# Patient Record
Sex: MALE | Race: BLACK OR AFRICAN AMERICAN | NOT HISPANIC OR LATINO | Employment: OTHER | URBAN - METROPOLITAN AREA
[De-identification: names, ages, dates, MRNs, and addresses within clinical notes are randomized per-mention and may not be internally consistent; named-entity substitution may affect disease eponyms.]

---

## 2015-03-23 LAB — HIV: NEGATIVE

## 2018-01-15 ENCOUNTER — OFFICE VISIT (OUTPATIENT)
Dept: FAMILY MEDICINE | Facility: CLINIC | Age: 58
End: 2018-01-15
Payer: MEDICARE

## 2018-01-15 VITALS
HEIGHT: 69 IN | TEMPERATURE: 99 F | RESPIRATION RATE: 17 BRPM | WEIGHT: 227.31 LBS | BODY MASS INDEX: 33.67 KG/M2 | OXYGEN SATURATION: 97 % | HEART RATE: 84 BPM | SYSTOLIC BLOOD PRESSURE: 110 MMHG | DIASTOLIC BLOOD PRESSURE: 84 MMHG

## 2018-01-15 DIAGNOSIS — Z23 NEED FOR PNEUMOCOCCAL VACCINATION: ICD-10-CM

## 2018-01-15 DIAGNOSIS — Z23 NEED FOR INFLUENZA VACCINATION: ICD-10-CM

## 2018-01-15 DIAGNOSIS — K21.9 GASTROESOPHAGEAL REFLUX DISEASE WITHOUT ESOPHAGITIS: ICD-10-CM

## 2018-01-15 DIAGNOSIS — I10 ESSENTIAL HYPERTENSION: ICD-10-CM

## 2018-01-15 DIAGNOSIS — Z98.890 HISTORY OF CORONARY ANGIOGRAM: ICD-10-CM

## 2018-01-15 DIAGNOSIS — M25.572 CHRONIC PAIN OF LEFT ANKLE: ICD-10-CM

## 2018-01-15 DIAGNOSIS — E11.9 TYPE 2 DIABETES MELLITUS WITHOUT COMPLICATION, WITH LONG-TERM CURRENT USE OF INSULIN: Primary | ICD-10-CM

## 2018-01-15 DIAGNOSIS — E78.49 OTHER HYPERLIPIDEMIA: ICD-10-CM

## 2018-01-15 DIAGNOSIS — Z79.4 TYPE 2 DIABETES MELLITUS WITHOUT COMPLICATION, WITH LONG-TERM CURRENT USE OF INSULIN: Primary | ICD-10-CM

## 2018-01-15 DIAGNOSIS — G89.29 CHRONIC PAIN OF LEFT ANKLE: ICD-10-CM

## 2018-01-15 PROCEDURE — 99204 OFFICE O/P NEW MOD 45 MIN: CPT | Mod: PBBFAC,PO,25 | Performed by: FAMILY MEDICINE

## 2018-01-15 PROCEDURE — 99204 OFFICE O/P NEW MOD 45 MIN: CPT | Mod: S$PBB,,, | Performed by: FAMILY MEDICINE

## 2018-01-15 PROCEDURE — G0009 ADMIN PNEUMOCOCCAL VACCINE: HCPCS | Mod: PBBFAC,PO

## 2018-01-15 PROCEDURE — 99999 PR PBB SHADOW E&M-NEW PATIENT-LVL IV: CPT | Mod: PBBFAC,,, | Performed by: FAMILY MEDICINE

## 2018-01-15 PROCEDURE — 90686 IIV4 VACC NO PRSV 0.5 ML IM: CPT | Mod: PBBFAC,PO

## 2018-01-15 RX ORDER — PEN NEEDLE, DIABETIC 29 G X1/2"
1 NEEDLE, DISPOSABLE MISCELLANEOUS DAILY
Qty: 90 EACH | Refills: 6 | Status: SHIPPED | OUTPATIENT
Start: 2018-01-15 | End: 2018-06-04 | Stop reason: ALTCHOICE

## 2018-01-15 RX ORDER — ACETAMINOPHEN 500 MG
500 TABLET ORAL EVERY 6 HOURS PRN
COMMUNITY

## 2018-01-15 RX ORDER — OMEPRAZOLE 20 MG/1
20 CAPSULE, DELAYED RELEASE ORAL DAILY
Qty: 90 CAPSULE | Refills: 2 | Status: SHIPPED | OUTPATIENT
Start: 2018-01-15 | End: 2019-01-30 | Stop reason: SDUPTHER

## 2018-01-15 RX ORDER — LISINOPRIL 20 MG/1
20 TABLET ORAL DAILY
Qty: 90 TABLET | Refills: 2 | Status: SHIPPED | OUTPATIENT
Start: 2018-01-15 | End: 2018-06-04 | Stop reason: SDUPTHER

## 2018-01-15 RX ORDER — TRAMADOL HYDROCHLORIDE 50 MG/1
50 TABLET ORAL 2 TIMES DAILY PRN
Qty: 60 TABLET | Refills: 1 | Status: SHIPPED | OUTPATIENT
Start: 2018-01-15 | End: 2018-01-25

## 2018-01-15 RX ORDER — INSULIN DETEMIR 100 [IU]/ML
40 INJECTION, SOLUTION SUBCUTANEOUS NIGHTLY
Qty: 15 ML | Refills: 8 | Status: SHIPPED | OUTPATIENT
Start: 2018-01-15 | End: 2019-02-28 | Stop reason: SDUPTHER

## 2018-01-15 RX ORDER — SIMVASTATIN 40 MG/1
40 TABLET, FILM COATED ORAL NIGHTLY
COMMUNITY
Start: 2017-11-15 | End: 2018-01-15 | Stop reason: SDUPTHER

## 2018-01-15 RX ORDER — CARVEDILOL 3.12 MG/1
3.12 TABLET ORAL 2 TIMES DAILY WITH MEALS
COMMUNITY
Start: 2017-11-15 | End: 2018-01-15 | Stop reason: SDUPTHER

## 2018-01-15 RX ORDER — CARVEDILOL 3.12 MG/1
3.12 TABLET ORAL 2 TIMES DAILY WITH MEALS
Qty: 180 TABLET | Refills: 2 | Status: SHIPPED | OUTPATIENT
Start: 2018-01-15 | End: 2018-06-04 | Stop reason: SDUPTHER

## 2018-01-15 RX ORDER — METFORMIN HYDROCHLORIDE 500 MG/1
500 TABLET ORAL 2 TIMES DAILY WITH MEALS
Qty: 180 TABLET | Refills: 3 | Status: SHIPPED | OUTPATIENT
Start: 2018-01-15 | End: 2018-10-10

## 2018-01-15 RX ORDER — INSULIN DETEMIR 100 [IU]/ML
40 INJECTION, SOLUTION SUBCUTANEOUS NIGHTLY
COMMUNITY
Start: 2017-11-15 | End: 2018-01-15 | Stop reason: SDUPTHER

## 2018-01-15 RX ORDER — SIMVASTATIN 40 MG/1
40 TABLET, FILM COATED ORAL NIGHTLY
Qty: 90 TABLET | Refills: 2 | Status: SHIPPED | OUTPATIENT
Start: 2018-01-15 | End: 2019-02-12 | Stop reason: SDUPTHER

## 2018-01-15 RX ORDER — LISINOPRIL 20 MG/1
20 TABLET ORAL DAILY
COMMUNITY
Start: 2017-11-15 | End: 2018-01-15 | Stop reason: SDUPTHER

## 2018-01-15 RX ORDER — OMEPRAZOLE 20 MG/1
20 CAPSULE, DELAYED RELEASE ORAL DAILY
COMMUNITY
Start: 2017-11-15 | End: 2018-01-15 | Stop reason: SDUPTHER

## 2018-01-15 NOTE — PROGRESS NOTES
Chief Complaint   Patient presents with    [facility]    Hypertension    Diabetes       HPI  Thierry Ho is a 57 y.o. male with medical diagnoses as listed in the medical history and problem list that presents to [facility].      S/P fall 2.5 years ago, repaired L ankle fx, +hardware. States using tylenol, evaluated by ortho, previously on tramadol.    DM2 - overall doing well, compliant with medications.     HTN - overall doing well, compliant with meds, no CP, HA or blurry vision. +Cardiac stress test, underwent angiogram (6 months ago). Needs Cardiology appt at [facility].      PAST MEDICAL HISTORY:  No past medical history on file.    PAST SURGICAL HISTORY:  Past Surgical History:   Procedure Laterality Date    BACK SURGERY  11/2017       SOCIAL HISTORY:  Social History     Social History    Marital status:      Spouse name: N/A    Number of children: N/A    Years of education: N/A     Occupational History    Not on file.     Social History Main Topics    Smoking status: Current Every Day Smoker     Types: Cigarettes     Start date: 1/15/1981    Smokeless tobacco: Never Used    Alcohol use Yes      Comment: social     Drug use: No    Sexual activity: Not on file     Other Topics Concern    Not on file     Social History Narrative    No narrative on file       FAMILY HISTORY:  Family History   Problem Relation Age of Onset    Heart disease Mother     Diabetes Father        ALLERGIES AND MEDICATIONS: updated and reviewed.  Review of patient's allergies indicates:  No Known Allergies  Current Outpatient Prescriptions   Medication Sig Dispense Refill    acetaminophen (TYLENOL) 500 MG tablet Take 500 mg by mouth every 6 (six) hours as needed for Pain.      carvedilol (COREG) 3.125 MG tablet Take 1 tablet (3.125 mg total) by mouth 2 (two) times daily with meals. 180 tablet 2    LEVEMIR FLEXTOUCH 100 unit/mL (3 mL) InPn pen Inject 40 Units into the skin every evening. 15 mL 8     "lisinopril (PRINIVIL,ZESTRIL) 20 MG tablet Take 1 tablet (20 mg total) by mouth once daily. 90 tablet 2    omeprazole (PRILOSEC) 20 MG capsule Take 1 capsule (20 mg total) by mouth once daily. 90 capsule 2    simvastatin (ZOCOR) 40 MG tablet Take 1 tablet (40 mg total) by mouth every evening. 90 tablet 2    metFORMIN (GLUCOPHAGE) 500 MG tablet Take 1 tablet (500 mg total) by mouth 2 (two) times daily with meals. 180 tablet 3    pen needle, diabetic (PEN NEEDLE) 29 gauge x 1/2" Ndle 1 Units by Misc.(Non-Drug; Combo Route) route once daily. 90 each 6    traMADol (ULTRAM) 50 mg tablet Take 1 tablet (50 mg total) by mouth 2 (two) times daily as needed for Pain. 60 tablet 1     No current facility-administered medications for this visit.        ROS  Review of Systems   Constitutional: Negative for activity change, fatigue and fever.   HENT: Negative for congestion, rhinorrhea and sore throat.    Eyes: Negative for visual disturbance.   Respiratory: Negative for cough, chest tightness and shortness of breath.    Cardiovascular: Negative for chest pain.   Gastrointestinal: Negative for abdominal pain, diarrhea, nausea and vomiting.   Genitourinary: Negative for dysuria, frequency and urgency.   Musculoskeletal: Positive for arthralgias, joint swelling and myalgias. Negative for back pain.   Skin: Negative for rash.   Neurological: Negative for dizziness, syncope and numbness.   Psychiatric/Behavioral: Negative for agitation.       Physical Exam  Vitals:    01/15/18 1448   BP: 110/84   Pulse: 84   Resp: 17   Temp: 98.7 °F (37.1 °C)    Body mass index is 33.47 kg/m².  Weight: 103.1 kg (227 lb 4.7 oz)   Height: 5' 9.09" (175.5 cm)     Physical Exam   Constitutional: He is oriented to person, place, and time. He appears well-developed and well-nourished.   HENT:   Head: Normocephalic and atraumatic.   Eyes: Conjunctivae and EOM are normal. Pupils are equal, round, and reactive to light.   Neck: Normal range of motion. " "Neck supple.   Cardiovascular: Normal rate, regular rhythm and normal heart sounds.    Pulmonary/Chest: Effort normal and breath sounds normal.   Abdominal: Soft. Bowel sounds are normal.   Musculoskeletal: Normal range of motion.   L ankle - mild dec ROM, mild TTP   Neurological: He is alert and oriented to person, place, and time. He has normal reflexes.   Skin: Skin is warm and dry.   Psychiatric: He has a normal mood and affect. His behavior is normal. Judgment and thought content normal.       Health Maintenance    Patient has no pending health maintenance at this time         Assessment & Plan    Type 2 diabetes mellitus without complication, with long-term current use of insulin  -     pen needle, diabetic (PEN NEEDLE) 29 gauge x 1/2" Ndle; 1 Units by Misc.(Non-Drug; Combo Route) route once daily.  Dispense: 90 each; Refill: 6  -     LEVEMIR FLEXTOUCH 100 unit/mL (3 mL) InPn pen; Inject 40 Units into the skin every evening.  Dispense: 15 mL; Refill: 8  -     metFORMIN (GLUCOPHAGE) 500 MG tablet; Take 1 tablet (500 mg total) by mouth 2 (two) times daily with meals.  Dispense: 180 tablet; Refill: 3  - Continue current medication regimen as prescribed  - Refilled medications.    Other hyperlipidemia  -     simvastatin (ZOCOR) 40 MG tablet; Take 1 tablet (40 mg total) by mouth every evening.  Dispense: 90 tablet; Refill: 2    Gastroesophageal reflux disease without esophagitis  -     omeprazole (PRILOSEC) 20 MG capsule; Take 1 capsule (20 mg total) by mouth once daily.  Dispense: 90 capsule; Refill: 2  - Continue current medication regimen as prescribed    Essential hypertension, History of coronary angiogram  -     lisinopril (PRINIVIL,ZESTRIL) 20 MG tablet; Take 1 tablet (20 mg total) by mouth once daily.  Dispense: 90 tablet; Refill: 2  -     carvedilol (COREG) 3.125 MG tablet; Take 1 tablet (3.125 mg total) by mouth 2 (two) times daily with meals.  Dispense: 180 tablet; Refill: 2  - Continue current " medication regimen as prescribed    Chronic pain of left ankle  -     traMADol (ULTRAM) 50 mg tablet; Take 1 tablet (50 mg total) by mouth 2 (two) times daily as needed for Pain.  Dispense: 60 tablet; Refill: 1    Need for pneumococcal vaccination  -     (In Office Administered) Pneumococcal Conjugate Vaccine (13 Valent) (IM)    Need for influenza vaccination  -     Influenza - Quadrivalent (3 years & older) (PF)        Follow-up in about 4 weeks (around 2/12/2018).

## 2018-01-15 NOTE — PROGRESS NOTES
Patient given influenza right deltoid and prevnar 13 vaccine left deltoid, tolerated well, no complaints, no reaction noted

## 2018-01-18 ENCOUNTER — TELEPHONE (OUTPATIENT)
Dept: FAMILY MEDICINE | Facility: CLINIC | Age: 58
End: 2018-01-18

## 2018-01-18 NOTE — TELEPHONE ENCOUNTER
Spoke with pharmacy and was notified script went through patient insurance for $1.25. Patient called and notified of the same

## 2018-01-18 NOTE — TELEPHONE ENCOUNTER
----- Message from Jessica Zacarias sent at 1/18/2018  9:09 AM CST -----  Contact: Self  Patient says pharmacy would not fill script because they say something is wrong on the directions. Please call Pharmacy at [phone].    traMADol (ULTRAM) 50 mg tablet    [facility] [address] [address] [address]

## 2018-01-19 DIAGNOSIS — E11.9 TYPE 2 DIABETES MELLITUS WITHOUT COMPLICATION: ICD-10-CM

## 2018-06-04 ENCOUNTER — LAB VISIT (OUTPATIENT)
Dept: LAB | Facility: HOSPITAL | Age: 58
End: 2018-06-04
Attending: FAMILY MEDICINE
Payer: MEDICARE

## 2018-06-04 ENCOUNTER — TELEPHONE (OUTPATIENT)
Dept: FAMILY MEDICINE | Facility: CLINIC | Age: 58
End: 2018-06-04

## 2018-06-04 ENCOUNTER — OFFICE VISIT (OUTPATIENT)
Dept: FAMILY MEDICINE | Facility: CLINIC | Age: 58
End: 2018-06-04
Payer: MEDICARE

## 2018-06-04 VITALS
OXYGEN SATURATION: 98 % | HEIGHT: 69 IN | RESPIRATION RATE: 20 BRPM | TEMPERATURE: 99 F | SYSTOLIC BLOOD PRESSURE: 158 MMHG | DIASTOLIC BLOOD PRESSURE: 96 MMHG | HEART RATE: 79 BPM | BODY MASS INDEX: 32.65 KG/M2 | WEIGHT: 220.44 LBS

## 2018-06-04 DIAGNOSIS — E11.9 TYPE 2 DIABETES MELLITUS WITHOUT COMPLICATION, WITH LONG-TERM CURRENT USE OF INSULIN: ICD-10-CM

## 2018-06-04 DIAGNOSIS — Z12.11 COLON CANCER SCREENING: ICD-10-CM

## 2018-06-04 DIAGNOSIS — G89.29 CHRONIC PAIN OF LEFT ANKLE: ICD-10-CM

## 2018-06-04 DIAGNOSIS — I10 ESSENTIAL HYPERTENSION: ICD-10-CM

## 2018-06-04 DIAGNOSIS — Z11.59 NEED FOR HEPATITIS C SCREENING TEST: ICD-10-CM

## 2018-06-04 DIAGNOSIS — M25.572 CHRONIC PAIN OF LEFT ANKLE: ICD-10-CM

## 2018-06-04 DIAGNOSIS — Z11.59 NEED FOR HEPATITIS C SCREENING TEST: Primary | ICD-10-CM

## 2018-06-04 DIAGNOSIS — L30.9 DERMATITIS: ICD-10-CM

## 2018-06-04 DIAGNOSIS — E11.9 TYPE 2 DIABETES MELLITUS WITHOUT COMPLICATION: ICD-10-CM

## 2018-06-04 DIAGNOSIS — Z79.4 TYPE 2 DIABETES MELLITUS WITHOUT COMPLICATION, WITH LONG-TERM CURRENT USE OF INSULIN: ICD-10-CM

## 2018-06-04 LAB
ALBUMIN SERPL BCP-MCNC: 3.5 G/DL
ALP SERPL-CCNC: 83 U/L
ALT SERPL W/O P-5'-P-CCNC: 27 U/L
ANION GAP SERPL CALC-SCNC: 7 MMOL/L
AST SERPL-CCNC: 21 U/L
BASOPHILS # BLD AUTO: 0.03 K/UL
BASOPHILS NFR BLD: 0.4 %
BILIRUB SERPL-MCNC: 0.2 MG/DL
BUN SERPL-MCNC: 10 MG/DL
CALCIUM SERPL-MCNC: 10.9 MG/DL
CHLORIDE SERPL-SCNC: 110 MMOL/L
CHOLEST SERPL-MCNC: 108 MG/DL
CHOLEST/HDLC SERPL: 2.7 {RATIO}
CO2 SERPL-SCNC: 25 MMOL/L
CREAT SERPL-MCNC: 0.8 MG/DL
DIFFERENTIAL METHOD: ABNORMAL
EOSINOPHIL # BLD AUTO: 0.3 K/UL
EOSINOPHIL NFR BLD: 4.1 %
ERYTHROCYTE [DISTWIDTH] IN BLOOD BY AUTOMATED COUNT: 14.7 %
EST. GFR  (AFRICAN AMERICAN): >60 ML/MIN/1.73 M^2
EST. GFR  (NON AFRICAN AMERICAN): >60 ML/MIN/1.73 M^2
ESTIMATED AVG GLUCOSE: 186 MG/DL
GLUCOSE SERPL-MCNC: 180 MG/DL
HBA1C MFR BLD HPLC: 8.1 %
HCT VFR BLD AUTO: 41.5 %
HDLC SERPL-MCNC: 40 MG/DL
HDLC SERPL: 37 %
HGB BLD-MCNC: 14.1 G/DL
IMM GRANULOCYTES # BLD AUTO: 0.02 K/UL
IMM GRANULOCYTES NFR BLD AUTO: 0.3 %
LDLC SERPL CALC-MCNC: 49.4 MG/DL
LYMPHOCYTES # BLD AUTO: 2.4 K/UL
LYMPHOCYTES NFR BLD: 34.3 %
MCH RBC QN AUTO: 31.7 PG
MCHC RBC AUTO-ENTMCNC: 34 G/DL
MCV RBC AUTO: 93 FL
MONOCYTES # BLD AUTO: 0.6 K/UL
MONOCYTES NFR BLD: 8.8 %
NEUTROPHILS # BLD AUTO: 3.7 K/UL
NEUTROPHILS NFR BLD: 52.1 %
NONHDLC SERPL-MCNC: 68 MG/DL
NRBC BLD-RTO: 0 /100 WBC
PLATELET # BLD AUTO: 260 K/UL
PMV BLD AUTO: 11.2 FL
POTASSIUM SERPL-SCNC: 4.6 MMOL/L
PROT SERPL-MCNC: 6.9 G/DL
RBC # BLD AUTO: 4.45 M/UL
SODIUM SERPL-SCNC: 142 MMOL/L
T4 FREE SERPL-MCNC: 1.02 NG/DL
TRIGL SERPL-MCNC: 93 MG/DL
TSH SERPL DL<=0.005 MIU/L-ACNC: 2.42 UIU/ML
WBC # BLD AUTO: 7.05 K/UL

## 2018-06-04 PROCEDURE — 84439 ASSAY OF FREE THYROXINE: CPT

## 2018-06-04 PROCEDURE — 99215 OFFICE O/P EST HI 40 MIN: CPT | Mod: PBBFAC,PO | Performed by: FAMILY MEDICINE

## 2018-06-04 PROCEDURE — 83036 HEMOGLOBIN GLYCOSYLATED A1C: CPT

## 2018-06-04 PROCEDURE — 80061 LIPID PANEL: CPT

## 2018-06-04 PROCEDURE — 80053 COMPREHEN METABOLIC PANEL: CPT

## 2018-06-04 PROCEDURE — 85025 COMPLETE CBC W/AUTO DIFF WBC: CPT

## 2018-06-04 PROCEDURE — 84443 ASSAY THYROID STIM HORMONE: CPT

## 2018-06-04 PROCEDURE — 99999 PR PBB SHADOW E&M-EST. PATIENT-LVL V: CPT | Mod: PBBFAC,,, | Performed by: FAMILY MEDICINE

## 2018-06-04 PROCEDURE — 86803 HEPATITIS C AB TEST: CPT

## 2018-06-04 PROCEDURE — 99214 OFFICE O/P EST MOD 30 MIN: CPT | Mod: S$PBB,,, | Performed by: FAMILY MEDICINE

## 2018-06-04 PROCEDURE — 36415 COLL VENOUS BLD VENIPUNCTURE: CPT | Mod: PO

## 2018-06-04 RX ORDER — TRAMADOL HYDROCHLORIDE 50 MG/1
50 TABLET ORAL 2 TIMES DAILY PRN
Qty: 60 TABLET | Refills: 0 | Status: SHIPPED | OUTPATIENT
Start: 2018-06-04 | End: 2018-06-14

## 2018-06-04 RX ORDER — LISINOPRIL 20 MG/1
20 TABLET ORAL DAILY
Qty: 90 TABLET | Refills: 2 | Status: SHIPPED | OUTPATIENT
Start: 2018-06-04 | End: 2019-03-13 | Stop reason: SDUPTHER

## 2018-06-04 RX ORDER — PEN NEEDLE, DIABETIC 31 GX5/16"
NEEDLE, DISPOSABLE MISCELLANEOUS
Refills: 6 | COMMUNITY
Start: 2018-05-03 | End: 2019-06-04 | Stop reason: SDUPTHER

## 2018-06-04 RX ORDER — CARVEDILOL 3.12 MG/1
3.12 TABLET ORAL 2 TIMES DAILY WITH MEALS
Qty: 180 TABLET | Refills: 2 | Status: SHIPPED | OUTPATIENT
Start: 2018-06-04 | End: 2019-01-29

## 2018-06-04 RX ORDER — CLOBETASOL PROPIONATE 0.05 G/100ML
SHAMPOO TOPICAL DAILY PRN
Qty: 118 ML | Refills: 1 | Status: SHIPPED | OUTPATIENT
Start: 2018-06-04 | End: 2018-07-27

## 2018-06-04 NOTE — LETTER
June 4, 2018    Thierry Ho  [address] [place] [address]             [facility] [address] [place] [address]  Phone: [phone]  Fax: [phone] To whom it may concern,    Mr. Ho has a history of ankle fracture repair and has difficulty walking over 200 feet at a time and also requires the use of a cane while ambulating.       If you have any questions or concerns, please don't hesitate to call.    Sincerely,          Tejas Barker MD

## 2018-06-04 NOTE — PROGRESS NOTES
"Chief Complaint   Patient presents with    Hypertension    Diabetes       HPI  Thierry Ho is a 58 y.o. male with multiple medical diagnoses as listed in the medical history and problem list that presents for follow up.      Scalp lesions - +pruritic, hyperpigmented lesions, 3-4 month hx.     S/P fall 3 years ago, repaired L ankle fx, +hardware. States using tylenol, evaluated by ortho, previously on tramadol. Difficulty walking long distances only.     DM2 - compliant with medications, needs updated labs.     HTN - out of meds x 1 month. Denies CP, HA or blurry vision.      Pt is known to me and was last seen by me on 1/15/2018.    PAST MEDICAL HISTORY:  No past medical history on file.    PAST SURGICAL HISTORY:  Past Surgical History:   Procedure Laterality Date    BACK SURGERY  11/2017       SOCIAL HISTORY:  Social History     Social History    Marital status:      Spouse name: N/A    Number of children: N/A    Years of education: N/A     Occupational History    Not on file.     Social History Main Topics    Smoking status: Current Every Day Smoker     Types: Cigarettes     Start date: 1/15/1981    Smokeless tobacco: Never Used    Alcohol use Yes      Comment: social     Drug use: No    Sexual activity: Not on file     Other Topics Concern    Not on file     Social History Narrative    No narrative on file       FAMILY HISTORY:  Family History   Problem Relation Age of Onset    Heart disease Mother     Diabetes Father        ALLERGIES AND MEDICATIONS: updated and reviewed.  Review of patient's allergies indicates:  No Known Allergies  Current Outpatient Prescriptions   Medication Sig Dispense Refill    acetaminophen (TYLENOL) 500 MG tablet Take 500 mg by mouth every 6 (six) hours as needed for Pain.      BD ULTRA-FINE FREDA PEN NEEDLE 32 gauge x 5/32" Ndle U ONCE D UTD  6    carvedilol (COREG) 3.125 MG tablet Take 1 tablet (3.125 mg total) by mouth 2 (two) times daily with meals. 180 " "tablet 2    LEVEMIR FLEXTOUCH 100 unit/mL (3 mL) InPn pen Inject 40 Units into the skin every evening. 15 mL 8    lisinopril (PRINIVIL,ZESTRIL) 20 MG tablet Take 1 tablet (20 mg total) by mouth once daily. 90 tablet 2    metFORMIN (GLUCOPHAGE) 500 MG tablet Take 1 tablet (500 mg total) by mouth 2 (two) times daily with meals. 180 tablet 3    omeprazole (PRILOSEC) 20 MG capsule Take 1 capsule (20 mg total) by mouth once daily. 90 capsule 2    simvastatin (ZOCOR) 40 MG tablet Take 1 tablet (40 mg total) by mouth every evening. 90 tablet 2    clobetasol (CLOBEX) 0.05 % shampoo Apply topically daily as needed. 118 mL 1    traMADol (ULTRAM) 50 mg tablet Take 1 tablet (50 mg total) by mouth 2 (two) times daily as needed. 60 tablet 0     No current facility-administered medications for this visit.        ROS  Review of Systems   Constitutional: Negative for activity change, fatigue and fever.   HENT: Negative for congestion, rhinorrhea and sore throat.    Eyes: Negative for visual disturbance.   Respiratory: Negative for cough, chest tightness and shortness of breath.    Cardiovascular: Negative for chest pain.   Gastrointestinal: Negative for abdominal pain, diarrhea, nausea and vomiting.   Genitourinary: Negative for dysuria, frequency and urgency.   Musculoskeletal: Positive for arthralgias, joint swelling and myalgias. Negative for back pain.   Skin: Negative for rash.   Neurological: Negative for dizziness, syncope and numbness.   Psychiatric/Behavioral: Negative for agitation.       Physical Exam  Vitals:    06/04/18 1246   BP: (!) 158/96   Pulse: 79   Resp: 20   Temp: 98.8 °F (37.1 °C)    Body mass index is 32.56 kg/m².  Weight: 100 kg (220 lb 7.4 oz)   Height: 5' 9" (175.3 cm)     Physical Exam   Constitutional: He is oriented to person, place, and time. He appears well-developed and well-nourished.   HENT:   Head: Normocephalic.   Eyes: EOM are normal.   Neck: Normal range of motion.   Musculoskeletal:   L " ankle - dec ROM, global TTP   Neurological: He is alert and oriented to person, place, and time.   Skin: Skin is warm and dry.   Psychiatric: He has a normal mood and affect. His behavior is normal. Judgment and thought content normal.   Nursing note and vitals reviewed.      Health Maintenance       Date Due Completion Date    Hepatitis C Screening 1960 ---    Lipid Panel 1961 ---    Hemoglobin A1c 1961 ---    Foot Exam 04/28/1971 ---    Eye Exam 04/28/1971 ---    Colonoscopy 04/28/2011 ---    Influenza Vaccine 08/01/2018 1/15/2018    Low Dose Statin 01/22/2019 1/22/2018    TETANUS VACCINE 10/01/2025 10/1/2015    Pneumococcal PPSV23 (Medium Risk) (2) 04/28/2026 10/1/2015          Assessment & Plan    Need for hepatitis C screening test  -     Hepatitis C antibody; Future; Expected date: 06/04/2018    Essential hypertension  -     lisinopril (PRINIVIL,ZESTRIL) 20 MG tablet; Take 1 tablet (20 mg total) by mouth once daily.  Dispense: 90 tablet; Refill: 2  -     carvedilol (COREG) 3.125 MG tablet; Take 1 tablet (3.125 mg total) by mouth 2 (two) times daily with meals.  Dispense: 180 tablet; Refill: 2  -     Comprehensive metabolic panel; Future; Expected date: 06/04/2018  -     T4, free; Future; Expected date: 06/04/2018  -     TSH; Future; Expected date: 06/04/2018  -     Lipid panel; Future; Expected date: 06/04/2018  -     CBC auto differential; Future; Expected date: 06/04/2018  - Assess labs today    Colon cancer screening  -     Case request GI: COLONOSCOPY    Type 2 diabetes mellitus without complication, with long-term current use of insulin  -     Diabetic Eye Screening Photo; Future  -     Ambulatory referral to Ophthalmology    Dermatitis  -     Ambulatory referral to Dermatology  -     clobetasol (CLOBEX) 0.05 % shampoo; Apply topically daily as needed.  Dispense: 118 mL; Refill: 1    Other orders  -     Cancel: Case request GI        Follow-up in about 3 months (around 9/4/2018), or if  symptoms worsen or fail to improve.

## 2018-06-04 NOTE — TELEPHONE ENCOUNTER
----- Message from Eloina Solano sent at 6/4/2018  2:33 PM CDT -----  Contact: self  Patient called,  clobetasol (CLOBEX) 0.05 % shampoo is over 300.00. Please re-send another medication.    Thanks!

## 2018-06-05 ENCOUNTER — TELEPHONE (OUTPATIENT)
Dept: OPTOMETRY | Facility: CLINIC | Age: 58
End: 2018-06-05

## 2018-06-05 DIAGNOSIS — Z12.11 SCREEN FOR COLON CANCER: Primary | ICD-10-CM

## 2018-06-05 LAB — HCV AB SERPL QL IA: NEGATIVE

## 2018-06-06 RX ORDER — FLUOCINOLONE ACETONIDE 0.1 MG/ML
SOLUTION TOPICAL DAILY
Qty: 60 ML | Refills: 2 | Status: SHIPPED | OUTPATIENT
Start: 2018-06-06 | End: 2019-07-09

## 2018-06-06 NOTE — TELEPHONE ENCOUNTER
----- Message from Abhishek Hoyt sent at 6/6/2018 10:32 AM CDT -----  Contact: [phone]/PT  Calling TO speak with nurse to see why medication haven't been called in yet. [facility] [address]

## 2018-06-08 ENCOUNTER — TELEPHONE (OUTPATIENT)
Dept: ADMINISTRATIVE | Facility: HOSPITAL | Age: 58
End: 2018-06-08

## 2018-07-25 ENCOUNTER — TELEPHONE (OUTPATIENT)
Dept: ENDOSCOPY | Facility: HOSPITAL | Age: 58
End: 2018-07-25

## 2018-07-27 ENCOUNTER — OFFICE VISIT (OUTPATIENT)
Dept: FAMILY MEDICINE | Facility: CLINIC | Age: 58
End: 2018-07-27
Payer: MEDICARE

## 2018-07-27 VITALS
DIASTOLIC BLOOD PRESSURE: 98 MMHG | HEART RATE: 90 BPM | OXYGEN SATURATION: 98 % | WEIGHT: 211.88 LBS | BODY MASS INDEX: 31.38 KG/M2 | HEIGHT: 69 IN | SYSTOLIC BLOOD PRESSURE: 150 MMHG | RESPIRATION RATE: 20 BRPM | TEMPERATURE: 99 F

## 2018-07-27 DIAGNOSIS — K21.9 GASTROESOPHAGEAL REFLUX DISEASE WITHOUT ESOPHAGITIS: ICD-10-CM

## 2018-07-27 DIAGNOSIS — I10 ESSENTIAL HYPERTENSION: ICD-10-CM

## 2018-07-27 DIAGNOSIS — E78.49 OTHER HYPERLIPIDEMIA: Primary | ICD-10-CM

## 2018-07-27 DIAGNOSIS — M79.672 FOOT PAIN, LEFT: ICD-10-CM

## 2018-07-27 DIAGNOSIS — E11.9 TYPE 2 DIABETES MELLITUS WITHOUT COMPLICATION, WITHOUT LONG-TERM CURRENT USE OF INSULIN: ICD-10-CM

## 2018-07-27 PROCEDURE — 99999 PR PBB SHADOW E&M-EST. PATIENT-LVL III: CPT | Mod: PBBFAC,,, | Performed by: FAMILY MEDICINE

## 2018-07-27 PROCEDURE — 99214 OFFICE O/P EST MOD 30 MIN: CPT | Mod: S$PBB,,, | Performed by: FAMILY MEDICINE

## 2018-07-27 PROCEDURE — 99213 OFFICE O/P EST LOW 20 MIN: CPT | Mod: PBBFAC,PO | Performed by: FAMILY MEDICINE

## 2018-07-27 RX ORDER — NEBULIZER AND COMPRESSOR
1 EACH MISCELLANEOUS DAILY
Qty: 1 EACH | Refills: 0 | Status: SHIPPED | OUTPATIENT
Start: 2018-07-27

## 2018-07-27 RX ORDER — TRAMADOL HYDROCHLORIDE 50 MG/1
50 TABLET ORAL EVERY 12 HOURS PRN
Qty: 60 TABLET | Refills: 2 | Status: SHIPPED | OUTPATIENT
Start: 2018-07-27 | End: 2018-08-06

## 2018-07-27 NOTE — PROGRESS NOTES
"Chief Complaint   Patient presents with    Hair/Scalp Problem       HPI  Thierry Ho is a 58 y.o. male with multiple medical diagnoses as listed in the medical history and problem list that presents for follow up.      HTN - Pt states out of meds x 2 months, states no BP meds today. Denies CP, HA or blurry vision.     S/P fall 3 years ago, repaired L ankle fx, +hardware. States using tylenol, evaluated by ortho, previously on tramadol. Difficulty walking long distances only. States no further therapy at this time.     DM2 - currently on levemir 40 units nightly (will decrease to 20 if BG at night <170),     Pt is known to me and was last seen by me on 6/4/2018.    PAST MEDICAL HISTORY:  History reviewed. No pertinent past medical history.    PAST SURGICAL HISTORY:  Past Surgical History:   Procedure Laterality Date    BACK SURGERY  11/2017       SOCIAL HISTORY:  Social History     Social History    Marital status:      Spouse name: N/A    Number of children: N/A    Years of education: N/A     Occupational History    Not on file.     Social History Main Topics    Smoking status: Current Every Day Smoker     Types: Cigarettes     Start date: 1/15/1981    Smokeless tobacco: Never Used    Alcohol use Yes      Comment: social     Drug use: No    Sexual activity: Not on file     Other Topics Concern    Not on file     Social History Narrative    No narrative on file       FAMILY HISTORY:  Family History   Problem Relation Age of Onset    Heart disease Mother     Diabetes Father        ALLERGIES AND MEDICATIONS: updated and reviewed.  Review of patient's allergies indicates:  No Known Allergies  Current Outpatient Prescriptions   Medication Sig Dispense Refill    acetaminophen (TYLENOL) 500 MG tablet Take 500 mg by mouth every 6 (six) hours as needed for Pain.      BD ULTRA-FINE FREDA PEN NEEDLE 32 gauge x 5/32" Ndle U ONCE D UTD  6    carvedilol (COREG) 3.125 MG tablet Take 1 tablet (3.125 mg " "total) by mouth 2 (two) times daily with meals. 180 tablet 2    fluocinolone (SYNALAR) 0.01 % external solution Apply topically once daily. 60 mL 2    LEVEMIR FLEXTOUCH 100 unit/mL (3 mL) InPn pen Inject 40 Units into the skin every evening. 15 mL 8    lisinopril (PRINIVIL,ZESTRIL) 20 MG tablet Take 1 tablet (20 mg total) by mouth once daily. 90 tablet 2    metFORMIN (GLUCOPHAGE) 500 MG tablet Take 1 tablet (500 mg total) by mouth 2 (two) times daily with meals. 180 tablet 3    omeprazole (PRILOSEC) 20 MG capsule Take 1 capsule (20 mg total) by mouth once daily. 90 capsule 2    BLOOD PRESSURE CUFF Misc 1 kit by Misc.(Non-Drug; Combo Route) route once daily. 1 each 0    simvastatin (ZOCOR) 40 MG tablet Take 1 tablet (40 mg total) by mouth every evening. 90 tablet 2     No current facility-administered medications for this visit.        ROS  Review of Systems   Constitutional: Negative for activity change, fatigue and fever.   HENT: Negative for congestion, rhinorrhea and sore throat.    Eyes: Negative for visual disturbance.   Respiratory: Negative for cough, chest tightness and shortness of breath.    Cardiovascular: Negative for chest pain.   Gastrointestinal: Negative for abdominal pain, diarrhea, nausea and vomiting.   Genitourinary: Negative for dysuria, frequency and urgency.   Musculoskeletal: Positive for arthralgias, joint swelling and myalgias. Negative for back pain.   Skin: Negative for rash.   Neurological: Negative for dizziness, syncope and numbness.   Psychiatric/Behavioral: Negative for agitation.       Physical Exam  Vitals:    07/27/18 1451   BP: (!) 150/98   Pulse: 90   Resp: 20   Temp: 98.8 °F (37.1 °C)    Body mass index is 31.29 kg/m².  Weight: 96.1 kg (211 lb 13.8 oz)   Height: 5' 9" (175.3 cm)     Physical Exam   Constitutional: He is oriented to person, place, and time. He appears well-developed and well-nourished.   HENT:   Head: Normocephalic.   Eyes: EOM are normal.   Neck: Normal " range of motion.   Neurological: He is alert and oriented to person, place, and time.   Skin: Skin is warm and dry.   Psychiatric: He has a normal mood and affect. His behavior is normal. Judgment and thought content normal.   Nursing note and vitals reviewed.      Health Maintenance       Date Due Completion Date    Foot Exam 04/28/1971 ---    Eye Exam 04/28/1971 ---    Colonoscopy 04/28/2011 ---    Influenza Vaccine 08/01/2018 1/15/2018    Hemoglobin A1c 09/04/2018 6/4/2018    Low Dose Statin 06/04/2019 6/4/2018    Lipid Panel 06/04/2019 6/4/2018    TETANUS VACCINE 10/01/2025 10/1/2015    Pneumococcal PPSV23 (Medium Risk) (2) 04/28/2026 10/1/2015          Assessment & Plan    Other hyperlipidemia  - Continue current medication regimen as prescribed    Gastroesophageal reflux disease without esophagitis  - Continue current medication regimen as prescribed    Essential hypertension  - Cont to monitor closely  - Pt restarted medications yesterday, no meds today    Foot pain, left  - Continue current medication regimen as prescribed    DM2   - Continue current medication regimen as prescribed  - Monitor BG in am for fasting levels.     Follow-up in about 4 weeks (around 8/24/2018), or if symptoms worsen or fail to improve.

## 2018-08-03 ENCOUNTER — CLINICAL SUPPORT (OUTPATIENT)
Dept: FAMILY MEDICINE | Facility: CLINIC | Age: 58
End: 2018-08-03
Payer: MEDICARE

## 2018-08-03 VITALS — SYSTOLIC BLOOD PRESSURE: 138 MMHG | DIASTOLIC BLOOD PRESSURE: 88 MMHG

## 2018-08-03 DIAGNOSIS — I10 ESSENTIAL HYPERTENSION: Primary | ICD-10-CM

## 2018-08-03 PROCEDURE — 99213 OFFICE O/P EST LOW 20 MIN: CPT | Mod: PBBFAC,PO

## 2018-08-03 PROCEDURE — 99999 PR PBB SHADOW E&M-EST. PATIENT-LVL III: CPT | Mod: PBBFAC,,,

## 2018-08-03 PROCEDURE — 99499 UNLISTED E&M SERVICE: CPT | Mod: S$PBB,,, | Performed by: FAMILY MEDICINE

## 2018-08-03 NOTE — PROGRESS NOTES
"Thierry Ho 58 y.o. male is here today for Blood Pressure check.   History of HTN yes.    Review of patient's allergies indicates:  No Known Allergies  Creatinine   Date Value Ref Range Status   06/04/2018 0.8 0.5 - 1.4 mg/dL Final     Sodium   Date Value Ref Range Status   06/04/2018 142 136 - 145 mmol/L Final     Potassium   Date Value Ref Range Status   06/04/2018 4.6 3.5 - 5.1 mmol/L Final   ]  Patient verifies taking blood pressure medications on a regular basis at the same time of the day.     Current Outpatient Prescriptions:     acetaminophen (TYLENOL) 500 MG tablet, Take 500 mg by mouth every 6 (six) hours as needed for Pain., Disp: , Rfl:     BD ULTRA-FINE FREDA PEN NEEDLE 32 gauge x 5/32" Ndle, U ONCE D UTD, Disp: , Rfl: 6    BLOOD PRESSURE CUFF Misc, 1 kit by Misc.(Non-Drug; Combo Route) route once daily., Disp: 1 each, Rfl: 0    carvedilol (COREG) 3.125 MG tablet, Take 1 tablet (3.125 mg total) by mouth 2 (two) times daily with meals., Disp: 180 tablet, Rfl: 2    LEVEMIR FLEXTOUCH 100 unit/mL (3 mL) InPn pen, Inject 40 Units into the skin every evening., Disp: 15 mL, Rfl: 8    lisinopril (PRINIVIL,ZESTRIL) 20 MG tablet, Take 1 tablet (20 mg total) by mouth once daily., Disp: 90 tablet, Rfl: 2    metFORMIN (GLUCOPHAGE) 500 MG tablet, Take 1 tablet (500 mg total) by mouth 2 (two) times daily with meals., Disp: 180 tablet, Rfl: 3    omeprazole (PRILOSEC) 20 MG capsule, Take 1 capsule (20 mg total) by mouth once daily., Disp: 90 capsule, Rfl: 2    simvastatin (ZOCOR) 40 MG tablet, Take 1 tablet (40 mg total) by mouth every evening., Disp: 90 tablet, Rfl: 2    traMADol (ULTRAM) 50 mg tablet, Take 1 tablet (50 mg total) by mouth every 12 (twelve) hours as needed for Pain., Disp: 60 tablet, Rfl: 2    fluocinolone (SYNALAR) 0.01 % external solution, Apply topically once daily., Disp: 60 mL, Rfl: 2  Does patient have record of home blood pressure readings no. Readings have been averaging no readings " to report.   Last dose of blood pressure medication was taken at 6:30 am8/3/18.  Patient is symptomatic.   Complains of dizziness this morning when he awoke.    Vitals:    08/03/18 0907 08/03/18 0921   BP: (!) 152/96 138/88   BP Location: Left arm Left arm   Patient Position: Sitting Sitting   BP Method: Medium (Manual) Medium (Manual)         Dr. Barker informed of nurse visit.

## 2018-08-13 ENCOUNTER — CLINICAL SUPPORT (OUTPATIENT)
Dept: FAMILY MEDICINE | Facility: CLINIC | Age: 58
End: 2018-08-13
Payer: MEDICARE

## 2018-08-13 VITALS — SYSTOLIC BLOOD PRESSURE: 136 MMHG | DIASTOLIC BLOOD PRESSURE: 88 MMHG | HEART RATE: 82 BPM

## 2018-08-13 DIAGNOSIS — I10 ESSENTIAL HYPERTENSION: Primary | ICD-10-CM

## 2018-08-13 PROCEDURE — 99499 UNLISTED E&M SERVICE: CPT | Mod: S$PBB,,, | Performed by: FAMILY MEDICINE

## 2018-08-13 PROCEDURE — 99212 OFFICE O/P EST SF 10 MIN: CPT | Mod: PBBFAC,PO

## 2018-08-13 PROCEDURE — 99999 PR PBB SHADOW E&M-EST. PATIENT-LVL II: CPT | Mod: PBBFAC,,,

## 2018-08-13 NOTE — PROGRESS NOTES
".  Thierry Ho 58 y.o. male is here today for Blood Pressure check.   History of HTN yes.    Review of patient's allergies indicates:  No Known Allergies  Creatinine   Date Value Ref Range Status   06/04/2018 0.8 0.5 - 1.4 mg/dL Final     Sodium   Date Value Ref Range Status   06/04/2018 142 136 - 145 mmol/L Final     Potassium   Date Value Ref Range Status   06/04/2018 4.6 3.5 - 5.1 mmol/L Final   ]  Patient verifies taking blood pressure medications on a regular basis at the same time of the day.     Current Outpatient Medications:     acetaminophen (TYLENOL) 500 MG tablet, Take 500 mg by mouth every 6 (six) hours as needed for Pain., Disp: , Rfl:     BD ULTRA-FINE FREDA PEN NEEDLE 32 gauge x 5/32" Ndle, U ONCE D UTD, Disp: , Rfl: 6    BLOOD PRESSURE CUFF Misc, 1 kit by Misc.(Non-Drug; Combo Route) route once daily., Disp: 1 each, Rfl: 0    carvedilol (COREG) 3.125 MG tablet, Take 1 tablet (3.125 mg total) by mouth 2 (two) times daily with meals., Disp: 180 tablet, Rfl: 2    fluocinolone (SYNALAR) 0.01 % external solution, Apply topically once daily., Disp: 60 mL, Rfl: 2    LEVEMIR FLEXTOUCH 100 unit/mL (3 mL) InPn pen, Inject 40 Units into the skin every evening., Disp: 15 mL, Rfl: 8    lisinopril (PRINIVIL,ZESTRIL) 20 MG tablet, Take 1 tablet (20 mg total) by mouth once daily., Disp: 90 tablet, Rfl: 2    metFORMIN (GLUCOPHAGE) 500 MG tablet, Take 1 tablet (500 mg total) by mouth 2 (two) times daily with meals., Disp: 180 tablet, Rfl: 3    omeprazole (PRILOSEC) 20 MG capsule, Take 1 capsule (20 mg total) by mouth once daily., Disp: 90 capsule, Rfl: 2    simvastatin (ZOCOR) 40 MG tablet, Take 1 tablet (40 mg total) by mouth every evening., Disp: 90 tablet, Rfl: 2  Does patient have record of home blood pressure readings no. Readings have been averaging N/A.   Last dose of blood pressure medication was taken at 8/13/2018@6am.  Patient is asymptomatic.   Complains of no symptoms at this time.    BP: 136/88 " , Pulse: 82 .    Blood pressure reading after 15 minutes was 136/88, Pulse 82.  Dr. Barker notified.

## 2018-09-14 DIAGNOSIS — E11.9 TYPE 2 DIABETES MELLITUS WITHOUT COMPLICATION: ICD-10-CM

## 2018-10-10 ENCOUNTER — LAB VISIT (OUTPATIENT)
Dept: LAB | Facility: HOSPITAL | Age: 58
End: 2018-10-10
Attending: FAMILY MEDICINE
Payer: MEDICARE

## 2018-10-10 ENCOUNTER — OFFICE VISIT (OUTPATIENT)
Dept: FAMILY MEDICINE | Facility: CLINIC | Age: 58
End: 2018-10-10
Payer: MEDICARE

## 2018-10-10 VITALS
BODY MASS INDEX: 30.4 KG/M2 | DIASTOLIC BLOOD PRESSURE: 90 MMHG | TEMPERATURE: 98 F | HEART RATE: 82 BPM | HEIGHT: 69 IN | OXYGEN SATURATION: 98 % | WEIGHT: 205.25 LBS | SYSTOLIC BLOOD PRESSURE: 138 MMHG | RESPIRATION RATE: 20 BRPM

## 2018-10-10 DIAGNOSIS — Z12.11 COLON CANCER SCREENING: ICD-10-CM

## 2018-10-10 DIAGNOSIS — E11.9 TYPE 2 DIABETES MELLITUS WITHOUT COMPLICATION: ICD-10-CM

## 2018-10-10 DIAGNOSIS — G89.29 CHRONIC PAIN OF LEFT ANKLE: ICD-10-CM

## 2018-10-10 DIAGNOSIS — E11.9 TYPE 2 DIABETES MELLITUS WITHOUT COMPLICATION, WITHOUT LONG-TERM CURRENT USE OF INSULIN: ICD-10-CM

## 2018-10-10 DIAGNOSIS — M25.572 CHRONIC PAIN OF LEFT ANKLE: ICD-10-CM

## 2018-10-10 DIAGNOSIS — E78.49 OTHER HYPERLIPIDEMIA: ICD-10-CM

## 2018-10-10 DIAGNOSIS — I10 ESSENTIAL HYPERTENSION: Primary | ICD-10-CM

## 2018-10-10 DIAGNOSIS — Z23 NEED FOR INFLUENZA VACCINATION: ICD-10-CM

## 2018-10-10 LAB
CHOLEST SERPL-MCNC: 110 MG/DL
CHOLEST/HDLC SERPL: 2.3 {RATIO}
HDLC SERPL-MCNC: 47 MG/DL
HDLC SERPL: 42.7 %
LDLC SERPL CALC-MCNC: 43.4 MG/DL
NONHDLC SERPL-MCNC: 63 MG/DL
TRIGL SERPL-MCNC: 98 MG/DL

## 2018-10-10 PROCEDURE — 90686 IIV4 VACC NO PRSV 0.5 ML IM: CPT | Mod: PBBFAC,PO

## 2018-10-10 PROCEDURE — 99214 OFFICE O/P EST MOD 30 MIN: CPT | Mod: S$PBB,,, | Performed by: FAMILY MEDICINE

## 2018-10-10 PROCEDURE — 36415 COLL VENOUS BLD VENIPUNCTURE: CPT | Mod: PO

## 2018-10-10 PROCEDURE — 80061 LIPID PANEL: CPT

## 2018-10-10 PROCEDURE — 99999 PR PBB SHADOW E&M-EST. PATIENT-LVL III: CPT | Mod: PBBFAC,,, | Performed by: FAMILY MEDICINE

## 2018-10-10 PROCEDURE — 99213 OFFICE O/P EST LOW 20 MIN: CPT | Mod: PBBFAC,PO,25 | Performed by: FAMILY MEDICINE

## 2018-10-10 RX ORDER — TRIAMCINOLONE ACETONIDE 1 MG/G
OINTMENT TOPICAL
Refills: 2 | COMMUNITY
Start: 2018-09-02 | End: 2019-07-09

## 2018-10-10 RX ORDER — METFORMIN HYDROCHLORIDE 1000 MG/1
1000 TABLET ORAL
Qty: 90 TABLET | Refills: 3 | Status: SHIPPED | OUTPATIENT
Start: 2018-10-10 | End: 2019-01-29 | Stop reason: DRUGHIGH

## 2018-10-10 RX ORDER — TRAMADOL HYDROCHLORIDE 50 MG/1
TABLET ORAL
Qty: 45 TABLET | Refills: 1 | Status: SHIPPED | OUTPATIENT
Start: 2018-10-10 | End: 2019-02-05 | Stop reason: SDUPTHER

## 2018-10-10 RX ORDER — TRAMADOL HYDROCHLORIDE 50 MG/1
TABLET ORAL
Refills: 2 | COMMUNITY
Start: 2018-08-26 | End: 2018-10-10 | Stop reason: SDUPTHER

## 2018-10-10 NOTE — PROGRESS NOTES
Chief Complaint   Patient presents with    Diabetes    Hyperlipidemia    Hypertension       HPI  Thierry Ho is a 58 y.o. male with multiple medical diagnoses as listed in the medical history and problem list that presents for follow up.      DM2 - levemir 25 or 30 units at night, +metformin, compliant with meds. Home FBG - over 200.     HTN - BP unknown at home. +HA at night, no CP blurry vision.     S/P fall 3 years ago, repaired L ankle fx, +hardware. States using tylenol, evaluated by ortho, previously on tramadol. Difficulty walking long distances only. States no further therapy at this time, declines further evaluation.     Pt is known to me and was last seen by me on 7/27/2018.    PAST MEDICAL HISTORY:  History reviewed. No pertinent past medical history.    PAST SURGICAL HISTORY:  Past Surgical History:   Procedure Laterality Date    BACK SURGERY  11/2017    COLONOSCOPY N/A 10/19/2018    Procedure: COLONOSCOPY;  Surgeon: Frantz Rasmussen MD;  Location: [facility];  Service: Endoscopy;  Laterality: N/A;    COLONOSCOPY N/A 10/19/2018    Performed by Frantz Rasmussen MD at [facility]       SOCIAL HISTORY:  Social History     Socioeconomic History    Marital status:      Spouse name: Not on file    Number of children: Not on file    Years of education: Not on file    Highest education level: Not on file   Social Needs    Financial resource strain: Not on file    Food insecurity - worry: Not on file    Food insecurity - inability: Not on file    Transportation needs - medical: Not on file    Transportation needs - non-medical: Not on file   Occupational History    Not on file   Tobacco Use    Smoking status: Current Every Day Smoker     Types: Cigarettes     Start date: 1/15/1981    Smokeless tobacco: Never Used   Substance and Sexual Activity    Alcohol use: Yes     Comment: social     Drug use: No    Sexual activity: Not on file   Other Topics Concern    Not on file   Social  "History Narrative    Not on file       FAMILY HISTORY:  Family History   Problem Relation Age of Onset    Heart disease Mother     Diabetes Father        ALLERGIES AND MEDICATIONS: updated and reviewed.  Review of patient's allergies indicates:  No Known Allergies  Current Outpatient Medications   Medication Sig Dispense Refill    acetaminophen (TYLENOL) 500 MG tablet Take 500 mg by mouth every 6 (six) hours as needed for Pain.      BD ULTRA-FINE FREDA PEN NEEDLE 32 gauge x 5/32" Ndle U ONCE D UTD  6    BLOOD PRESSURE CUFF Misc 1 kit by Misc.(Non-Drug; Combo Route) route once daily. 1 each 0    carvedilol (COREG) 3.125 MG tablet Take 1 tablet (3.125 mg total) by mouth 2 (two) times daily with meals. 180 tablet 2    LEVEMIR FLEXTOUCH 100 unit/mL (3 mL) InPn pen Inject 40 Units into the skin every evening. 15 mL 8    lisinopril (PRINIVIL,ZESTRIL) 20 MG tablet Take 1 tablet (20 mg total) by mouth once daily. 90 tablet 2    omeprazole (PRILOSEC) 20 MG capsule Take 1 capsule (20 mg total) by mouth once daily. 90 capsule 2    simvastatin (ZOCOR) 40 MG tablet Take 1 tablet (40 mg total) by mouth every evening. 90 tablet 2    traMADol (ULTRAM) 50 mg tablet 1 tablet by mouth prn pain 45 tablet 1    triamcinolone acetonide 0.1% (KENALOG) 0.1 % ointment APPLY TO SORES BID  2    fluocinolone (SYNALAR) 0.01 % external solution Apply topically once daily. 60 mL 2    metFORMIN (GLUCOPHAGE) 1000 MG tablet Take 1 tablet (1,000 mg total) by mouth daily with breakfast. 90 tablet 3     No current facility-administered medications for this visit.        ROS  Review of Systems   Constitutional: Negative for activity change, fatigue and fever.   HENT: Negative for congestion, rhinorrhea and sore throat.    Eyes: Negative for visual disturbance.   Respiratory: Negative for cough, chest tightness and shortness of breath.    Cardiovascular: Negative for chest pain.   Gastrointestinal: Negative for abdominal pain, diarrhea, " "nausea and vomiting.   Genitourinary: Negative for dysuria, frequency and urgency.   Musculoskeletal: Positive for arthralgias, joint swelling and myalgias. Negative for back pain.   Skin: Negative for rash.   Neurological: Negative for dizziness, syncope and numbness.   Psychiatric/Behavioral: Negative for agitation.       Physical Exam  Vitals:    10/10/18 0857   BP: (!) 138/90   Pulse: 82   Resp: 20   Temp: 98.2 °F (36.8 °C)    Body mass index is 30.31 kg/m².  Weight: 93.1 kg (205 lb 4 oz)   Height: 5' 9" (175.3 cm)     Physical Exam   Constitutional: He is oriented to person, place, and time. He appears well-developed and well-nourished.   HENT:   Head: Normocephalic.   Eyes: EOM are normal.   Neck: Normal range of motion.   Neurological: He is alert and oriented to person, place, and time.   Skin: Skin is warm and dry.   Psychiatric: He has a normal mood and affect. His behavior is normal. Judgment and thought content normal.   Nursing note and vitals reviewed.      Health Maintenance       Date Due Completion Date    Foot Exam 04/28/1971 ---    Eye Exam 04/28/1971 ---    Colonoscopy 04/28/2011 ---    Influenza Vaccine 08/01/2018 1/15/2018    Hemoglobin A1c 09/04/2018 6/4/2018    Lipid Panel 06/04/2019 6/4/2018    Low Dose Statin 10/10/2019 10/10/2018    TETANUS VACCINE 10/01/2025 10/1/2015    Pneumococcal PPSV23 (Medium Risk) (2) 04/28/2026 10/1/2015          Assessment & Plan    Essential hypertension  - Continue current medication regimen as prescribed    Type 2 diabetes mellitus without complication, without long-term current use of insulin  -     metFORMIN (GLUCOPHAGE) 1000 MG tablet; Take 1 tablet (1,000 mg total) by mouth daily with breakfast.  Dispense: 90 tablet; Refill: 3  - Will assess labs today    Other hyperlipidemia  - Continue current medication regimen as prescribed    Chronic pain of left ankle  -     traMADol (ULTRAM) 50 mg tablet; 1 tablet by mouth prn pain  Dispense: 45 tablet; Refill: " 1  - Weaning process begun  - Will begin gabapentin on next fill        Follow-up in about 4 weeks (around 11/7/2018).

## 2018-10-19 ENCOUNTER — ANESTHESIA EVENT (OUTPATIENT)
Dept: ENDOSCOPY | Facility: HOSPITAL | Age: 58
End: 2018-10-19
Payer: MEDICARE

## 2018-10-19 ENCOUNTER — HOSPITAL ENCOUNTER (OUTPATIENT)
Facility: HOSPITAL | Age: 58
Discharge: HOME | End: 2018-10-19
Attending: INTERNAL MEDICINE | Admitting: INTERNAL MEDICINE
Payer: MEDICARE

## 2018-10-19 ENCOUNTER — ANESTHESIA (OUTPATIENT)
Dept: ENDOSCOPY | Facility: HOSPITAL | Age: 58
End: 2018-10-19
Payer: MEDICARE

## 2018-10-19 VITALS
WEIGHT: 207 LBS | OXYGEN SATURATION: 98 % | SYSTOLIC BLOOD PRESSURE: 144 MMHG | HEIGHT: 71 IN | TEMPERATURE: 98 F | DIASTOLIC BLOOD PRESSURE: 70 MMHG | HEART RATE: 74 BPM | BODY MASS INDEX: 28.98 KG/M2 | RESPIRATION RATE: 18 BRPM

## 2018-10-19 DIAGNOSIS — Z12.11 COLON CANCER SCREENING: ICD-10-CM

## 2018-10-19 PROCEDURE — 88305 TISSUE EXAM BY PATHOLOGIST: CPT | Mod: 26,,, | Performed by: PATHOLOGY

## 2018-10-19 PROCEDURE — 25000003 PHARM REV CODE 250: Performed by: ANESTHESIOLOGY

## 2018-10-19 PROCEDURE — 37000009 HC ANESTHESIA EA ADD 15 MINS: Performed by: INTERNAL MEDICINE

## 2018-10-19 PROCEDURE — 88305 TISSUE EXAM BY PATHOLOGIST: CPT | Performed by: PATHOLOGY

## 2018-10-19 PROCEDURE — D9220A PRA ANESTHESIA: Mod: PT,CRNA,, | Performed by: NURSE ANESTHETIST, CERTIFIED REGISTERED

## 2018-10-19 PROCEDURE — 27201089 HC SNARE, DISP (ANY): Performed by: INTERNAL MEDICINE

## 2018-10-19 PROCEDURE — 63600175 PHARM REV CODE 636 W HCPCS: Performed by: NURSE ANESTHETIST, CERTIFIED REGISTERED

## 2018-10-19 PROCEDURE — 37000008 HC ANESTHESIA 1ST 15 MINUTES: Performed by: INTERNAL MEDICINE

## 2018-10-19 PROCEDURE — D9220A PRA ANESTHESIA: Mod: PT,ANES,, | Performed by: ANESTHESIOLOGY

## 2018-10-19 PROCEDURE — 45385 COLONOSCOPY W/LESION REMOVAL: CPT | Performed by: INTERNAL MEDICINE

## 2018-10-19 RX ORDER — PROPOFOL 10 MG/ML
VIAL (ML) INTRAVENOUS
Status: DISCONTINUED | OUTPATIENT
Start: 2018-10-19 | End: 2018-10-19

## 2018-10-19 RX ORDER — PROPOFOL 10 MG/ML
VIAL (ML) INTRAVENOUS
Status: DISCONTINUED
Start: 2018-10-19 | End: 2018-10-19 | Stop reason: HOSPADM

## 2018-10-19 RX ORDER — SODIUM CHLORIDE 9 MG/ML
INJECTION, SOLUTION INTRAVENOUS CONTINUOUS
Status: DISCONTINUED | OUTPATIENT
Start: 2018-10-19 | End: 2018-10-19 | Stop reason: HOSPADM

## 2018-10-19 RX ORDER — LIDOCAINE HYDROCHLORIDE 20 MG/ML
INJECTION, SOLUTION EPIDURAL; INFILTRATION; INTRACAUDAL; PERINEURAL
Status: DISCONTINUED
Start: 2018-10-19 | End: 2018-10-19 | Stop reason: HOSPADM

## 2018-10-19 RX ORDER — LIDOCAINE HCL/PF 100 MG/5ML
SYRINGE (ML) INTRAVENOUS
Status: DISCONTINUED | OUTPATIENT
Start: 2018-10-19 | End: 2018-10-19

## 2018-10-19 RX ORDER — PROPOFOL 10 MG/ML
VIAL (ML) INTRAVENOUS
Status: COMPLETED
Start: 2018-10-19 | End: 2018-10-19

## 2018-10-19 RX ADMIN — PROPOFOL 50 MG: 10 INJECTION, EMULSION INTRAVENOUS at 10:10

## 2018-10-19 RX ADMIN — SODIUM CHLORIDE: 0.9 INJECTION, SOLUTION INTRAVENOUS at 09:10

## 2018-10-19 RX ADMIN — PROPOFOL 50 MG: 10 INJECTION, EMULSION INTRAVENOUS at 09:10

## 2018-10-19 RX ADMIN — PROPOFOL 150 MG: 10 INJECTION, EMULSION INTRAVENOUS at 09:10

## 2018-10-19 RX ADMIN — LIDOCAINE HYDROCHLORIDE 100 MG: 20 INJECTION, SOLUTION INTRAVENOUS at 09:10

## 2018-10-19 RX ADMIN — PROPOFOL 20 MG: 10 INJECTION, EMULSION INTRAVENOUS at 10:10

## 2018-10-19 RX ADMIN — PROPOFOL 30 MG: 10 INJECTION, EMULSION INTRAVENOUS at 10:10

## 2018-10-19 NOTE — [employer] PATIENT INSTRUCTIONS
Discharge Summary/Instructions after an Endoscopic Procedure  Patient Name: Thierry Ho  Patient MRN: [id number]  Patient YOB: 1960 Friday, October 19, 2018  Frantz Rasmussen MD  RESTRICTIONS:  During your procedure today, you received medications for sedation.  These   medications may affect your judgment, balance and coordination.  Therefore,   for 24 hours, you have the following restrictions:   - DO NOT drive a car, operate machinery, make legal/financial decisions,   sign important papers or drink alcohol.    ACTIVITY:  Today: no heavy lifting, straining or running due to procedural   sedation/anesthesia.  The following day: return to full activity including work.  DIET:  Eat and drink normally unless instructed otherwise.     TREATMENT FOR COMMON SIDE EFFECTS:  - Mild abdominal pain, nausea, belching, bloating or excessive gas:  rest,   eat lightly and use a heating pad.  - Sore Throat: treat with throat lozenges and/or gargle with warm salt   water.  - Because air was used during the procedure, expelling large amounts of air   from your rectum or belching is normal.  - If a bowel prep was taken, you may not have a bowel movement for 1-3 days.    This is normal.  SYMPTOMS TO WATCH FOR AND REPORT TO YOUR PHYSICIAN:  1. Abdominal pain or bloating, other than gas cramps.  2. Chest pain.  3. Back pain.  4. Signs of infection such as: chills or fever occurring within 24 hours   after the procedure.  5. Rectal bleeding, which would show as bright red, maroon, or black stools.   (A tablespoon of blood from the rectum is not serious, especially if   hemorrhoids are present.)  6. Vomiting.  7. Weakness or dizziness.  GO DIRECTLY TO THE NEAREST EMERGENCY ROOM IF YOU HAVE ANY OF THE FOLLOWING:      Difficulty breathing              Chills and/or fever over 101 F   Persistent vomiting and/or vomiting blood   Severe abdominal pain   Severe chest pain   Black, tarry stools   Bleeding- more than one  tablespoon   Any other symptom or condition that you feel may need urgent attention  Your doctor recommends these additional instructions:  If any biopsies were taken, your doctors clinic will contact you in 1 to 2   weeks with any results.  - Discharge patient to home.   - Resume previous diet.   - No ibuprofen, naproxen, or other non-steroidal anti-inflammatory drugs for   5 days after polyp removal.   - Await pathology results.   - Repeat colonoscopy in 2 years because the bowel preparation was suboptimal   and for surveillance based on pathology results.   - Return to primary care physician as previously scheduled.   - Return to GI clinic PRN.   - The findings and recommendations were discussed with the patient's family.     - Patient has a contact number available for emergencies.  The signs and   symptoms of potential delayed complications were discussed with the   patient.  Return to normal activities tomorrow.  Written discharge   instructions were provided to the patient.  For questions, problems or results please call your physician - Frantz Rasmussen MD at Work:  [phone].  [facility] can be reached at [phone]     IF A COMPLICATION OR EMERGENCY SITUATION ARISES AND YOU ARE UNABLE TO REACH   YOUR PHYSICIAN - GO DIRECTLY TO THE EMERGENCY ROOM.  Frantz Rasmussen MD  10/19/2018 10:26:33 AM  This report has been verified and signed electronically.  [employer]

## 2018-10-19 NOTE — ANESTHESIA PREPROCEDURE EVALUATION
10/19/2018  Thierry Ho is a 58 y.o., male.    Anesthesia Evaluation    I have reviewed the Patient Summary Reports.    I have reviewed the Nursing Notes.   I have reviewed the Medications.     Review of Systems  Anesthesia Hx:  No problems with previous Anesthesia  History of prior surgery of interest to airway management or planning: Denies Family Hx of Anesthesia complications.   Denies Personal Hx of Anesthesia complications.   Social:  Smoker    Cardiovascular:   Exercise tolerance: good Hypertension    Hepatic/GI:   GERD    Endocrine:   Diabetes        Physical Exam  General:  Well nourished    Airway/Jaw/Neck:  Airway Findings: Mouth Opening: Normal Tongue: Normal  General Airway Assessment: Adult  Mallampati: II     Eyes/Ears/Nose:  Eyes/Ears/Nose Findings:          Mental Status:  Mental Status Findings:  Cooperative, Alert and Oriented         Anesthesia Plan  Type of Anesthesia, risks & benefits discussed:  Anesthesia Type:  MAC, general  Patient's Preference:   Intra-op Monitoring Plan: standard ASA monitors  Intra-op Monitoring Plan Comments:   Post Op Pain Control Plan: multimodal analgesia, IV/PO Opioids PRN and per primary service following discharge from PACU  Post Op Pain Control Plan Comments:   Induction:   IV  Beta Blocker:  Patient is on a Beta-Blocker and has received one dose within the past 24 hours (No further documentation required).       Informed Consent: Patient understands risks and agrees with Anesthesia plan.  Questions answered. Anesthesia consent signed with patient.  ASA Score: 2     Day of Surgery Review of History & Physical:     H&P completed by Anesthesiologist.       Ready For Surgery From Anesthesia Perspective.

## 2018-10-19 NOTE — TRANSFER OF CARE
"Anesthesia Transfer of Care Note    Patient: Thierry Ho    Procedure(s) Performed: Procedure(s) (LRB):  COLONOSCOPY (N/A)    Patient location: PACU    Anesthesia Type: general    Transport from OR: Transported from OR on room air with adequate spontaneous ventilation    Post pain: adequate analgesia    Post assessment: no apparent anesthetic complications and tolerated procedure well    Post vital signs: stable    Level of consciousness: awake, alert and oriented    Nausea/Vomiting: no nausea/vomiting    Complications: none    Transfer of care protocol was followed      Last vitals:   Visit Vitals  /78   Pulse 84   Temp 36.5 °C (97.7 °F) (Oral)   Resp 16   Ht 5' 11" (1.803 m)   Wt 93.9 kg (207 lb)   SpO2 97%   BMI 28.87 kg/m²     "

## 2018-10-19 NOTE — DISCHARGE INSTRUCTIONS

## 2018-10-19 NOTE — H&P
Pre-Procedure H&P:  Reason for Procedure: Colon Ca screening    HPI:  Pt is a 58 y.o. male here for colonoscopy.  This is the patient's first colonoscopy.  No known family Hx of Colon Ca.  No current GI complaints.      History reviewed. No pertinent past medical history.    Past Surgical History:   Procedure Laterality Date    BACK SURGERY  11/2017       Family History   Problem Relation Age of Onset    Heart disease Mother     Diabetes Father        Social History     Socioeconomic History    Marital status:      Spouse name: Not on file    Number of children: Not on file    Years of education: Not on file    Highest education level: Not on file   Social Needs    Financial resource strain: Not on file    Food insecurity - worry: Not on file    Food insecurity - inability: Not on file    Transportation needs - medical: Not on file    Transportation needs - non-medical: Not on file   Occupational History    Not on file   Tobacco Use    Smoking status: Current Every Day Smoker     Types: Cigarettes     Start date: 1/15/1981    Smokeless tobacco: Never Used   Substance and Sexual Activity    Alcohol use: Yes     Comment: social     Drug use: No    Sexual activity: Not on file   Other Topics Concern    Not on file   Social History Narrative    Not on file       Endoscopic History:  None    Review of patient's allergies indicates:  No Known Allergies    No current facility-administered medications on file prior to encounter.      Current Outpatient Medications on File Prior to Encounter   Medication Sig Dispense Refill    carvedilol (COREG) 3.125 MG tablet Take 1 tablet (3.125 mg total) by mouth 2 (two) times daily with meals. 180 tablet 2    lisinopril (PRINIVIL,ZESTRIL) 20 MG tablet Take 1 tablet (20 mg total) by mouth once daily. 90 tablet 2    simvastatin (ZOCOR) 40 MG tablet Take 1 tablet (40 mg total) by mouth every evening. 90 tablet 2    acetaminophen (TYLENOL) 500 MG tablet Take  "500 mg by mouth every 6 (six) hours as needed for Pain.      BD ULTRA-FINE FREDA PEN NEEDLE 32 gauge x 5/32" Ndle U ONCE D UTD  6    fluocinolone (SYNALAR) 0.01 % external solution Apply topically once daily. 60 mL 2    LEVEMIR FLEXTOUCH 100 unit/mL (3 mL) InPn pen Inject 40 Units into the skin every evening. 15 mL 8    omeprazole (PRILOSEC) 20 MG capsule Take 1 capsule (20 mg total) by mouth once daily. 90 capsule 2       Current Facility-Administered Medications:     0.9%  NaCl infusion, , Intravenous, Continuous, Marcin Campos MD    ROS: Negative x 10    Patient Vitals for the past 24 hrs:   BP Temp Temp src Pulse Resp SpO2 Height Weight   10/19/18 0943 -- -- -- 74 18 99 % -- --   10/19/18 0918 (!) 146/87 98.2 °F (36.8 °C) Oral -- -- -- 5' 11" (1.803 m) 93.9 kg (207 lb)       Gen: Well developed, well nourished, no apparent distress  HEENT: Anicteric, PERRLA  CV: S1, S2, no murmers/rubs, non-displaced PMI  Lungs: CTA-B, normal excursion  Abd: Soft, NT, ND, normal BS's, no HSM  Ext: No c/c/e, 1+ DP pulses to BLE's  Neuro: CN II-XII grossly intact, no asterixis.  Skin: No rashes/lesions.  Psych: AA&O x 4    Assessment:  Pt. Is a 58 y.o. male with:  1. Colon CA screening    Recommendations:  1. Colonoscopy  2. F/U with PCP      I would like to take this opportunity to thank you for this consult.  If you have any questions or concerns, please do not hesitate to contact me.         "

## 2018-10-22 NOTE — ANESTHESIA POSTPROCEDURE EVALUATION
"Anesthesia Post Evaluation    Patient: Thierry Ho    Procedure(s) Performed: Procedure(s) (LRB):  COLONOSCOPY (N/A)    Final Anesthesia Type: MAC  Patient location during evaluation: PACU  Patient participation: Yes- Able to Participate  Level of consciousness: awake and alert and oriented  Post-procedure vital signs: reviewed and stable  Pain management: adequate  Airway patency: patent  PONV status at discharge: No PONV  Anesthetic complications: no      Cardiovascular status: blood pressure returned to baseline and hemodynamically stable  Respiratory status: unassisted, spontaneous ventilation and room air  Hydration status: euvolemic  Follow-up not needed.        Visit Vitals  BP (!) 144/70 (BP Location: Left arm, Patient Position: Lying)   Pulse 74   Temp 36.5 °C (97.7 °F) (Oral)   Resp 18   Ht 5' 11" (1.803 m)   Wt 93.9 kg (207 lb)   SpO2 98%   BMI 28.87 kg/m²       Pain/Estiven Score: No Data Recorded      "

## 2018-12-17 ENCOUNTER — PATIENT OUTREACH (OUTPATIENT)
Dept: FAMILY MEDICINE | Facility: CLINIC | Age: 58
End: 2018-12-17
Payer: MEDICAID

## 2019-01-29 ENCOUNTER — LAB VISIT (OUTPATIENT)
Dept: LAB | Facility: HOSPITAL | Age: 59
End: 2019-01-29
Attending: FAMILY MEDICINE
Payer: MEDICARE

## 2019-01-29 ENCOUNTER — OFFICE VISIT (OUTPATIENT)
Dept: FAMILY MEDICINE | Facility: CLINIC | Age: 59
End: 2019-01-29
Payer: MEDICARE

## 2019-01-29 VITALS
DIASTOLIC BLOOD PRESSURE: 108 MMHG | RESPIRATION RATE: 20 BRPM | HEART RATE: 86 BPM | TEMPERATURE: 98 F | BODY MASS INDEX: 28.98 KG/M2 | WEIGHT: 207 LBS | HEIGHT: 71 IN | OXYGEN SATURATION: 98 % | SYSTOLIC BLOOD PRESSURE: 180 MMHG

## 2019-01-29 DIAGNOSIS — E11.9 TYPE 2 DIABETES MELLITUS WITHOUT COMPLICATION, WITHOUT LONG-TERM CURRENT USE OF INSULIN: ICD-10-CM

## 2019-01-29 DIAGNOSIS — E11.9 TYPE 2 DIABETES MELLITUS WITHOUT COMPLICATION: ICD-10-CM

## 2019-01-29 DIAGNOSIS — I10 ESSENTIAL HYPERTENSION: Primary | ICD-10-CM

## 2019-01-29 DIAGNOSIS — I25.10 CORONARY ARTERY DISEASE INVOLVING NATIVE CORONARY ARTERY OF NATIVE HEART WITHOUT ANGINA PECTORIS: ICD-10-CM

## 2019-01-29 DIAGNOSIS — I16.0 HYPERTENSIVE URGENCY: ICD-10-CM

## 2019-01-29 DIAGNOSIS — K21.9 GASTROESOPHAGEAL REFLUX DISEASE WITHOUT ESOPHAGITIS: ICD-10-CM

## 2019-01-29 LAB
ESTIMATED AVG GLUCOSE: 137 MG/DL
HBA1C MFR BLD HPLC: 6.4 %

## 2019-01-29 PROCEDURE — 99999 PR PBB SHADOW E&M-EST. PATIENT-LVL III: CPT | Mod: PBBFAC,,, | Performed by: FAMILY MEDICINE

## 2019-01-29 PROCEDURE — 93010 ELECTROCARDIOGRAM REPORT: CPT | Mod: S$PBB,,, | Performed by: INTERNAL MEDICINE

## 2019-01-29 PROCEDURE — 99213 OFFICE O/P EST LOW 20 MIN: CPT | Mod: PBBFAC,PO,25 | Performed by: FAMILY MEDICINE

## 2019-01-29 PROCEDURE — 99215 PR OFFICE/OUTPT VISIT, EST, LEVL V, 40-54 MIN: ICD-10-PCS | Mod: S$PBB,,, | Performed by: FAMILY MEDICINE

## 2019-01-29 PROCEDURE — 99215 OFFICE O/P EST HI 40 MIN: CPT | Mod: S$PBB,,, | Performed by: FAMILY MEDICINE

## 2019-01-29 PROCEDURE — 83036 HEMOGLOBIN GLYCOSYLATED A1C: CPT

## 2019-01-29 PROCEDURE — 93010 EKG 12-LEAD: ICD-10-PCS | Mod: S$PBB,,, | Performed by: INTERNAL MEDICINE

## 2019-01-29 PROCEDURE — 99999 PR PBB SHADOW E&M-EST. PATIENT-LVL III: ICD-10-PCS | Mod: PBBFAC,,, | Performed by: FAMILY MEDICINE

## 2019-01-29 PROCEDURE — 36415 COLL VENOUS BLD VENIPUNCTURE: CPT | Mod: PO

## 2019-01-29 PROCEDURE — 93005 ELECTROCARDIOGRAM TRACING: CPT | Mod: PBBFAC,PO | Performed by: INTERNAL MEDICINE

## 2019-01-29 RX ORDER — CARVEDILOL 12.5 MG/1
12.5 TABLET ORAL 2 TIMES DAILY WITH MEALS
Qty: 60 TABLET | Refills: 11 | Status: SHIPPED | OUTPATIENT
Start: 2019-01-29 | End: 2019-08-26 | Stop reason: SDUPTHER

## 2019-01-29 RX ORDER — METFORMIN HYDROCHLORIDE 500 MG/1
TABLET ORAL
Refills: 1 | COMMUNITY
Start: 2018-11-07 | End: 2019-03-13 | Stop reason: SDUPTHER

## 2019-01-29 RX ORDER — FLUOCINONIDE TOPICAL SOLUTION USP, 0.05% 0.5 MG/ML
SOLUTION TOPICAL
Refills: 3 | COMMUNITY
Start: 2019-01-07 | End: 2021-11-05 | Stop reason: SDUPTHER

## 2019-01-29 NOTE — PROGRESS NOTES
Chief Complaint   Patient presents with    Diabetes    Hyperlipidemia    Hypertension       HPI  Thierry Ho is a 58 y.o. male with multiple medical diagnoses as listed in the medical history and problem list that presents for headaches.     HA - 3 weeks, intermittent, states improves with OTC tylenol, frontal, no blurry vision, +intermittent L sided chest pain, no neuro deficits, no speech difficulty, no SOB or diarrhea.    Overall states not feeling right, yesterday overall has worsened.     Hx of chemical stress, per patient normal.     States compliant with medications.     Pt is known to me and was last seen by me on 10/10/2018.    PAST MEDICAL HISTORY:  No past medical history on file.    PAST SURGICAL HISTORY:  Past Surgical History:   Procedure Laterality Date    BACK SURGERY  11/2017    COLONOSCOPY N/A 10/19/2018    Performed by Frantz Rasmussen MD at [facility] ENDO       SOCIAL HISTORY:  Social History     Socioeconomic History    Marital status:      Spouse name: Not on file    Number of children: Not on file    Years of education: Not on file    Highest education level: Not on file   Social Needs    Financial resource strain: Not on file    Food insecurity - worry: Not on file    Food insecurity - inability: Not on file    Transportation needs - medical: Not on file    Transportation needs - non-medical: Not on file   Occupational History    Not on file   Tobacco Use    Smoking status: Current Every Day Smoker     Types: Cigarettes     Start date: 1/15/1981    Smokeless tobacco: Never Used   Substance and Sexual Activity    Alcohol use: Yes     Comment: social     Drug use: No    Sexual activity: Not on file   Other Topics Concern    Not on file   Social History Narrative    Not on file       FAMILY HISTORY:  Family History   Problem Relation Age of Onset    Heart disease Mother     Diabetes Father        ALLERGIES AND MEDICATIONS: updated and reviewed.  Review of patient's  "allergies indicates:  No Known Allergies  Current Outpatient Medications   Medication Sig Dispense Refill    acetaminophen (TYLENOL) 500 MG tablet Take 500 mg by mouth every 6 (six) hours as needed for Pain.      BD ULTRA-FINE FREDA PEN NEEDLE 32 gauge x 5/32" Ndle U ONCE D UTD  6    BLOOD PRESSURE CUFF Misc 1 kit by Misc.(Non-Drug; Combo Route) route once daily. 1 each 0    fluocinonide (LIDEX) 0.05 % external solution APPLY 1 ML TO THE SCALP AT BEDTIME  3    LEVEMIR FLEXTOUCH 100 unit/mL (3 mL) InPn pen Inject 40 Units into the skin every evening. 15 mL 8    lisinopril (PRINIVIL,ZESTRIL) 20 MG tablet Take 1 tablet (20 mg total) by mouth once daily. 90 tablet 2    metFORMIN (GLUCOPHAGE) 500 MG tablet   1    omeprazole (PRILOSEC) 20 MG capsule Take 1 capsule (20 mg total) by mouth once daily. 90 capsule 2    simvastatin (ZOCOR) 40 MG tablet Take 1 tablet (40 mg total) by mouth every evening. 90 tablet 2    traMADol (ULTRAM) 50 mg tablet 1 tablet by mouth prn pain 45 tablet 1    triamcinolone acetonide 0.1% (KENALOG) 0.1 % ointment APPLY TO SORES BID  2    carvedilol (COREG) 12.5 MG tablet Take 1 tablet (12.5 mg total) by mouth 2 (two) times daily with meals. 60 tablet 11    fluocinolone (SYNALAR) 0.01 % external solution Apply topically once daily. 60 mL 2     No current facility-administered medications for this visit.        ROS  Review of Systems   Constitutional: Negative for activity change, fatigue and fever.   HENT: Negative for congestion, rhinorrhea and sore throat.    Eyes: Negative for visual disturbance.   Respiratory: Negative for cough, chest tightness and shortness of breath.    Cardiovascular: Negative for chest pain.   Gastrointestinal: Negative for abdominal pain, diarrhea, nausea and vomiting.   Genitourinary: Negative for dysuria, frequency and urgency.   Musculoskeletal: Positive for arthralgias, joint swelling and myalgias. Negative for back pain.   Skin: Negative for rash. " "  Neurological: Positive for headaches. Negative for dizziness, syncope and numbness.   Psychiatric/Behavioral: Negative for agitation.       Physical Exam  Vitals:    01/29/19 0837   BP: (!) 180/108   Pulse: 86   Resp: 20   Temp: 98.1 °F (36.7 °C)    Body mass index is 28.87 kg/m².  Weight: 93.9 kg (207 lb 0.2 oz)   Height: 5' 11" (180.3 cm)     Physical Exam   Constitutional: He is oriented to person, place, and time. He appears well-developed and well-nourished.   HENT:   Head: Normocephalic and atraumatic.   Eyes: Conjunctivae and EOM are normal. Pupils are equal, round, and reactive to light.   Neck: Normal range of motion. Neck supple.   Cardiovascular: Normal rate, regular rhythm and normal heart sounds.   Pulmonary/Chest: Effort normal and breath sounds normal.   Abdominal: Soft. Bowel sounds are normal.   Musculoskeletal: Normal range of motion.   Neurological: He is alert and oriented to person, place, and time. He has normal reflexes.   Skin: Skin is warm and dry.   Psychiatric: He has a normal mood and affect. His behavior is normal. Judgment and thought content normal.       Health Maintenance       Date Due Completion Date    Foot Exam 04/28/1971 ---    Eye Exam 04/28/1971 ---    Hemoglobin A1c 09/04/2018 6/4/2018    Lipid Panel 10/10/2019 10/10/2018    Low Dose Statin 01/29/2020 1/29/2019    TETANUS VACCINE 10/01/2025 10/1/2015    Colonoscopy 10/19/2028 10/19/2018          Assessment & Plan    Essential hypertension  -     carvedilol (COREG) 12.5 MG tablet; Take 1 tablet (12.5 mg total) by mouth 2 (two) times daily with meals.  Dispense: 60 tablet; Refill: 11  - Will increase lisinopril to 40 mg daily    Type 2 diabetes mellitus without complication, without long-term current use of insulin  -     POCT Glucose, Hand-Held Device    Gastroesophageal reflux disease without esophagitis  - Continue current medication regimen as prescribed    Coronary artery disease involving native coronary artery of " native heart without angina pectoris  -     EKG 12-lead; Future    Overall, recommended ER follow up today for hypertensive urgency. Pt refused ER follow up, EKG reviewed. Strongly advised if any symptoms occur/worsen to ER immediately, pt agrees. Discussed at length the need for further follow up, labs, cardiac evaluation in ER, pt again refused.  BP meds increased.      Follow-up in about 2 weeks (around 2/12/2019), or if symptoms worsen or fail to improve.

## 2019-01-30 DIAGNOSIS — K21.9 GASTROESOPHAGEAL REFLUX DISEASE WITHOUT ESOPHAGITIS: ICD-10-CM

## 2019-01-30 RX ORDER — OMEPRAZOLE 20 MG/1
20 CAPSULE, DELAYED RELEASE ORAL DAILY
Qty: 90 CAPSULE | Refills: 2 | Status: SHIPPED | OUTPATIENT
Start: 2019-01-30 | End: 2019-12-31 | Stop reason: SDUPTHER

## 2019-01-30 NOTE — TELEPHONE ENCOUNTER
----- Message from Lucy Tran sent at 1/30/2019 12:32 PM CST -----  Pt. Is out of meds Omeprazole 90ct,  Pls call [facility] [phone]. Thanks.

## 2019-02-05 ENCOUNTER — CLINICAL SUPPORT (OUTPATIENT)
Dept: FAMILY MEDICINE | Facility: CLINIC | Age: 59
End: 2019-02-05
Payer: MEDICARE

## 2019-02-05 VITALS — SYSTOLIC BLOOD PRESSURE: 156 MMHG | DIASTOLIC BLOOD PRESSURE: 96 MMHG

## 2019-02-05 DIAGNOSIS — G89.29 CHRONIC PAIN OF LEFT ANKLE: ICD-10-CM

## 2019-02-05 DIAGNOSIS — I10 ESSENTIAL HYPERTENSION: Primary | ICD-10-CM

## 2019-02-05 DIAGNOSIS — M25.572 CHRONIC PAIN OF LEFT ANKLE: ICD-10-CM

## 2019-02-05 PROCEDURE — 99999 PR PBB SHADOW E&M-EST. PATIENT-LVL I: ICD-10-PCS | Mod: PBBFAC,,,

## 2019-02-05 PROCEDURE — 99999 PR PBB SHADOW E&M-EST. PATIENT-LVL I: CPT | Mod: PBBFAC,,,

## 2019-02-05 PROCEDURE — 99211 OFF/OP EST MAY X REQ PHY/QHP: CPT | Mod: PBBFAC,PO

## 2019-02-05 RX ORDER — TRAMADOL HYDROCHLORIDE 50 MG/1
TABLET ORAL
Qty: 45 TABLET | Refills: 0 | Status: SHIPPED | OUTPATIENT
Start: 2019-02-05 | End: 2019-03-13 | Stop reason: SDUPTHER

## 2019-02-05 NOTE — PROGRESS NOTES
"Thierry Ho 58 y.o. male is here today for Blood Pressure check.   History of HTN yes.    Review of patient's allergies indicates:  No Known Allergies  Creatinine   Date Value Ref Range Status   06/04/2018 0.8 0.5 - 1.4 mg/dL Final     Sodium   Date Value Ref Range Status   06/04/2018 142 136 - 145 mmol/L Final     Potassium   Date Value Ref Range Status   06/04/2018 4.6 3.5 - 5.1 mmol/L Final   ]  Patient verifies taking blood pressure medications on a regular basis at the same time of the day.     Current Outpatient Medications:     acetaminophen (TYLENOL) 500 MG tablet, Take 500 mg by mouth every 6 (six) hours as needed for Pain., Disp: , Rfl:     BD ULTRA-FINE FREDA PEN NEEDLE 32 gauge x 5/32" Ndle, U ONCE D UTD, Disp: , Rfl: 6    BLOOD PRESSURE CUFF Misc, 1 kit by Misc.(Non-Drug; Combo Route) route once daily., Disp: 1 each, Rfl: 0    carvedilol (COREG) 12.5 MG tablet, Take 1 tablet (12.5 mg total) by mouth 2 (two) times daily with meals., Disp: 60 tablet, Rfl: 11    fluocinolone (SYNALAR) 0.01 % external solution, Apply topically once daily., Disp: 60 mL, Rfl: 2    fluocinonide (LIDEX) 0.05 % external solution, APPLY 1 ML TO THE SCALP AT BEDTIME, Disp: , Rfl: 3    LEVEMIR FLEXTOUCH 100 unit/mL (3 mL) InPn pen, Inject 40 Units into the skin every evening., Disp: 15 mL, Rfl: 8    lisinopril (PRINIVIL,ZESTRIL) 20 MG tablet, Take 1 tablet (20 mg total) by mouth once daily., Disp: 90 tablet, Rfl: 2    metFORMIN (GLUCOPHAGE) 500 MG tablet, , Disp: , Rfl: 1    omeprazole (PRILOSEC) 20 MG capsule, Take 1 capsule (20 mg total) by mouth once daily., Disp: 90 capsule, Rfl: 2    simvastatin (ZOCOR) 40 MG tablet, Take 1 tablet (40 mg total) by mouth every evening., Disp: 90 tablet, Rfl: 2    traMADol (ULTRAM) 50 mg tablet, 1 tablet by mouth prn pain, Disp: 45 tablet, Rfl: 1    triamcinolone acetonide 0.1% (KENALOG) 0.1 % ointment, APPLY TO SORES BID, Disp: , Rfl: 2  Does patient have record of home blood " pressure readings no. Readings have been averaging not done.   Last dose of blood pressure medication was taken at this morning.  Patient is asymptomatic.   Complains of no complaints.    Vitals:    02/05/19 0920 02/05/19 0930   BP: (!) 160/98 (!) 156/96   BP Location: Left arm Left arm   Patient Position: Sitting Sitting   BP Method: Large (Manual) Large (Manual)         Dr. Barker informed of nurse visit.

## 2019-02-05 NOTE — TELEPHONE ENCOUNTER
Pt states unable to get tramadol filled at [facility] on 1/29/19 because his blood pressure was too high, states medication was adjusted, bp today at nurse visit 156/96; pt requesting refill of tramadol; please advise

## 2019-02-12 DIAGNOSIS — E11.9 TYPE 2 DIABETES MELLITUS WITHOUT COMPLICATION, WITH LONG-TERM CURRENT USE OF INSULIN: ICD-10-CM

## 2019-02-12 DIAGNOSIS — Z79.4 TYPE 2 DIABETES MELLITUS WITHOUT COMPLICATION, WITH LONG-TERM CURRENT USE OF INSULIN: ICD-10-CM

## 2019-02-12 DIAGNOSIS — E78.49 OTHER HYPERLIPIDEMIA: ICD-10-CM

## 2019-02-13 RX ORDER — METFORMIN HYDROCHLORIDE 500 MG/1
TABLET ORAL
Qty: 180 TABLET | Refills: 0 | Status: SHIPPED | OUTPATIENT
Start: 2019-02-13 | End: 2019-06-13 | Stop reason: SDUPTHER

## 2019-02-13 RX ORDER — SIMVASTATIN 40 MG/1
TABLET, FILM COATED ORAL
Qty: 90 TABLET | Refills: 0 | Status: SHIPPED | OUTPATIENT
Start: 2019-02-13 | End: 2019-06-04 | Stop reason: SDUPTHER

## 2019-02-28 DIAGNOSIS — Z79.4 TYPE 2 DIABETES MELLITUS WITHOUT COMPLICATION, WITH LONG-TERM CURRENT USE OF INSULIN: ICD-10-CM

## 2019-02-28 DIAGNOSIS — E11.9 TYPE 2 DIABETES MELLITUS WITHOUT COMPLICATION, WITH LONG-TERM CURRENT USE OF INSULIN: ICD-10-CM

## 2019-02-28 RX ORDER — INSULIN DETEMIR 100 [IU]/ML
INJECTION, SOLUTION SUBCUTANEOUS
Qty: 15 ML | Refills: 0 | Status: SHIPPED | OUTPATIENT
Start: 2019-02-28 | End: 2019-04-09 | Stop reason: SDUPTHER

## 2019-03-13 ENCOUNTER — OFFICE VISIT (OUTPATIENT)
Dept: FAMILY MEDICINE | Facility: CLINIC | Age: 59
End: 2019-03-13
Payer: MEDICARE

## 2019-03-13 ENCOUNTER — TELEPHONE (OUTPATIENT)
Dept: FAMILY MEDICINE | Facility: CLINIC | Age: 59
End: 2019-03-13

## 2019-03-13 VITALS
HEART RATE: 76 BPM | TEMPERATURE: 99 F | SYSTOLIC BLOOD PRESSURE: 136 MMHG | RESPIRATION RATE: 20 BRPM | HEIGHT: 71 IN | DIASTOLIC BLOOD PRESSURE: 88 MMHG | OXYGEN SATURATION: 98 % | BODY MASS INDEX: 30.43 KG/M2 | WEIGHT: 217.38 LBS

## 2019-03-13 DIAGNOSIS — I10 ESSENTIAL HYPERTENSION: ICD-10-CM

## 2019-03-13 DIAGNOSIS — E11.9 TYPE 2 DIABETES MELLITUS WITHOUT COMPLICATION, WITHOUT LONG-TERM CURRENT USE OF INSULIN: Primary | ICD-10-CM

## 2019-03-13 DIAGNOSIS — M25.572 CHRONIC PAIN OF LEFT ANKLE: ICD-10-CM

## 2019-03-13 DIAGNOSIS — G89.29 CHRONIC PAIN OF LEFT ANKLE: ICD-10-CM

## 2019-03-13 PROCEDURE — 99214 OFFICE O/P EST MOD 30 MIN: CPT | Mod: PBBFAC,PO | Performed by: FAMILY MEDICINE

## 2019-03-13 PROCEDURE — 99214 OFFICE O/P EST MOD 30 MIN: CPT | Mod: HCNC,S$GLB,, | Performed by: FAMILY MEDICINE

## 2019-03-13 PROCEDURE — 99999 PR PBB SHADOW E&M-EST. PATIENT-LVL IV: ICD-10-PCS | Mod: PBBFAC,HCNC,, | Performed by: FAMILY MEDICINE

## 2019-03-13 PROCEDURE — 99999 PR PBB SHADOW E&M-EST. PATIENT-LVL IV: CPT | Mod: PBBFAC,HCNC,, | Performed by: FAMILY MEDICINE

## 2019-03-13 PROCEDURE — 99214 PR OFFICE/OUTPT VISIT, EST, LEVL IV, 30-39 MIN: ICD-10-PCS | Mod: HCNC,S$GLB,, | Performed by: FAMILY MEDICINE

## 2019-03-13 RX ORDER — LISINOPRIL 20 MG/1
20 TABLET ORAL DAILY
Qty: 90 TABLET | Refills: 2 | Status: CANCELLED | OUTPATIENT
Start: 2019-03-13

## 2019-03-13 RX ORDER — TRAMADOL HYDROCHLORIDE 50 MG/1
TABLET ORAL
Qty: 30 TABLET | Refills: 0 | Status: SHIPPED | OUTPATIENT
Start: 2019-03-13 | End: 2019-03-13 | Stop reason: SDUPTHER

## 2019-03-13 RX ORDER — TRAMADOL HYDROCHLORIDE 50 MG/1
TABLET ORAL
Qty: 45 TABLET | Refills: 0 | Status: CANCELLED | OUTPATIENT
Start: 2019-03-13

## 2019-03-13 RX ORDER — INSULIN PUMP SYRINGE, 3 ML
1 EACH MISCELLANEOUS
Qty: 1 EACH | Refills: 0 | Status: SHIPPED | OUTPATIENT
Start: 2019-03-13 | End: 2022-09-28 | Stop reason: SDUPTHER

## 2019-03-13 RX ORDER — LANCETS
1 EACH MISCELLANEOUS
Qty: 200 EACH | Refills: 3 | Status: SHIPPED | OUTPATIENT
Start: 2019-03-13 | End: 2019-06-13 | Stop reason: SDUPTHER

## 2019-03-13 RX ORDER — DICLOFENAC SODIUM 75 MG/1
75 TABLET, DELAYED RELEASE ORAL 2 TIMES DAILY PRN
Qty: 60 TABLET | Refills: 0 | Status: SHIPPED | OUTPATIENT
Start: 2019-03-13 | End: 2019-04-03 | Stop reason: SDUPTHER

## 2019-03-13 RX ORDER — LISINOPRIL 20 MG/1
20 TABLET ORAL DAILY
Qty: 90 TABLET | Refills: 3 | Status: SHIPPED | OUTPATIENT
Start: 2019-03-13 | End: 2020-04-22 | Stop reason: SDUPTHER

## 2019-03-13 RX ORDER — TRAMADOL HYDROCHLORIDE 50 MG/1
TABLET ORAL
Qty: 30 TABLET | Refills: 0 | Status: SHIPPED | OUTPATIENT
Start: 2019-03-13 | End: 2019-06-13 | Stop reason: SDUPTHER

## 2019-03-13 RX ORDER — DICLOFENAC SODIUM 75 MG/1
75 TABLET, DELAYED RELEASE ORAL 2 TIMES DAILY PRN
Qty: 60 TABLET | Refills: 0 | Status: SHIPPED | OUTPATIENT
Start: 2019-03-13 | End: 2019-03-13 | Stop reason: SDUPTHER

## 2019-03-13 RX ORDER — LISINOPRIL 20 MG/1
20 TABLET ORAL DAILY
Qty: 90 TABLET | Refills: 3 | Status: SHIPPED | OUTPATIENT
Start: 2019-03-13 | End: 2019-03-13 | Stop reason: SDUPTHER

## 2019-03-13 NOTE — PROGRESS NOTES
Subjective:       Patient ID: Thierry Ho is a 58 y.o. male.    Chief Complaint: Establish Care and Medication Refill    HPI   57 yo male present for med refill and foot exam. Pt states he needs his BP med, DM supplies, and tramadol for his L foot pain. Pt states he had a surgery many years ago on his foot and is painful now. States tramadol helps w/ the pain. States he has pt/ot a year ago. States he did not try any other medication. Denies any other issues at this time.    Review of Systems   Constitutional: Negative.    HENT: Negative.    Respiratory: Negative.    Cardiovascular: Negative.    Gastrointestinal: Negative.    Genitourinary: Negative.    Musculoskeletal: Positive for arthralgias (L foot pain).   Neurological: Negative.    Psychiatric/Behavioral: Negative.         History reviewed. No pertinent past medical history.  Past Surgical History:   Procedure Laterality Date    BACK SURGERY  11/2017    COLONOSCOPY N/A 10/19/2018    Performed by Frantz Rasmussen MD at [facility] ENDO     Family History   Problem Relation Age of Onset    Heart disease Mother     Diabetes Father      Social History     Socioeconomic History    Marital status:      Spouse name: None    Number of children: None    Years of education: None    Highest education level: None   Social Needs    Financial resource strain: None    Food insecurity - worry: None    Food insecurity - inability: None    Transportation needs - medical: None    Transportation needs - non-medical: None   Occupational History    None   Tobacco Use    Smoking status: Current Every Day Smoker     Types: Cigarettes     Start date: 1/15/1981    Smokeless tobacco: Never Used   Substance and Sexual Activity    Alcohol use: Yes     Comment: social     Drug use: No    Sexual activity: None   Other Topics Concern    None   Social History Narrative    None        Objective:      Vitals:    03/13/19 0930   BP: 136/88   Pulse: 76   Resp: 20   Temp:  98.5 °F (36.9 °C)     Physical Exam   Constitutional: He is oriented to person, place, and time. No distress.   HENT:   Head: Normocephalic and atraumatic.   Eyes: Conjunctivae are normal.   Neck: Neck supple.   Cardiovascular: Normal rate, regular rhythm and normal heart sounds. Exam reveals no gallop and no friction rub.   No murmur heard.  Pulmonary/Chest: Effort normal and breath sounds normal. He has no wheezes. He has no rales.   Neurological: He is alert and oriented to person, place, and time.   Skin: Skin is warm and dry.   Psychiatric: He has a normal mood and affect. His behavior is normal. Judgment and thought content normal.      Protective Sensation (w/ 10 gram monofilament):  Right: Intact  Left: Intact    Visual Inspection:  Normal -  Bilateral    Pedal Pulses:   Right: Present  Left: Present    Posterior tibialis:   Right:Present  Left: Present      Assessment:       1. Type 2 diabetes mellitus without complication, without long-term current use of insulin    2. Chronic pain of left ankle    3. Essential hypertension        Plan:       Type 2 diabetes mellitus without complication, without long-term current use of insulin  - Last A1c of 6.4  -     blood-glucose meter kit; 1 each by Other route 4 (four) times daily before meals and nightly.  Dispense: 1 each; Refill: 0  -     lancets Misc; Inject 1 application into the skin 4 (four) times daily before meals and nightly.  Dispense: 200 each; Refill: 3  -     blood sugar diagnostic Strp; 1 each by abdominal subcutaneous route 4 (four) times daily before meals and nightly.  Dispense: 200 each; Refill: 3  -     POCT Glucose, Hand-Held Device    Chronic pain of left ankle  - Will decrease pt's tramadol to 30. Advised pt he will be taken off the medication. Added voltaren and referred pt to pt/ot.  -     Ambulatory Referral to Physical/Occupational Therapy  -     diclofenac (VOLTAREN) 75 MG EC tablet; Take 1 tablet (75 mg total) by mouth 2 (two) times  daily as needed (pain).  Dispense: 60 tablet; Refill: 0  -     traMADol (ULTRAM) 50 mg tablet; 1 tablet by mouth prn pain  Dispense: 30 tablet; Refill: 0    Essential hypertension  -     lisinopril (PRINIVIL,ZESTRIL) 20 MG tablet; Take 1 tablet (20 mg total) by mouth once daily.  Dispense: 90 tablet; Refill: 3      Follow-up if symptoms worsen or fail to improve.            Sterling Jarquin MD  3/13/2019 10:22 AM

## 2019-03-13 NOTE — TELEPHONE ENCOUNTER
"----- Message from Ana Mayers sent at 3/13/2019 10:50 AM CDT -----  Contact: [facility] "[facility]" [phone]   Requesting clarification on traMADol (ULTRAM) 50 mg tablet  .  [id number] - [place] [address] [facility] [address] [place] [address]  [facility] [address]  Phone: [phone] Fax: [phone]        "

## 2019-04-03 DIAGNOSIS — G89.29 CHRONIC PAIN OF LEFT ANKLE: ICD-10-CM

## 2019-04-03 DIAGNOSIS — M25.572 CHRONIC PAIN OF LEFT ANKLE: ICD-10-CM

## 2019-04-05 RX ORDER — DICLOFENAC SODIUM 75 MG/1
TABLET, DELAYED RELEASE ORAL
Qty: 180 TABLET | Refills: 0 | Status: SHIPPED | OUTPATIENT
Start: 2019-04-05 | End: 2019-05-06 | Stop reason: SDUPTHER

## 2019-04-09 DIAGNOSIS — Z79.4 TYPE 2 DIABETES MELLITUS WITHOUT COMPLICATION, WITH LONG-TERM CURRENT USE OF INSULIN: ICD-10-CM

## 2019-04-09 DIAGNOSIS — E11.9 TYPE 2 DIABETES MELLITUS WITHOUT COMPLICATION, WITH LONG-TERM CURRENT USE OF INSULIN: ICD-10-CM

## 2019-04-09 RX ORDER — INSULIN DETEMIR 100 [IU]/ML
INJECTION, SOLUTION SUBCUTANEOUS
Qty: 15 ML | Refills: 0 | Status: SHIPPED | OUTPATIENT
Start: 2019-04-09 | End: 2019-06-04 | Stop reason: SDUPTHER

## 2019-05-06 DIAGNOSIS — M25.572 CHRONIC PAIN OF LEFT ANKLE: ICD-10-CM

## 2019-05-06 DIAGNOSIS — G89.29 CHRONIC PAIN OF LEFT ANKLE: ICD-10-CM

## 2019-05-08 RX ORDER — DICLOFENAC SODIUM 75 MG/1
TABLET, DELAYED RELEASE ORAL
Qty: 60 TABLET | Refills: 0 | Status: SHIPPED | OUTPATIENT
Start: 2019-05-08 | End: 2019-06-13

## 2019-05-10 DIAGNOSIS — E11.9 TYPE 2 DIABETES MELLITUS WITHOUT COMPLICATION: ICD-10-CM

## 2019-05-15 ENCOUNTER — CLINICAL SUPPORT (OUTPATIENT)
Dept: REHABILITATION | Facility: HOSPITAL | Age: 59
End: 2019-05-15
Attending: FAMILY MEDICINE
Payer: MEDICARE

## 2019-05-15 DIAGNOSIS — M25.572 CHRONIC PAIN OF LEFT ANKLE: ICD-10-CM

## 2019-05-15 DIAGNOSIS — R26.89 IMPAIRED GAIT AND MOBILITY: ICD-10-CM

## 2019-05-15 DIAGNOSIS — G89.29 CHRONIC PAIN OF LEFT ANKLE: ICD-10-CM

## 2019-05-15 DIAGNOSIS — M25.672 DECREASED RANGE OF MOTION OF LEFT ANKLE: ICD-10-CM

## 2019-05-15 PROCEDURE — 97161 PT EVAL LOW COMPLEX 20 MIN: CPT | Mod: HCNC,PN

## 2019-05-15 NOTE — PLAN OF CARE
Physical Therapy Initial Evaluation     Name: Thierry Ho  Clinic Number: [id number]    Therapy Diagnosis:   Encounter Diagnoses   Name Primary?    Decreased range of motion of left ankle     Chronic pain of left ankle     Impaired gait and mobility      Physician: Sterling Jarquin MD    Physician Orders: PT Eval and Treat   Medical Diagnosis from Referral: M25.572,G89.29 (ICD-10-CM) - Chronic pain of left ankle  Evaluation Date: 5/15/2019  Authorization Period Expiration: 12/31/2019  Plan of Care Expiration: 8/15/2019  Visit # / Visits authorized: 1/20    Time In: 1200  Time Out: 1230  Total Billable Time: 30 minutes    Precautions: Standard and Diabetes    Subjective     Date of onset: Chronic  History of current condition - Thierry reports: L ankle pain beginning about 4.5 years ago after falling down stairs.  He had surgery with fixation and was unable to have the hardware removed d/t diabetes comorbidity.  Currently c/o pain in medial/lateral aspects of L ankle.  Difficulty walking, performing household chores, stairs, any WB activity.      Medical History:   No past medical history on file.    Surgical History:   Thierry Ho  has a past surgical history that includes Back surgery (11/2017) and Colonoscopy (N/A, 10/19/2018).    Medications:   Thierry has a current medication list which includes the following prescription(s): acetaminophen, bd ultra-fine onofre pen needle, blood pressure cuff, blood sugar diagnostic, blood-glucose meter, carvedilol, diclofenac, fluocinolone, fluocinonide, lancets, levemir flextouch u-100 insuln, lisinopril, metformin, omeprazole, simvastatin, tramadol, and triamcinolone acetonide 0.1%.    Allergies:   Review of patient's allergies indicates:  No Known Allergies     Imaging: None recently    Prior Therapy: After surgery  Social History:  lives alone  Occupation: N/A Disability  Prior Level of Function: Chronic - was unlimited  previous to fall.    DME owned/used: [employer] (left at home)  Current Level of Function: Difficulty walking, performing household chores, stairs, any WB activity.        Pain:  Current 5/10, worst 10/10, best 3/10   Location: left ankles  Description: Aching, Throbbing and Sharp  Aggravating Factors: Standing and Walking  Easing Factors: pain medication, rest and elevation    Pts goals: Pt would like to be able to walk without L ankle pain.         Objective     Sensation: Light touch reported to be intact and equal bilaterally    Palpation: High levels of pain reported with palpation along lateral portions of lower leg, lateral malleolus and areas surrounding, similar medially.  Not reproduced consistently.      Observation: L lateral malleolus more pronounced compared to R side.      Edema:  Left: absent  Right: absent    Range of Motion: Ankle    Left   Dorsiflexion: (5) °   Plantarflexion 30 °   Inversion 30 °   Eversion 5 °     Strength: Ankle    Left Right   Gastrocnemius 4/5 4+/5   Soleus 4/5 4+/5   Dorsiflexion 4/5 4+/5   Inversion 4/5 4+/5   Eversion 4/5 4+/5     Strength: Knee   Left Right   Quadriceps 4+/5 4+/5   Hamstrings 4/5 4+/5       Strength: Hip    Left Right   Iliopsoas 4+/5 4+/5     Joint Mobility: Unable to assess d/t patient pain levels.      Gait: Vic ambulated 300 feet with no AD.  He demonstrated antalgic gait pattern, lacking heel strike and toe off on L LE.  .    Balance: Pt refused to attempt    Functional Limitations Reports - G Codes      TREATMENT     Home Exercises and Patient Education Provided    Education provided:   - HEP    Written Home Exercises Provided: yes.  Exercises were reviewed and Thierry was able to demonstrate them prior to the end of the session.  Thierry demonstrated good  understanding of the education provided.     See EMR under Patient Instructions for exercises provided 5/15/2019.    ASSESSMENT     Thierry is a 59 y.o. male referred to outpatient Physical Therapy with a  medical diagnosis of L ankle pain. He presents with the following impairments: increased L ankle pain, decreased L ankle ROM, decreased LE Strength, soft tissue dysfunction, postural imbalance,impaired joint mobility, and decreased tolerance to functional activities.  Functional limitations include: Difficulty walking, performing household chores, stairs, any WB activity.      Pt with good motivation to perform physical activity and responds well to cueing.       Patient prognosis is Fair.  Pt will benefit from skilled outpatient physical therapy to address the above stated deficits, provide pt/family education and to maximize pt's level of independence.     Medical necessity is demonstrated by the following IMPAIRMENTS/PROBLEMS:  weakness, impaired sensation, impaired functional mobility, gait instability, impaired balance, decreased lower extremity function, pain, decreased ROM and impaired muscle length    Medical Necessity is demonstrated by the following  History  Co-morbidities and personal factors that may impact the plan of care Co-morbidities:   diabetes    Personal Factors:   no deficits     low   Examination  Body Structures and Functions, activity limitations and participation restrictions that may impact the plan of care Body Regions:   lower extremities    Body Systems:    ROM  strength  balance  gait  motor control    Participation Restrictions:   None    Activity limitations:   Learning and applying knowledge  no deficits    General Tasks and Commands  no deficits    Communication  no deficits    Mobility  walking    Self care  no deficits    Domestic Life  shopping  cooking  doing house work (cleaning house, washing dishes, laundry)    Interactions/Relationships  no deficits    Life Areas  no deficits    Community and Social Life  community life  recreation and leisure         low   Clinical Presentation stable and uncomplicated low   Decision Making/ Complexity Score: low     Pt's spiritual,  cultural and educational needs considered and pt agreeable to plan of care and goals as stated below:       Short Term GOALS: 3 weeks. Pt agrees with goals set.  1. Patient demonstrates independence with HEP.   2. Patient demonstrates independence with Postural Awareness.   3. Patient demonstrates independence with body mechanics.     Long Term GOALS: 6 weeks. Pt agrees with goals set.  1. Patient demonstrates increased DF ROM to at least 5 degs  2. Patient will report ability to walk for at least 10 mins without limitations d/t L ankle pain.  3. Patient demonstrates improved overall function per FOTO Ankle Survey to 33% Limitation or less.   4. Patient demonstrates ability to balance on L LE for at least 10 seconds without limitations d/t L ankle pain.    PLAN     Plan of care Certification: 5/15/2019 to 8/15/2019.    Outpatient Physical Therapy 2 times weekly for 6 weeks to include the following interventions: Electrical Stimulation TENS, Fluidotherapy, Gait Training, Manual Therapy, Moist Heat/ Ice, Neuromuscular Re-ed, Patient Education, Therapeutic Activites, Therapeutic Exercise and Ultrasound.  Pt may be seen by PTA as part of the rehabilitation team.     Slim Botello, PT  5/15/2019    I have seen the patient, reviewed the therapist's plan of care, and I agree with the plan of care.      I certify the need for these services furnished under this plan of treatment and while under my care.     ___________________ ________ Physician/Referring Practitioner            ___________________________ Date of Signature

## 2019-05-27 ENCOUNTER — TELEPHONE (OUTPATIENT)
Dept: FAMILY MEDICINE | Facility: CLINIC | Age: 59
End: 2019-05-27

## 2019-05-30 ENCOUNTER — PATIENT OUTREACH (OUTPATIENT)
Dept: ADMINISTRATIVE | Facility: HOSPITAL | Age: 59
End: 2019-05-30

## 2019-05-30 DIAGNOSIS — Z13.5 SCREENING FOR DIABETIC RETINOPATHY: Primary | ICD-10-CM

## 2019-06-04 DIAGNOSIS — E11.9 TYPE 2 DIABETES MELLITUS WITHOUT COMPLICATION, WITH LONG-TERM CURRENT USE OF INSULIN: ICD-10-CM

## 2019-06-04 DIAGNOSIS — Z79.4 TYPE 2 DIABETES MELLITUS WITHOUT COMPLICATION, WITH LONG-TERM CURRENT USE OF INSULIN: ICD-10-CM

## 2019-06-04 DIAGNOSIS — G89.29 CHRONIC PAIN OF LEFT ANKLE: ICD-10-CM

## 2019-06-04 DIAGNOSIS — M25.572 CHRONIC PAIN OF LEFT ANKLE: ICD-10-CM

## 2019-06-04 DIAGNOSIS — E78.49 OTHER HYPERLIPIDEMIA: ICD-10-CM

## 2019-06-07 NOTE — PROGRESS NOTES
"  Physical Therapy Daily Treatment Note     Name: Thierry Ho  [facility] Number: [id number]    Therapy Diagnosis:   Encounter Diagnoses   Name Primary?    Decreased range of motion of left ankle     Chronic pain of left ankle     Impaired gait and mobility      Physician: Sterling Jarquin MD    Visit Date: 6/10/2019    Physician Orders: PT Eval and Treat   Medical Diagnosis from Referral: M25.572,G89.29 (ICD-10-CM) - Chronic pain of left ankle  Evaluation Date: 5/15/2019  Authorization Period Expiration: 12/31/2019  Plan of Care Expiration: 8/15/2019  Visit # / Visits authorized: 2/20    Time In:1105  Time Out: 1130  Total Billable Time: 25 minutes    Precautions: Standard and Diabetes    Subjective     Pt reports: Increased pain today, woke up with more pain than usual.    He was not compliant with home exercise program.  Response to previous treatment: No changes  Functional change: No changes    Pain: 10/10  Location: left ankles     Objective     BOLD = Performed Today  + = New Exercise or Progression    Thierry received therapeutic exercises to develop strength, ROM and flexibility for 25 minutes including:     Exercise Resistance Sets/Reps/Hold    Upright Bike  8 mins    Calf Board  3x30 secs    Lateral Step Up 4"  3x10    BAPS Board Seated  L2 20x ea.    Shuttle Squats 2 bands 3x10    Ankle INV/EVR/DF w/ theraband green 20x    Bridging  Pain                   Home Exercises Provided and Patient Education Provided     Education provided:   - Exercise progression   Written Home Exercises Provided: Patient instructed to cont prior HEP.  Exercises were reviewed and Thierry was able to demonstrate them prior to the end of the session.  Thierry demonstrated good  understanding of the education provided.     See EMR under Patient Instructions for exercises provided prior visit.    Assessment     Thierry is not progressing well towards his goals.  All activity was reported to worsen his sx's, and he refused manual " treatment/modalities for pain reduction.  He also stated that going forward, he would only be able to attend therapy sessions 1x per month.    Pt prognosis is Fair.     Pt will continue to benefit from skilled outpatient physical therapy to address the deficits listed in the problem list box on initial evaluation, provide pt/family education and to maximize pt's level of independence in the home and community environment.     Pt's spiritual, cultural and educational needs considered and pt agreeable to plan of care and goals.     Anticipated barriers to physical therapy: None    Goals:   Short Term GOALS: 3 weeks. Pt agrees with goals set.  1. Patient demonstrates independence with HEP.   2. Patient demonstrates independence with Postural Awareness.   3. Patient demonstrates independence with body mechanics.      Long Term GOALS: 6 weeks. Pt agrees with goals set.  1. Patient demonstrates increased DF ROM to at least 5 degs  2. Patient will report ability to walk for at least 10 mins without limitations d/t L ankle pain.  3. Patient demonstrates improved overall function per FOTO Ankle Survey to 33% Limitation or less.   4. Patient demonstrates ability to balance on L LE for at least 10 seconds without limitations d/t L ankle pain.    Plan     Assess response, continue w/ POC.      Slim Botello, PT

## 2019-06-09 RX ORDER — TRAMADOL HYDROCHLORIDE 50 MG/1
TABLET ORAL
Qty: 30 TABLET | Refills: 0 | OUTPATIENT
Start: 2019-06-09

## 2019-06-09 RX ORDER — INSULIN DETEMIR 100 [IU]/ML
40 INJECTION, SOLUTION SUBCUTANEOUS NIGHTLY
Qty: 15 ML | Refills: 0 | Status: SHIPPED | OUTPATIENT
Start: 2019-06-09 | End: 2019-12-31 | Stop reason: SDUPTHER

## 2019-06-09 RX ORDER — SIMVASTATIN 40 MG/1
TABLET, FILM COATED ORAL
Qty: 90 TABLET | Refills: 0 | Status: SHIPPED | OUTPATIENT
Start: 2019-06-09 | End: 2019-09-17 | Stop reason: SDUPTHER

## 2019-06-09 RX ORDER — PEN NEEDLE, DIABETIC 31 GX5/16"
NEEDLE, DISPOSABLE MISCELLANEOUS
Qty: 100 EACH | Refills: 6 | Status: SHIPPED | OUTPATIENT
Start: 2019-06-09 | End: 2019-06-13 | Stop reason: SDUPTHER

## 2019-06-10 ENCOUNTER — CLINICAL SUPPORT (OUTPATIENT)
Dept: REHABILITATION | Facility: HOSPITAL | Age: 59
End: 2019-06-10
Payer: MEDICARE

## 2019-06-10 DIAGNOSIS — G89.29 CHRONIC PAIN OF LEFT ANKLE: ICD-10-CM

## 2019-06-10 DIAGNOSIS — M25.672 DECREASED RANGE OF MOTION OF LEFT ANKLE: ICD-10-CM

## 2019-06-10 DIAGNOSIS — R26.89 IMPAIRED GAIT AND MOBILITY: ICD-10-CM

## 2019-06-10 DIAGNOSIS — M25.572 CHRONIC PAIN OF LEFT ANKLE: ICD-10-CM

## 2019-06-10 PROCEDURE — 97110 THERAPEUTIC EXERCISES: CPT | Mod: HCNC,PN

## 2019-06-10 NOTE — TELEPHONE ENCOUNTER
Patient notified of need for OV for refills on controlled medication, patient has scheduled appt 6/13

## 2019-06-13 ENCOUNTER — OFFICE VISIT (OUTPATIENT)
Dept: FAMILY MEDICINE | Facility: CLINIC | Age: 59
End: 2019-06-13
Payer: MEDICARE

## 2019-06-13 ENCOUNTER — HOSPITAL ENCOUNTER (OUTPATIENT)
Dept: RADIOLOGY | Facility: HOSPITAL | Age: 59
Discharge: HOME | End: 2019-06-13
Attending: FAMILY MEDICINE
Payer: MEDICARE

## 2019-06-13 VITALS
HEIGHT: 71 IN | OXYGEN SATURATION: 98 % | TEMPERATURE: 99 F | RESPIRATION RATE: 18 BRPM | DIASTOLIC BLOOD PRESSURE: 78 MMHG | SYSTOLIC BLOOD PRESSURE: 130 MMHG | WEIGHT: 225.06 LBS | HEART RATE: 76 BPM | BODY MASS INDEX: 31.51 KG/M2

## 2019-06-13 DIAGNOSIS — I25.10 CORONARY ARTERY DISEASE INVOLVING NATIVE CORONARY ARTERY OF NATIVE HEART WITHOUT ANGINA PECTORIS: ICD-10-CM

## 2019-06-13 DIAGNOSIS — Z79.4 TYPE 2 DIABETES MELLITUS WITHOUT COMPLICATION, WITH LONG-TERM CURRENT USE OF INSULIN: Primary | ICD-10-CM

## 2019-06-13 DIAGNOSIS — M25.572 CHRONIC PAIN OF LEFT ANKLE: ICD-10-CM

## 2019-06-13 DIAGNOSIS — G89.29 CHRONIC PAIN OF LEFT ANKLE: ICD-10-CM

## 2019-06-13 DIAGNOSIS — Z87.81 HISTORY OF FRACTURE OF LEFT ANKLE: ICD-10-CM

## 2019-06-13 DIAGNOSIS — Z98.890 HISTORY OF CORONARY ANGIOGRAM: ICD-10-CM

## 2019-06-13 DIAGNOSIS — E11.9 TYPE 2 DIABETES MELLITUS WITHOUT COMPLICATION, WITH LONG-TERM CURRENT USE OF INSULIN: Primary | ICD-10-CM

## 2019-06-13 PROCEDURE — 73610 XR ANKLE COMPLETE 3 VIEW LEFT: ICD-10-PCS | Mod: 26,HCNC,LT, | Performed by: RADIOLOGY

## 2019-06-13 PROCEDURE — 73610 X-RAY EXAM OF ANKLE: CPT | Mod: 26,HCNC,LT, | Performed by: RADIOLOGY

## 2019-06-13 PROCEDURE — 3078F PR MOST RECENT DIASTOLIC BLOOD PRESSURE < 80 MM HG: ICD-10-PCS | Mod: HCNC,CPTII,S$GLB, | Performed by: FAMILY MEDICINE

## 2019-06-13 PROCEDURE — 3078F DIAST BP <80 MM HG: CPT | Mod: HCNC,CPTII,S$GLB, | Performed by: FAMILY MEDICINE

## 2019-06-13 PROCEDURE — 3008F PR BODY MASS INDEX (BMI) DOCUMENTED: ICD-10-PCS | Mod: HCNC,CPTII,S$GLB, | Performed by: FAMILY MEDICINE

## 2019-06-13 PROCEDURE — 3008F BODY MASS INDEX DOCD: CPT | Mod: HCNC,CPTII,S$GLB, | Performed by: FAMILY MEDICINE

## 2019-06-13 PROCEDURE — 99214 PR OFFICE/OUTPT VISIT, EST, LEVL IV, 30-39 MIN: ICD-10-PCS | Mod: HCNC,S$GLB,, | Performed by: FAMILY MEDICINE

## 2019-06-13 PROCEDURE — 3044F HG A1C LEVEL LT 7.0%: CPT | Mod: HCNC,CPTII,S$GLB, | Performed by: FAMILY MEDICINE

## 2019-06-13 PROCEDURE — 3075F SYST BP GE 130 - 139MM HG: CPT | Mod: HCNC,CPTII,S$GLB, | Performed by: FAMILY MEDICINE

## 2019-06-13 PROCEDURE — 99214 OFFICE O/P EST MOD 30 MIN: CPT | Mod: HCNC,S$GLB,, | Performed by: FAMILY MEDICINE

## 2019-06-13 PROCEDURE — 3075F PR MOST RECENT SYSTOLIC BLOOD PRESS GE 130-139MM HG: ICD-10-PCS | Mod: HCNC,CPTII,S$GLB, | Performed by: FAMILY MEDICINE

## 2019-06-13 PROCEDURE — 99999 PR PBB SHADOW E&M-EST. PATIENT-LVL V: CPT | Mod: PBBFAC,HCNC,, | Performed by: FAMILY MEDICINE

## 2019-06-13 PROCEDURE — 3044F PR MOST RECENT HEMOGLOBIN A1C LEVEL <7.0%: ICD-10-PCS | Mod: HCNC,CPTII,S$GLB, | Performed by: FAMILY MEDICINE

## 2019-06-13 PROCEDURE — 99999 PR PBB SHADOW E&M-EST. PATIENT-LVL V: ICD-10-PCS | Mod: PBBFAC,HCNC,, | Performed by: FAMILY MEDICINE

## 2019-06-13 PROCEDURE — 73610 X-RAY EXAM OF ANKLE: CPT | Mod: TC,HCNC,FY,PO,LT

## 2019-06-13 RX ORDER — TRAMADOL HYDROCHLORIDE 50 MG/1
50 TABLET ORAL
Qty: 30 TABLET | Refills: 0 | Status: ON HOLD | OUTPATIENT
Start: 2019-06-13 | End: 2019-07-17 | Stop reason: HOSPADM

## 2019-06-13 RX ORDER — GABAPENTIN 100 MG/1
100 CAPSULE ORAL 3 TIMES DAILY
Qty: 90 CAPSULE | Refills: 0 | Status: SHIPPED | OUTPATIENT
Start: 2019-06-13 | End: 2019-07-25

## 2019-06-13 RX ORDER — NAPROXEN 500 MG/1
500 TABLET ORAL 2 TIMES DAILY WITH MEALS
Qty: 60 TABLET | Refills: 0 | Status: ON HOLD | OUTPATIENT
Start: 2019-06-13 | End: 2019-07-17 | Stop reason: HOSPADM

## 2019-06-13 RX ORDER — LANCETS 33 GAUGE
EACH MISCELLANEOUS
COMMUNITY
Start: 2019-03-14 | End: 2020-11-17 | Stop reason: SDUPTHER

## 2019-06-13 RX ORDER — NAPROXEN SODIUM 220 MG/1
81 TABLET, FILM COATED ORAL DAILY
Refills: 0 | COMMUNITY
Start: 2019-06-13 | End: 2020-08-28

## 2019-06-13 RX ORDER — NAPROXEN 500 MG/1
TABLET ORAL
Qty: 180 TABLET | Refills: 0 | OUTPATIENT
Start: 2019-06-13

## 2019-06-13 RX ORDER — PEN NEEDLE, DIABETIC 31 GX5/16"
NEEDLE, DISPOSABLE MISCELLANEOUS
Qty: 100 EACH | Refills: 6 | Status: SHIPPED | OUTPATIENT
Start: 2019-06-13 | End: 2020-08-27

## 2019-06-13 RX ORDER — METFORMIN HYDROCHLORIDE 500 MG/1
500 TABLET ORAL 2 TIMES DAILY WITH MEALS
Qty: 180 TABLET | Refills: 1 | Status: SHIPPED | OUTPATIENT
Start: 2019-06-13 | End: 2020-02-26 | Stop reason: SDUPTHER

## 2019-06-13 NOTE — PROGRESS NOTES
Subjective:       Patient ID: Thierry Ho is a 59 y.o. male.    Chief Complaint: Diabetes; Hyperlipidemia; and Hypertension    HPI   60 yo male presents for dm f/u. Pt states he is doing well w/ his insulin. Fasting BG is around 175-180. States he is compliant w/ his meds.    Still complains about L ankle pain. Had foot and ankle fracture in 2015. States diclofenac did not help. Tramadol helps. Tried pt/ot but states the pain has increased.     Review of Systems   Constitutional: Negative.    HENT: Negative.    Respiratory: Negative.    Cardiovascular: Negative.    Gastrointestinal: Negative.    Endocrine: Negative.    Genitourinary: Negative.    Musculoskeletal: Positive for arthralgias (L foot and ankle).   Neurological: Negative.    Psychiatric/Behavioral: Negative.         History reviewed. No pertinent past medical history.  Past Surgical History:   Procedure Laterality Date    BACK SURGERY  11/2017    COLONOSCOPY N/A 10/19/2018    Performed by Frantz Rasmussen MD at [facility] ENDO     Family History   Problem Relation Age of Onset    Heart disease Mother     Diabetes Father      Social History     Socioeconomic History    Marital status:      Spouse name: Not on file    Number of children: Not on file    Years of education: Not on file    Highest education level: Not on file   Occupational History    Not on file   Social Needs    Financial resource strain: Not on file    Food insecurity:     Worry: Not on file     Inability: Not on file    Transportation needs:     Medical: Not on file     Non-medical: Not on file   Tobacco Use    Smoking status: Current Every Day Smoker     Types: Cigarettes     Start date: 1/15/1981    Smokeless tobacco: Never Used   Substance and Sexual Activity    Alcohol use: Yes     Comment: social     Drug use: No    Sexual activity: Not on file   Lifestyle    Physical activity:     Days per week: Not on file     Minutes per session: Not on file    Stress: Not on  file   Relationships    Social connections:     Talks on phone: Not on file     Gets together: Not on file     Attends Protestant service: Not on file     Active member of club or organization: Not on file     Attends meetings of clubs or organizations: Not on file     Relationship status: Not on file   Other Topics Concern    Not on file   Social History Narrative    Not on file        Objective:      Vitals:    06/13/19 0907   BP: 130/78   Pulse: 76   Resp: 18   Temp: 98.5 °F (36.9 °C)     Physical Exam   Constitutional: He is oriented to person, place, and time. No distress.   HENT:   Head: Normocephalic and atraumatic.   Eyes: Conjunctivae are normal.   Neck: Neck supple.   Cardiovascular: Normal rate, regular rhythm and normal heart sounds. Exam reveals no gallop and no friction rub.   No murmur heard.  Pulmonary/Chest: Effort normal and breath sounds normal. He has no wheezes. He has no rales.   Musculoskeletal:        Left ankle: He exhibits decreased range of motion. Tenderness (in lateral aspect of ankle).   Neurological: He is alert and oriented to person, place, and time.   Skin: Skin is warm and dry. Rash:      Psychiatric: He has a normal mood and affect. His behavior is normal. Judgment and thought content normal.        Assessment:       1. Type 2 diabetes mellitus without complication, with long-term current use of insulin    2. History of fracture of left ankle    3. Chronic pain of left ankle    4. History of coronary angiogram    5. Coronary artery disease involving native coronary artery of native heart without angina pectoris        Plan:       Type 2 diabetes mellitus without complication, with long-term current use of insulin  -     Hemoglobin A1c; Future; Expected date: 06/13/2019  -     metFORMIN (GLUCOPHAGE) 500 MG tablet; Take 1 tablet (500 mg total) by mouth 2 (two) times daily with meals.  Dispense: 180 tablet; Refill: 1  -     traMADol (ULTRAM) 50 mg tablet; Take 1 tablet (50 mg  "total) by mouth every 24 hours as needed for Pain. 1 tablet by mouth prn pain  Dispense: 30 tablet; Refill: 0  -     gabapentin (NEURONTIN) 100 MG capsule; Take 1 capsule (100 mg total) by mouth 3 (three) times daily.  Dispense: 90 capsule; Refill: 0  -     BD ULTRA-FINE FREDA PEN NEEDLE 32 gauge x 5/32" Ndle; U ONCE D UTD  Dispense: 100 each; Refill: 6    History of fracture of left ankle  -     X-Ray Ankle Complete Left; Future; Expected date: 06/13/2019  -     Ambulatory referral to Orthopedics    Chronic pain of left ankle  - Given gabapentin and naproxen  -     traMADol (ULTRAM) 50 mg tablet; Take 1 tablet (50 mg total) by mouth every 24 hours as needed for Pain. 1 tablet by mouth prn pain  Dispense: 30 tablet; Refill: 0      Follow up in about 1 month (around 7/11/2019).            Sterling Jarquin MD  "

## 2019-06-28 DIAGNOSIS — E11.9 TYPE 2 DIABETES MELLITUS WITHOUT COMPLICATION, WITH LONG-TERM CURRENT USE OF INSULIN: ICD-10-CM

## 2019-06-28 DIAGNOSIS — Z79.4 TYPE 2 DIABETES MELLITUS WITHOUT COMPLICATION, WITH LONG-TERM CURRENT USE OF INSULIN: ICD-10-CM

## 2019-06-30 ENCOUNTER — HOSPITAL ENCOUNTER (EMERGENCY)
Facility: HOSPITAL | Age: 59
Discharge: HOME | End: 2019-06-30
Attending: EMERGENCY MEDICINE
Payer: MEDICARE

## 2019-06-30 VITALS
WEIGHT: 219 LBS | DIASTOLIC BLOOD PRESSURE: 100 MMHG | BODY MASS INDEX: 30.66 KG/M2 | SYSTOLIC BLOOD PRESSURE: 133 MMHG | RESPIRATION RATE: 18 BRPM | HEIGHT: 71 IN | OXYGEN SATURATION: 96 % | HEART RATE: 92 BPM | TEMPERATURE: 99 F

## 2019-06-30 DIAGNOSIS — S82.409A CLOSED FIBULAR FRACTURE: Primary | ICD-10-CM
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
## 2019-06-30 LAB — POCT GLUCOSE: 167 MG/DL (ref 70–110)

## 2019-06-30 PROCEDURE — 25000003 PHARM REV CODE 250: Mod: HCNC | Performed by: PHYSICIAN ASSISTANT

## 2019-06-30 PROCEDURE — 82962 GLUCOSE BLOOD TEST: CPT | Mod: HCNC

## 2019-06-30 PROCEDURE — 29515 APPLICATION SHORT LEG SPLINT: CPT | Mod: HCNC,RT

## 2019-06-30 PROCEDURE — 99284 EMERGENCY DEPT VISIT MOD MDM: CPT | Mod: 25,HCNC

## 2019-06-30 RX ORDER — OXYCODONE AND ACETAMINOPHEN 5; 325 MG/1; MG/1
1 TABLET ORAL
Status: DISCONTINUED | OUTPATIENT
Start: 2019-06-30 | End: 2019-06-30 | Stop reason: HOSPADM

## 2019-06-30 RX ORDER — OXYCODONE AND ACETAMINOPHEN 5; 325 MG/1; MG/1
1 TABLET ORAL
Status: COMPLETED | OUTPATIENT
Start: 2019-06-30 | End: 2019-06-30

## 2019-06-30 RX ORDER — OXYCODONE AND ACETAMINOPHEN 10; 325 MG/1; MG/1
1 TABLET ORAL EVERY 8 HOURS PRN
Qty: 9 TABLET | Refills: 0 | Status: SHIPPED | OUTPATIENT
Start: 2019-06-30 | End: 2019-07-09

## 2019-06-30 RX ADMIN — OXYCODONE HYDROCHLORIDE AND ACETAMINOPHEN 1 TABLET: 5; 325 TABLET ORAL at 10:06
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
## 2019-06-30 NOTE — DISCHARGE INSTRUCTIONS
Follow-up with orthopedics. Percocet for pain control. Be aware, this medication is sedating.  Do not mix with alcohol or any other sedating medications.  Do not drive or operate machinery when taking this medication.     No weight-bearing. RICE precautions. Please return to this emergency department if your foot becomes cold, if your pain worsens, if any other problems occur.
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
## 2019-07-01 ENCOUNTER — TELEPHONE (OUTPATIENT)
Dept: ORTHOPEDICS | Facility: CLINIC | Age: 59
End: 2019-07-01

## 2019-07-01 NOTE — TELEPHONE ENCOUNTER
Try calling patient about coming in as a ED/f/up have try all the numbers in his chart. With no answer with any of them. Patient need appt soon as possible with Dr. Steward. Thanks

## 2019-07-02 ENCOUNTER — PATIENT OUTREACH (OUTPATIENT)
Dept: ADMINISTRATIVE | Facility: OTHER | Age: 59
End: 2019-07-02

## 2019-07-02 DIAGNOSIS — S82.821A CLOSED TORUS FRACTURE OF DISTAL END OF RIGHT FIBULA, INITIAL ENCOUNTER: Primary | ICD-10-CM

## 2019-07-03 ENCOUNTER — OFFICE VISIT (OUTPATIENT)
Dept: ORTHOPEDICS | Facility: CLINIC | Age: 59
End: 2019-07-03
Payer: MEDICARE

## 2019-07-03 VITALS
BODY MASS INDEX: 31.22 KG/M2 | HEIGHT: 71 IN | WEIGHT: 223 LBS | HEART RATE: 87 BPM | DIASTOLIC BLOOD PRESSURE: 70 MMHG | SYSTOLIC BLOOD PRESSURE: 120 MMHG

## 2019-07-03 DIAGNOSIS — S82.821A CLOSED TORUS FRACTURE OF DISTAL END OF RIGHT FIBULA, INITIAL ENCOUNTER: Primary | ICD-10-CM

## 2019-07-03 PROCEDURE — 3008F PR BODY MASS INDEX (BMI) DOCUMENTED: ICD-10-PCS | Mod: HCNC,CPTII,S$GLB, | Performed by: ORTHOPAEDIC SURGERY

## 2019-07-03 PROCEDURE — 99999 PR PBB SHADOW E&M-EST. PATIENT-LVL V: ICD-10-PCS | Mod: PBBFAC,HCNC,, | Performed by: ORTHOPAEDIC SURGERY

## 2019-07-03 PROCEDURE — 3078F PR MOST RECENT DIASTOLIC BLOOD PRESSURE < 80 MM HG: ICD-10-PCS | Mod: HCNC,CPTII,S$GLB, | Performed by: ORTHOPAEDIC SURGERY

## 2019-07-03 PROCEDURE — 99204 OFFICE O/P NEW MOD 45 MIN: CPT | Mod: HCNC,S$GLB,, | Performed by: ORTHOPAEDIC SURGERY

## 2019-07-03 PROCEDURE — 3008F BODY MASS INDEX DOCD: CPT | Mod: HCNC,CPTII,S$GLB, | Performed by: ORTHOPAEDIC SURGERY

## 2019-07-03 PROCEDURE — 3074F PR MOST RECENT SYSTOLIC BLOOD PRESSURE < 130 MM HG: ICD-10-PCS | Mod: HCNC,CPTII,S$GLB, | Performed by: ORTHOPAEDIC SURGERY

## 2019-07-03 PROCEDURE — 3078F DIAST BP <80 MM HG: CPT | Mod: HCNC,CPTII,S$GLB, | Performed by: ORTHOPAEDIC SURGERY

## 2019-07-03 PROCEDURE — 99999 PR PBB SHADOW E&M-EST. PATIENT-LVL V: CPT | Mod: PBBFAC,HCNC,, | Performed by: ORTHOPAEDIC SURGERY

## 2019-07-03 PROCEDURE — 99204 PR OFFICE/OUTPT VISIT, NEW, LEVL IV, 45-59 MIN: ICD-10-PCS | Mod: HCNC,S$GLB,, | Performed by: ORTHOPAEDIC SURGERY

## 2019-07-03 PROCEDURE — 3074F SYST BP LT 130 MM HG: CPT | Mod: HCNC,CPTII,S$GLB, | Performed by: ORTHOPAEDIC SURGERY

## 2019-07-03 RX ORDER — SODIUM CHLORIDE 9 MG/ML
INJECTION, SOLUTION INTRAVENOUS CONTINUOUS
Status: CANCELLED | OUTPATIENT
Start: 2019-07-03

## 2019-07-03 NOTE — PROGRESS NOTES
" Chief complaint:  Right ankle pain     HPI: Thierry Ho is a 59 y.o. male who presents today complaining of right ankle pain     He presents for follow-up from the ER where he was seen over the weekend for acute right ankle pain after mechanical fall with twisting injury to the right ankle.  He was placed into a splint and given crutches and he is here for follow up.  He states he was in a lot of pain this morning and called an ambulance but he declined transportation to the hospital. Once his pain medicine started working he felt better.   He endorses numbness and paresthesias over the entire foot and ankle- this is new after the injury.   He has a history of a left ankle fracture which was treated at [facility] with open reduction internal fixation of the fibula with associated syndesmosis screws. He does not feel that he can bear full weight on the left lower extremity due to history of left ankle fracture and fear that he will "break the pins in there."  He states that he never got better after this surgery. He was participating in PT prior to his recent fall. Reports that he did not walk until 10 months after his prior surgery. He reports that he had significant difficulty with pain.    He has a diagnosis of diabetes as last A1c was 7.2    His PCP is Sterling Jarquin MD    This is the extent of the patient's complaints at this time.      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    HENT: Negative.    Eyes: Negative.    Respiratory: Negative.    Cardiovascular: Negative.    Gastrointestinal: Negative.    Genitourinary: Negative.    Skin: Negative.    Neurological: Negative.    Endo/Heme/Allergies: Negative.    Psychiatric/Behavioral: Negative.          Review of patient's allergies indicates:  No Known Allergies      Current Outpatient Medications:     acetaminophen (TYLENOL) 500 MG tablet, Take 500 mg by mouth every 6 " "(six) hours as needed for Pain., Disp: , Rfl:     aspirin 81 MG Chew, Take 1 tablet (81 mg total) by mouth once daily., Disp: , Rfl: 0    BD ULTRA-FINE FREDA PEN NEEDLE 32 gauge x 5/32" Ndle, U ONCE D UTD, Disp: 100 each, Rfl: 6    BLOOD PRESSURE CUFF Misc, 1 kit by Misc.(Non-Drug; Combo Route) route once daily., Disp: 1 each, Rfl: 0    blood sugar diagnostic Strp, 1 each by abdominal subcutaneous route 4 (four) times daily before meals and nightly., Disp: 200 each, Rfl: 3    blood-glucose meter kit, 1 each by Other route 4 (four) times daily before meals and nightly., Disp: 1 each, Rfl: 0    carvedilol (COREG) 12.5 MG tablet, Take 1 tablet (12.5 mg total) by mouth 2 (two) times daily with meals., Disp: 60 tablet, Rfl: 11    fluocinolone (SYNALAR) 0.01 % external solution, Apply topically once daily., Disp: 60 mL, Rfl: 2    fluocinonide (LIDEX) 0.05 % external solution, APPLY 1 ML TO THE SCALP AT BEDTIME, Disp: , Rfl: 3    gabapentin (NEURONTIN) 100 MG capsule, Take 1 capsule (100 mg total) by mouth 3 (three) times daily., Disp: 90 capsule, Rfl: 0    LEVEMIR FLEXTOUCH U-100 INSULN 100 unit/mL (3 mL) InPn pen, Inject 40 Units into the skin every evening., Disp: 15 mL, Rfl: 0    lisinopril (PRINIVIL,ZESTRIL) 20 MG tablet, Take 1 tablet (20 mg total) by mouth once daily., Disp: 90 tablet, Rfl: 3    metFORMIN (GLUCOPHAGE) 500 MG tablet, Take 1 tablet (500 mg total) by mouth 2 (two) times daily with meals., Disp: 180 tablet, Rfl: 1    naproxen (NAPROSYN) 500 MG tablet, Take 1 tablet (500 mg total) by mouth 2 (two) times daily with meals., Disp: 60 tablet, Rfl: 0    omeprazole (PRILOSEC) 20 MG capsule, Take 1 capsule (20 mg total) by mouth once daily., Disp: 90 capsule, Rfl: 2    oxyCODONE-acetaminophen (PERCOCET)  mg per tablet, Take 1 tablet by mouth every 8 (eight) hours as needed for Pain., Disp: 9 tablet, Rfl: 0    simvastatin (ZOCOR) 40 MG tablet, TAKE 1 TABLET(40 MG) BY MOUTH EVERY EVENING, " Disp: 90 tablet, Rfl: 0    traMADol (ULTRAM) 50 mg tablet, Take 1 tablet (50 mg total) by mouth every 24 hours as needed for Pain. 1 tablet by mouth prn pain, Disp: 30 tablet, Rfl: 0    triamcinolone acetonide 0.1% (KENALOG) 0.1 % ointment, APPLY TO SORES BID, Disp: , Rfl: 2    TRUEPLUS LANCETS 33 gauge Misc, , Disp: , Rfl:     Past Medical History:   Diagnosis Date    Diabetes mellitus     GERD (gastroesophageal reflux disease)     Hyperlipidemia     Hypertension        Patient Active Problem List   Diagnosis    Other hyperlipidemia    Gastroesophageal reflux disease without esophagitis    Essential hypertension    History of coronary angiogram    Type 2 diabetes mellitus, without long-term current use of insulin    Coronary artery disease involving native coronary artery of native heart without angina pectoris    Decreased range of motion of left ankle    Chronic pain of left ankle    Impaired gait and mobility       Past Surgical History:   Procedure Laterality Date    APPENDECTOMY      BACK SURGERY  11/2017    COLONOSCOPY N/A 10/19/2018    Per[facility] ENDO    FOOT SURGERY         Social History     Tobacco Use    Smoking status: Current Every Day Smoker     Types: Cigarettes     Start date: 1/15/1981    Smokeless tobacco: Never Used   Substance Use Topics    Alcohol use: Yes     Comment: social     Drug use: No       Family History   Problem Relation Age of Onset    Heart disease Mother     Diabetes Father        Physical Exam:     Vitals:    07/03/19 1316   BP: 120/70   Pulse: 87     General:   There is no height or weight on file to calculate BMI.  Patient is alert, awake and oriented to time, place and person. Mood and affect are appropriate.  Patient does not appear to be in any distress, denies any constitutional symptoms and appears stated age.   HEENT: Pupils are equal and round, sclera are not injected. External examination of ears and nose reveals  no abnormalities. Cranial nerves II-X are grossly intact  Skin: no rashes, abrasions or open wounds on the affected extremity   Resp: No respiratory distress or audible wheezing   CV: 2+  pulses, all extremities warm and well perfused     Right Lower Extremity   Diffuse swelling over the foot and ankle  Skin wrinkles present  No abrasions, lacerations, blisters   Diffusely decreased sensation over the foot in s/s/sp/dp/t distribution  + ehl/fhl/ta/gs.  having difficulty with flexion and extension of lesser toes  2+ DP    Imaging: 3 views right ankle: fracture of distal fibula, clear space widening to 7 mm with manual ER stress    Left ankle: s/p ORIF with syndesmosis screw fixation. Inferior screw broken, all other hardware intact and in place with lucencies around syndemosis screws. The fracture does appear to have healed. No evidence of arthritis.      Assessment: 59 y.o. male with right unstable lateral malleolus fracture    I explained my diagnostic impression and the reasoning behind it in detail, using layman's terms.  Models and/or pictures were used to help in the explanation.    - We discussed the pathology of the patients condition as well as non operative and operative treatment modalities.  He would like to proceed with operative treatment.   - All risks and benefits of open reduction and internal fixation of the right fibula +/- syndesmosis fixation were discussed with the patient including pain, infection, bleeding, damage to surrounding nerves/blood vessels/tendons/other structures, loss of function, loss of range of motion, stiffness, failure to improve, chronic pain, need for further procedures,  stroke, blood clot, pulmonary embolus, pneumonia, urinary tract infection, paralysis, death, non union (failure to heal), malunion  (heal in the wrong position), delayed healing , hardware complications, wound healing complications and plastic surgery coverage of the wound  .  If syndesmosis fixation is  used will plan on removal.   -Alternative treatments were discussed with the patient including:    - treatment in cast -- risk includes malunion, ankle instability and development of arthritis   -He was given an opportunity to ask questions. All questions were answered and all concerns were addressed. He has elected to proceed with ORIF R ankle.  He consented to the procedure in writing.  - We discussed increased risk of complications including infection, wound healing complications, osteomyelitis, need for amputation, non union, delayed union and need for further procedures due to smoking history and poorly controlled DM. These risks include infection, wound healing complications, non union, osteomyelitis, loss of reduction/redisplacement delayed union and need for amputation.  - Patient declined referral to smoking cessation   - Walker Rx sent per patient request  - Discussed pain medication policy -- will only write pain medications until 6 weeks postop   - Return to clinic  2 weeks post op. Return sooner if symptoms worsen or fail to improve.    All questions were answered in detail. The patient is in full agreement with the treatment plan and will proceed accordingly.    This note was created by combination of typed  and M-Modal dictation. Transcription and phonetic errors may be present.  If there are any questions, please contact me.    Email address for PRO: [email or web address]

## 2019-07-03 NOTE — Clinical Note
He is scheduled for ORIF of his right ankle fracture. Juany scheduled you to see him Tuesday for medical clearance. Thanks!

## 2019-07-03 NOTE — H&P (VIEW-ONLY)
" Chief complaint:  Right ankle pain     HPI: Thierry Ho is a 59 y.o. male who presents today complaining of right ankle pain     He presents for follow-up from the ER where he was seen over the weekend for acute right ankle pain after mechanical fall with twisting injury to the right ankle.  He was placed into a splint and given crutches and he is here for follow up.  He states he was in a lot of pain this morning and called an ambulance but he declined transportation to the hospital. Once his pain medicine started working he felt better.   He endorses numbness and paresthesias over the entire foot and ankle- this is new after the injury.   He has a history of a left ankle fracture which was treated at [facility] with open reduction internal fixation of the fibula with associated syndesmosis screws. He does not feel that he can bear full weight on the left lower extremity due to history of left ankle fracture and fear that he will "break the pins in there."  He states that he never got better after this surgery. He was participating in PT prior to his recent fall. Reports that he did not walk until 10 months after his prior surgery. He reports that he had significant difficulty with pain.    He has a diagnosis of diabetes as last A1c was 7.2    His PCP is Sterling Jarquin MD    This is the extent of the patient's complaints at this time.      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    HENT: Negative.    Eyes: Negative.    Respiratory: Negative.    Cardiovascular: Negative.    Gastrointestinal: Negative.    Genitourinary: Negative.    Skin: Negative.    Neurological: Negative.    Endo/Heme/Allergies: Negative.    Psychiatric/Behavioral: Negative.          Review of patient's allergies indicates:  No Known Allergies      Current Outpatient Medications:     acetaminophen (TYLENOL) 500 MG tablet, Take 500 mg by mouth every 6 " "(six) hours as needed for Pain., Disp: , Rfl:     aspirin 81 MG Chew, Take 1 tablet (81 mg total) by mouth once daily., Disp: , Rfl: 0    BD ULTRA-FINE FREDA PEN NEEDLE 32 gauge x 5/32" Ndle, U ONCE D UTD, Disp: 100 each, Rfl: 6    BLOOD PRESSURE CUFF Misc, 1 kit by Misc.(Non-Drug; Combo Route) route once daily., Disp: 1 each, Rfl: 0    blood sugar diagnostic Strp, 1 each by abdominal subcutaneous route 4 (four) times daily before meals and nightly., Disp: 200 each, Rfl: 3    blood-glucose meter kit, 1 each by Other route 4 (four) times daily before meals and nightly., Disp: 1 each, Rfl: 0    carvedilol (COREG) 12.5 MG tablet, Take 1 tablet (12.5 mg total) by mouth 2 (two) times daily with meals., Disp: 60 tablet, Rfl: 11    fluocinolone (SYNALAR) 0.01 % external solution, Apply topically once daily., Disp: 60 mL, Rfl: 2    fluocinonide (LIDEX) 0.05 % external solution, APPLY 1 ML TO THE SCALP AT BEDTIME, Disp: , Rfl: 3    gabapentin (NEURONTIN) 100 MG capsule, Take 1 capsule (100 mg total) by mouth 3 (three) times daily., Disp: 90 capsule, Rfl: 0    LEVEMIR FLEXTOUCH U-100 INSULN 100 unit/mL (3 mL) InPn pen, Inject 40 Units into the skin every evening., Disp: 15 mL, Rfl: 0    lisinopril (PRINIVIL,ZESTRIL) 20 MG tablet, Take 1 tablet (20 mg total) by mouth once daily., Disp: 90 tablet, Rfl: 3    metFORMIN (GLUCOPHAGE) 500 MG tablet, Take 1 tablet (500 mg total) by mouth 2 (two) times daily with meals., Disp: 180 tablet, Rfl: 1    naproxen (NAPROSYN) 500 MG tablet, Take 1 tablet (500 mg total) by mouth 2 (two) times daily with meals., Disp: 60 tablet, Rfl: 0    omeprazole (PRILOSEC) 20 MG capsule, Take 1 capsule (20 mg total) by mouth once daily., Disp: 90 capsule, Rfl: 2    oxyCODONE-acetaminophen (PERCOCET)  mg per tablet, Take 1 tablet by mouth every 8 (eight) hours as needed for Pain., Disp: 9 tablet, Rfl: 0    simvastatin (ZOCOR) 40 MG tablet, TAKE 1 TABLET(40 MG) BY MOUTH EVERY EVENING, " Disp: 90 tablet, Rfl: 0    traMADol (ULTRAM) 50 mg tablet, Take 1 tablet (50 mg total) by mouth every 24 hours as needed for Pain. 1 tablet by mouth prn pain, Disp: 30 tablet, Rfl: 0    triamcinolone acetonide 0.1% (KENALOG) 0.1 % ointment, APPLY TO SORES BID, Disp: , Rfl: 2    TRUEPLUS LANCETS 33 gauge Misc, , Disp: , Rfl:     Past Medical History:   Diagnosis Date    Diabetes mellitus     GERD (gastroesophageal reflux disease)     Hyperlipidemia     Hypertension        Patient Active Problem List   Diagnosis    Other hyperlipidemia    Gastroesophageal reflux disease without esophagitis    Essential hypertension    History of coronary angiogram    Type 2 diabetes mellitus, without long-term current use of insulin    Coronary artery disease involving native coronary artery of native heart without angina pectoris    Decreased range of motion of left ankle    Chronic pain of left ankle    Impaired gait and mobility       Past Surgical History:   Procedure Laterality Date    APPENDECTOMY      BACK SURGERY  11/2017    COLONOSCOPY N/A 10/19/2018    [facility]    FOOT SURGERY         Social History     Tobacco Use    Smoking status: Current Every Day Smoker     Types: Cigarettes     Start date: 1/15/1981    Smokeless tobacco: Never Used   Substance Use Topics    Alcohol use: Yes     Comment: social     Drug use: No       Family History   Problem Relation Age of Onset    Heart disease Mother     Diabetes Father        Physical Exam:     Vitals:    07/03/19 1316   BP: 120/70   Pulse: 87     General:   There is no height or weight on file to calculate BMI.  Patient is alert, awake and oriented to time, place and person. Mood and affect are appropriate.  Patient does not appear to be in any distress, denies any constitutional symptoms and appears stated age.   HEENT: Pupils are equal and round, sclera are not injected. External examination of ears and nose reveals  no abnormalities. Cranial nerves II-X are grossly intact  Skin: no rashes, abrasions or open wounds on the affected extremity   Resp: No respiratory distress or audible wheezing   CV: 2+  pulses, all extremities warm and well perfused     Right Lower Extremity   Diffuse swelling over the foot and ankle  Skin wrinkles present  No abrasions, lacerations, blisters   Diffusely decreased sensation over the foot in s/s/sp/dp/t distribution  + ehl/fhl/ta/gs.  having difficulty with flexion and extension of lesser toes  2+ DP    Imaging: 3 views right ankle: fracture of distal fibula, clear space widening to 7 mm with manual ER stress    Left ankle: s/p ORIF with syndesmosis screw fixation. Inferior screw broken, all other hardware intact and in place with lucencies around syndemosis screws. The fracture does appear to have healed. No evidence of arthritis.      Assessment: 59 y.o. male with right unstable lateral malleolus fracture    I explained my diagnostic impression and the reasoning behind it in detail, using layman's terms.  Models and/or pictures were used to help in the explanation.    - We discussed the pathology of the patients condition as well as non operative and operative treatment modalities.  He would like to proceed with operative treatment.   - All risks and benefits of open reduction and internal fixation of the right fibula +/- syndesmosis fixation were discussed with the patient including pain, infection, bleeding, damage to surrounding nerves/blood vessels/tendons/other structures, loss of function, loss of range of motion, stiffness, failure to improve, chronic pain, need for further procedures,  stroke, blood clot, pulmonary embolus, pneumonia, urinary tract infection, paralysis, death, non union (failure to heal), malunion  (heal in the wrong position), delayed healing , hardware complications, wound healing complications and plastic surgery coverage of the wound  .  If syndesmosis fixation is  used will plan on removal.   -Alternative treatments were discussed with the patient including:    - treatment in cast -- risk includes malunion, ankle instability and development of arthritis   -He was given an opportunity to ask questions. All questions were answered and all concerns were addressed. He has elected to proceed with ORIF R ankle.  He consented to the procedure in writing.  - We discussed increased risk of complications including infection, wound healing complications, osteomyelitis, need for amputation, non union, delayed union and need for further procedures due to smoking history and poorly controlled DM. These risks include infection, wound healing complications, non union, osteomyelitis, loss of reduction/redisplacement delayed union and need for amputation.  - Patient declined referral to smoking cessation   - Walker Rx sent per patient request  - Discussed pain medication policy -- will only write pain medications until 6 weeks postop   - Return to clinic  2 weeks post op. Return sooner if symptoms worsen or fail to improve.    All questions were answered in detail. The patient is in full agreement with the treatment plan and will proceed accordingly.    This note was created by combination of typed  and M-Modal dictation. Transcription and phonetic errors may be present.  If there are any questions, please contact me.    Email address for PRO: [email or web address]

## 2019-07-09 ENCOUNTER — OFFICE VISIT (OUTPATIENT)
Dept: FAMILY MEDICINE | Facility: CLINIC | Age: 59
End: 2019-07-09
Payer: MEDICARE

## 2019-07-09 ENCOUNTER — TELEPHONE (OUTPATIENT)
Dept: FAMILY MEDICINE | Facility: CLINIC | Age: 59
End: 2019-07-09

## 2019-07-09 ENCOUNTER — HOSPITAL ENCOUNTER (OUTPATIENT)
Dept: PREADMISSION TESTING | Facility: HOSPITAL | Age: 59
Discharge: HOME | End: 2019-07-09
Attending: ORTHOPAEDIC SURGERY
Payer: MEDICARE

## 2019-07-09 ENCOUNTER — ANESTHESIA EVENT (OUTPATIENT)
Dept: SURGERY | Facility: HOSPITAL | Age: 59
End: 2019-07-09
Payer: MEDICARE

## 2019-07-09 ENCOUNTER — HOSPITAL ENCOUNTER (OUTPATIENT)
Dept: RADIOLOGY | Facility: HOSPITAL | Age: 59
Discharge: HOME | End: 2019-07-09
Attending: ORTHOPAEDIC SURGERY
Payer: MEDICARE

## 2019-07-09 VITALS
TEMPERATURE: 97 F | RESPIRATION RATE: 18 BRPM | OXYGEN SATURATION: 100 % | SYSTOLIC BLOOD PRESSURE: 114 MMHG | HEART RATE: 76 BPM | WEIGHT: 226.44 LBS | BODY MASS INDEX: 31.7 KG/M2 | HEIGHT: 71 IN | DIASTOLIC BLOOD PRESSURE: 77 MMHG

## 2019-07-09 VITALS
HEIGHT: 71 IN | WEIGHT: 226 LBS | RESPIRATION RATE: 20 BRPM | TEMPERATURE: 98 F | OXYGEN SATURATION: 97 % | SYSTOLIC BLOOD PRESSURE: 96 MMHG | HEART RATE: 81 BPM | BODY MASS INDEX: 31.64 KG/M2 | DIASTOLIC BLOOD PRESSURE: 62 MMHG

## 2019-07-09 DIAGNOSIS — Z01.818 PREOPERATIVE TESTING: Primary | ICD-10-CM

## 2019-07-09 DIAGNOSIS — E87.5 HYPERKALEMIA: ICD-10-CM

## 2019-07-09 DIAGNOSIS — E83.52 HYPERCALCEMIA: ICD-10-CM

## 2019-07-09 DIAGNOSIS — S82.821A CLOSED TORUS FRACTURE OF DISTAL END OF RIGHT FIBULA, INITIAL ENCOUNTER: ICD-10-CM

## 2019-07-09 DIAGNOSIS — Z01.818 PREOP EXAMINATION: Primary | ICD-10-CM

## 2019-07-09 LAB
ALBUMIN SERPL BCP-MCNC: 4.1 G/DL (ref 3.5–5.2)
ALP SERPL-CCNC: 82 U/L (ref 55–135)
ALT SERPL W/O P-5'-P-CCNC: 58 U/L (ref 10–44)
ANION GAP SERPL CALC-SCNC: 5 MMOL/L (ref 8–16)
AST SERPL-CCNC: 36 U/L (ref 10–40)
BASOPHILS # BLD AUTO: 0.03 K/UL (ref 0–0.2)
BASOPHILS NFR BLD: 0.3 % (ref 0–1.9)
BILIRUB SERPL-MCNC: 0.3 MG/DL (ref 0.1–1)
BUN SERPL-MCNC: 28 MG/DL (ref 6–20)
CALCIUM SERPL-MCNC: 12.3 MG/DL (ref 8.7–10.5)
CHLORIDE SERPL-SCNC: 107 MMOL/L (ref 95–110)
CO2 SERPL-SCNC: 22 MMOL/L (ref 23–29)
CREAT SERPL-MCNC: 1.3 MG/DL (ref 0.5–1.4)
DIFFERENTIAL METHOD: ABNORMAL
EOSINOPHIL # BLD AUTO: 0.2 K/UL (ref 0–0.5)
EOSINOPHIL NFR BLD: 2.6 % (ref 0–8)
ERYTHROCYTE [DISTWIDTH] IN BLOOD BY AUTOMATED COUNT: 13.6 % (ref 11.5–14.5)
EST. GFR  (AFRICAN AMERICAN): >60 ML/MIN/1.73 M^2
EST. GFR  (NON AFRICAN AMERICAN): >60 ML/MIN/1.73 M^2
GLUCOSE SERPL-MCNC: 142 MG/DL (ref 70–110)
HCT VFR BLD AUTO: 43.5 % (ref 40–54)
HGB BLD-MCNC: 14.5 G/DL (ref 14–18)
LYMPHOCYTES # BLD AUTO: 2.7 K/UL (ref 1–4.8)
LYMPHOCYTES NFR BLD: 29.5 % (ref 18–48)
MCH RBC QN AUTO: 31.9 PG (ref 27–31)
MCHC RBC AUTO-ENTMCNC: 33.3 G/DL (ref 32–36)
MCV RBC AUTO: 96 FL (ref 82–98)
MONOCYTES # BLD AUTO: 0.7 K/UL (ref 0.3–1)
MONOCYTES NFR BLD: 8 % (ref 4–15)
NEUTROPHILS # BLD AUTO: 5.5 K/UL (ref 1.8–7.7)
NEUTROPHILS NFR BLD: 59.9 % (ref 38–73)
PLATELET # BLD AUTO: 313 K/UL (ref 150–350)
PMV BLD AUTO: 10.2 FL (ref 9.2–12.9)
POTASSIUM SERPL-SCNC: 6 MMOL/L (ref 3.5–5.1)
PROT SERPL-MCNC: 7.8 G/DL (ref 6–8.4)
RBC # BLD AUTO: 4.55 M/UL (ref 4.6–6.2)
SODIUM SERPL-SCNC: 134 MMOL/L (ref 136–145)
WBC # BLD AUTO: 9.2 K/UL (ref 3.9–12.7)

## 2019-07-09 PROCEDURE — 93010 EKG 12-LEAD: ICD-10-PCS | Mod: HCNC,,, | Performed by: INTERNAL MEDICINE

## 2019-07-09 PROCEDURE — 93010 ELECTROCARDIOGRAM REPORT: CPT | Mod: HCNC,,, | Performed by: INTERNAL MEDICINE

## 2019-07-09 PROCEDURE — 93005 ELECTROCARDIOGRAM TRACING: CPT | Mod: HCNC

## 2019-07-09 PROCEDURE — 99999 PR PBB SHADOW E&M-EST. PATIENT-LVL IV: ICD-10-PCS | Mod: PBBFAC,HCNC,, | Performed by: FAMILY MEDICINE

## 2019-07-09 PROCEDURE — 3008F PR BODY MASS INDEX (BMI) DOCUMENTED: ICD-10-PCS | Mod: HCNC,CPTII,S$GLB, | Performed by: FAMILY MEDICINE

## 2019-07-09 PROCEDURE — 99999 PR PBB SHADOW E&M-EST. PATIENT-LVL IV: CPT | Mod: PBBFAC,HCNC,, | Performed by: FAMILY MEDICINE

## 2019-07-09 PROCEDURE — 3074F SYST BP LT 130 MM HG: CPT | Mod: HCNC,CPTII,S$GLB, | Performed by: FAMILY MEDICINE

## 2019-07-09 PROCEDURE — 3078F DIAST BP <80 MM HG: CPT | Mod: HCNC,CPTII,S$GLB, | Performed by: FAMILY MEDICINE

## 2019-07-09 PROCEDURE — 71046 X-RAY EXAM CHEST 2 VIEWS: CPT | Mod: 26,HCNC,, | Performed by: RADIOLOGY

## 2019-07-09 PROCEDURE — 80053 COMPREHEN METABOLIC PANEL: CPT | Mod: HCNC

## 2019-07-09 PROCEDURE — 99214 PR OFFICE/OUTPT VISIT, EST, LEVL IV, 30-39 MIN: ICD-10-PCS | Mod: HCNC,S$GLB,, | Performed by: FAMILY MEDICINE

## 2019-07-09 PROCEDURE — 71046 X-RAY EXAM CHEST 2 VIEWS: CPT | Mod: TC,HCNC,FY

## 2019-07-09 PROCEDURE — 3078F PR MOST RECENT DIASTOLIC BLOOD PRESSURE < 80 MM HG: ICD-10-PCS | Mod: HCNC,CPTII,S$GLB, | Performed by: FAMILY MEDICINE

## 2019-07-09 PROCEDURE — 36415 COLL VENOUS BLD VENIPUNCTURE: CPT | Mod: HCNC

## 2019-07-09 PROCEDURE — 99214 OFFICE O/P EST MOD 30 MIN: CPT | Mod: HCNC,S$GLB,, | Performed by: FAMILY MEDICINE

## 2019-07-09 PROCEDURE — 3074F PR MOST RECENT SYSTOLIC BLOOD PRESSURE < 130 MM HG: ICD-10-PCS | Mod: HCNC,CPTII,S$GLB, | Performed by: FAMILY MEDICINE

## 2019-07-09 PROCEDURE — 3008F BODY MASS INDEX DOCD: CPT | Mod: HCNC,CPTII,S$GLB, | Performed by: FAMILY MEDICINE

## 2019-07-09 PROCEDURE — 85025 COMPLETE CBC W/AUTO DIFF WBC: CPT | Mod: HCNC

## 2019-07-09 PROCEDURE — 71046 XR CHEST PA AND LATERAL PRE-OP: ICD-10-PCS | Mod: 26,HCNC,, | Performed by: RADIOLOGY

## 2019-07-09 NOTE — PLAN OF CARE
Pre-operative instructions, medication directives and pain scales reviewed with Mr Ho.. All questions the patient had  were answered. Re-assurance about surgical procedure and day of surgery routine given as needed. Mr Ho verbalized understanding of the pre-op instructions.Labs, EKG, and CXR done.

## 2019-07-09 NOTE — DISCHARGE INSTRUCTIONS
"Your procedure  is scheduled for _WED  7/10_________.    Call [phone] between 2pm and 5pm on _TODAY--7/9______to find out your arrival time for the day of surgery.    Report to Same Day Surgery Unit at ____ AM on the 2nd floor of the hospital.  Use the front entrance of the hospital.  The front doors of the hospital open promptly at 5:30am.  If you need wheelchair assistance, call [phone] from your cell phone, or call "0" from the courtesy phone in the lobby.    Important instructions:   Do not eat or drink after 12 midnight, including water.  It is okay to brush your teeth.  Do not have gum, candy or mints.     Take only these medications with a small swallow of water on the morning of your surgery __CARVEDILOL, GABAPENTIN, OMEPRAZOLE____________    Do not take any diabetic medication on the morning of surgery unless instructed to do so by your doctor or pre op nurse.    Stop taking Aspirin, Ibuprofen, Motrin and Aleve , Fish oil, and Vitamin E for at least 7 days before your surgery. You may use Tylenol unless otherwise instructed by your doctor.       STOP NAPROXEN TODAY                                                       .       Prep instructions:           SHOWER   OTHER_____________     Please shower the night before and the morning of your surgery.        Use Hibiclens soap as instructed by your pre op nurse.   Please place clean linens on your bed the night before surgery. Please wear fresh clean clothing after each shower.            You may wear deodorant only.      Do not wear powder, body lotion or perfume/cologne.     Do not wear any jewelry or have any metal on your body.     You will be asked to remove any dentures or partials for the procedure.   .     If you are going home on the same day of surgery, you must arrange for a family member or a friend to drive you home.  Public transportation is prohibited.  You will not be able to drive home if you were given anesthesia or " sedation.          Wear loose fitting clothes allowing for bandages.     Please leave money and valuables home.       You may bring your cell phone.     Call the doctor if fever or illness should occur before your surgery.    Call [phone] to contact us here if needed.

## 2019-07-09 NOTE — PRE ADMISSION SCREENING
Dr Bueno notified of abnormal labs. I then called [facility] to inform Dr Britton cooley Md of abnormal labs.

## 2019-07-09 NOTE — TELEPHONE ENCOUNTER
----- Message from Yamileth Jordan sent at 7/9/2019  1:57 PM CDT -----  Contact: [facility], Nurse Janice  ,Name of Who is Calling: Janice      What is the request in detail: calling to make sure Dr Jarquin looks at the labs because they are abnormal. Potasium is critical high, and his calcium. Pt is suppose to have surgery on tomorrow      Can the clinic reply by [facility]: no      What Number to Call Back if not in [facility]: [phone]

## 2019-07-10 ENCOUNTER — ANESTHESIA (OUTPATIENT)
Dept: SURGERY | Facility: HOSPITAL | Age: 59
End: 2019-07-10
Payer: MEDICARE

## 2019-07-10 DIAGNOSIS — Z79.4 TYPE 2 DIABETES MELLITUS WITHOUT COMPLICATION, WITH LONG-TERM CURRENT USE OF INSULIN: ICD-10-CM

## 2019-07-10 DIAGNOSIS — E11.9 TYPE 2 DIABETES MELLITUS WITHOUT COMPLICATION, WITH LONG-TERM CURRENT USE OF INSULIN: ICD-10-CM

## 2019-07-10 RX ORDER — GABAPENTIN 100 MG/1
CAPSULE ORAL
Qty: 90 CAPSULE | Refills: 0 | OUTPATIENT
Start: 2019-07-10

## 2019-07-10 NOTE — PROGRESS NOTES
"Subjective:       Patient ID: Thierry Ho is a 59 y.o. male.    Chief Complaint: Pre-op Exam    HPI   60 yo male presents for preop.  On 6/30/19. '59 y.o. Male presents to the ED for an emergent evaluation of R ankle and foot pain secondary to a mechanical fall last night while at his niece's house. Rates as 8/10. Pt reports he was accidentally pushed which caused him to fall backwards with his R foot "behind" him'. XR showed closed torus fracture. Pt went to see ortho on 7/3/19 and is scheduled for ORIF of R ankle on 7/10/19. Lab work and imaging was ordered already. Pt has a hx of CAD, HTN, T2DM, and HLD. Denies any recent MIs. States his BS is usually less than 200 but pt does not take the 40u of lantus qhs. States it ranges from 20-40u. Continues to smoke about 2 cigs. States he will stop. Complains about R ankle pain but denies any other issues.    Review of Systems   Constitutional: Negative.    HENT: Negative.    Respiratory: Negative.    Cardiovascular: Negative.    Gastrointestinal: Negative.    Endocrine: Negative.    Genitourinary: Negative.    Musculoskeletal: Positive for arthralgias (R ankle) and joint swelling (r ankle).   Neurological: Negative.    Psychiatric/Behavioral: Negative.           Past Medical History:   Diagnosis Date    Diabetes mellitus     GERD (gastroesophageal reflux disease)     Hyperlipidemia     Hypertension     Myocardial infarction      Past Surgical History:   Procedure Laterality Date    APPENDECTOMY      BACK SURGERY  11/2017    COLONOSCOPY N/A 10/19/2018    [facility]at[facility]O    FOOT SURGERY       Family History   Problem Relation Age of Onset    Heart disease Mother     Diabetes Father      Social History     Socioeconomic History    Marital status:      Spouse name: Not on file    Number of children: Not on file    Years of education: Not on file    Highest education level: Not on file   Occupational History    Not on " "file   Social Needs    Financial resource strain: Not on file    Food insecurity:     Worry: Not on file     Inability: Not on file    Transportation needs:     Medical: Not on file     Non-medical: Not on file   Tobacco Use    Smoking status: Current Every Day Smoker     Packs/day: 0.25     Types: Cigarettes     Start date: 1/15/1981    Smokeless tobacco: Never Used   Substance and Sexual Activity    Alcohol use: Yes     Comment: social     Drug use: No    Sexual activity: Not on file   Lifestyle    Physical activity:     Days per week: Not on file     Minutes per session: Not on file    Stress: Not on file   Relationships    Social connections:     Talks on phone: Not on file     Gets together: Not on file     Attends Yazidi service: Not on file     Active member of club or organization: Not on file     Attends meetings of clubs or organizations: Not on file     Relationship status: Not on file   Other Topics Concern    Not on file   Social History Narrative    Not on file       Current Outpatient Medications:     BD ULTRA-FINE FREDA PEN NEEDLE 32 gauge x 5/32" Ndle, U ONCE D UTD, Disp: 100 each, Rfl: 6    BLOOD PRESSURE CUFF Misc, 1 kit by Misc.(Non-Drug; Combo Route) route once daily., Disp: 1 each, Rfl: 0    blood sugar diagnostic Strp, 1 each by abdominal subcutaneous route 4 (four) times daily before meals and nightly., Disp: 200 each, Rfl: 3    blood-glucose meter kit, 1 each by Other route 4 (four) times daily before meals and nightly., Disp: 1 each, Rfl: 0    carvedilol (COREG) 12.5 MG tablet, Take 1 tablet (12.5 mg total) by mouth 2 (two) times daily with meals., Disp: 60 tablet, Rfl: 11    LEVEMIR FLEXTOUCH U-100 INSULN 100 unit/mL (3 mL) InPn pen, Inject 40 Units into the skin every evening., Disp: 15 mL, Rfl: 0    omeprazole (PRILOSEC) 20 MG capsule, Take 1 capsule (20 mg total) by mouth once daily., Disp: 90 capsule, Rfl: 2    TRUEPLUS LANCETS 33 gauge Misc, , Disp: , Rfl:     " "acetaminophen (TYLENOL) 500 MG tablet, Take 500 mg by mouth every 6 (six) hours as needed for Pain., Disp: , Rfl:     aspirin 81 MG Chew, Take 1 tablet (81 mg total) by mouth once daily., Disp: , Rfl: 0    fluocinonide (LIDEX) 0.05 % external solution, APPLY 1 ML TO THE SCALP AT BEDTIME, Disp: , Rfl: 3    gabapentin (NEURONTIN) 100 MG capsule, Take 1 capsule (100 mg total) by mouth 3 (three) times daily., Disp: 90 capsule, Rfl: 0    lisinopril (PRINIVIL,ZESTRIL) 20 MG tablet, Take 1 tablet (20 mg total) by mouth once daily., Disp: 90 tablet, Rfl: 3    metFORMIN (GLUCOPHAGE) 500 MG tablet, Take 1 tablet (500 mg total) by mouth 2 (two) times daily with meals., Disp: 180 tablet, Rfl: 1    naproxen (NAPROSYN) 500 MG tablet, Take 1 tablet (500 mg total) by mouth 2 (two) times daily with meals., Disp: 60 tablet, Rfl: 0    simvastatin (ZOCOR) 40 MG tablet, TAKE 1 TABLET(40 MG) BY MOUTH EVERY EVENING, Disp: 90 tablet, Rfl: 0    traMADol (ULTRAM) 50 mg tablet, Take 1 tablet (50 mg total) by mouth every 24 hours as needed for Pain. 1 tablet by mouth prn pain, Disp: 30 tablet, Rfl: 0   Objective:      Vitals:    07/09/19 1336   BP: 96/62   BP Location: Left arm   Patient Position: Sitting   BP Method: Large (Manual)   Pulse: 81   Resp: 20   Temp: 98.2 °F (36.8 °C)   TempSrc: Oral   SpO2: 97%   Weight: 102.5 kg (226 lb)   Height: 5' 11" (1.803 m)       Physical Exam   Constitutional: He is oriented to person, place, and time. No distress.   HENT:   Head: Normocephalic and atraumatic.   Eyes: Conjunctivae are normal.   Neck: Neck supple.   Cardiovascular: Normal rate, regular rhythm and normal heart sounds. Exam reveals no gallop and no friction rub.   No murmur heard.  Pulmonary/Chest: Effort normal and breath sounds normal. He has no wheezes. He has no rales.   Musculoskeletal:   Has a boot on his R leg   Neurological: He is alert and oriented to person, place, and time.   Skin: Skin is warm and dry.   Psychiatric: He " has a normal mood and affect. His behavior is normal. Judgment and thought content normal.          Assessment:       1. Preop examination    2. Hyperkalemia    3. Hypercalcemia        Plan:       Preop examination  - CXR and EKG was wnl.   - Lab work showed elevated K of 6.0 and Ca of 12.3. AST was elevated at 58. Pt at baseline has an elevated Ca. But K about 1 month ago was wnl. Ca is most likely due the fracture and K is most likely a lab error especially since EKG did not show an T waves. Pt is completely asymptomatic. Advised pt to hydrate aggressively and to get a cmp tomorrow morning. If labs have improved pt can be cleared for surgery. Discussed w/ Dr. Steward who is fine with the plan.  -     Comprehensive metabolic panel; Future; Expected date: 07/09/2019    Hyperkalemia  -     Comprehensive metabolic panel; Future; Expected date: 07/09/2019    Hypercalcemia  -     Comprehensive metabolic panel; Future; Expected date: 07/09/2019    T2DM- discussed w/ pt not to use prn levemir at night time. Advised pt to take 30u qhs for his BS. Advised pt to stop smoking especially since it can delay healing.              Sterling Jarquin MD

## 2019-07-12 ENCOUNTER — TELEPHONE (OUTPATIENT)
Dept: ORTHOPEDICS | Facility: CLINIC | Age: 59
End: 2019-07-12

## 2019-07-16 ENCOUNTER — TELEPHONE (OUTPATIENT)
Dept: ORTHOPEDICS | Facility: CLINIC | Age: 59
End: 2019-07-16

## 2019-07-16 NOTE — TELEPHONE ENCOUNTER
----- Message from Sterling Jarquin MD sent at 7/15/2019 11:59 AM CDT -----  His cmp has improved. K is elevated but pt had a normal EKG at ov. He is cleared. Pt is a moderate risk for a moderate risk surgery. Patient will be worked up for his hypercalcemia after surgery. Would benefit from hydration for now.

## 2019-07-17 ENCOUNTER — HOSPITAL ENCOUNTER (OUTPATIENT)
Facility: HOSPITAL | Age: 59
Discharge: HOME | End: 2019-07-17
Attending: ORTHOPAEDIC SURGERY | Admitting: ORTHOPAEDIC SURGERY
Payer: MEDICARE

## 2019-07-17 VITALS
WEIGHT: 226.44 LBS | HEART RATE: 78 BPM | BODY MASS INDEX: 31.7 KG/M2 | HEIGHT: 71 IN | SYSTOLIC BLOOD PRESSURE: 134 MMHG | OXYGEN SATURATION: 100 % | DIASTOLIC BLOOD PRESSURE: 71 MMHG | RESPIRATION RATE: 18 BRPM | TEMPERATURE: 99 F

## 2019-07-17 DIAGNOSIS — S82.821A CLOSED TORUS FRACTURE OF DISTAL END OF RIGHT FIBULA, INITIAL ENCOUNTER: Primary | ICD-10-CM

## 2019-07-17 LAB
ALBUMIN SERPL BCP-MCNC: 3.8 G/DL (ref 3.5–5.2)
ALP SERPL-CCNC: 72 U/L (ref 55–135)
ALT SERPL W/O P-5'-P-CCNC: 63 U/L (ref 10–44)
ANION GAP SERPL CALC-SCNC: 6 MMOL/L (ref 8–16)
AST SERPL-CCNC: 43 U/L (ref 10–40)
BILIRUB SERPL-MCNC: 0.2 MG/DL (ref 0.1–1)
BUN SERPL-MCNC: 31 MG/DL (ref 6–20)
CALCIUM SERPL-MCNC: 11.4 MG/DL (ref 8.7–10.5)
CHLORIDE SERPL-SCNC: 112 MMOL/L (ref 95–110)
CO2 SERPL-SCNC: 22 MMOL/L (ref 23–29)
CREAT SERPL-MCNC: 1.2 MG/DL (ref 0.5–1.4)
EST. GFR  (AFRICAN AMERICAN): >60 ML/MIN/1.73 M^2
EST. GFR  (NON AFRICAN AMERICAN): >60 ML/MIN/1.73 M^2
GLUCOSE SERPL-MCNC: 119 MG/DL (ref 70–110)
POCT GLUCOSE: 111 MG/DL (ref 70–110)
POTASSIUM SERPL-SCNC: 5.9 MMOL/L (ref 3.5–5.1)
PROT SERPL-MCNC: 7.1 G/DL (ref 6–8.4)
SODIUM SERPL-SCNC: 140 MMOL/L (ref 136–145)

## 2019-07-17 PROCEDURE — 71000033 HC RECOVERY, INTIAL HOUR: Mod: HCNC | Performed by: ORTHOPAEDIC SURGERY

## 2019-07-17 PROCEDURE — 25000003 PHARM REV CODE 250: Mod: HCNC | Performed by: ANESTHESIOLOGY

## 2019-07-17 PROCEDURE — 63600175 PHARM REV CODE 636 W HCPCS: Mod: HCNC | Performed by: ORTHOPAEDIC SURGERY

## 2019-07-17 PROCEDURE — 63600175 PHARM REV CODE 636 W HCPCS: Mod: HCNC | Performed by: ANESTHESIOLOGY

## 2019-07-17 PROCEDURE — 36000709 HC OR TIME LEV III EA ADD 15 MIN: Mod: HCNC | Performed by: ORTHOPAEDIC SURGERY

## 2019-07-17 PROCEDURE — 71000015 HC POSTOP RECOV 1ST HR: Mod: HCNC | Performed by: ORTHOPAEDIC SURGERY

## 2019-07-17 PROCEDURE — 27201423 OPTIME MED/SURG SUP & DEVICES STERILE SUPPLY: Mod: HCNC | Performed by: ORTHOPAEDIC SURGERY

## 2019-07-17 PROCEDURE — 63600175 PHARM REV CODE 636 W HCPCS: Mod: HCNC | Performed by: NURSE ANESTHETIST, CERTIFIED REGISTERED

## 2019-07-17 PROCEDURE — 25000003 PHARM REV CODE 250: Mod: HCNC | Performed by: ORTHOPAEDIC SURGERY

## 2019-07-17 PROCEDURE — D9220A PRA ANESTHESIA: ICD-10-PCS | Mod: HCNC,ANES,, | Performed by: ANESTHESIOLOGY

## 2019-07-17 PROCEDURE — 01480 ANES OPEN PX LOWER L/A/F NOS: CPT | Mod: HCNC | Performed by: ORTHOPAEDIC SURGERY

## 2019-07-17 PROCEDURE — 37000009 HC ANESTHESIA EA ADD 15 MINS: Mod: HCNC | Performed by: ORTHOPAEDIC SURGERY

## 2019-07-17 PROCEDURE — C1713 ANCHOR/SCREW BN/BN,TIS/BN: HCPCS | Mod: HCNC | Performed by: ORTHOPAEDIC SURGERY

## 2019-07-17 PROCEDURE — 27792 PR OPEN TX DISTAL FIBULAR FRACTURE LAT MALLEOLUS: ICD-10-PCS | Mod: HCNC,RT,, | Performed by: ORTHOPAEDIC SURGERY

## 2019-07-17 PROCEDURE — 80053 COMPREHEN METABOLIC PANEL: CPT | Mod: HCNC

## 2019-07-17 PROCEDURE — D9220A PRA ANESTHESIA: Mod: HCNC,ANES,, | Performed by: ANESTHESIOLOGY

## 2019-07-17 PROCEDURE — 71000039 HC RECOVERY, EACH ADD'L HOUR: Mod: HCNC | Performed by: ORTHOPAEDIC SURGERY

## 2019-07-17 PROCEDURE — 36000708 HC OR TIME LEV III 1ST 15 MIN: Mod: HCNC | Performed by: ORTHOPAEDIC SURGERY

## 2019-07-17 PROCEDURE — 27792 TREATMENT OF ANKLE FRACTURE: CPT | Mod: HCNC,RT,, | Performed by: ORTHOPAEDIC SURGERY

## 2019-07-17 PROCEDURE — 36415 COLL VENOUS BLD VENIPUNCTURE: CPT | Mod: HCNC

## 2019-07-17 PROCEDURE — C1769 GUIDE WIRE: HCPCS | Mod: HCNC | Performed by: ORTHOPAEDIC SURGERY

## 2019-07-17 PROCEDURE — 25000003 PHARM REV CODE 250: Mod: HCNC | Performed by: NURSE ANESTHETIST, CERTIFIED REGISTERED

## 2019-07-17 PROCEDURE — 37000008 HC ANESTHESIA 1ST 15 MINUTES: Mod: HCNC | Performed by: ORTHOPAEDIC SURGERY

## 2019-07-17 PROCEDURE — 71000016 HC POSTOP RECOV ADDL HR: Mod: HCNC | Performed by: ORTHOPAEDIC SURGERY

## 2019-07-17 DEVICE — SCREW BONE LOCK T10 3.5X10MM: Type: IMPLANTABLE DEVICE | Site: ANKLE | Status: FUNCTIONAL

## 2019-07-17 DEVICE — GUIDEWIRE ORTHO 1.6X150MM: Type: IMPLANTABLE DEVICE | Site: ANKLE | Status: FUNCTIONAL

## 2019-07-17 DEVICE — SCREW BONE LOCK T10 3.5X12MM: Type: IMPLANTABLE DEVICE | Site: ANKLE | Status: FUNCTIONAL

## 2019-07-17 DEVICE — PLATE BONE FIB VARIAX LAT DIST: Type: IMPLANTABLE DEVICE | Site: ANKLE | Status: FUNCTIONAL

## 2019-07-17 DEVICE — SCREW BONE NON LOCK 3.5X48MM: Type: IMPLANTABLE DEVICE | Site: ANKLE | Status: FUNCTIONAL

## 2019-07-17 RX ORDER — METOCLOPRAMIDE HYDROCHLORIDE 5 MG/ML
INJECTION INTRAMUSCULAR; INTRAVENOUS
Status: DISCONTINUED | OUTPATIENT
Start: 2019-07-17 | End: 2019-07-17

## 2019-07-17 RX ORDER — OXYCODONE AND ACETAMINOPHEN 5; 325 MG/1; MG/1
1 TABLET ORAL EVERY 6 HOURS PRN
Qty: 28 TABLET | Refills: 0 | Status: SHIPPED | OUTPATIENT
Start: 2019-07-17 | End: 2019-07-17 | Stop reason: HOSPADM

## 2019-07-17 RX ORDER — ROCURONIUM BROMIDE 10 MG/ML
INJECTION, SOLUTION INTRAVENOUS
Status: DISCONTINUED | OUTPATIENT
Start: 2019-07-17 | End: 2019-07-17

## 2019-07-17 RX ORDER — HYDRALAZINE HYDROCHLORIDE 20 MG/ML
5 INJECTION INTRAMUSCULAR; INTRAVENOUS ONCE
Status: COMPLETED | OUTPATIENT
Start: 2019-07-17 | End: 2019-07-17

## 2019-07-17 RX ORDER — DEXAMETHASONE SODIUM PHOSPHATE 4 MG/ML
INJECTION, SOLUTION INTRA-ARTICULAR; INTRALESIONAL; INTRAMUSCULAR; INTRAVENOUS; SOFT TISSUE
Status: DISCONTINUED | OUTPATIENT
Start: 2019-07-17 | End: 2019-07-17

## 2019-07-17 RX ORDER — HYDROMORPHONE HYDROCHLORIDE 2 MG/ML
0.2 INJECTION, SOLUTION INTRAMUSCULAR; INTRAVENOUS; SUBCUTANEOUS EVERY 5 MIN PRN
Status: COMPLETED | OUTPATIENT
Start: 2019-07-17 | End: 2019-07-17

## 2019-07-17 RX ORDER — PROPOFOL 10 MG/ML
VIAL (ML) INTRAVENOUS
Status: DISCONTINUED | OUTPATIENT
Start: 2019-07-17 | End: 2019-07-17

## 2019-07-17 RX ORDER — SODIUM CHLORIDE 0.9 % (FLUSH) 0.9 %
10 SYRINGE (ML) INJECTION
Status: DISCONTINUED | OUTPATIENT
Start: 2019-07-17 | End: 2019-07-17 | Stop reason: HOSPADM

## 2019-07-17 RX ORDER — ACETAMINOPHEN 10 MG/ML
1000 INJECTION, SOLUTION INTRAVENOUS ONCE
Status: COMPLETED | OUTPATIENT
Start: 2019-07-17 | End: 2019-07-17

## 2019-07-17 RX ORDER — PHENYLEPHRINE HYDROCHLORIDE 10 MG/ML
INJECTION INTRAVENOUS
Status: DISCONTINUED | OUTPATIENT
Start: 2019-07-17 | End: 2019-07-17

## 2019-07-17 RX ORDER — LIDOCAINE HCL/PF 100 MG/5ML
SYRINGE (ML) INTRAVENOUS
Status: DISCONTINUED | OUTPATIENT
Start: 2019-07-17 | End: 2019-07-17

## 2019-07-17 RX ORDER — OXYCODONE AND ACETAMINOPHEN 5; 325 MG/1; MG/1
1 TABLET ORAL ONCE
Status: COMPLETED | OUTPATIENT
Start: 2019-07-17 | End: 2019-07-17

## 2019-07-17 RX ORDER — LIDOCAINE HYDROCHLORIDE 10 MG/ML
1 INJECTION, SOLUTION EPIDURAL; INFILTRATION; INTRACAUDAL; PERINEURAL ONCE
Status: DISCONTINUED | OUTPATIENT
Start: 2019-07-17 | End: 2022-09-26

## 2019-07-17 RX ORDER — CEFAZOLIN SODIUM 2 G/50ML
2 SOLUTION INTRAVENOUS
Status: COMPLETED | OUTPATIENT
Start: 2019-07-17 | End: 2019-07-17

## 2019-07-17 RX ORDER — FENTANYL CITRATE 50 UG/ML
INJECTION, SOLUTION INTRAMUSCULAR; INTRAVENOUS
Status: DISCONTINUED | OUTPATIENT
Start: 2019-07-17 | End: 2019-07-17

## 2019-07-17 RX ORDER — SODIUM CHLORIDE 9 MG/ML
INJECTION, SOLUTION INTRAVENOUS CONTINUOUS
Status: DISCONTINUED | OUTPATIENT
Start: 2019-07-17 | End: 2019-07-17 | Stop reason: HOSPADM

## 2019-07-17 RX ORDER — GLYCOPYRROLATE 0.2 MG/ML
INJECTION INTRAMUSCULAR; INTRAVENOUS
Status: DISCONTINUED | OUTPATIENT
Start: 2019-07-17 | End: 2019-07-17

## 2019-07-17 RX ORDER — ONDANSETRON 8 MG/1
8 TABLET, ORALLY DISINTEGRATING ORAL EVERY 6 HOURS PRN
Qty: 10 TABLET | Refills: 0 | Status: ON HOLD | OUTPATIENT
Start: 2019-07-17 | End: 2020-06-25 | Stop reason: HOSPADM

## 2019-07-17 RX ORDER — FENTANYL CITRATE 50 UG/ML
25 INJECTION, SOLUTION INTRAMUSCULAR; INTRAVENOUS EVERY 5 MIN PRN
Status: DISCONTINUED | OUTPATIENT
Start: 2019-07-17 | End: 2019-07-17 | Stop reason: HOSPADM

## 2019-07-17 RX ORDER — ONDANSETRON 2 MG/ML
INJECTION INTRAMUSCULAR; INTRAVENOUS
Status: DISCONTINUED | OUTPATIENT
Start: 2019-07-17 | End: 2019-07-17

## 2019-07-17 RX ORDER — OXYCODONE AND ACETAMINOPHEN 10; 325 MG/1; MG/1
1 TABLET ORAL EVERY 6 HOURS PRN
Qty: 50 TABLET | Refills: 0 | Status: SHIPPED | OUTPATIENT
Start: 2019-07-17 | End: 2019-07-31

## 2019-07-17 RX ORDER — MIDAZOLAM HYDROCHLORIDE 1 MG/ML
INJECTION, SOLUTION INTRAMUSCULAR; INTRAVENOUS
Status: DISCONTINUED | OUTPATIENT
Start: 2019-07-17 | End: 2019-07-17

## 2019-07-17 RX ORDER — NEOSTIGMINE METHYLSULFATE 1 MG/ML
INJECTION, SOLUTION INTRAVENOUS
Status: DISCONTINUED | OUTPATIENT
Start: 2019-07-17 | End: 2019-07-17

## 2019-07-17 RX ORDER — SODIUM CHLORIDE, SODIUM LACTATE, POTASSIUM CHLORIDE, CALCIUM CHLORIDE 600; 310; 30; 20 MG/100ML; MG/100ML; MG/100ML; MG/100ML
INJECTION, SOLUTION INTRAVENOUS CONTINUOUS
Status: DISCONTINUED | OUTPATIENT
Start: 2019-07-17 | End: 2022-09-26

## 2019-07-17 RX ADMIN — HYDROMORPHONE HYDROCHLORIDE 0.2 MG: 2 INJECTION, SOLUTION INTRAMUSCULAR; INTRAVENOUS; SUBCUTANEOUS at 10:07

## 2019-07-17 RX ADMIN — PHENYLEPHRINE HYDROCHLORIDE 100 MCG: 10 INJECTION INTRAVENOUS at 08:07

## 2019-07-17 RX ADMIN — LIDOCAINE HYDROCHLORIDE 100 MG: 20 INJECTION, SOLUTION INTRAVENOUS at 07:07

## 2019-07-17 RX ADMIN — ONDANSETRON 4 MG: 2 INJECTION, SOLUTION INTRAMUSCULAR; INTRAVENOUS at 09:07

## 2019-07-17 RX ADMIN — PROPOFOL 20 MG: 10 INJECTION, EMULSION INTRAVENOUS at 09:07

## 2019-07-17 RX ADMIN — ROCURONIUM BROMIDE 50 MG: 10 INJECTION, SOLUTION INTRAVENOUS at 07:07

## 2019-07-17 RX ADMIN — HYDROMORPHONE HYDROCHLORIDE 0.2 MG: 2 INJECTION, SOLUTION INTRAMUSCULAR; INTRAVENOUS; SUBCUTANEOUS at 09:07

## 2019-07-17 RX ADMIN — NEOSTIGMINE METHYLSULFATE 5 MG: 1 INJECTION INTRAVENOUS at 09:07

## 2019-07-17 RX ADMIN — LIDOCAINE HYDROCHLORIDE 40 MG: 20 INJECTION, SOLUTION INTRAVENOUS at 09:07

## 2019-07-17 RX ADMIN — HYDRALAZINE HYDROCHLORIDE 5 MG: 20 INJECTION INTRAMUSCULAR; INTRAVENOUS at 11:07

## 2019-07-17 RX ADMIN — OXYCODONE HYDROCHLORIDE AND ACETAMINOPHEN 1 TABLET: 5; 325 TABLET ORAL at 02:07

## 2019-07-17 RX ADMIN — PHENYLEPHRINE HYDROCHLORIDE 100 MCG: 10 INJECTION INTRAVENOUS at 09:07

## 2019-07-17 RX ADMIN — FENTANYL CITRATE 25 MCG: 50 INJECTION INTRAMUSCULAR; INTRAVENOUS at 09:07

## 2019-07-17 RX ADMIN — FENTANYL CITRATE 25 MCG: 50 INJECTION INTRAMUSCULAR; INTRAVENOUS at 11:07

## 2019-07-17 RX ADMIN — PROPOFOL 140 MG: 10 INJECTION, EMULSION INTRAVENOUS at 07:07

## 2019-07-17 RX ADMIN — FENTANYL CITRATE 50 MCG: 50 INJECTION INTRAMUSCULAR; INTRAVENOUS at 08:07

## 2019-07-17 RX ADMIN — PHENYLEPHRINE HYDROCHLORIDE 100 MCG: 10 INJECTION INTRAVENOUS at 07:07

## 2019-07-17 RX ADMIN — PROPOFOL 30 MG: 10 INJECTION, EMULSION INTRAVENOUS at 09:07

## 2019-07-17 RX ADMIN — ROCURONIUM BROMIDE 15 MG: 10 INJECTION, SOLUTION INTRAVENOUS at 08:07

## 2019-07-17 RX ADMIN — ACETAMINOPHEN 1000 MG: 10 INJECTION, SOLUTION INTRAVENOUS at 01:07

## 2019-07-17 RX ADMIN — DEXAMETHASONE SODIUM PHOSPHATE 2 MG: 4 INJECTION, SOLUTION INTRAMUSCULAR; INTRAVENOUS at 09:07

## 2019-07-17 RX ADMIN — FENTANYL CITRATE 50 MCG: 50 INJECTION INTRAMUSCULAR; INTRAVENOUS at 07:07

## 2019-07-17 RX ADMIN — CEFAZOLIN SODIUM 2 G: 2 SOLUTION INTRAVENOUS at 07:07

## 2019-07-17 RX ADMIN — GLYCOPYRROLATE 0.2 MG: 0.2 INJECTION, SOLUTION INTRAMUSCULAR; INTRAVENOUS at 07:07

## 2019-07-17 RX ADMIN — GLYCOPYRROLATE 0.6 MG: 0.2 INJECTION, SOLUTION INTRAMUSCULAR; INTRAVENOUS at 09:07

## 2019-07-17 RX ADMIN — PROPOFOL 30 MG: 10 INJECTION, EMULSION INTRAVENOUS at 08:07

## 2019-07-17 RX ADMIN — SODIUM CHLORIDE, SODIUM LACTATE, POTASSIUM CHLORIDE, AND CALCIUM CHLORIDE: .6; .31; .03; .02 INJECTION, SOLUTION INTRAVENOUS at 07:07

## 2019-07-17 RX ADMIN — OXYCODONE HYDROCHLORIDE AND ACETAMINOPHEN 1 TABLET: 5; 325 TABLET ORAL at 12:07

## 2019-07-17 RX ADMIN — MIDAZOLAM HYDROCHLORIDE 2 MG: 1 INJECTION, SOLUTION INTRAMUSCULAR; INTRAVENOUS at 07:07

## 2019-07-17 RX ADMIN — SODIUM CHLORIDE, SODIUM LACTATE, POTASSIUM CHLORIDE, AND CALCIUM CHLORIDE: .6; .31; .03; .02 INJECTION, SOLUTION INTRAVENOUS at 09:07

## 2019-07-17 NOTE — ANESTHESIA PREPROCEDURE EVALUATION
07/17/2019  Thierry Ho is a 59 y.o., male.    Pre-op Assessment    I have reviewed the Patient Summary Reports.     I have reviewed the Nursing Notes.   I have reviewed the Medications.     Review of Systems  Anesthesia Hx:  No problems with previous Anesthesia  History of prior surgery of interest to airway management or planning: Denies Family Hx of Anesthesia complications.   Denies Personal Hx of Anesthesia complications.   Social:  Smoker    Cardiovascular:   Exercise tolerance: good Hypertension Past MI CAD   Last MI 3 years ago.  On medical maintenance.  Mets>4   Hepatic/GI:   GERD    Endocrine:   Diabetes, type 2, using insulin        Physical Exam  General:  Well nourished    Airway/Jaw/Neck:  Airway Findings: Mouth Opening: Normal Tongue: Normal  General Airway Assessment: Adult  Mallampati: II     Eyes/Ears/Nose:  Eyes/Ears/Nose Findings:          Mental Status:  Mental Status Findings:  Cooperative, Alert and Oriented         Anesthesia Plan  Type of Anesthesia, risks & benefits discussed:  Anesthesia Type:  general  Patient's Preference:   Intra-op Monitoring Plan: standard ASA monitors  Intra-op Monitoring Plan Comments:   Post Op Pain Control Plan: multimodal analgesia, IV/PO Opioids PRN and per primary service following discharge from PACU  Post Op Pain Control Plan Comments:   Induction:   IV  Beta Blocker:  Patient is on a Beta-Blocker and has received one dose within the past 24 hours (No further documentation required).       Informed Consent: Patient understands risks and agrees with Anesthesia plan.  Questions answered. Anesthesia consent signed with patient.  ASA Score: 2     Day of Surgery Review of History & Physical:     H&P completed by Anesthesiologist.       Ready For Surgery From Anesthesia Perspective.

## 2019-07-17 NOTE — DISCHARGE INSTRUCTIONS
Ankle Open Reduction and Internal Fixation Post Operative Instructions   Raeann Steward MD  Clinic phone number: [phone]  Call your surgeon for:    Uncontrolled nausea or vomiting   Persistent numbness or tingling in the arm after the block has worn off    Fevers greater than 101.4   Redness surrounding the incision (swelling is normal)   Shortness of breath/difficulty breathing    Chest pain   Any concerns you may have   Pain:   You will be sent home from the hospital with a pain medication e-prescribed to your pharmacy    The anesthesiologist will perform a block which will numb your leg for several hours after the surgery. When you start to feel sensation return (in the form of pain or tingling) start taking your pain medication. The medicine takes about an hour to work do you do not want to wait until the block has fully worn off.    The first two days will be the most painful. After the pain starts to lessen you should start weaning the narcotic medication   An anti-nausea medicine will be sent with the pain medication as many people do experience nausea as a side effect   An over the counter stool softener such as Miralax can be taken to avoid constipation as a side effect of the pain medication    The pain medication is intended to make your pain more tolerable. It is not intended to relieve 100% of pain.   Do not drive while taking pain medications    Avoid taking other sedative medications such as sleeping pills while taking narcotics.   Ice   For the first 4 days, ice  for 30 minutes on and then 30 minutes off.  After day 5 use as needed for pain control.    The ice may be placed directly on the splint -- take care that it does not get the splint wet   Dressing care:     Leave your dressing and splint on until seen in clinic     Do not get the splint wet.  You will need to carefully shower or take a sponge bath to avoid wetting the dressing.    Post op activity and  precautions:   Keep splint on as described above    Elevation of the ankle above the level of the heart will help control swelling and pain    No lifting, non weight bearing to the surgical extremity. Use the wheelchair provided to you. DO NOT put weight on your right foot.    No driving until cleared by your surgeon  Follow up appointment   You will be seen in clinic in 2 weeks following your procedure for splint removal.  This appointment will be made before you leave the hospital    Sutures will be removed if applicable   More detailed activity instructions will be given at that time          ACTIVITY LEVEL: If you have received sedation or an anesthetic, you may feel sleepy for several hours. Rest until you are more awake. Gradually resume your normal activities.    WEIGHT BEARING:  No weight bearing for 8-12 weeks depending on radiographic appearance of the fracture at follow-up.              DIET: You may resume your home diet. If nausea is present, increase your diet gradually with fluids and bland foods.    Medications: Pain medication should be taken only if needed and as directed. If antibiotics are prescribed, the medication should be taken until completed. You will be given an updated list of you medications.    No driving, alcoholic beverages or signing legal documents for next 24 hours or while taking pain medication.  Elevate the affected extremity to a level above your heart and ice packs may be applied in intervals of 15-20 minutes on 15-20 minutes off while awake    CALL THE DOCTOR:    For any obvious bleeding (some dried blood over the incision is normal).      Redness, swelling, foul smell around incision or fever over 101.   Shortness of breath, Coughing up Bloody Sputum or Pains or Swelling in your Calves.   Persistent pain or nausea not relieved by medication.   Impaired circulation such as tingling, change in color, numbness or tingling, coldness.    If any unusual problems or  difficulties occur contact your doctor. If you cannot contact your doctor but feel your signs and symptoms warrant a physicians attention return to the emergency room.  Fall Prevention  Millions of people fall every year and injure themselves. You may have had anesthesia or sedation which may increase your risk of falling. You may have health issues that put you at an increased risk of falling.     Here are ways to reduce your risk of falling.  ·   · Make your home safe by keeping walkways clear of objects you may trip over.  · Use non-slip pads under rugs. Do not use area rugs or small throw rugs.  · Use non-slip mats in bathtubs and showers.  · Install handrails and lights on staircases.  · Do not walk in poorly lit areas.  · Do not stand on chairs or wobbly ladders.  · Use caution when reaching overhead or looking upward. This position can cause a loss of balance.  · Be sure your shoes fit properly, have non-slip bottoms and are in good condition.   · Wear shoes both inside and out. Avoid going barefoot or wearing slippers.  · Be cautious when going up and down stairs, curbs, and when walking on uneven sidewalks.  · If your balance is poor, consider using a cane or walker.  · If your fall was related to alcohol use, stop or limit alcohol intake.   · If your fall was related to use of sleeping medicines, talk to your doctor about this. You may need to reduce your dosage at bedtime if you awaken during the night to go to the bathroom.    · To reduce the need for nighttime bathroom trips:  ¨ Avoid drinking fluids for several hours before going to bed  ¨ Empty your bladder before going to bed  ¨ Men can keep a urinal at the bedside  · Stay as active as you can. Balance, flexibility, strength, and endurance all come from exercise. They all play a role in preventing falls. Ask your healthcare provider which types of activity are right for you.  · Get your vision checked on a regular basis.  · If you have pets, know  where they are before you stand up or walk so you don't trip over them.  Use night lights.

## 2019-07-17 NOTE — BRIEF OP NOTE
[facility]  Brief Operative Note     SUMMARY     Surgery Date: 7/17/2019     Surgeon(s) and Role:     * Raeann Steward MD - Primary    Assisting Surgeon: None    Pre-op Diagnosis:  Closed torus fracture of distal end of right fibula, initial encounter [S82.821A]    Post-op Diagnosis:  Post-Op Diagnosis Codes:     * Closed torus fracture of distal end of right fibula, initial encounter [S82.821A]    Procedure(s) (LRB):  ORIF, ANKLE (Right)    Anesthesia: General    Description of the findings of the procedure: open reduction internal fixation right distal fibula    Findings/Key Components: right distal fibula fracture    Estimated Blood Loss: 50 mL         Specimens:   Specimen (12h ago, onward)    None          Discharge Note    SUMMARY     Admit Date: 7/17/2019    Discharge Date and Time:  07/17/2019 9:52 AM    Hospital Course (synopsis of major diagnoses, care, treatment, and services provided during the course of the hospital stay): He was admitted to outpatient surgery for ORIF right distal fibula.  He tolerated the procedure well and was discharged home.     Final Diagnosis: Post-Op Diagnosis Codes:     * Closed torus fracture of distal end of right fibula, initial encounter [S82.821A]    Disposition: Home or Self Care    Follow Up/Patient Instructions:     Medications:  Reconciled Home Medications:      Medication List      START taking these medications    ondansetron 8 MG Tbdl  Commonly known as:  ZOFRAN-ODT  Take 1 tablet (8 mg total) by mouth every 6 (six) hours as needed.     oxyCODONE-acetaminophen 5-325 mg per tablet  Commonly known as:  PERCOCET  Take 1 tablet by mouth every 6 (six) hours as needed for Pain.        CONTINUE taking these medications    acetaminophen 500 MG tablet  Commonly known as:  TYLENOL  Take 500 mg by mouth every 6 (six) hours as needed for Pain.     aspirin 81 MG Chew  Take 1 tablet (81 mg total) by mouth once daily.     BD ULTRA-FINE FREDA PEN NEEDLE 32  "gauge x 5/32" Ndle  Generic drug:  pen needle, diabetic  U ONCE D UTD     BLOOD PRESSURE CUFF Misc  Generic drug:  miscellaneous medical supply  1 kit by Misc.(Non-Drug; Combo Route) route once daily.     blood sugar diagnostic Strp  1 each by abdominal subcutaneous route 4 (four) times daily before meals and nightly.     blood-glucose meter kit  1 each by Other route 4 (four) times daily before meals and nightly.     carvedilol 12.5 MG tablet  Commonly known as:  COREG  Take 1 tablet (12.5 mg total) by mouth 2 (two) times daily with meals.     fluocinonide 0.05 % external solution  Commonly known as:  LIDEX  APPLY 1 ML TO THE SCALP AT BEDTIME     gabapentin 100 MG capsule  Commonly known as:  NEURONTIN  Take 1 capsule (100 mg total) by mouth 3 (three) times daily.     LEVEMIR FLEXTOUCH U-100 INSULN 100 unit/mL (3 mL) Inpn pen  Generic drug:  insulin detemir U-100  Inject 40 Units into the skin every evening.     lisinopril 20 MG tablet  Commonly known as:  PRINIVIL,ZESTRIL  Take 1 tablet (20 mg total) by mouth once daily.     metFORMIN 500 MG tablet  Commonly known as:  GLUCOPHAGE  Take 1 tablet (500 mg total) by mouth 2 (two) times daily with meals.     naproxen 500 MG tablet  Commonly known as:  NAPROSYN  Take 1 tablet (500 mg total) by mouth 2 (two) times daily with meals.     omeprazole 20 MG capsule  Commonly known as:  PRILOSEC  Take 1 capsule (20 mg total) by mouth once daily.     simvastatin 40 MG tablet  Commonly known as:  ZOCOR  TAKE 1 TABLET(40 MG) BY MOUTH EVERY EVENING     traMADol 50 mg tablet  Commonly known as:  ULTRAM  Take 1 tablet (50 mg total) by mouth every 24 hours as needed for Pain. 1 tablet by mouth prn pain     TRUEPLUS LANCETS 33 gauge Misc  Generic drug:  lancets          Discharge Procedure Orders   Diet diabetic     Keep surgical extremity elevated     Ice to affected area     Other restrictions (specify):   Order Comments: Remain in splint     Leave dressing on - Keep it clean, dry, " and intact until clinic visit     Weight bearing restrictions (specify):   Order Comments: Non WB RLE

## 2019-07-17 NOTE — PROGRESS NOTES
Post op check  Pain poorly control, patient complaining that the splint is too tight over his lateral ankle only.  He did not get a postoperative block. He does not have any numbness or tingling to the foot or toes.  He does not have worsening pain.    Physical exam:  He has full range of motion to all toes without pain  Light touch sensation is intact to sural, saphenous, superficial peroneal, deep peroneal, tibial nerve distributions  All toes are warm and well perfused  Splint was loosened which did not change the sensation that the splint was too tight.  It was rewrapped very loosely with an Ace wrap without any change in his pain.    Plan:  No evidence of compartment syndrome.  His pain is poorly controlled on current pain regimen.  We will send him back to [facility] for a block with anesthesia prior to discharge.  His wheelchair will be delivered prior to discharge.  I will increase his pain medication from Percocet 5/325 mg to Percocet 10/325 mg.

## 2019-07-17 NOTE — OP NOTE
ORIF right ankle procedure note      Date of Procedure: 7/17/2019     Pre-operative Diagnosis:  Right distal fibula fracture     Postoperative Diagnosis: As above      Procedure:  Open Reduction and Internal Fixation of Right distal fibula with supplemental syndesmosis fixation    Assistant: NJ Davis    Surgeon:  Raeann Steward MD      Anesthesia: General     Estimated Blood Loss:  10 cc           Drains: None            Specimens:      none           Complications:  None            Disposition: Home            Condition: Stable     Tourniquet Time: 48 minutes     Indications:    Patient is a  59 y.o. male with a right fibula fracture sustained on 6/30/19.  He was seen in the ER initially and found to have a right distal fibula fracture that was unstable on gravity stress views. Surgical treatment was recommended.. All risks and benefits were discussed with the patient.  Increased risk of complications due to poorly controlled due to poorly controlled diabetes mellitus type 2 was discussed with the patient. He elected to proceed with surgical treatment. Of note he had a fibula fracture on the left ankle that was fixed with open reduction internal fixation with syndesmosis screws several years ago.  He did not do well pain wise following the surgery and is now on disability due to this injury.  His initial procedure was scheduled for July 10, 2019 but was canceled by the patient due to weather.     Implant:  [place] 4 hole right distal fibula plate     Procedure: The patient was brought to the operating room and underwent general anesthesia. He was then positioned  Supine on the operating table. All bony prominences were padded. SCDs were placed to the contralateral lower extremity. The right leg was prepped in the usual sterile fashion. A time out was called confirming the correct patient, site, side procedure and that appropriate antibiotics had been administered within 30 minutes of incision.  A lateral  incision was made over the distal fibula centered over the fracture site. Sharp dissection was carried to bone over the distal fibula. More proximal dissection was performed with a combination of elevators and metzenbaun scissors. The superficial peroneal nerve was not encountered during the procedure. The fracture was identified and cleaned of all hematoma and debris.  No significant healing was noted. The fracture was reduced with a serrated reduction clamp.  A 2.7 mm screw was placed from anterior to posterior using lag technique perpendicular to the fracture.  The first lag screw did not get any bite and an additional screw was placed more distally with good bite.  A 4- hole Vocalcom distal fibula plate was placed laterally over the fibula and provisionally held in place with k-wires. Once proper postioning had been confirmed with direct visualization and fluoroscopy, the plate was secured over the fracture with four distal  locking screws. A cortical screw was placed in the proximal fragment to suck the plate down to the bone.  The syndesmosis was tested under fluoroscopy with external rotation stress, a Cotton test, and AP shuck. No widening of the ankle mortise or instability of the syndesmosis was noted. Two 3.5 mm screws were placed across the fibula and tibia in the most proximal holes for supplemental fixation in the setting of poorly controlled diabetes.  The most distal shaft screw was removed and replaced with a screw spanning the fibula and tibia.   Flouroscopy was used throughout this process to confirm that the plate was in proper position, reduction was maintained and that all screws were extra-articular and of proper length.  The tourniquet was let down and no significant bleeding was noted.     The wound was copiously irrigated and closed with 0 Vicryl, 3-0 vicryl and nylon. Sterile dressings and a posterior slab splint with stirrups was applied.    Instrument, sponge, and needle counts were  correct prior to wound closure and at the conclusion of the case.      The patient was awoken from anesthesia, tolerated the procedure well and taken to recovery in stable condition.     Post-operative Plan: He will keep the splint on and keep it clean and dry until He is seen in clinic in 2 weeks. Sutures will be removed at that time if the wound is healing appropriately.  Because of his poorly-controlled diabetes his weight-bearing will be protected for a prolonged period of time -- hge will remain NWB x 8- 12 weeks depending on radiographic appearance of the fracture at follow-up.

## 2019-07-17 NOTE — OR NURSING
Dr. Steward at bedside, evaluating patients C/O constant pain 8/10.  She requests patient get block to help with pain relief. Pt to holding for block.

## 2019-07-17 NOTE — OR NURSING
CMP drawn. As per request of MD, Waiting for results of lab prior to sending the patient to [facility].

## 2019-07-17 NOTE — TRANSFER OF CARE
"Anesthesia Transfer of Care Note    Patient: Thierry Ho    Procedure(s) Performed: Procedure(s) (LRB):  ORIF, ANKLE (Right)    Patient location: PACU    Anesthesia Type: general    Transport from OR: Transported from OR on 100% O2 by closed face mask with adequate spontaneous ventilation    Post pain: pain needs to be addressed    Post assessment: no apparent anesthetic complications and tolerated procedure well    Post vital signs: stable    Level of consciousness: awake    Nausea/Vomiting: no nausea/vomiting    Complications: none    Transfer of care protocol was followed      Last vitals:   Visit Vitals  BP (!) 229/102 (BP Location: Left arm)   Pulse 76   Temp 36.6 °C (97.8 °F) (Oral)   Resp 19   Ht 5' 11" (1.803 m)   Wt 102.7 kg (226 lb 6.6 oz)   SpO2 100%   BMI 31.58 kg/m²     "

## 2019-07-17 NOTE — INTERVAL H&P NOTE
The patient has been examined and the H&P has been reviewed:    I concur with the findings and changes have been noted since the H&P was written:  pre op cmp showed elevated potassium, calcium and BUN. PCP recommended hydration with re-drawing of labs which showed improvement in calcium and potassium, increase in BUN.  He was cleared for surgery following the second set of labs.  Procedure scheduled on 7/10 was self cancelled by patient due to bad weather.    Anesthesia/Surgery risks, benefits and alternative options discussed and understood by patient/family.          Active Hospital Problems    Diagnosis  POA    Closed torus fracture of lower end of right fibula [S82.828K]  Yes      Resolved Hospital Problems   No resolved problems to display.

## 2019-07-18 NOTE — ANESTHESIA POSTPROCEDURE EVALUATION
Anesthesia Post Evaluation    Patient: Thierry Ho    Procedure(s) Performed: Procedure(s) (LRB):  ORIF, ANKLE (Right)    Final Anesthesia Type: general  Patient location during evaluation: PACU  Patient participation: Yes- Able to Participate  Level of consciousness: awake and alert, oriented and awake  Post-procedure vital signs: reviewed and stable  Airway patency: patent  PONV status at discharge: No PONV  Anesthetic complications: no      Cardiovascular status: blood pressure returned to baseline  Respiratory status: unassisted, spontaneous ventilation and room air  Hydration status: euvolemic  Follow-up not needed.          Vitals Value Taken Time   /71 7/17/2019  5:04 PM   Temp 37.2 °C (99 °F) 7/17/2019  4:03 PM   Pulse 78 7/17/2019  5:04 PM   Resp 18 7/17/2019  5:04 PM   SpO2 100 % 7/17/2019  5:04 PM         Event Time     Out of Recovery 11:50:00          Pain/Estiven Score: Pain Rating Prior to Med Admin: 8 (7/17/2019  2:58 PM)  Pain Rating Post Med Admin: 0 (7/17/2019  4:00 PM)  Estiven Score: 10 (7/17/2019 11:51 AM)

## 2019-07-25 ENCOUNTER — OFFICE VISIT (OUTPATIENT)
Dept: ORTHOPEDICS | Facility: CLINIC | Age: 59
End: 2019-07-25
Payer: MEDICARE

## 2019-07-25 VITALS
HEIGHT: 71 IN | SYSTOLIC BLOOD PRESSURE: 126 MMHG | DIASTOLIC BLOOD PRESSURE: 78 MMHG | WEIGHT: 226 LBS | BODY MASS INDEX: 31.64 KG/M2

## 2019-07-25 DIAGNOSIS — E11.9 TYPE 2 DIABETES MELLITUS WITHOUT COMPLICATION, WITH LONG-TERM CURRENT USE OF INSULIN: ICD-10-CM

## 2019-07-25 DIAGNOSIS — Z79.4 TYPE 2 DIABETES MELLITUS WITHOUT COMPLICATION, WITH LONG-TERM CURRENT USE OF INSULIN: ICD-10-CM

## 2019-07-25 DIAGNOSIS — S82.821A CLOSED TORUS FRACTURE OF DISTAL END OF RIGHT FIBULA, INITIAL ENCOUNTER: Primary | ICD-10-CM

## 2019-07-25 PROCEDURE — 99999 PR PBB SHADOW E&M-EST. PATIENT-LVL IV: CPT | Mod: PBBFAC,HCNC,, | Performed by: ORTHOPAEDIC SURGERY

## 2019-07-25 PROCEDURE — 99024 POSTOP FOLLOW-UP VISIT: CPT | Mod: HCNC,S$GLB,, | Performed by: ORTHOPAEDIC SURGERY

## 2019-07-25 PROCEDURE — 99024 PR POST-OP FOLLOW-UP VISIT: ICD-10-PCS | Mod: HCNC,S$GLB,, | Performed by: ORTHOPAEDIC SURGERY

## 2019-07-25 PROCEDURE — 99999 PR PBB SHADOW E&M-EST. PATIENT-LVL IV: ICD-10-PCS | Mod: PBBFAC,HCNC,, | Performed by: ORTHOPAEDIC SURGERY

## 2019-07-25 RX ORDER — OXYCODONE HYDROCHLORIDE 5 MG/1
5 TABLET ORAL EVERY 6 HOURS PRN
Qty: 50 TABLET | Refills: 0 | Status: SHIPPED | OUTPATIENT
Start: 2019-07-25 | End: 2019-08-12 | Stop reason: SDUPTHER

## 2019-07-25 RX ORDER — GABAPENTIN 100 MG/1
300 CAPSULE ORAL 3 TIMES DAILY
Qty: 90 CAPSULE | Refills: 0 | Status: SHIPPED | OUTPATIENT
Start: 2019-07-25 | End: 2019-08-26

## 2019-07-25 RX ORDER — METHOCARBAMOL 500 MG/1
500 TABLET, FILM COATED ORAL EVERY 6 HOURS PRN
Qty: 40 TABLET | Refills: 0 | Status: SHIPPED | OUTPATIENT
Start: 2019-07-25 | End: 2019-08-04

## 2019-07-25 NOTE — PATIENT INSTRUCTIONS
Change pain regimen to   Tylenol every 6 hours   Gabapentin 300 mg three times per day (about every 8 hours)  Methacarbamol 500 mg every 6 hours as needed  Oxycodone 5 mg every 6 hours as needed    When painful elevate, use ice

## 2019-07-28 PROBLEM — Z79.4 TYPE 2 DIABETES MELLITUS WITHOUT COMPLICATION, WITH LONG-TERM CURRENT USE OF INSULIN: Status: ACTIVE | Noted: 2018-07-27

## 2019-07-28 NOTE — PROGRESS NOTES
Postoperative Visit    History of Present Illness:   Thierry is 1 week s/p right fibula ORIF with syndesmosis screws for additional fixation (DOS-7/17/19)  Pain is poorly controlled and he has almost taken all of his narcotic medications. He has been taking 2 pills instead of 1 every 4 hours  He unwrapped and loosened his splint last night because he believed that this was contributing to his pain  Denies fevers, chills, constitutional symptoms     Physical Examination:    NAD  right lower extremity:  Incision over the lateral ankle  is clean and dry with suture in place   No swelling, induration, erythema, drainage, purulence . Appropriate post operative swelling is present  Ltsi s/s/sp/dp/t  + ehl/fhl/ta/gs  2+ DP    Assessment/Plan:  59 y.o. male  With DM, 1 week s/p right fibula ORIF with syndesmosis screws for additional fixation (DOS-7/17/19)    Plan  1. Too early to remove sutures  2. We discussed that he is taking far too many narcotics. Adjusted his medications to include scheduled tylenol, increased gabapentin dose, added robaxin PRN and changed his narcotic Rx from percocet to roxicodone 5 mg to allow him to start weaning his dosing and accommodate for the scheduled tylenol.  He should try robaxin prior to taking narcotics and he should not take more medication that prescribed. I encouraged other pain control modalities including ice and elevation. We discussed the risks with taking narcotics including accidental overdose, constipation, chronic increase in pain and dependence with prolonged use and multiple prescriptions. If he continues to take excessive doses of these medications I will not continue to refill them.  Under no circumstances will I continue to write these medications long term.   3. Splint replaced with CAM boot -- keep incision covered with dressing and ACE  4. NWB RLE  5. RTC 1 week for suture removal      All questions were answered in detail. The patient is in full agreement with the  treatment plan and will proceed accordingly.

## 2019-08-05 ENCOUNTER — OFFICE VISIT (OUTPATIENT)
Dept: ORTHOPEDICS | Facility: CLINIC | Age: 59
End: 2019-08-05
Payer: MEDICARE

## 2019-08-05 VITALS
DIASTOLIC BLOOD PRESSURE: 78 MMHG | BODY MASS INDEX: 31.64 KG/M2 | SYSTOLIC BLOOD PRESSURE: 125 MMHG | HEART RATE: 83 BPM | HEIGHT: 71 IN | WEIGHT: 226 LBS

## 2019-08-05 DIAGNOSIS — Z79.4 TYPE 2 DIABETES MELLITUS WITHOUT COMPLICATION, WITH LONG-TERM CURRENT USE OF INSULIN: ICD-10-CM

## 2019-08-05 DIAGNOSIS — S82.821A CLOSED TORUS FRACTURE OF DISTAL END OF RIGHT FIBULA, INITIAL ENCOUNTER: Primary | ICD-10-CM

## 2019-08-05 DIAGNOSIS — E11.9 TYPE 2 DIABETES MELLITUS WITHOUT COMPLICATION, WITH LONG-TERM CURRENT USE OF INSULIN: ICD-10-CM

## 2019-08-05 PROCEDURE — 99999 PR PBB SHADOW E&M-EST. PATIENT-LVL IV: CPT | Mod: PBBFAC,HCNC,, | Performed by: ORTHOPAEDIC SURGERY

## 2019-08-05 PROCEDURE — 99024 PR POST-OP FOLLOW-UP VISIT: ICD-10-PCS | Mod: HCNC,S$GLB,, | Performed by: ORTHOPAEDIC SURGERY

## 2019-08-05 PROCEDURE — 99999 PR PBB SHADOW E&M-EST. PATIENT-LVL IV: ICD-10-PCS | Mod: PBBFAC,HCNC,, | Performed by: ORTHOPAEDIC SURGERY

## 2019-08-05 PROCEDURE — 99024 POSTOP FOLLOW-UP VISIT: CPT | Mod: HCNC,S$GLB,, | Performed by: ORTHOPAEDIC SURGERY

## 2019-08-05 NOTE — PROGRESS NOTES
Postoperative Visit     History of Present Illness:   Thierry is 2 weeks s/p right fibula ORIF with syndesmosis screws for additional fixation (DOS-7/17/19) because of history of DM   Pain is much better controlled with new pain regimen started last week. He is not taking as many narcotic medications and still has pills left from previous Rx.   Denies fevers, chills, constitutional symptoms   Has been compliant with boot wear and TDWB RLE     Physical Examination:    NAD  right lower extremity:  Incision over the lateral ankle  is clean and dry with suture in place -- superior incision does not appear to be healed enough for suture removal. No drainage.  No swelling, induration, erythema, drainage, purulence . Appropriate post operative swelling is present  Ltsi s/s/sp/dp/t  + ehl/fhl/ta/gs  2+ DP    Imaging: Three views of the right ankle show maintained position of fracture and associated hardware      Assessment/Plan:  59 y.o. male  With DM, 2 weeks s/p right fibula ORIF with syndesmosis screws for additional fixation (DOS-7/17/19)     Plan  1. Will have him return next week for wound check and possible suture removal  2. Continue to wean off narcotics  3. Continue CAM boot -- keep incision covered with dressing and ACE  4. TDWB RLE  5. RTC 1 week for suture removal        All questions were answered in detail. The patient is in full agreement with the treatment plan and will proceed accordingly.

## 2019-08-12 ENCOUNTER — OFFICE VISIT (OUTPATIENT)
Dept: ORTHOPEDICS | Facility: CLINIC | Age: 59
End: 2019-08-12
Payer: MEDICARE

## 2019-08-12 VITALS
HEART RATE: 78 BPM | HEIGHT: 71 IN | SYSTOLIC BLOOD PRESSURE: 147 MMHG | DIASTOLIC BLOOD PRESSURE: 94 MMHG | WEIGHT: 226 LBS | BODY MASS INDEX: 31.64 KG/M2

## 2019-08-12 DIAGNOSIS — Z79.4 TYPE 2 DIABETES MELLITUS WITHOUT COMPLICATION, WITH LONG-TERM CURRENT USE OF INSULIN: ICD-10-CM

## 2019-08-12 DIAGNOSIS — S82.821A CLOSED TORUS FRACTURE OF DISTAL END OF RIGHT FIBULA, INITIAL ENCOUNTER: Primary | ICD-10-CM

## 2019-08-12 DIAGNOSIS — E11.9 TYPE 2 DIABETES MELLITUS WITHOUT COMPLICATION, WITH LONG-TERM CURRENT USE OF INSULIN: ICD-10-CM

## 2019-08-12 PROCEDURE — 99024 PR POST-OP FOLLOW-UP VISIT: ICD-10-PCS | Mod: HCNC,S$GLB,, | Performed by: ORTHOPAEDIC SURGERY

## 2019-08-12 PROCEDURE — 99999 PR PBB SHADOW E&M-EST. PATIENT-LVL III: ICD-10-PCS | Mod: PBBFAC,HCNC,, | Performed by: ORTHOPAEDIC SURGERY

## 2019-08-12 PROCEDURE — 99999 PR PBB SHADOW E&M-EST. PATIENT-LVL III: CPT | Mod: PBBFAC,HCNC,, | Performed by: ORTHOPAEDIC SURGERY

## 2019-08-12 PROCEDURE — 99024 POSTOP FOLLOW-UP VISIT: CPT | Mod: HCNC,S$GLB,, | Performed by: ORTHOPAEDIC SURGERY

## 2019-08-12 RX ORDER — OXYCODONE HYDROCHLORIDE 5 MG/1
5 TABLET ORAL EVERY 8 HOURS PRN
Qty: 30 TABLET | Refills: 0 | Status: SHIPPED | OUTPATIENT
Start: 2019-08-12 | End: 2019-08-26

## 2019-08-12 NOTE — PROGRESS NOTES
"Postoperative Visit     History of Present Illness:   Thierry is 3 weeks s/p right fibula ORIF (DOS-7/17/19)   Smoking about 4 cigarettes per day   Denies fevers, chills, constitutional symptoms   Has been compliant with boot wear and TDWB RLE. Here today for suture removal. Requesting pain medication refill for "pain surges."  Rates pain as 3/10 today     Physical Examination:    NAD  right lower extremity:  Incision over the lateral ankle  is clean and dry with suture in place. Healing well  No swelling, induration, erythema, drainage, purulence . Appropriate post operative swelling is present  Ltsi s/s/sp/dp/t  + ehl/fhl/ta/gs  2+ DP     Imaging: No new images     Assessment/Plan:  59 y.o. male  With DM, 3 weeks s/p right fibula ORIF with syndesmosis screws for additional fixation (DOS-7/17/19)     Plan  1. Narcotic prescription reduced with increased time between doses. This will be last refill. Take non narcotic medications including tylenol prior to taking narcotics. NSAIDs not prescribed due to preop increase in BUN, Cr  2. Continue CAM boot   3. TDWB RLE x total of 8-12  Weeks pending radiographic healing   4. RTC 3 weeks        All questions were answered in detail. The patient is in full agreement with the treatment plan and will proceed accordingly.     "

## 2019-08-16 ENCOUNTER — PATIENT OUTREACH (OUTPATIENT)
Dept: ADMINISTRATIVE | Facility: OTHER | Age: 59
End: 2019-08-16

## 2019-08-22 ENCOUNTER — PATIENT OUTREACH (OUTPATIENT)
Dept: ADMINISTRATIVE | Facility: OTHER | Age: 59
End: 2019-08-22

## 2019-08-26 ENCOUNTER — OFFICE VISIT (OUTPATIENT)
Dept: ORTHOPEDICS | Facility: CLINIC | Age: 59
End: 2019-08-26
Payer: MEDICARE

## 2019-08-26 ENCOUNTER — OFFICE VISIT (OUTPATIENT)
Dept: CARDIOLOGY | Facility: CLINIC | Age: 59
End: 2019-08-26
Payer: MEDICARE

## 2019-08-26 VITALS
HEART RATE: 77 BPM | BODY MASS INDEX: 32.41 KG/M2 | DIASTOLIC BLOOD PRESSURE: 89 MMHG | WEIGHT: 231.5 LBS | SYSTOLIC BLOOD PRESSURE: 145 MMHG | HEIGHT: 71 IN

## 2019-08-26 VITALS
HEART RATE: 78 BPM | OXYGEN SATURATION: 98 % | RESPIRATION RATE: 15 BRPM | BODY MASS INDEX: 32.07 KG/M2 | WEIGHT: 229.06 LBS | DIASTOLIC BLOOD PRESSURE: 74 MMHG | SYSTOLIC BLOOD PRESSURE: 140 MMHG | HEIGHT: 71 IN

## 2019-08-26 DIAGNOSIS — S82.821A CLOSED TORUS FRACTURE OF DISTAL END OF RIGHT FIBULA, INITIAL ENCOUNTER: Primary | ICD-10-CM

## 2019-08-26 DIAGNOSIS — I25.10 CORONARY ARTERY DISEASE INVOLVING NATIVE CORONARY ARTERY OF NATIVE HEART WITHOUT ANGINA PECTORIS: Primary | ICD-10-CM

## 2019-08-26 DIAGNOSIS — M89.9 DISORDER OF BONE: ICD-10-CM

## 2019-08-26 DIAGNOSIS — Z79.4 TYPE 2 DIABETES MELLITUS WITHOUT COMPLICATION, WITH LONG-TERM CURRENT USE OF INSULIN: ICD-10-CM

## 2019-08-26 DIAGNOSIS — Z72.0 TOBACCO ABUSE: ICD-10-CM

## 2019-08-26 DIAGNOSIS — E66.9 NON MORBID OBESITY, UNSPECIFIED OBESITY TYPE: ICD-10-CM

## 2019-08-26 DIAGNOSIS — E11.9 TYPE 2 DIABETES MELLITUS WITHOUT COMPLICATION, WITH LONG-TERM CURRENT USE OF INSULIN: ICD-10-CM

## 2019-08-26 DIAGNOSIS — E78.49 OTHER HYPERLIPIDEMIA: ICD-10-CM

## 2019-08-26 DIAGNOSIS — I10 ESSENTIAL HYPERTENSION: ICD-10-CM

## 2019-08-26 PROBLEM — S82.831G CLOSED FRACTURE OF DISTAL END OF RIGHT FIBULA WITH DELAYED HEALING: Status: ACTIVE | Noted: 2019-07-03

## 2019-08-26 PROCEDURE — 99999 PR PBB SHADOW E&M-EST. PATIENT-LVL III: ICD-10-PCS | Mod: PBBFAC,HCNC,, | Performed by: ORTHOPAEDIC SURGERY

## 2019-08-26 PROCEDURE — 93000 EKG 12-LEAD: ICD-10-PCS | Mod: HCNC,S$GLB,, | Performed by: INTERNAL MEDICINE

## 2019-08-26 PROCEDURE — 99024 PR POST-OP FOLLOW-UP VISIT: ICD-10-PCS | Mod: HCNC,S$GLB,, | Performed by: ORTHOPAEDIC SURGERY

## 2019-08-26 PROCEDURE — 3045F PR MOST RECENT HEMOGLOBIN A1C LEVEL 7.0-9.0%: ICD-10-PCS | Mod: HCNC,CPTII,S$GLB, | Performed by: INTERNAL MEDICINE

## 2019-08-26 PROCEDURE — 99204 OFFICE O/P NEW MOD 45 MIN: CPT | Mod: HCNC,S$GLB,, | Performed by: INTERNAL MEDICINE

## 2019-08-26 PROCEDURE — 3078F DIAST BP <80 MM HG: CPT | Mod: HCNC,CPTII,S$GLB, | Performed by: INTERNAL MEDICINE

## 2019-08-26 PROCEDURE — 3008F BODY MASS INDEX DOCD: CPT | Mod: HCNC,CPTII,S$GLB, | Performed by: INTERNAL MEDICINE

## 2019-08-26 PROCEDURE — 99024 POSTOP FOLLOW-UP VISIT: CPT | Mod: HCNC,S$GLB,, | Performed by: ORTHOPAEDIC SURGERY

## 2019-08-26 PROCEDURE — 99999 PR PBB SHADOW E&M-EST. PATIENT-LVL III: ICD-10-PCS | Mod: PBBFAC,HCNC,, | Performed by: INTERNAL MEDICINE

## 2019-08-26 PROCEDURE — 99204 PR OFFICE/OUTPT VISIT, NEW, LEVL IV, 45-59 MIN: ICD-10-PCS | Mod: HCNC,S$GLB,, | Performed by: INTERNAL MEDICINE

## 2019-08-26 PROCEDURE — 3077F PR MOST RECENT SYSTOLIC BLOOD PRESSURE >= 140 MM HG: ICD-10-PCS | Mod: HCNC,CPTII,S$GLB, | Performed by: INTERNAL MEDICINE

## 2019-08-26 PROCEDURE — 3077F SYST BP >= 140 MM HG: CPT | Mod: HCNC,CPTII,S$GLB, | Performed by: INTERNAL MEDICINE

## 2019-08-26 PROCEDURE — 99999 PR PBB SHADOW E&M-EST. PATIENT-LVL III: CPT | Mod: PBBFAC,HCNC,, | Performed by: ORTHOPAEDIC SURGERY

## 2019-08-26 PROCEDURE — 3045F PR MOST RECENT HEMOGLOBIN A1C LEVEL 7.0-9.0%: CPT | Mod: HCNC,CPTII,S$GLB, | Performed by: INTERNAL MEDICINE

## 2019-08-26 PROCEDURE — 3008F PR BODY MASS INDEX (BMI) DOCUMENTED: ICD-10-PCS | Mod: HCNC,CPTII,S$GLB, | Performed by: INTERNAL MEDICINE

## 2019-08-26 PROCEDURE — 99999 PR PBB SHADOW E&M-EST. PATIENT-LVL III: CPT | Mod: PBBFAC,HCNC,, | Performed by: INTERNAL MEDICINE

## 2019-08-26 PROCEDURE — 93000 ELECTROCARDIOGRAM COMPLETE: CPT | Mod: HCNC,S$GLB,, | Performed by: INTERNAL MEDICINE

## 2019-08-26 PROCEDURE — 3078F PR MOST RECENT DIASTOLIC BLOOD PRESSURE < 80 MM HG: ICD-10-PCS | Mod: HCNC,CPTII,S$GLB, | Performed by: INTERNAL MEDICINE

## 2019-08-26 RX ORDER — CARVEDILOL 25 MG/1
25 TABLET ORAL 2 TIMES DAILY WITH MEALS
Qty: 180 TABLET | Refills: 3 | Status: SHIPPED | OUTPATIENT
Start: 2019-08-26 | End: 2020-09-14

## 2019-08-26 NOTE — LETTER
August 26, 2019      Sterling Jarquin MD  [address] [place] [address]           [facility]  [address] [facility] [address]  [facility] [address]  Phone: [phone]          Patient: Thierry Ho   MR Number: [id number]   YOB: 1960   Date of Visit: 8/26/2019       Dear Dr. Sterling Jarquin:    Thank you for referring Thierry Ho to me for evaluation. Attached you will find relevant portions of my assessment and plan of care.    If you have questions, please do not hesitate to call me. I look forward to following Thierry Ho along with you.    Sincerely,    David Ibrahim MD    Enclosure  CC:  No Recipients    If you would like to receive this communication electronically, please contact [email or web address] or [phone] to request more information on [employer] access.    For providers and/or their staff who would like to refer a patient to [facility], please contact us through our one-stop-shop provider referral line, [facility] , at [phone].    If you feel you have received this communication in error or would no longer like to receive these types of communications, please e-mail [email or web address]

## 2019-08-26 NOTE — PROGRESS NOTES
CARDIOVASCULAR CONSULTATION    REASON FOR CONSULT:   Thierry Ho is a 59 y.o. male who presents for eval/mgmt of CAD.    PCP/Req: Britton  HISTORY OF PRESENT ILLNESS:   The patient is a very pleasant 59-year-old man referred by his primary care physician for evaluation management of CAD.  He recently underwent operative repair of a right lower extremity fracture.  At present, denies angina or dyspnea.  He has had no palpitations, lightheadedness, dizziness, or syncope.  There has been no PND, orthopnea, or lower extremity edema.  He has had no melena, hematuria, or claudicant symptoms.    Family history appears to be negative for premature CAD.  His strongly positive for diabetes.    The patient is a current cigarette smoker, but tells me he has cut down to 2 cigarettes a day.  I have strongly encouraged to quit smoking and plan to refer him to the Ambulatory smoking cessation program.    CARDIOVASCULAR HISTORY:   nonobst CAD by cath 10/2015 ([facility] cardiology, see Care everywhere)     PAST MEDICAL HISTORY:     Past Medical History:   Diagnosis Date    Diabetes mellitus     GERD (gastroesophageal reflux disease)     Hyperlipidemia     Hypertension     Myocardial infarction        PAST SURGICAL HISTORY:     Past Surgical History:   Procedure Laterality Date    APPENDECTOMY      BACK SURGERY  11/2017    COLONOSCOPY N/A 10/19/2018    Performed by Frantz Rasmussen MD at [facility] ENDO    FOOT SURGERY      ORIF, ANKLE Right 7/17/2019    Performed by Raeann Steward MD at [facility] OR       ALLERGIES AND MEDICATION:   Review of patient's allergies indicates:  No Known Allergies     Medication List           Accurate as of 8/26/19  1:20 PM. If you have any questions, ask your nurse or doctor.               CONTINUE taking these medications    acetaminophen 500 MG tablet  Commonly known as:  TYLENOL     aspirin 81 MG Chew  Take 1 tablet (81 mg total) by mouth once daily.     BD ULTRA-FINE FREDA PEN NEEDLE 32 gauge x  "5/32" Ndle  Generic drug:  pen needle, diabetic  U ONCE D UTD     BLOOD PRESSURE CUFF Misc  Generic drug:  miscellaneous medical supply  1 kit by Misc.(Non-Drug; Combo Route) route once daily.     blood sugar diagnostic Strp  1 each by abdominal subcutaneous route 4 (four) times daily before meals and nightly.     blood-glucose meter kit  1 each by Other route 4 (four) times daily before meals and nightly.     carvedilol 12.5 MG tablet  Commonly known as:  COREG  Take 1 tablet (12.5 mg total) by mouth 2 (two) times daily with meals.     fluocinonide 0.05 % external solution  Commonly known as:  LIDEX     gabapentin 100 MG capsule  Commonly known as:  NEURONTIN  Take 3 capsules (300 mg total) by mouth 3 (three) times daily.     LEVEMIR FLEXTOUCH U-100 INSULN 100 unit/mL (3 mL) Inpn pen  Generic drug:  insulin detemir U-100  Inject 40 Units into the skin every evening.     lisinopril 20 MG tablet  Commonly known as:  PRINIVIL,ZESTRIL  Take 1 tablet (20 mg total) by mouth once daily.     metFORMIN 500 MG tablet  Commonly known as:  GLUCOPHAGE  Take 1 tablet (500 mg total) by mouth 2 (two) times daily with meals.     omeprazole 20 MG capsule  Commonly known as:  PRILOSEC  Take 1 capsule (20 mg total) by mouth once daily.     ondansetron 8 MG Tbdl  Commonly known as:  ZOFRAN-ODT  Take 1 tablet (8 mg total) by mouth every 6 (six) hours as needed.     oxyCODONE 5 MG immediate release tablet  Commonly known as:  ROXICODONE  Take 1 tablet (5 mg total) by mouth every 8 (eight) hours as needed for Pain.     simvastatin 40 MG tablet  Commonly known as:  ZOCOR  TAKE 1 TABLET(40 MG) BY MOUTH EVERY EVENING     TRUEPLUS LANCETS 33 gauge Misc  Generic drug:  lancets            SOCIAL HISTORY:     Social History     Socioeconomic History    Marital status:      Spouse name: Not on file    Number of children: Not on file    Years of education: Not on file    Highest education level: Not on file   Occupational History    " Not on file   Social Needs    Financial resource strain: Not on file    Food insecurity:     Worry: Not on file     Inability: Not on file    Transportation needs:     Medical: Not on file     Non-medical: Not on file   Tobacco Use    Smoking status: Current Every Day Smoker     Packs/day: 0.25     Types: Cigarettes     Start date: 1/15/1981    Smokeless tobacco: Never Used   Substance and Sexual Activity    Alcohol use: Yes     Comment: social     Drug use: No    Sexual activity: Not on file   Lifestyle    Physical activity:     Days per week: Not on file     Minutes per session: Not on file    Stress: Not on file   Relationships    Social connections:     Talks on phone: Not on file     Gets together: Not on file     Attends Worship service: Not on file     Active member of club or organization: Not on file     Attends meetings of clubs or organizations: Not on file     Relationship status: Not on file   Other Topics Concern    Not on file   Social History Narrative    Not on file       FAMILY HISTORY:     Family History   Problem Relation Age of Onset    Heart disease Mother     Diabetes Father        REVIEW OF SYSTEMS:   Review of Systems   Constitutional: Negative for chills, diaphoresis and fever.   HENT: Negative for nosebleeds.    Eyes: Negative for blurred vision, double vision and photophobia.   Respiratory: Negative for hemoptysis, shortness of breath and wheezing.    Cardiovascular: Negative for chest pain, palpitations, orthopnea, claudication, leg swelling and PND.   Gastrointestinal: Negative for abdominal pain, blood in stool, heartburn, melena, nausea and vomiting.   Genitourinary: Negative for flank pain and hematuria.   Musculoskeletal: Negative for falls, myalgias and neck pain.   Skin: Negative for rash.   Neurological: Negative for dizziness, seizures, loss of consciousness, weakness and headaches.   Endo/Heme/Allergies: Negative for polydipsia. Does not bruise/bleed easily.  "  Psychiatric/Behavioral: Negative for depression and memory loss. The patient is not nervous/anxious.        PHYSICAL EXAM:     Vitals:    08/26/19 1257   BP: (!) 140/74   Pulse: 78   Resp: 15    Body mass index is 31.95 kg/m².  Weight: 103.9 kg (229 lb 0.9 oz)   Height: 5' 11" (180.3 cm)     Physical Exam   Constitutional: He is oriented to person, place, and time. He appears well-developed and well-nourished. He is cooperative.  Non-toxic appearance. No distress.   HENT:   Head: Normocephalic and atraumatic.   Eyes: Pupils are equal, round, and reactive to light. Conjunctivae and EOM are normal. No scleral icterus.   Neck: Trachea normal and normal range of motion. Neck supple. Normal carotid pulses and no JVD present. Carotid bruit is not present. No neck rigidity. No tracheal deviation and no edema present. No thyromegaly present.   Cardiovascular: Normal rate, regular rhythm, S1 normal and S2 normal. PMI is not displaced. Exam reveals no gallop and no friction rub.   No murmur heard.  Pulses:       Carotid pulses are 2+ on the right side, and 2+ on the left side.  Pulmonary/Chest: Effort normal and breath sounds normal. No stridor. No respiratory distress. He has no wheezes. He has no rales. He exhibits no tenderness.   Abdominal: Soft. He exhibits no distension. There is no hepatosplenomegaly.   obese   Musculoskeletal: Normal range of motion. He exhibits no edema or tenderness.   R foot in hard boot   Feet:   Right Foot:   Skin Integrity: Negative for ulcer.   Left Foot:   Skin Integrity: Negative for ulcer.   Neurological: He is alert and oriented to person, place, and time. No cranial nerve deficit.   Skin: Skin is warm and dry. No rash noted. No erythema.   Psychiatric: He has a normal mood and affect. His speech is normal and behavior is normal.   Vitals reviewed.      DATA:   EKG: (personally reviewed tracing)  8/26/19 SR 78, PRWP    Laboratory:  CBC:  Recent Labs   Lab 06/04/18  1355 07/09/19  1156 "   WBC 7.05 9.20   Hemoglobin 14.1 14.5   Hematocrit 41.5 43.5   Platelets 260 313       CHEMISTRIES:  Recent Labs   Lab 07/09/19  1156 07/15/19  0943 07/17/19  0644   Glucose 142 H 104 119 H   Sodium 134 L 139 140   Potassium 6.0 H 5.4 H 5.9 H   BUN, Bld 28 H 36 H 31 H   Creatinine 1.3 1.2 1.2   eGFR if  >60 >60 >60   eGFR if non African American >60 >60 >60   Calcium 12.3 HH 12.0 H 11.4 H       CARDIAC BIOMARKERS:        COAGS:        LIPIDS/LFTS:  Recent Labs   Lab 06/04/18  1355 10/10/18  1008  07/09/19  1156 07/15/19  0943 07/17/19  0644   Cholesterol 108 L 110 L  --   --   --   --    Triglycerides 93 98  --   --   --   --    HDL 40 47  --   --   --   --    LDL Cholesterol 49.4 L 43.4 L  --   --   --   --    Non-HDL Cholesterol 68 63  --   --   --   --    AST 21  --    < > 36 37 43 H   ALT 27  --    < > 58 H 47 H 63 H    < > = values in this interval not displayed.       Cardiovascular Testing:  Cath 10/22/15 (Care everywhere)  · Left dominant system  · No Left main - LAD and LCX have separate ostia   · Left anterior descending has 20 % ostial, D1 patent   · Circumflex is patent   · OM1 patent   · LPL1, 2 & 3 are small and patent  · Right coronary artery small and patent     DSE 9/2/15 (Care everywhere)  1. The ECG showed ischemic ST segment changes: up to 2 mm ST segment depressions in leads II, III, aVF, and V4-V6.  2. Stress induced inferior wall motion abnormality.  Consideration of coronary angiography is warranted.  3. 2D echocardiographic evidence of inducible ishemia at achieved workload (in the usual distribution of the right coronary artery).    ASSESSMENT:   # Nonobst CAD by cath 10/2015, asymptomatic  # HTN, uncontrolled  # HLP on simva 40mg  # DM  # tob abuse  # BMI 32    PLAN:   Cont med rx  Inc coreg 25mg bid  Ambulatory smoking cessation program  RTC 6 months    David Ibrahim MD, FACC

## 2019-08-26 NOTE — PROGRESS NOTES
Postoperative Visit     History of Present Illness:   Thierry is 6 weeks s/p right fibula ORIF (DOS-7/17/19)   Smoking about 2 cigarettes per day   Has not been taking narcotic pain medication   Denies fevers, chills, constitutional symptoms   Rates pain as 0/10 today   Has WBAT in CAM with cane without pain  - was supposed to be TDWB with crutches  Has been on vitamin D supplementation in the past but has not been consistent about taking it  Overall he is happy with his progress      Physical Examination:    NAD  right lower extremity:  Incision over the lateral ankle is well healed   No signs of infection  Non tender over lateral malleolus   Ltsi s/s/sp/dp/t  + ehl/fhl/ta/gs  2+ DP     Imaging: 3 views right ankle: hardware in place. Fracture line still visible.      Assessment/Plan:  59 y.o. male with DM, 6 weeks s/p right fibula ORIF with syndesmosis screws for additional fixation (DOS-7/17/19)      Plan  1. Tylenol PRN pain   2. Smoking cessation again discussed with patient. Delay in healing likely related to DM + nicotine use. Will get vitamin D level today.  3. Continue CAM boot   4. TDWB RLE x total of 8 weeks with crutches, then can progress to WBAT if painless  5. RTC 4 weeks        All questions were answered in detail. The patient is in full agreement with the treatment plan and will proceed accordingly.

## 2019-08-28 ENCOUNTER — PATIENT OUTREACH (OUTPATIENT)
Dept: ADMINISTRATIVE | Facility: OTHER | Age: 59
End: 2019-08-28

## 2019-09-09 ENCOUNTER — PATIENT OUTREACH (OUTPATIENT)
Dept: ADMINISTRATIVE | Facility: HOSPITAL | Age: 59
End: 2019-09-09

## 2019-09-17 DIAGNOSIS — E78.49 OTHER HYPERLIPIDEMIA: ICD-10-CM

## 2019-09-17 RX ORDER — SIMVASTATIN 40 MG/1
TABLET, FILM COATED ORAL
Qty: 90 TABLET | Refills: 0 | Status: SHIPPED | OUTPATIENT
Start: 2019-09-17 | End: 2019-12-30

## 2019-09-26 ENCOUNTER — OFFICE VISIT (OUTPATIENT)
Dept: ORTHOPEDICS | Facility: CLINIC | Age: 59
End: 2019-09-26
Payer: MEDICARE

## 2019-09-26 VITALS
BODY MASS INDEX: 31.67 KG/M2 | SYSTOLIC BLOOD PRESSURE: 140 MMHG | HEART RATE: 78 BPM | RESPIRATION RATE: 16 BRPM | WEIGHT: 226.19 LBS | DIASTOLIC BLOOD PRESSURE: 90 MMHG | HEIGHT: 71 IN | OXYGEN SATURATION: 98 %

## 2019-09-26 DIAGNOSIS — S82.821A CLOSED TORUS FRACTURE OF DISTAL END OF RIGHT FIBULA, INITIAL ENCOUNTER: Primary | ICD-10-CM

## 2019-09-26 DIAGNOSIS — S82.821G CLOSED TORUS FRACTURE OF DISTAL END OF RIGHT FIBULA WITH DELAYED HEALING, SUBSEQUENT ENCOUNTER: ICD-10-CM

## 2019-09-26 PROCEDURE — 99999 PR PBB SHADOW E&M-EST. PATIENT-LVL III: CPT | Mod: PBBFAC,HCNC,, | Performed by: ORTHOPAEDIC SURGERY

## 2019-09-26 PROCEDURE — 99024 POSTOP FOLLOW-UP VISIT: CPT | Mod: HCNC,S$GLB,, | Performed by: ORTHOPAEDIC SURGERY

## 2019-09-26 PROCEDURE — 99024 PR POST-OP FOLLOW-UP VISIT: ICD-10-PCS | Mod: HCNC,S$GLB,, | Performed by: ORTHOPAEDIC SURGERY

## 2019-09-26 PROCEDURE — 99999 PR PBB SHADOW E&M-EST. PATIENT-LVL III: ICD-10-PCS | Mod: PBBFAC,HCNC,, | Performed by: ORTHOPAEDIC SURGERY

## 2019-09-26 PROCEDURE — 99499 UNLISTED E&M SERVICE: CPT | Mod: HCNC,S$GLB,, | Performed by: ORTHOPAEDIC SURGERY

## 2019-09-26 PROCEDURE — 99499 RISK ADDL DX/OHS AUDIT: ICD-10-PCS | Mod: HCNC,S$GLB,, | Performed by: ORTHOPAEDIC SURGERY

## 2019-09-26 NOTE — PROGRESS NOTES
Postoperative Visit     History of Present Illness:   Thierry is 12 weeks s/p right fibula ORIF (DOS-7/17/19)   Smoking about 2 - 3  cigarettes per day   Has not been taking narcotic pain medication   Denies fevers, chills, constitutional symptoms   Rates pain as 0/10 today. Has come out of boot and does not have pain with weight bearing   Overall he is happy with his progress      Physical Examination:    NAD  right lower extremity:  Incision over the lateral ankle is well healed   No signs of infection  Non tender over lateral malleolus   Ltsi s/s/sp/dp/t  + ehl/fhl/ta/gs  2+ DP     Imaging: 3 views right ankle: hardware in place. Fracture line still visible.No significant change compared to previous films     Vitamin D WNL     Assessment/Plan:  59 y.o. male with DM, 12 weeks s/p right fibula ORIF with syndesmosis screws for additional fixation (DOS-7/17/19)      Plan  1. Tylenol PRN pain   2. Healing likely delayed due to continued smoking, DM  3. Continue WBAT if painless in boot  4. Discussed importance of smoking cessation. Declines referral to smoking cessation  5. RTC 8 weeks        All questions were answered in detail. The patient is in full agreement with the treatment plan and will proceed accordingly.

## 2019-10-18 DIAGNOSIS — E11.9 TYPE 2 DIABETES MELLITUS WITHOUT COMPLICATION: ICD-10-CM

## 2019-11-19 ENCOUNTER — PATIENT OUTREACH (OUTPATIENT)
Dept: ADMINISTRATIVE | Facility: OTHER | Age: 59
End: 2019-11-19

## 2019-12-04 ENCOUNTER — PATIENT OUTREACH (OUTPATIENT)
Dept: ADMINISTRATIVE | Facility: HOSPITAL | Age: 59
End: 2019-12-04

## 2019-12-10 ENCOUNTER — PATIENT OUTREACH (OUTPATIENT)
Dept: ADMINISTRATIVE | Facility: OTHER | Age: 59
End: 2019-12-10

## 2019-12-28 DIAGNOSIS — E78.49 OTHER HYPERLIPIDEMIA: ICD-10-CM

## 2019-12-30 RX ORDER — SIMVASTATIN 40 MG/1
TABLET, FILM COATED ORAL
Qty: 90 TABLET | Refills: 0 | Status: SHIPPED | OUTPATIENT
Start: 2019-12-30 | End: 2020-04-06

## 2019-12-31 DIAGNOSIS — E11.9 TYPE 2 DIABETES MELLITUS WITHOUT COMPLICATION, WITH LONG-TERM CURRENT USE OF INSULIN: ICD-10-CM

## 2019-12-31 DIAGNOSIS — Z79.4 TYPE 2 DIABETES MELLITUS WITHOUT COMPLICATION, WITH LONG-TERM CURRENT USE OF INSULIN: ICD-10-CM

## 2019-12-31 DIAGNOSIS — K21.9 GASTROESOPHAGEAL REFLUX DISEASE WITHOUT ESOPHAGITIS: ICD-10-CM

## 2019-12-31 RX ORDER — OMEPRAZOLE 20 MG/1
20 CAPSULE, DELAYED RELEASE ORAL DAILY
Qty: 90 CAPSULE | Refills: 2 | Status: ON HOLD | OUTPATIENT
Start: 2019-12-31 | End: 2020-06-25 | Stop reason: HOSPADM

## 2019-12-31 RX ORDER — INSULIN DETEMIR 100 [IU]/ML
40 INJECTION, SOLUTION SUBCUTANEOUS NIGHTLY
Qty: 15 ML | Refills: 0 | Status: SHIPPED | OUTPATIENT
Start: 2019-12-31 | End: 2020-02-06

## 2019-12-31 NOTE — TELEPHONE ENCOUNTER
----- Message from Anastasiia Groves sent at 12/31/2019  7:29 AM CST -----  Contact: Self   Refill : LEVEMIR FLEXTOUCH U-100 INSULN 100 unit/mL (3 mL) InPn pen             omeprazole (PRILOSEC) 20 MG capsule        [id number] - [place] [address] [facility] [address] [place] [address] [address]  [facility] [place] [address]  Phone: [phone] Fax: [phone]

## 2020-01-16 ENCOUNTER — PATIENT OUTREACH (OUTPATIENT)
Dept: ADMINISTRATIVE | Facility: OTHER | Age: 60
End: 2020-01-16

## 2020-02-05 DIAGNOSIS — E11.9 TYPE 2 DIABETES MELLITUS WITHOUT COMPLICATION, WITH LONG-TERM CURRENT USE OF INSULIN: ICD-10-CM

## 2020-02-05 DIAGNOSIS — Z79.4 TYPE 2 DIABETES MELLITUS WITHOUT COMPLICATION, WITH LONG-TERM CURRENT USE OF INSULIN: ICD-10-CM

## 2020-02-06 RX ORDER — INSULIN DETEMIR 100 [IU]/ML
INJECTION, SOLUTION SUBCUTANEOUS
Qty: 15 ML | Refills: 0 | Status: SHIPPED | OUTPATIENT
Start: 2020-02-06 | End: 2020-03-30

## 2020-02-06 NOTE — TELEPHONE ENCOUNTER
Attempt to reschedule patient labs prior to office visit. Patient stated unsure if able to due to transportation. Patient will call back regarding rescheduling.

## 2020-02-06 NOTE — TELEPHONE ENCOUNTER
Can recall patient and see if he can come in a day or so before his appointment for the lab instead of day of

## 2020-02-17 ENCOUNTER — PATIENT OUTREACH (OUTPATIENT)
Dept: ADMINISTRATIVE | Facility: OTHER | Age: 60
End: 2020-02-17

## 2020-02-18 ENCOUNTER — OFFICE VISIT (OUTPATIENT)
Dept: ORTHOPEDICS | Facility: CLINIC | Age: 60
End: 2020-02-18
Payer: MEDICARE

## 2020-02-18 VITALS
RESPIRATION RATE: 18 BRPM | HEIGHT: 71 IN | OXYGEN SATURATION: 95 % | SYSTOLIC BLOOD PRESSURE: 140 MMHG | DIASTOLIC BLOOD PRESSURE: 90 MMHG | WEIGHT: 235.88 LBS | HEART RATE: 86 BPM | BODY MASS INDEX: 33.02 KG/M2

## 2020-02-18 DIAGNOSIS — S82.821A CLOSED TORUS FRACTURE OF DISTAL END OF RIGHT FIBULA, INITIAL ENCOUNTER: Primary | ICD-10-CM

## 2020-02-18 PROCEDURE — 99999 PR PBB SHADOW E&M-EST. PATIENT-LVL IV: ICD-10-PCS | Mod: PBBFAC,HCNC,, | Performed by: ORTHOPAEDIC SURGERY

## 2020-02-18 PROCEDURE — 3080F PR MOST RECENT DIASTOLIC BLOOD PRESSURE >= 90 MM HG: ICD-10-PCS | Mod: HCNC,CPTII,S$GLB, | Performed by: ORTHOPAEDIC SURGERY

## 2020-02-18 PROCEDURE — 3080F DIAST BP >= 90 MM HG: CPT | Mod: HCNC,CPTII,S$GLB, | Performed by: ORTHOPAEDIC SURGERY

## 2020-02-18 PROCEDURE — 3008F BODY MASS INDEX DOCD: CPT | Mod: HCNC,CPTII,S$GLB, | Performed by: ORTHOPAEDIC SURGERY

## 2020-02-18 PROCEDURE — 3008F PR BODY MASS INDEX (BMI) DOCUMENTED: ICD-10-PCS | Mod: HCNC,CPTII,S$GLB, | Performed by: ORTHOPAEDIC SURGERY

## 2020-02-18 PROCEDURE — 99213 OFFICE O/P EST LOW 20 MIN: CPT | Mod: HCNC,S$GLB,, | Performed by: ORTHOPAEDIC SURGERY

## 2020-02-18 PROCEDURE — 3077F SYST BP >= 140 MM HG: CPT | Mod: HCNC,CPTII,S$GLB, | Performed by: ORTHOPAEDIC SURGERY

## 2020-02-18 PROCEDURE — 99213 PR OFFICE/OUTPT VISIT, EST, LEVL III, 20-29 MIN: ICD-10-PCS | Mod: HCNC,S$GLB,, | Performed by: ORTHOPAEDIC SURGERY

## 2020-02-18 PROCEDURE — 99999 PR PBB SHADOW E&M-EST. PATIENT-LVL IV: CPT | Mod: PBBFAC,HCNC,, | Performed by: ORTHOPAEDIC SURGERY

## 2020-02-18 PROCEDURE — 3077F PR MOST RECENT SYSTOLIC BLOOD PRESSURE >= 140 MM HG: ICD-10-PCS | Mod: HCNC,CPTII,S$GLB, | Performed by: ORTHOPAEDIC SURGERY

## 2020-02-18 NOTE — PROGRESS NOTES
"Postoperative Visit     History of Present Illness:   Thierry is 7 months s/p right fibula ORIF (DOS-7/17/19)   Continues to smoke  Has not been taking narcotic pain medication   Denies fevers, chills, constitutional symptoms   Pain was resolved, has had some pain after slipping in the shower recently. Able to bear weight  Missed last follow up         Physical Examination:    Vitals:    02/18/20 1420   BP: (!) 140/90   Pulse: 86   Resp: 18   SpO2: 95%   Weight: 107 kg (235 lb 14.3 oz)   Height: 5' 11" (1.803 m)   PainSc:   5   PainLoc: Foot     NAD  right lower extremity:  Walks with cane for chronic left ankle pain  Incision over the lateral ankle is well healed   No signs of infection  Non tender over lateral malleolus, posterior and medial ankle   DF to neutral   Ltsi s/s/sp/dp/t  + ehl/fhl/ta/gs  2+ DP     Imaging: 3 views right ankle: hardware in place. Fracture healed. Synostosis formed between tibia and fibula. Lucencies surrounding syndesmosis screws      Vitamin D WNL     Assessment/Plan:  59 y.o. male with DM, smoker  7 months s/p right fibula ORIF with syndesmosis screws for additional fixation (DOS-7/17/19)      Plan  1. Tylenol PRN pain   2. Activity as tolerated   3. Discussed importance of smoking cessation. Declines referral to smoking cessation  4. PT referral  Placed for ROM, strengthening and proprioceptive training   5. RTC 3 months         All questions were answered in detail. The patient is in full agreement with the treatment plan and will proceed accordingly.  "

## 2020-02-24 LAB
LEFT EYE DM RETINOPATHY: POSITIVE
RIGHT EYE DM RETINOPATHY: POSITIVE

## 2020-02-26 ENCOUNTER — LAB VISIT (OUTPATIENT)
Dept: LAB | Facility: HOSPITAL | Age: 60
End: 2020-02-26
Attending: FAMILY MEDICINE
Payer: MEDICARE

## 2020-02-26 ENCOUNTER — PATIENT OUTREACH (OUTPATIENT)
Dept: ADMINISTRATIVE | Facility: HOSPITAL | Age: 60
End: 2020-02-26

## 2020-02-26 DIAGNOSIS — Z79.4 TYPE 2 DIABETES MELLITUS WITHOUT COMPLICATION, WITH LONG-TERM CURRENT USE OF INSULIN: ICD-10-CM

## 2020-02-26 DIAGNOSIS — E11.9 TYPE 2 DIABETES MELLITUS WITHOUT COMPLICATION: ICD-10-CM

## 2020-02-26 DIAGNOSIS — E11.9 TYPE 2 DIABETES MELLITUS WITHOUT COMPLICATION, WITH LONG-TERM CURRENT USE OF INSULIN: ICD-10-CM

## 2020-02-26 LAB
CHOLEST SERPL-MCNC: 117 MG/DL (ref 120–199)
CHOLEST/HDLC SERPL: 3.5 {RATIO} (ref 2–5)
ESTIMATED AVG GLUCOSE: 278 MG/DL (ref 68–131)
HBA1C MFR BLD HPLC: 11.3 % (ref 4–5.6)
HDLC SERPL-MCNC: 33 MG/DL (ref 40–75)
HDLC SERPL: 28.2 % (ref 20–50)
LDLC SERPL CALC-MCNC: 56.4 MG/DL (ref 63–159)
NONHDLC SERPL-MCNC: 84 MG/DL
TRIGL SERPL-MCNC: 138 MG/DL (ref 30–150)

## 2020-02-26 PROCEDURE — 83036 HEMOGLOBIN GLYCOSYLATED A1C: CPT | Mod: HCNC

## 2020-02-26 PROCEDURE — 36415 COLL VENOUS BLD VENIPUNCTURE: CPT | Mod: HCNC,PO

## 2020-02-26 PROCEDURE — 80061 LIPID PANEL: CPT | Mod: HCNC

## 2020-02-26 RX ORDER — METFORMIN HYDROCHLORIDE 500 MG/1
500 TABLET ORAL 2 TIMES DAILY WITH MEALS
Qty: 180 TABLET | Refills: 0 | Status: SHIPPED | OUTPATIENT
Start: 2020-02-26 | End: 2020-05-28 | Stop reason: SDUPTHER

## 2020-02-26 NOTE — TELEPHONE ENCOUNTER
"Last Office Visit Info:   The patient's last visit with Sterling Jarquin MD was on 7/9/2019.    The patient's last visit in current department was on Visit date not found.        Last CBC Results:   Lab Results   Component Value Date    WBC 9.20 07/09/2019    HGB 14.5 07/09/2019    HCT 43.5 07/09/2019     07/09/2019       Last CMP Results  Lab Results   Component Value Date     07/17/2019    K 5.9 (H) 07/17/2019     (H) 07/17/2019    CO2 22 (L) 07/17/2019    BUN 31 (H) 07/17/2019    CREATININE 1.2 07/17/2019    CALCIUM 11.4 (H) 07/17/2019    ALBUMIN 3.8 07/17/2019    AST 43 (H) 07/17/2019    ALT 63 (H) 07/17/2019       Last Lipids  Lab Results   Component Value Date    CHOL 110 (L) 10/10/2018    TRIG 98 10/10/2018    HDL 47 10/10/2018    LDLCALC 43.4 (L) 10/10/2018       Last A1C  Lab Results   Component Value Date    HGBA1C 7.7 (H) 06/13/2019       Last TSH  Lab Results   Component Value Date    TSH 2.423 06/04/2018         Current Med Refills  Medication List with Changes/Refills   Current Medications    ACETAMINOPHEN (TYLENOL) 500 MG TABLET    Take 500 mg by mouth every 6 (six) hours as needed for Pain.       Start Date: --        End Date: --    ASPIRIN 81 MG CHEW    Take 1 tablet (81 mg total) by mouth once daily.       Start Date: 6/13/2019 End Date: 6/12/2020    BD ULTRA-FINE FREDA PEN NEEDLE 32 GAUGE X 5/32" NDLE    U ONCE D UTD       Start Date: 6/13/2019 End Date: --    BLOOD PRESSURE CUFF MISC    1 kit by Misc.(Non-Drug; Combo Route) route once daily.       Start Date: 7/27/2018 End Date: --    BLOOD SUGAR DIAGNOSTIC STRP    1 each by abdominal subcutaneous route 4 (four) times daily before meals and nightly.       Start Date: 3/13/2019 End Date: --    BLOOD-GLUCOSE METER KIT    1 each by Other route 4 (four) times daily before meals and nightly.       Start Date: 3/13/2019 End Date: --    CARVEDILOL (COREG) 25 MG TABLET    Take 1 tablet (25 mg total) by mouth 2 (two) times daily with " meals.       Start Date: 8/26/2019 End Date: 8/25/2020    FLUOCINONIDE (LIDEX) 0.05 % EXTERNAL SOLUTION    APPLY 1 ML TO THE SCALP AT BEDTIME       Start Date: 1/7/2019  End Date: --    LEVEMIR FLEXTOUCH U-100 INSULN 100 UNIT/ML (3 ML) INPN PEN    ADMINISTER 40 UNITS UNDER THE SKIN EVERY EVENING       Start Date: 2/6/2020  End Date: --    LISINOPRIL (PRINIVIL,ZESTRIL) 20 MG TABLET    Take 1 tablet (20 mg total) by mouth once daily.       Start Date: 3/13/2019 End Date: --    METFORMIN (GLUCOPHAGE) 500 MG TABLET    Take 1 tablet (500 mg total) by mouth 2 (two) times daily with meals.       Start Date: 6/13/2019 End Date: --    OMEPRAZOLE (PRILOSEC) 20 MG CAPSULE    Take 1 capsule (20 mg total) by mouth once daily.       Start Date: 12/31/2019End Date: --    ONDANSETRON (ZOFRAN-ODT) 8 MG TBDL    Take 1 tablet (8 mg total) by mouth every 6 (six) hours as needed.       Start Date: 7/17/2019 End Date: --    SIMVASTATIN (ZOCOR) 40 MG TABLET    TAKE 1 TABLET(40 MG) BY MOUTH EVERY EVENING       Start Date: 12/30/2019End Date: --    TRUEPLUS LANCETS 33 GAUGE MISC           Start Date: 3/14/2019 End Date: --       Order(s) placed per written order guidelines:     Please advise.

## 2020-02-28 DIAGNOSIS — E11.9 TYPE 2 DIABETES MELLITUS WITHOUT COMPLICATION: ICD-10-CM

## 2020-03-03 ENCOUNTER — OUTPATIENT CASE MANAGEMENT (OUTPATIENT)
Dept: ADMINISTRATIVE | Facility: OTHER | Age: 60
End: 2020-03-03

## 2020-03-03 ENCOUNTER — TELEPHONE (OUTPATIENT)
Dept: FAMILY MEDICINE | Facility: CLINIC | Age: 60
End: 2020-03-03

## 2020-03-03 DIAGNOSIS — E11.65 UNCONTROLLED TYPE 2 DIABETES MELLITUS WITH HYPERGLYCEMIA: Primary | ICD-10-CM

## 2020-03-03 NOTE — LETTER
March 3, 2020           Dear Mr. Ho,     Welcome to [facility]s Diabetes Care Management Program!  My name is Jacques Hartman and I will be your diabetes care manager.  As we discussed today, I work in partnership with your primary care provider to support and offer you tools and resources to help you achieve your health goals with confidence.     [facility] has worked with your [employer] Plan to develop a program based on recommendations from the American Diabetes Association and the [employer]s program called Together 2 Goal.     Some of the services we provide include:    Diabetes care tips and resources    Personal Diabetes Care Plan created with your primary care team    Nutrition counseling including meal planning and preparation tips    Personal goal setting and evaluation of goal progress    Access to diabetes support groups    Referral for diabetes retinal eye exam    Referral to a Diabetes Specialty Clinic (if needed)    24/7 assistance from [facility]      All services provided by this program are coordinated and communicated to your primary care team.     You will be receiving more information about these services in your [facility] account, by phone or through the mail. You may also visit our website [email or web address].     If you do not wish to participate or receive information about these services, please contact our office at [phone] or contact me directly through the patient portal or directly at [phone].      I look forward to our partnership!  We will work as a team to build or enhance your skills for managing your diabetes.       Sincerely,        Jacques Hartman RN  Diabetes Care Management Team

## 2020-03-03 NOTE — TELEPHONE ENCOUNTER
----- Message from Jacques Hartman RN sent at 3/3/2020  2:47 PM CST -----  Contact: Jacques Hartman RN Outpatient Case Management  Good Afternoon,    My name is Jacques and I work with the Outpatient Case Management Team. Today I spoke with Mr. Ho and enrolled him in OPCM.  He reported to me that he has never seen an endocrinologist. With his Most recent HgbA1C at 11.3, I feel he can benefit from their assistance. If you feel this is an appropriate request, can you please place a referral for Endocrine to assist with his Diabetes Management?      Also he stated that he would like to see if he can qualify for HH assistance? He specifically is asking for help with checking his blood pressure and BG. He is aware that he has an appt with you next week and may discuss a HH referral with you at the time of his visit.  Please let me know if I can do anything more to further assist.    Thank you!    Jacques Hartman RN

## 2020-03-03 NOTE — PATIENT INSTRUCTIONS
Step-by-Step  Checking Your Blood Pressure    Date Last Reviewed: 4/27/2016  © 0986-8295 [employer]. [address] [place] [address], [place] [address]. All rights reserved. This information is not intended as a substitute for professional medical care. Always follow your healthcare professional's instructions.        Eating Heart-Healthy Food: Using the DASH Plan    Eating for your heart doesnt have to be hard or boring. You just need to know how to make healthier choices. The DASH eating plan has been developed to help you do just that. DASH stands for Dietary Approaches to Stop Hypertension. It is a plan that has been proven to be healthier for your heart and to lower your risk for high blood pressure. It can also help lower your risk for cancer, heart disease, osteoporosis, and diabetes.  Choosing from each food group  Choose foods from each of the food groups below each day. Try to get the recommended number of servings for each food group. The serving numbers are based on a diet of 2,000 calories a day. Talk to your doctor if youre unsure about your calorie needs. Along with getting the correct servings, the DASH plan also recommends a sodium intake less than 2,300 mg per day.        Grains  Servings: 6 to 8 a day  A serving is:  · 1 slice bread  · 1 ounce dry cereal  · Half a cup cooked rice, pasta or cereal  Best choices: Whole grains and any grains high in fiber. Vegetables  Servings: 4 to 5 a day  A serving is:  · 1 cup raw leafy vegetable  · Half a cup cut-up raw or cooked vegetable  · Half a cup vegetable juice  Best choices: Fresh or frozen vegetables prepared without added salt or fat.   Fruits  Servings: 4 to 5 a day  A serving is:  · 1 medium fruit  · One-quarter cup dried fruit  · Half a cup fresh, frozen, or canned fruit  · Half a cup of 100% fruit juices  Best choices: A variety of fresh fruits of different colors. Whole fruits are a better choice than fruit juices. Low-fat or fat-free  dairy  Servings: 2 to 3 a day  A serving is:  · 1 cup milk  · 1 cup yogurt  · One and a half ounces cheese  Best choices: Skim or 1% milk, low-fat or fat-free yogurt or buttermilk, and low-fat cheeses.         Lean meats, poultry, fish  Servings: 6 or fewer a day  A serving is:  · 1 ounce cooked meats, poultry, or fish  · 1 egg  Best choices: Lean poultry and fish. Trim away visible fat. Broil, grill, roast, or boil instead of frying. Remove skin from poultry before eating. Limit how much red meat you eat.  Nuts, seeds, beans  Servings: 4 to 5 a week  A serving is:  · One-third cup nuts (one and a half ounces)  · 2 tablespoons nut butter or seeds  · Half a cup cooked dry beans or legumes  Best choices: Dry roasted nuts with no salt added, lentils, kidney beans, garbanzo beans, and whole kaur beans.   Fats and oils  Servings: 2 to 3 a day  A serving is:  · 1 teaspoon vegetable oil  · 1 teaspoon soft margarine  · 1 tablespoon mayonnaise  · 2 tablespoons salad dressing  Best choices: Nut and vegetable oils (nontropical vegetable oils), such as olive and canola oil. Sweets  Servings: 5 a week or fewer  A serving is:  · 1 tablespoon sugar, maple syrup, or honey  · 1 tablespoon jam or jelly  · 1 half-ounce jelly beans (about 15)  · 1 cup lemonade  Best choices: Dried fruit can be a satisfying sweet. Choose low-fat sweets. And watch your serving sizes!      For more on the DASH eating plan, visit:  www.nhlbi.nih.gov/health/health-topics/topics/dash   Date Last Reviewed: 6/1/2016 © 2000-2017 [employer]. [address] [place] [address], [place] [address]. All rights reserved. This information is not intended as a substitute for professional medical care. Always follow your healthcare professional's instructions.        Controlling High Blood Pressure  High blood pressure (hypertension) is often called the silent killer. This is because many people who have it dont know it. High blood pressure is defined as 140/90 mm  Hg or higher. Know your blood pressure and remember to check it regularly. Doing so can save your life. Here are some things you can do to help control your blood pressure.    Choose heart-healthy foods  · Select low-salt, low-fat foods. Limit sodium intake to 2,400 mg per day or the amount suggested by your healthcare provider.  · Limit canned, dried, cured, packaged, and fast foods. These can contain a lot of salt.  · Eat 8 to 10 servings of fruits and vegetables every day.  · Choose lean meats, fish, or chicken.  · Eat whole-grain pasta, brown rice, and beans.  · Eat 2 to 3 servings of low-fat or fat-free dairy products.  · Ask your doctor about the DASH eating plan. This plan helps reduce blood pressure.  · When you go to a restaurant, ask that your meal be prepared with no added salt.  Maintain a healthy weight  · Ask your healthcare provider how many calories to eat a day. Then stick to that number.  · Ask your healthcare provider what weight range is healthiest for you. If you are overweight, a weight loss of only 3% to 5% of your body weight can help lower blood pressure. Generally, a good weight loss goal is to lose 10% of your body weight in a year.  · Limit snacks and sweets.  · Get regular exercise.  Get up and get active  · Choose activities you enjoy. Find ones you can do with friends or family. This includes bicycling, dancing, walking, and jogging.  · Park farther away from building entrances.  · Use stairs instead of the elevator.  · When you can, walk or bike instead of driving.  · [place] leaves, garden, or do household repairs.  · Be active at a moderate to vigorous level of physical activity for at least 40 minutes for a minimum of 3 to 4 days a week.   Manage stress  · Make time to relax and enjoy life. Find time to laugh.  · Communicate your concerns with your loved ones and your healthcare provider.  · Visit with family and friends, and keep up with hobbies.  Limit alcohol and quit  smoking  · Men should have no more than 2 drinks per day.  · Women should have no more than 1 drink per day.  · Talk with your healthcare provider about quitting smoking. Smoking significantly increases your risk for heart disease and stroke. Ask your healthcare provider about community smoking cessation programs and other options.  Medicines  If lifestyle changes arent enough, your healthcare provider may prescribe high blood pressure medicine. Take all medicines as prescribed. If you have any questions about your medicines, ask your healthcare provider before stopping or changing them.   Date Last Reviewed: 4/27/2016 © 2000-2017 [employer]. [address] [place] [address]. All rights reserved. This information is not intended as a substitute for professional medical care. Always follow your healthcare professional's instructions.        Low-Salt Choices  Eating salt (sodium) can make your body retain too much water. Excess water makes your heart work harder. Canned, packaged, and frozen foods are easy to prepare, but they are often high in sodium. Here are some ideas for low-salt foods you can easily prepare yourself.    For breakfast  · Fruit or 100% fruit juice  · Whole-wheat bread or an English muffin. Compare sodium content on labels.  · Low-fat milk or yogurt  · Unsalted eggs  · Shredded wheat  · Corn tortillas  · Unsalted steamed rice  · Regular (not instant) hot cereal, made without salt  Stay away from:  · Sausage, traore, and ham  · Flour tortillas  · Packaged muffins, pancakes, and biscuits  · Instant hot cereals  · Cottage cheese  For lunch and dinner  · Fresh fish, chicken, turkey, or meat--baked, broiled, or roasted without salt  · Dry beans, cooked without salt  · Tofu, stir-fried without salt  · Unsalted fresh fruit and vegetables, or frozen or canned fruit and vegetables with no added salt  Stay away from:  · Lunch or deli meat that is cured or smoked  · Cheese  · Tomato  juice and catsup  · Canned vegetables, soups, and fish not labeled as no-salt-added or reduced sodium  · Packaged gravies and sauces  · Olives, pickles, and relish  · Bottled salad dressings  For snacks and desserts  · Yogurt  · Unsalted, air popped popcorn  · Unsalted nuts or seeds  Stay away from:  · Pies and cakes  · Packaged dessert mixes  · Pizza  · Canned and packaged puddings  · Pretzels, chips, crackers, and nuts--unless the label says unsalted  Date Last Reviewed: 6/17/2015  © 4084-3847 [employer]. [address] [place] [address], [place] [address]. All rights reserved. This information is not intended as a substitute for professional medical care. Always follow your healthcare professional's instructions.

## 2020-03-03 NOTE — PROGRESS NOTES
Outpatient Care Management  Initial Patient Assessment    Patient: Thierry Ho  MRN: [id number]  Date of Service: 03/03/2020  Completed by: Jacques Hartman RN  Referral Date: 02/28/2020  Program: Disease Management (Diabetes & COPD)    Reason for Visit   Patient presents with    OPCM Enrollment Call     3/3/2020    Initial Assessment     3/3/2020    PHQ-9     3/3/2020    Plan Of Care     3/3/2020    OPCM Welcome Letter     3/3/2020       Brief Summary: Contacted patient to complete Initial assessment for OPCM. Referral received from patient's PCP to assist with Diabetes management. Patient is a 58yo male with a history of hypertension, CAD and Type 2 DM-last HgbA1C drawn 2/26 was 11.3. Patient lives alone and reports little difficulty with completing ADLs and IADLs. Patient reported that he does not have difficulty with managing his medications and occasionally forgets if he has taken his insulin. Reported that he does use a pillbox organizer for medication management. Patient reported that he does use a cane to assist with ambulation and had 1 fall in the shower last year that did not result in injury. Patient stated that he has since placed a rubber mat in the shower to prevent any additional slips. Patient reported that he does not drive and relies on insurance provided transportation as well as taxi cabs for transportation to and from hospitals. Patient reported that he has not been checking his BG readings. Stated that he does have his meter and all supplies needed to monitor his BG available. Patient stated that he would begin to check his BG once daily. Reported that he will also begin to monitor his BP as well. Medication reconciliation completed, no discrepancies noted.  Pt scored a 2 on PHQ2 depression screening   Encouraged patient to call this RN if having any questions/concerns. Will mail educational materials on Diabetes and Hypertension to assist with management. Patient stated that he has never been  to an endocrinologist, recommended establishing care to assist with better management of Diabetes. Will message PCP to request referral.  Reminded patient of upcoming appointments, verbalized awareness. Discussed completing F/U call with patient, who is in agreement with call in 2 weeks.        Next Steps:  Confirm receipt of resource materials  Educate on Diabetes and Hypertension  F/u On Message to PCP (endo referral)         Assessment Documentation     OPCM Initial Assessment    Involvement of Care  Do I have permission to speak with other family members about your care?:  Yes (Comment: Ivette Ho, daughter, [phone])  Assessment completed by:  Patient  Patient Reported Insurance  Verified current insurance plan:  [employer] Advantage  [employer] benefits discussed:  OTC prescription discounts, Transportation, Mail Order Pharmacy  Current Health Status  Patient Health Rating  Compared to other people your age, how would you rate your health?:  Poor  Patient Reported Labs & Vitals  Any patient reported labs and/or vitals?:  No  Social Determinants  Advanced Care Planning  Support  Caregiver presence?:  No  Present activity level:  not limited in any of these ways  Who takes you to your medical appointments?:  other (see comment) (Comment: medical transportation)  Housing  Living arrangements:  alone  Number of people in home:  1  Type of residence:  apartment  Own or rent?:  rent  Permanent residence?:  yes  Does the patient's residence have any of the following?:  n/a  Is housing an area of need?:  no  Access to [employer]  Does the patient have access to any of the following devices or technologies?:  SmartPhone  Clinical Assessment  Medication Adherence  How does the patient obtain their medications?:  taxi  How many days a week do you miss medications?:  1  Do you use a pill box or medication chart to help you manage your medications?:  pill box  Do you sometimes have difficulty refilling  your medications?:  no  Medication reconciliation completed?:  Yes  Is medication adherence an area of need?:  Yes  *Active medication list was reviewed and reconciled with patient and/or caregiver:    Nutritional Status  Diet:  Regular  Change in appetite?:  no  Recent changes in weight?:  no  Dentition:  wears dentures  Is nutrition an area of need?:  no  Labs  Do you have regular lab work to monitor your medications?:  yes  Type of lab work:  A1c  Where do you get your lab work done?:  [facility]  Is lab adherence an area of need?:  no  PHQ Depression Screen  Does patient's PHQ Depression Screening indicate a barrier to meeting self-care needs?:  No  Cognitive/Behavioral Health  Alert and oriented?:  yes  Difficulty thinking?:  no  Requires prompting?:  no  Requires assistance for routine expression?:  no  Is Cognitive/Behavioral health an area of need?:  no  Culture/Protestant  Does patient have cultural or Jewish beliefs that may impact ability to access healthcare?:  no  Communication  Language preference:  English   needed?:  no  Hearing problems?:  no  Decreased vision?:  yes  Legally blind?:  no  Vision assistance:  glasses  Is Communication an area of need?:  no  Health Literacy  Preferred learning method:  reading materials  How often do you need to have someone help you read instructions, pamphlets, or other written material from your doctor or pharmacy?:  never  Is there a Health Literacy need?:  no  Activities of Daily Living  Ambulation:  independent  Dressing:  independent  Bathing:  independent  Transfers:  independent  Toileting:  independent  Feeding:  independent  Cleaning home/chores:  independent  Telephone use:  independent  Shopping/attending doctors' appointments:  independent  Paying bills:  independent  Taking meds:  independent  Climbing stairs:  assistance required  Fall Risk  Patient mobility status:  Ambulatory w/ assistance  Number of falls in the past 12 months:  1  Fall  risk?:  Yes  Equipment/Current Services  Equipment/supplies used in home:  cane, glucometer, wheelchair, other (see comment) (Comment: BP cuff)  Is Equipment/Supplies/Services an area of need?:  yes (Comment: Requesting HH-Will message PCP)  Community & Government Programs  Support:  none  Government suppot assistance:  N/A  [facility] Office of Aging and Adult Services:  N/A  Community Resource Assessment  Based on the assessment of needs:  No community resources needed at this time  \[facility]\""M  consulted to assist with the following:   (Comment: NA)  Completion of Initial Assessment  Is the Initial Assessment Complete at this time?:  yes         Problem List and History     Patient Active Problem List   Diagnosis    Other hyperlipidemia    Gastroesophageal reflux disease without esophagitis    Essential hypertension    History of coronary angiogram    Type 2 diabetes mellitus without complication, with long-term current use of insulin    Coronary artery disease involving native coronary artery of native heart without angina pectoris    Decreased range of motion of left ankle    Chronic pain of left ankle    Impaired gait and mobility    Closed fracture of distal end of right fibula with delayed healing    Tobacco abuse    Non morbid obesity, unspecified obesity type       Reviewed Active Problem List with patient and/or Caregiver. The following were identified as areas of need: Diabetes, Hypertension    Medical History:  Reviewed medical history with patient and/or caregiver    Social History:  Reviewed social history with patient and/or caregiver    Complex Care Plan  Care plan was discussed and completed today with input from patient and/or caregiver.           Clinical Reference Documents Added to Patient Instructions       Document    CHECKING YOUR BLOOD PRESSURE, STEP-BY-STEP (ENGLISH)    HEART-HEALTHY FOOD, EATING: USING THE DASH PLAN (ENGLISH)    HIGH BLOOD PRESSURE, CONTROLLING (ENGLISH)     LOW-SALT CHOICES (ENGLISH)          Instructions were provided via the [employer] patient resources and are available for the patient to view on the patient portal, if active.      Follow up in about 2 weeks (around 3/17/2020) for RN Follow UP.    Todays OPCM Self-Management Care Plan was developed with the patients/caregivers input and was based on identified barriers from todays assessment.  Goals were written today with the patient/caregiver and the patient has agreed to work towards these goals to improve his/her overall well-being. Patient verbalized understanding of the care plan, goals, and all of today's instructions. Encouraged patient/caregiver to communicate with his/her physician and health care team about health conditions and the treatment plan.  Provided my contact information today and encouraged patient/caregiver to call me with any questions as needed.

## 2020-03-16 ENCOUNTER — PATIENT OUTREACH (OUTPATIENT)
Dept: ADMINISTRATIVE | Facility: HOSPITAL | Age: 60
End: 2020-03-16

## 2020-03-18 ENCOUNTER — OUTPATIENT CASE MANAGEMENT (OUTPATIENT)
Dept: ADMINISTRATIVE | Facility: OTHER | Age: 60
End: 2020-03-18

## 2020-03-18 NOTE — PROGRESS NOTES
Outpatient Care Management  Plan of Care Follow Up Visit    Patient: Thierry Ho  MRN: [id number]  Date of Service: 03/18/2020  Completed by: Jacques Hartman RN  Referral Date: 02/28/2020  Program: Disease Management (Diabetes & COPD)    Reason for Visit   Patient presents with    Update Plan Of Care     3/18/2020       Brief Summary: Contacted patient to complete follow up for OPCM. Patient reported that he did receive resource information from this RN CM, but has not had a chance to review the information. Inquired about appt with Endo for better management of Diabetes.  Patient reported that he did get an appt but stated that the appt is not until Aug.  Patient reported that he does have an appt with his PCP at the end of March and will review his concerns at this appt.  Inquired about BG monitoring. Patient reported that he has not checked it today and did not check it yesterday.  Patient checked BG while on call with this RN CM, which was 258.  Patient reported that lately when checking the lowest he has run was 198 and highest is 405.  Reviewed the importance of checking his BG at least 2 times daily to assist with management of his diabetes.  Verbalized understanding. Patient reported that he has been staying inside due to recent COVID19 outbreak.  Patient denies any current symptoms related to the COVID virus. Patient reported that he is practicing social distancing to avoid charbel the virus.  Encouraged patient to stay in contact with health care providers in the event that changes are made to his appts.  Verbalized understanding and appreciative of assistance. Encouraged patient to contact this RN CM with needs.   Reminded patient of upcoming appointments, verbalized awareness. Discussed completing F/U call with patient, who is in agreement with call in 2 weeks.          Next Steps:  Review BG and BP readings  Educate on Diabetes and Hypertension    Patient Summary     Involvement of Care:  Do I have  permission to speak with other family members about your care?       Patient Reported Labs & Vitals:  1.  Any Patient Reported Labs & Vitals?     2.  Patient Reported Blood Pressure:     3.  Patient Reported Pulse:     4.  Patient Reported Weight (Kg):     5.  Patient Reported Blood Glucose (mg/dl):       Medical History:  Reviewed medical history with patient and/or caregiver    Social History:  Reviewed social history with patient and/or caregiver    Clinical Assessment     Reviewed and provided basic information on available community resources for mental health, transportation, wellness resources, and palliative care programs with patient and/or caregiver.    Complex Care Plan     Care plan was discussed and completed today with input from patient and/or caregiver.        Patient Instructions     Instructions were provided via the [employer] patient [employer] and are available for the patient to view on the patient portal.      Follow up in about 2 weeks (around 4/1/2020) for RN Follow UP.      Todays OPCM Self-Management Care Plan was developed with the patients/caregivers input and was based on identified barriers from todays assessment.  Goals were written today with the patient/caregiver and the patient has agreed to work towards these goals to improve his/her overall well-being. Patient verbalized understanding of the care plan, goals, and all of today's instructions. Encouraged patient/caregiver to communicate with his/her physician and health care team about health conditions and the treatment plan.  Provided my contact information today and encouraged patient/caregiver to call me with any questions as needed.

## 2020-03-24 PROBLEM — G89.29 CHRONIC PAIN OF LEFT ANKLE: Status: RESOLVED | Noted: 2019-05-15 | Resolved: 2020-03-24

## 2020-03-24 PROBLEM — M25.672 DECREASED RANGE OF MOTION OF LEFT ANKLE: Status: RESOLVED | Noted: 2019-05-15 | Resolved: 2020-03-24

## 2020-03-24 PROBLEM — R26.89 IMPAIRED GAIT AND MOBILITY: Status: RESOLVED | Noted: 2019-05-15 | Resolved: 2020-03-24

## 2020-03-24 PROBLEM — M25.572 CHRONIC PAIN OF LEFT ANKLE: Status: RESOLVED | Noted: 2019-05-15 | Resolved: 2020-03-24

## 2020-03-30 DIAGNOSIS — E11.9 TYPE 2 DIABETES MELLITUS WITHOUT COMPLICATION, WITH LONG-TERM CURRENT USE OF INSULIN: ICD-10-CM

## 2020-03-30 DIAGNOSIS — Z79.4 TYPE 2 DIABETES MELLITUS WITHOUT COMPLICATION, WITH LONG-TERM CURRENT USE OF INSULIN: ICD-10-CM

## 2020-03-30 RX ORDER — INSULIN DETEMIR 100 [IU]/ML
INJECTION, SOLUTION SUBCUTANEOUS
Qty: 15 ML | Refills: 0 | Status: SHIPPED | OUTPATIENT
Start: 2020-03-30 | End: 2020-05-12

## 2020-04-02 ENCOUNTER — OUTPATIENT CASE MANAGEMENT (OUTPATIENT)
Dept: ADMINISTRATIVE | Facility: OTHER | Age: 60
End: 2020-04-02

## 2020-04-02 NOTE — PROGRESS NOTES
Outpatient Care Management  Plan of Care Follow Up Visit    Patient: Thierry Ho  MRN: [id number]  Date of Service: 04/02/2020  Completed by: Jacques Hartman RN  Referral Date: 02/28/2020  Program: Disease Management (Diabetes & COPD)    Reason for Visit   Patient presents with    Update Plan Of Care     4/2/2020     Brief Summary: Contacted patient to complete follow up for OPCM. Patient reported that he continues to stay home as directed due to the COVID 19 outbreak. Reported that is he needs groceries he will walk to the grocery or have his daughter Bring him, but reported that he is stocked up at this time. We reviewed BG monitoring, patient reported that he has been checking his BG readings once daily at night time. Discussed monitoring Monitoring BG readings in the morning as well as at night time and keeping a log of readings. Discussed the importance of keeping a log for better management of diabetes. Verbalized understanding.  Reported that his BG readings have ranged from 167-200s. Denies any readings above 300. Also discussed BP monitoring, patient reported that he has not been checking his BP, but did get a reading while on the phone, which was 178/94. Elevated BP reading taken after moving around.  Educated on correct way to check BP, sitting In upright position quietly for 5mins and then checking BP. Reviewed the importance of checking BP daily, keeping log and notifying HCP for blood pressures of 140/90 or higher. Verbalized understanding. Reviewed the importance of adhering to a low salt diet. Verbalized understanding and appreciative of assistance. Encouraged patient to contact this RN CM with needs.   Reminded patient of upcoming appointments, verbalized awareness. Discussed completing F/U call with patient, who is in agreement with call in 3 weeks.          Next Steps:  Review BG and BP readings  Educate on Diabetes and Hypertension    Patient Summary     Involvement of Care:  Do I have permission  to speak with other family members about your care?       Patient Reported Labs & Vitals:  1.  Any Patient Reported Labs & Vitals?     2.  Patient Reported Blood Pressure:   178/94  3.  Patient Reported Pulse:     4.  Patient Reported Weight (Kg):     5.  Patient Reported Blood Glucose (mg/dl):   211, 167, 200s    Medical History:  Reviewed medical history with patient and/or caregiver    Social History:  Reviewed social history with patient and/or caregiver    Clinical Assessment     Reviewed and provided basic information on available community resources for mental health, transportation, wellness resources, and palliative care programs with patient and/or caregiver.    Complex Care Plan     Care plan was discussed and completed today with input from patient and/or caregiver.        Patient Instructions     Instructions were provided via the [employer] patient [employer] and are available for the patient to view on the patient portal.    Follow up in about 3 weeks (around 4/23/2020) for RN Follow UP.      Todays OPCM Self-Management Care Plan was developed with the patients/caregivers input and was based on identified barriers from todays assessment.  Goals were written today with the patient/caregiver and the patient has agreed to work towards these goals to improve his/her overall well-being. Patient verbalized understanding of the care plan, goals, and all of today's instructions. Encouraged patient/caregiver to communicate with his/her physician and health care team about health conditions and the treatment plan.  Provided my contact information today and encouraged patient/caregiver to call me with any questions as needed.

## 2020-04-06 DIAGNOSIS — E78.49 OTHER HYPERLIPIDEMIA: ICD-10-CM

## 2020-04-06 DIAGNOSIS — I10 ESSENTIAL HYPERTENSION: ICD-10-CM

## 2020-04-06 RX ORDER — SIMVASTATIN 40 MG/1
TABLET, FILM COATED ORAL
Qty: 90 TABLET | Refills: 0 | Status: SHIPPED | OUTPATIENT
Start: 2020-04-06 | End: 2020-07-20

## 2020-04-09 ENCOUNTER — TELEPHONE (OUTPATIENT)
Dept: REHABILITATION | Facility: HOSPITAL | Age: 60
End: 2020-04-09

## 2020-04-09 NOTE — TELEPHONE ENCOUNTER
PT called Pt's phone number on file and left voicemail regarding scheduled Physical Therapy Evaluation scheduled for Monday 4/13 amid COVID-19 concerns. Instructed Pt to return call to discuss therapeutic options including in-clinic/in-person evaluation pending symptoms.      Martin Pritchard, PT  04/09/2020

## 2020-04-22 DIAGNOSIS — I10 ESSENTIAL HYPERTENSION: ICD-10-CM

## 2020-04-22 RX ORDER — LISINOPRIL 20 MG/1
20 TABLET ORAL DAILY
Qty: 90 TABLET | Refills: 3 | Status: SHIPPED | OUTPATIENT
Start: 2020-04-22 | End: 2020-11-17 | Stop reason: SDUPTHER

## 2020-04-23 ENCOUNTER — OUTPATIENT CASE MANAGEMENT (OUTPATIENT)
Dept: ADMINISTRATIVE | Facility: OTHER | Age: 60
End: 2020-04-23

## 2020-04-23 NOTE — PROGRESS NOTES
"Outpatient Care Management  Plan of Care Follow Up Visit    Patient: Thierry Ho  MRN: [id number]  Date of Service: 04/23/2020  Completed by: Jacques Hartman RN  Referral Date: 02/28/2020  Program: Disease Management (Diabetes & COPD)    Reason for Visit   Patient presents with    Update Plan Of Care     4/24/2020     Brief Summary: Contacted patient to complete follow up for OPCM. Patient reported that he continues to stay home as directed due to the COVID 19 outbreak. Reported that if he needs groceries he will catch a cab. We reviewed BG monitoring, patient reported the following readings: 249, 252, 211, 311, 135, 188, 214, 168, 141. Denies any readings below 135. Reviewed S/Sx of hypoglycemia, denies having any symptoms or hypoglycemic episodes. Reviewed recommended BG range, patient stated "I have never been in a normal range." Discussed influence of diet on BG readings, educated on use of plate method, verbalized understanding. Discussed BP monitoring, patient checked his BP during encounter, which was 197/93. Educated on what is an elevated blood pressure reading, patient reported additional readings, including 165/76 and 182/67. Currently denies any headache, vision changes, SOB, chest chest pains. Denies feeling bad with high BP reading, stated that he thinks he forgot to take his AM medications and will take them after call. Stated that when his BG and BP are high, he will take his Levemir, and will have "hallucinations and see things that aren't really there."  We again reviewed the importance of checking BP daily, keeping log and notifying HCP for blood pressures of 140/90 or higher. Verbalized understanding. Will send High priority message to PCP with BP readings and request contact for further advice. Encouraged patient to contact this RN CM with needs. Discussed completing F/U call with patient, who is in agreement with call in 2 weeks.          Next Steps:  Review BG and BP readings  Educate on " Diabetes and Hypertension  Follow Up on Message to PCP    Patient Summary     Involvement of Care:  Do I have permission to speak with other family members about your care?       Patient Reported Labs & Vitals:  1.  Any Patient Reported Labs & Vitals?     2.  Patient Reported Blood Pressure:    197/93, 165/76 and 182/67  3.  Patient Reported Pulse:     4.  Patient Reported Weight (Kg):     5.  Patient Reported Blood Glucose (mg/dl):     249, 252, 211, 311, 135, 188, 214, 168, 141    Medical History:  Reviewed medical history with patient and/or caregiver    Social History:  Reviewed social history with patient and/or caregiver    Clinical Assessment     Reviewed and provided basic information on available community resources for mental health, transportation, wellness resources, and palliative care programs with patient and/or caregiver.    Complex Care Plan     Care plan was discussed and completed today with input from patient and/or caregiver.      Patient Instructions     Instructions were provided via the "[employer] patient [employer] and are available for the patient to view on the patient portal.      Follow up in about 15 days (around 5/8/2020).      Todays OPCM Self-Management Care Plan was developed with the patients/caregivers input and was based on identified barriers from todays assessment.  Goals were written today with the patient/caregiver and the patient has agreed to work towards these goals to improve his/her overall well-being. Patient verbalized understanding of the care plan, goals, and all of today's instructions. Encouraged patient/caregiver to communicate with his/her physician and health care team about health conditions and the treatment plan.  Provided my contact information today and encouraged patient/caregiver to call me with any questions as needed.

## 2020-04-24 ENCOUNTER — TELEPHONE (OUTPATIENT)
Dept: FAMILY MEDICINE | Facility: CLINIC | Age: 60
End: 2020-04-24

## 2020-04-24 NOTE — TELEPHONE ENCOUNTER
"----- Message from Sterling Jarquin MD sent at 4/24/2020  3:17 PM CDT -----  Contact: Jacques Hartman, Outpatient Case Management  Please schedule pt for a virtual visit next Friday. Please have pt record his bp and blood glucose daily. Glucose should be fasting and 2 hrs after meals. If pt is symptomatic or if his symptoms worsen, he needs to go to the ED.  ----- Message -----  From: Karyn Collins MA  Sent: 4/24/2020  11:30 AM CDT  To: Sterling Jarquin MD        ----- Message -----  From: Jacques Hartman RN  Sent: 4/24/2020  10:44 AM CDT  To: Britton Katz Staff    Good Morning,     My name is Jacques and I work with the Outpatient Case Management Team. Today I spoke with Mr. Juarez, he reported to me that his BP this morning was 197/93. He stated that he forgot to take his medications.  He is also reporting the following BP readings: 165/76 and 182/67. He stated that when his BP and BG are running high, after he takes his Levemir he will "start having hallucinations and see things that are not really there" and then will go and lay down. He currently denies any chest pain, dizziness, headaches, vision changes, no SOB. I expressed concern for his elevated BP readings. Can you please contact him for further assistance?     He also is still having High BG readings, he is reporting to me that he is ranging from 135-405. He stated that he is taking his Levemir as directed. Any assistance you can provide will be greatly appreciated.       Thank you!    Jacques Hartman, VINICIO  Outpatient Case Management    "

## 2020-04-24 NOTE — TELEPHONE ENCOUNTER
Spoke with patient and scheduled him for an appointment with Dr. Jarquin on 05/01/12020. Patient was informed to record Blood sugar and blood pressure readings.

## 2020-05-01 ENCOUNTER — HOSPITAL ENCOUNTER (EMERGENCY)
Facility: HOSPITAL | Age: 60
Discharge: HOME | End: 2020-05-01
Attending: EMERGENCY MEDICINE
Payer: MEDICARE

## 2020-05-01 ENCOUNTER — OFFICE VISIT (OUTPATIENT)
Dept: FAMILY MEDICINE | Facility: CLINIC | Age: 60
End: 2020-05-01
Payer: MEDICARE

## 2020-05-01 VITALS
HEART RATE: 79 BPM | WEIGHT: 227 LBS | BODY MASS INDEX: 31.78 KG/M2 | OXYGEN SATURATION: 97 % | RESPIRATION RATE: 16 BRPM | HEIGHT: 71 IN | TEMPERATURE: 99 F | DIASTOLIC BLOOD PRESSURE: 72 MMHG | SYSTOLIC BLOOD PRESSURE: 141 MMHG

## 2020-05-01 DIAGNOSIS — I25.10 CORONARY ARTERY DISEASE INVOLVING NATIVE CORONARY ARTERY OF NATIVE HEART WITHOUT ANGINA PECTORIS: ICD-10-CM

## 2020-05-01 DIAGNOSIS — R73.9 HYPERGLYCEMIA: Primary | ICD-10-CM

## 2020-05-01 DIAGNOSIS — I16.1 HYPERTENSIVE EMERGENCY: Primary | ICD-10-CM

## 2020-05-01 DIAGNOSIS — E11.65 UNCONTROLLED TYPE 2 DIABETES MELLITUS WITH HYPERGLYCEMIA: ICD-10-CM

## 2020-05-01 LAB — POCT GLUCOSE: 311 MG/DL (ref 70–110)

## 2020-05-01 PROCEDURE — 82962 GLUCOSE BLOOD TEST: CPT | Mod: HCNC

## 2020-05-01 PROCEDURE — 99441 PR PHYSICIAN TELEPHONE EVALUATION 5-10 MIN: CPT | Mod: HCNC,95,, | Performed by: FAMILY MEDICINE

## 2020-05-01 PROCEDURE — 99441 PR PHYSICIAN TELEPHONE EVALUATION 5-10 MIN: ICD-10-PCS | Mod: HCNC,95,, | Performed by: FAMILY MEDICINE

## 2020-05-01 PROCEDURE — 99282 EMERGENCY DEPT VISIT SF MDM: CPT | Mod: HCNC

## 2020-05-01 NOTE — PROGRESS NOTES
Established Patient - Audio Only Telehealth Visit     The patient location is: home  The chief complaint leading to consultation is: elevated BP  Visit type: Virtual visit with audio only (telephone)     The reason for the audio only service rather than synchronous audio and video virtual visit was related to technical difficulties or patient preference/necessity.     Each patient to whom I provide medical services by telemedicine is:  (1) informed of the relationship between the physician and patient and the respective role of any other health care provider with respect to management of the patient; and (2) notified that they may decline to receive medical services by telemedicine and may withdraw from such care at any time. Patient verbally consented to receive this service via voice-only telephone call.       HPI:   61 yo male endorses elevated bp. States his bp recently is over 200s/100s. Endorses HA, nausea, and general malaise. Denies any f/c. During interview, pt checked his bp and states it showed HI on his bp machine 2x.     Assessment and plan:      Hypertensive emergency    Uncontrolled type 2 diabetes mellitus with hyperglycemia    Coronary artery disease involving native coronary artery of native heart without angina pectoris    has significant risk factors for MI/CVA. Had multiple reading recently w/ elevated BP. Pt most likely has hypertensive emergency and Pt was advised to go to the ED immediately.                           This service was not originating from a related E/M service provided within the previous 7 days nor will  to an E/M service or procedure within the next 24 hours or my soonest available appointment.  Prevailing standard of care was able to be met in this audio-only visit.

## 2020-05-01 NOTE — ED PROVIDER NOTES
"Encounter Date: 5/1/2020    SCRIBE #1 NOTE: I, Herve Orozco, yazan scribing for, and in the presence of, Uri Leo MD.       History     Chief Complaint   Patient presents with    Dizziness     pt. reports he was sent to the ED by his PCP shea he thought his b/p was elevated. pt. CBS is 311 without eating.        Time seen by provider: 11:33 AM on 05/01/2020    Thierry Ho is a 60 year old male who presents to the ED with an onset of a brief episode of non-specific dizziness lasting 30 minutes prior to arrival. Patient states that, during a routine primary care check-up via phone, he had a CBG reading of 301. This was accompanied by a blood pressure reading of "high," which prompted a recommendation to be seen at the ED. The patient denies any current symptoms and believes the cuff misread. Patient endorses taking his Levemir at 30 units last night. also denies any other symptoms, such as abdominal pain, nausea, vomiting, diarrhea, numbness or tingling of the legs or feet, shortness of breath, fever, or rashes. Past medical history includes diabetes, hyperlipidemia, and hypertension.    The history is provided by the patient.     Review of patient's allergies indicates:  No Known Allergies  Past Medical History:   Diagnosis Date    Diabetes mellitus     GERD (gastroesophageal reflux disease)     Hyperlipidemia     Hypertension     Myocardial infarction      Past Surgical History:   Procedure Laterality Date    APPENDECTOMY      BACK SURGERY  11/2017    COLONOSCOPY N/A 10/19/2018    Procedure: COLONOSCOPY;  Surgeon: Frantz Rasmussen MD;  Location: [facility] ENDO;  Service: Endoscopy;  Laterality: N/A;    FOOT SURGERY      OPEN REDUCTION AND INTERNAL FIXATION (ORIF) OF INJURY OF ANKLE Right 7/17/2019    Procedure: ORIF, ANKLE;  Surgeon: Raeann Steward MD;  Location: [facility] OR;  Service: Orthopedics;  Laterality: Right;  SARITA GALAN  [phone] TEXTED HIM @ 10:2 AM ON 7-11-19  SKELETAL  RN " PREOP 7/9/19---- [facility] MORNING OF SURGERY PER DR FONTAINE     Family History   Problem Relation Age of Onset    Heart disease Mother     Diabetes Father      Social History     Tobacco Use    Smoking status: Current Every Day Smoker     Packs/day: 0.25     Types: Cigarettes     Start date: 1/15/1981    Smokeless tobacco: Never Used   Substance Use Topics    Alcohol use: Yes     Comment: social     Drug use: Yes     Types: Marijuana     Review of Systems   Constitutional: Negative for fatigue and fever.   HENT: Negative for sore throat.    Eyes: Negative for visual disturbance.   Respiratory: Negative for shortness of breath.    Cardiovascular: Negative for chest pain and leg swelling.   Gastrointestinal: Negative for abdominal pain, diarrhea, nausea and vomiting.   Genitourinary: Negative for dysuria.   Musculoskeletal: Negative for back pain.   Skin: Negative for rash.   Neurological: Positive for dizziness. Negative for numbness and headaches.       Physical Exam     Initial Vitals [05/01/20 1042]   BP Pulse Resp Temp SpO2   (!) 143/80 88 18 98.1 °F (36.7 °C) 98 %      MAP       --         Physical Exam    Nursing note and vitals reviewed.  Constitutional: He appears well-developed and well-nourished.   HENT:   Head: Normocephalic and atraumatic.   Eyes: EOM are normal. Pupils are equal, round, and reactive to light.   Neck: Normal range of motion. Neck supple. No thyromegaly present. No JVD present.   Cardiovascular: Normal rate, regular rhythm, normal heart sounds and intact distal pulses. Exam reveals no gallop and no friction rub.    No murmur heard.  Pulmonary/Chest: Breath sounds normal. No respiratory distress. He has no wheezes. He has no rhonchi. He has no rales. He exhibits no tenderness.   Abdominal: Soft. Bowel sounds are normal. There is no tenderness.   Musculoskeletal: Normal range of motion. He exhibits no edema or tenderness.   Neurological: He is alert and oriented to person, place, and time.  He has normal strength. GCS score is 15. GCS eye subscore is 4. GCS verbal subscore is 5. GCS motor subscore is 6.   Skin: Skin is warm and dry. Capillary refill takes less than 2 seconds.         ED Course   Procedures  Labs Reviewed   POCT GLUCOSE - Abnormal; Notable for the following components:       Result Value    POCT Glucose 311 (*)     All other components within normal limits          Imaging Results    None          Medical Decision Making:   History:   Old Medical Records: I decided to obtain old medical records.  Initial Assessment:   Past medical records queried and reviewed, including history of insulin-dependent diabetes, hyperlipidemia, hypertension, and prior miocardial infarction.     60 year old male presents to the ED complaining of brief dizziness which has resolved. Reported unreadable blood pressure via home cuff. 141/72 on arrival. Initially reports blood sugar of 301 but denies any abnormality, just that he has not had food today. Glucose was 311 here. Patient reiterates that this is not abnormal in the setting of missing breakfast. The patient was seen and examined. The history and physical exam was obtained. The nursing notes and vital signs were reviewed. Secondary to symptoms and exam findings, I do not suspect any concerning cardiopulmonary etiology. I do not suspect diabetic ketoacidosis or electrolyte abnormalities. Patient expresses desire to be discharged and is stable to be at this time.            Scribe Attestation:   Scribe #1: I performed the above scribed service and the documentation accurately describes the services I performed. I attest to the accuracy of the note.                          Clinical Impression:       ICD-10-CM ICD-9-CM   1. Hyperglycemia R73.9 790.29         Disposition:   Disposition: Discharged  Condition: Stable     ED Disposition Condition    Discharge Stable        ED Prescriptions     None        Follow-up Information     Follow up With Specialties  Details Why Contact Info    Sterling Jarquin MD Family Medicine Schedule an appointment as soon as possible for a visit   [address] [place] [address]  [phone]      [facility] Emergency Medicine  may return at any time [address]  [facility] [address] [phone]                      I, Uri Leo, personally performed the services described in this documentation. All medical record entries made by the scribe were at my direction and in my presence. I have reviewed the chart and agree that the record reflects my personal performance and is accurate and complete.               Uri Leo MD  05/04/20 0011

## 2020-05-01 NOTE — ED TRIAGE NOTES
The patient presents to the ED via personal transportation alone. The patient reports dizziness that started after several trips back and forth from his car to the house bringing groceries inside. Patient states that he took his BP on his home monitor this morning and it gave him an error reading. Patient denies chest pain, sob, headache, vision changes, numbness/tingling.

## 2020-05-08 ENCOUNTER — OUTPATIENT CASE MANAGEMENT (OUTPATIENT)
Dept: ADMINISTRATIVE | Facility: OTHER | Age: 60
End: 2020-05-08

## 2020-05-08 NOTE — PROGRESS NOTES
Outpatient Care Management  Plan of Care Follow Up Visit    Patient: Thierry Ho  MRN: [id number]  Date of Service: 05/15/2020  Completed by: Jacques Hartman RN  Referral Date: 02/28/2020  Program: Disease Management (Diabetes & COPD)    Reason for Visit   Patient presents with    OPCM RN First Follow-Up Attempt     5/8/2020    Update Plan Of Care     5/15/2020   Brief Summary: Contacted patient to complete follow up for OPCM. Patient reported that he continues to stay home as directed due to the COVID 19 outbreak, but will go to  his groceries or medications if needed. Patient reported that he was sent to the ED by his PCP with elevated BP about 2 weeks ago, which was not as elevated as his home readings and was discharged home.  Patient reported that his BP a few days ago was 230/120.  Reviewed with patient the dangers of this BP readings increasing his risk of stroke. Patient checked his BP while on call with this RN and it was 197/96.  Patient reported that he forgot to take his medications this morning and attributes this to being the reason it is high. Patient reported that he often forgets to take his medications in the morning. Stated that he does use a pillbox, but still forgets. Encouraged to set alarm on phone or place signs out to remind himself to take his medications as this can help with improvement of his BP and BG. Stated that he will try this. Reported that he is having some back pain and swelling in his Right foot, denies any recent falls or injures. Instructed patient on elevation of Right foot. Verbalized understanding. Patient reported that he is taking Tylenol for the back pain and has an appt with his PCP in 2 weeks to address his issues. Tried to review BG readings, patient stated that they are running about the same. We again reviewed the importance of checking BP daily, keeping log and notifying HCP for blood pressures of 140/90 or higher. Verbalized understanding. Encouraged  patient to contact this RN CM with needs. Discussed completing F/U call with patient, who is in agreement with call in 2 weeks.          Next Steps:  Review BG and BP readings  Educate on Diabetes and Hypertension    Patient Summary     Involvement of Care:  Do I have permission to speak with other family members about your care?       Patient Reported Labs & Vitals:  1.  Any Patient Reported Labs & Vitals?     2.  Patient Reported Blood Pressure:   197/96  3.  Patient Reported Pulse:     4.  Patient Reported Weight (Kg):     5.  Patient Reported Blood Glucose (mg/dl):       Medical History:  Reviewed medical history with patient and/or caregiver    Social History:  Reviewed social history with patient and/or caregiver    Clinical Assessment     Reviewed and provided basic information on available community resources for mental health, transportation, wellness resources, and palliative care programs with patient and/or caregiver.    Complex Care Plan     Care plan was discussed and completed today with input from patient and/or caregiver.    Patient Instructions     Instructions were provided via the [employer] patient [employer] and are available for the patient to view on the patient portal.    Follow up in about 14 days (around 5/28/2020) for RN Follow UP.      Todays OPCM Self-Management Care Plan was developed with the patients/caregivers input and was based on identified barriers from todays assessment.  Goals were written today with the patient/caregiver and the patient has agreed to work towards these goals to improve his/her overall well-being. Patient verbalized understanding of the care plan, goals, and all of today's instructions. Encouraged patient/caregiver to communicate with his/her physician and health care team about health conditions and the treatment plan.  Provided my contact information today and encouraged patient/caregiver to call me with any questions as needed.

## 2020-05-12 DIAGNOSIS — E11.9 TYPE 2 DIABETES MELLITUS WITHOUT COMPLICATION, WITH LONG-TERM CURRENT USE OF INSULIN: ICD-10-CM

## 2020-05-12 DIAGNOSIS — Z79.4 TYPE 2 DIABETES MELLITUS WITHOUT COMPLICATION, WITH LONG-TERM CURRENT USE OF INSULIN: ICD-10-CM

## 2020-05-12 RX ORDER — INSULIN DETEMIR 100 [IU]/ML
INJECTION, SOLUTION SUBCUTANEOUS
Qty: 15 ML | Refills: 0 | Status: SHIPPED | OUTPATIENT
Start: 2020-05-12 | End: 2020-06-18 | Stop reason: SDUPTHER

## 2020-05-28 ENCOUNTER — OFFICE VISIT (OUTPATIENT)
Dept: FAMILY MEDICINE | Facility: CLINIC | Age: 60
End: 2020-05-28
Payer: MEDICARE

## 2020-05-28 ENCOUNTER — LAB VISIT (OUTPATIENT)
Dept: LAB | Facility: HOSPITAL | Age: 60
End: 2020-05-28
Attending: FAMILY MEDICINE
Payer: MEDICARE

## 2020-05-28 VITALS
OXYGEN SATURATION: 97 % | DIASTOLIC BLOOD PRESSURE: 80 MMHG | SYSTOLIC BLOOD PRESSURE: 124 MMHG | BODY MASS INDEX: 32.41 KG/M2 | TEMPERATURE: 99 F | HEIGHT: 71 IN | HEART RATE: 82 BPM | WEIGHT: 231.5 LBS | RESPIRATION RATE: 20 BRPM

## 2020-05-28 DIAGNOSIS — E78.49 OTHER HYPERLIPIDEMIA: ICD-10-CM

## 2020-05-28 DIAGNOSIS — I10 ESSENTIAL HYPERTENSION: ICD-10-CM

## 2020-05-28 DIAGNOSIS — Z79.4 TYPE 2 DIABETES MELLITUS WITHOUT COMPLICATION, WITH LONG-TERM CURRENT USE OF INSULIN: ICD-10-CM

## 2020-05-28 DIAGNOSIS — E11.9 TYPE 2 DIABETES MELLITUS WITHOUT COMPLICATION, WITH LONG-TERM CURRENT USE OF INSULIN: ICD-10-CM

## 2020-05-28 DIAGNOSIS — E11.65 UNCONTROLLED TYPE 2 DIABETES MELLITUS WITH HYPERGLYCEMIA: Primary | ICD-10-CM

## 2020-05-28 DIAGNOSIS — E11.9 ENCOUNTER FOR DIABETIC FOOT EXAM: ICD-10-CM

## 2020-05-28 LAB
ALBUMIN SERPL BCP-MCNC: 3.7 G/DL (ref 3.5–5.2)
ALP SERPL-CCNC: 76 U/L (ref 55–135)
ALT SERPL W/O P-5'-P-CCNC: 39 U/L (ref 10–44)
ANION GAP SERPL CALC-SCNC: 8 MMOL/L (ref 8–16)
AST SERPL-CCNC: 32 U/L (ref 10–40)
BILIRUB SERPL-MCNC: 0.1 MG/DL (ref 0.1–1)
BUN SERPL-MCNC: 23 MG/DL (ref 6–20)
CALCIUM SERPL-MCNC: 12 MG/DL (ref 8.7–10.5)
CHLORIDE SERPL-SCNC: 105 MMOL/L (ref 95–110)
CHOLEST SERPL-MCNC: 117 MG/DL (ref 120–199)
CHOLEST/HDLC SERPL: 3.1 {RATIO} (ref 2–5)
CO2 SERPL-SCNC: 24 MMOL/L (ref 23–29)
CREAT SERPL-MCNC: 1.2 MG/DL (ref 0.5–1.4)
EST. GFR  (AFRICAN AMERICAN): >60 ML/MIN/1.73 M^2
EST. GFR  (NON AFRICAN AMERICAN): >60 ML/MIN/1.73 M^2
GLUCOSE SERPL-MCNC: 263 MG/DL (ref 70–110)
GLUCOSE SERPL-MCNC: 274 MG/DL (ref 70–110)
HDLC SERPL-MCNC: 38 MG/DL (ref 40–75)
HDLC SERPL: 32.5 % (ref 20–50)
LDLC SERPL CALC-MCNC: 58.2 MG/DL (ref 63–159)
NONHDLC SERPL-MCNC: 79 MG/DL
POTASSIUM SERPL-SCNC: 4.8 MMOL/L (ref 3.5–5.1)
PROT SERPL-MCNC: 7.2 G/DL (ref 6–8.4)
SODIUM SERPL-SCNC: 137 MMOL/L (ref 136–145)
TRIGL SERPL-MCNC: 104 MG/DL (ref 30–150)

## 2020-05-28 PROCEDURE — 82962 GLUCOSE BLOOD TEST: CPT | Mod: HCNC,S$GLB,, | Performed by: FAMILY MEDICINE

## 2020-05-28 PROCEDURE — 36415 COLL VENOUS BLD VENIPUNCTURE: CPT | Mod: HCNC,PO

## 2020-05-28 PROCEDURE — 99999 PR PBB SHADOW E&M-EST. PATIENT-LVL IV: ICD-10-PCS | Mod: PBBFAC,HCNC,, | Performed by: FAMILY MEDICINE

## 2020-05-28 PROCEDURE — 99214 PR OFFICE/OUTPT VISIT, EST, LEVL IV, 30-39 MIN: ICD-10-PCS | Mod: HCNC,S$GLB,, | Performed by: FAMILY MEDICINE

## 2020-05-28 PROCEDURE — 3074F PR MOST RECENT SYSTOLIC BLOOD PRESSURE < 130 MM HG: ICD-10-PCS | Mod: HCNC,CPTII,S$GLB, | Performed by: FAMILY MEDICINE

## 2020-05-28 PROCEDURE — 3074F SYST BP LT 130 MM HG: CPT | Mod: HCNC,CPTII,S$GLB, | Performed by: FAMILY MEDICINE

## 2020-05-28 PROCEDURE — 82962 POCT GLUCOSE, HAND-HELD DEVICE: ICD-10-PCS | Mod: HCNC,S$GLB,, | Performed by: FAMILY MEDICINE

## 2020-05-28 PROCEDURE — 3008F PR BODY MASS INDEX (BMI) DOCUMENTED: ICD-10-PCS | Mod: HCNC,CPTII,S$GLB, | Performed by: FAMILY MEDICINE

## 2020-05-28 PROCEDURE — 83036 HEMOGLOBIN GLYCOSYLATED A1C: CPT | Mod: HCNC

## 2020-05-28 PROCEDURE — 3079F PR MOST RECENT DIASTOLIC BLOOD PRESSURE 80-89 MM HG: ICD-10-PCS | Mod: HCNC,CPTII,S$GLB, | Performed by: FAMILY MEDICINE

## 2020-05-28 PROCEDURE — 80061 LIPID PANEL: CPT | Mod: HCNC

## 2020-05-28 PROCEDURE — 99214 OFFICE O/P EST MOD 30 MIN: CPT | Mod: HCNC,S$GLB,, | Performed by: FAMILY MEDICINE

## 2020-05-28 PROCEDURE — 80053 COMPREHEN METABOLIC PANEL: CPT | Mod: HCNC

## 2020-05-28 PROCEDURE — 99999 PR PBB SHADOW E&M-EST. PATIENT-LVL IV: CPT | Mod: PBBFAC,HCNC,, | Performed by: FAMILY MEDICINE

## 2020-05-28 PROCEDURE — 3046F PR MOST RECENT HEMOGLOBIN A1C LEVEL > 9.0%: ICD-10-PCS | Mod: HCNC,CPTII,S$GLB, | Performed by: FAMILY MEDICINE

## 2020-05-28 PROCEDURE — 3008F BODY MASS INDEX DOCD: CPT | Mod: HCNC,CPTII,S$GLB, | Performed by: FAMILY MEDICINE

## 2020-05-28 PROCEDURE — 3046F HEMOGLOBIN A1C LEVEL >9.0%: CPT | Mod: HCNC,CPTII,S$GLB, | Performed by: FAMILY MEDICINE

## 2020-05-28 PROCEDURE — 3079F DIAST BP 80-89 MM HG: CPT | Mod: HCNC,CPTII,S$GLB, | Performed by: FAMILY MEDICINE

## 2020-05-28 RX ORDER — METFORMIN HYDROCHLORIDE 1000 MG/1
1000 TABLET ORAL 2 TIMES DAILY WITH MEALS
Qty: 180 TABLET | Refills: 1 | Status: SHIPPED | OUTPATIENT
Start: 2020-05-28 | End: 2020-11-13 | Stop reason: SDUPTHER

## 2020-05-29 LAB
ESTIMATED AVG GLUCOSE: 260 MG/DL (ref 68–131)
HBA1C MFR BLD HPLC: 10.7 % (ref 4–5.6)

## 2020-06-02 ENCOUNTER — OUTPATIENT CASE MANAGEMENT (OUTPATIENT)
Dept: ADMINISTRATIVE | Facility: OTHER | Age: 60
End: 2020-06-02

## 2020-06-02 ENCOUNTER — TELEPHONE (OUTPATIENT)
Dept: FAMILY MEDICINE | Facility: CLINIC | Age: 60
End: 2020-06-02

## 2020-06-02 NOTE — TELEPHONE ENCOUNTER
Spoke with patient was told Dr. Jarquin do not have anything available these week offered an appoint with Pippa will be here 06/05/20 @ 9:20 am , patient verbalized understand .

## 2020-06-02 NOTE — PROGRESS NOTES
"Outpatient Care Management  Plan of Care Follow Up Visit    Patient: Thierry Ho  MRN: [id number]  Date of Service: 06/02/2020  Completed by: Jacques Hartman RN  Referral Date: 02/28/2020  Program: Disease Management (Diabetes & COPD)    Reason for Visit   Patient presents with    Update Plan Of Care     6/2/2020     Brief Summary: Contacted patient to complete follow up for OPCM. Patient reported that he recently had a visit with his PCP.  Stated that he is now checking his BG 6 times daily and reported that "it is close to normal." Reported that his lowest reading was 171.  Reviewed recommended BG range of .  Patient verbalized understanding. Stated that he is also keeping a log of his BG readings as well as his BP readings.  We reviewed the importance of eating a balanced meal of carbs, protein and fat.  Stressed the importance of limiting intake of starchy carbs to avoid increase in BG.  Verbalized understanding. Patient stated that he did  his RX from the new increased dose of Metformin and has started taking that as directed. We again reviewed the importance of checking BP daily, keeping log and notifying HCP for blood pressures of 140/90 or higher. Patient stated that he is a Lump/knot in his groin area that he is concerned about.  Reported that is it warm to touch and slightly red and painful.  Patient worried about it getting bigger and would like to be seen to get further evaluation. Will send message to patient's PCP as requested.   Encouraged patient to contact this RN CM with needs. Discussed completing F/U call with patient, who is in agreement with call in 2 weeks.  Will begin to discuss case closure at that time.         Next Steps:  Review BG and BP readings  Educate on Diabetes and Hypertension      Patient Summary     Involvement of Care:  Do I have permission to speak with other family members about your care?       Patient Reported Labs & Vitals:  1.  Any Patient Reported Labs & " Vitals?     2.  Patient Reported Blood Pressure:     3.  Patient Reported Pulse:     4.  Patient Reported Weight (Kg):     5.  Patient Reported Blood Glucose (mg/dl):       Medical History:  Reviewed medical history with patient and/or caregiver    Social History:  Reviewed social history with patient and/or caregiver    Clinical Assessment     Reviewed and provided basic information on available community resources for mental health, transportation, wellness resources, and palliative care programs with patient and/or caregiver.    Complex Care Plan     Care plan was discussed and completed today with input from patient and/or caregiver.    Patient Instructions     Instructions were provided via the [employer] patient resources and are available for the patient to view on the patient portal.    Follow up in about 2 weeks (around 6/16/2020) for RN Follow UP.      [facility] OPCM Self-Management Care Plan was developed with the patients/caregivers input and was based on identified barriers from todays assessment.  Goals were written today with the patient/caregiver and the patient has agreed to work towards these goals to improve his/her overall well-being. Patient verbalized understanding of the care plan, goals, and all of today's instructions. Encouraged patient/caregiver to communicate with his/her physician and health care team about health conditions and the treatment plan.  Provided my contact information today and encouraged patient/caregiver to call me with any questions as needed.

## 2020-06-02 NOTE — TELEPHONE ENCOUNTER
----- Message from Jacques Hartman RN sent at 6/2/2020  3:55 PM CDT -----  Contact: Jacques Hartman RN Outpatient Case Management  Good Afternoon,     Today I spoke with Mr. Ho.  He informed me that he saw you last week.  Since his visit, he is reporting that he has a lump in his groin area that he would like you to look at.  He stated it is warm to touch and slightly red.  He is concerned that he could have an infection would like to get an appt. Can you please contact him for further assistance?      Thank you!    Jacques Hartman RN   Outpatient Case Management

## 2020-06-03 ENCOUNTER — TELEPHONE (OUTPATIENT)
Dept: FAMILY MEDICINE | Facility: CLINIC | Age: 60
End: 2020-06-03

## 2020-06-03 NOTE — PROGRESS NOTES
Subjective:       Patient ID: Thierry Ho is a 60 y.o. male.    Chief Complaint: Diabetes; Hyperlipidemia; and Hypertension      HPI  60-year-old presents for diabetes follow-up.  A1c greatly worsened 7 to 11.  Patient states he was not taking his medications.  States he is doing well otherwise.  Denies any issues at this time.        Review of Systems   Constitutional: Negative.    HENT: Negative.    Respiratory: Negative.    Cardiovascular: Negative.    Gastrointestinal: Negative.    Endocrine: Negative.    Genitourinary: Negative.    Musculoskeletal: Negative.    Neurological: Negative.    Psychiatric/Behavioral: Negative.           Past Medical History:   Diagnosis Date    Diabetes mellitus     GERD (gastroesophageal reflux disease)     Hyperlipidemia     Hypertension     Myocardial infarction      Past Surgical History:   Procedure Laterality Date    APPENDECTOMY      BACK SURGERY  11/2017    COLONOSCOPY N/A 10/19/2018    Procedure: COLONOSCOPY;  Surgeon: Frantz Rasmussen MD;  Location: [facility] ENDO;  Service: Endoscopy;  Laterality: N/A;    FOOT SURGERY      OPEN REDUCTION AND INTERNAL FIXATION (ORIF) OF INJURY OF ANKLE Right 7/17/2019    Procedure: ORIF, ANKLE;  Surgeon: Raeann Steward MD;  Location: [facility] OR;  Service: Orthopedics;  Laterality: Right;  SARITA  CRISTOFER GALAN  [phone] TEXTED HIM @ 10:2 AM ON 7-11-19  SKELETAL  RN PREOP 7/9/19---- CMP MORNING OF SURGERY PER DR STEWARD     Family History   Problem Relation Age of Onset    Heart disease Mother     Diabetes Father      Social History     Socioeconomic History    Marital status:      Spouse name: Not on file    Number of children: Not on file    Years of education: Not on file    Highest education level: Not on file   Occupational History    Not on file   Social Needs    Financial resource strain: Not on file    Food insecurity:     Worry: Not on file     Inability: Not on file    Transportation needs:     Medical: Not  "on file     Non-medical: Not on file   Tobacco Use    Smoking status: Current Every Day Smoker     Packs/day: 0.25     Types: Cigarettes     Start date: 1/15/1981    Smokeless tobacco: Never Used   Substance and Sexual Activity    Alcohol use: Yes     Comment: social     Drug use: Yes     Types: Marijuana    Sexual activity: Not on file   Lifestyle    Physical activity:     Days per week: Not on file     Minutes per session: Not on file    Stress: Not on file   Relationships    Social connections:     Talks on phone: Not on file     Gets together: Not on file     Attends Spiritism service: Not on file     Active member of club or organization: Not on file     Attends meetings of clubs or organizations: Not on file     Relationship status: Not on file   Other Topics Concern    Not on file   Social History Narrative    Not on file       Current Outpatient Medications:     acetaminophen (TYLENOL) 500 MG tablet, Take 500 mg by mouth every 6 (six) hours as needed for Pain., Disp: , Rfl:     aspirin 81 MG Chew, Take 1 tablet (81 mg total) by mouth once daily., Disp: , Rfl: 0    BD ULTRA-FINE FREDA PEN NEEDLE 32 gauge x 5/32" Ndle, U ONCE D UTD, Disp: 100 each, Rfl: 6    BLOOD PRESSURE CUFF Misc, 1 kit by Misc.(Non-Drug; Combo Route) route once daily., Disp: 1 each, Rfl: 0    blood sugar diagnostic Strp, 1 each by abdominal subcutaneous route 4 (four) times daily before meals and nightly., Disp: 200 each, Rfl: 3    blood-glucose meter kit, 1 each by Other route 4 (four) times daily before meals and nightly., Disp: 1 each, Rfl: 0    carvedilol (COREG) 25 MG tablet, Take 1 tablet (25 mg total) by mouth 2 (two) times daily with meals., Disp: 180 tablet, Rfl: 3    fluocinonide (LIDEX) 0.05 % external solution, APPLY 1 ML TO THE SCALP AT BEDTIME, Disp: , Rfl: 3    LEVEMIR FLEXTOUCH U-100 INSULN 100 unit/mL (3 mL) InPn pen, ADMINISTER 40 UNITS UNDER THE SKIN EVERY EVENING, Disp: 15 mL, Rfl: 0    lisinopriL " "(PRINIVIL,ZESTRIL) 20 MG tablet, Take 1 tablet (20 mg total) by mouth once daily., Disp: 90 tablet, Rfl: 3    metFORMIN (GLUCOPHAGE) 1000 MG tablet, Take 1 tablet (1,000 mg total) by mouth 2 (two) times daily with meals., Disp: 180 tablet, Rfl: 1    simvastatin (ZOCOR) 40 MG tablet, TAKE 1 TABLET(40 MG) BY MOUTH EVERY EVENING, Disp: 90 tablet, Rfl: 0    TRUEPLUS LANCETS 33 gauge Misc, , Disp: , Rfl:     omeprazole (PRILOSEC) 20 MG capsule, Take 1 capsule (20 mg total) by mouth once daily. (Patient not taking: Reported on 5/28/2020), Disp: 90 capsule, Rfl: 2    ondansetron (ZOFRAN-ODT) 8 MG TbDL, Take 1 tablet (8 mg total) by mouth every 6 (six) hours as needed. (Patient not taking: Reported on 2/18/2020), Disp: 10 tablet, Rfl: 0  No current facility-administered medications for this visit.     Facility-Administered Medications Ordered in Other Visits:     lactated ringers infusion, , Intravenous, Continuous, Violeta Vides MD    lidocaine (PF) 10 mg/ml (1%) injection 10 mg, 1 mL, Intradermal, Once, Violeta Vides MD   Objective:      Vitals:    05/28/20 1428   BP: 124/80   BP Location: Left arm   Patient Position: Sitting   BP Method: Large (Manual)   Pulse: 82   Resp: 20   Temp: 98.8 °F (37.1 °C)   TempSrc: Oral   SpO2: 97%   Weight: 105 kg (231 lb 7.7 oz)   Height: 5' 11" (1.803 m)       Physical Exam   Constitutional: He is oriented to person, place, and time. No distress.   HENT:   Head: Normocephalic and atraumatic.   Eyes: Conjunctivae are normal.   Neck: Neck supple.   Cardiovascular: Normal rate, regular rhythm and normal heart sounds. Exam reveals no gallop and no friction rub.   No murmur heard.  Pulmonary/Chest: Effort normal and breath sounds normal. He has no wheezes. He has no rales.   Neurological: He is alert and oriented to person, place, and time.   Skin: Skin is warm and dry.   Psychiatric: He has a normal mood and affect. His behavior is normal. Judgment and thought content normal. "          Assessment:       1. Uncontrolled type 2 diabetes mellitus with hyperglycemia    2. Other hyperlipidemia    3. Essential hypertension    4. Encounter for diabetic foot exam        Plan:       Uncontrolled type 2 diabetes mellitus with hyperglycemia  -     Hemoglobin A1C; Future; Expected date: 05/28/2020  -     POCT Glucose, Hand-Held Device  -     metFORMIN (GLUCOPHAGE) 1000 MG tablet; Take 1 tablet (1,000 mg total) by mouth 2 (two) times daily with meals.  Dispense: 180 tablet; Refill: 1    Other hyperlipidemia  -     Lipid Panel; Future; Expected date: 05/28/2020    Essential hypertension  -     Comprehensive metabolic panel; Future; Expected date: 05/28/2020    Encounter for diabetic foot exam  -     Ambulatory referral/consult to Podiatry; Future; Expected date: 06/04/2020    f/u in 2 week for DM. Refilled metformin. Advised diet and exercise.    Follow up in about 2 weeks (around 6/11/2020).            Sterling Jarquin MD      Patient note was created using [employer].  Any errors in syntax or even information may not have been identified and edited on initial review prior to signing this note.

## 2020-06-03 NOTE — LETTER
Nicolle 3, 2020    Thierry Ho  [address]  [address]  [place] [address]             [facility]  [address] [facility] [place] [address]  Phone: [phone]  Fax: [phone] Dear Mr. Ho:    I have been unable to reach you by phone for your appointment to Podiatry. Please call me at the clinic [phone] to book your appointment.      If you have any questions or concerns, please don't hesitate to call.    Sincerely,        Sterling Jarquin MD

## 2020-06-05 ENCOUNTER — OFFICE VISIT (OUTPATIENT)
Dept: FAMILY MEDICINE | Facility: CLINIC | Age: 60
End: 2020-06-05
Payer: MEDICARE

## 2020-06-05 VITALS
TEMPERATURE: 99 F | WEIGHT: 227.94 LBS | OXYGEN SATURATION: 97 % | SYSTOLIC BLOOD PRESSURE: 98 MMHG | RESPIRATION RATE: 18 BRPM | BODY MASS INDEX: 31.91 KG/M2 | DIASTOLIC BLOOD PRESSURE: 80 MMHG | HEART RATE: 80 BPM | HEIGHT: 71 IN

## 2020-06-05 DIAGNOSIS — L02.214 ABSCESS OF RIGHT GROIN: Primary | ICD-10-CM

## 2020-06-05 PROCEDURE — 3079F DIAST BP 80-89 MM HG: CPT | Mod: HCNC,CPTII,S$GLB, | Performed by: PHYSICIAN ASSISTANT

## 2020-06-05 PROCEDURE — 99999 PR PBB SHADOW E&M-EST. PATIENT-LVL V: ICD-10-PCS | Mod: PBBFAC,HCNC,, | Performed by: PHYSICIAN ASSISTANT

## 2020-06-05 PROCEDURE — 3008F BODY MASS INDEX DOCD: CPT | Mod: HCNC,CPTII,S$GLB, | Performed by: PHYSICIAN ASSISTANT

## 2020-06-05 PROCEDURE — 3008F PR BODY MASS INDEX (BMI) DOCUMENTED: ICD-10-PCS | Mod: HCNC,CPTII,S$GLB, | Performed by: PHYSICIAN ASSISTANT

## 2020-06-05 PROCEDURE — 3074F PR MOST RECENT SYSTOLIC BLOOD PRESSURE < 130 MM HG: ICD-10-PCS | Mod: HCNC,CPTII,S$GLB, | Performed by: PHYSICIAN ASSISTANT

## 2020-06-05 PROCEDURE — 3074F SYST BP LT 130 MM HG: CPT | Mod: HCNC,CPTII,S$GLB, | Performed by: PHYSICIAN ASSISTANT

## 2020-06-05 PROCEDURE — 99999 PR PBB SHADOW E&M-EST. PATIENT-LVL V: CPT | Mod: PBBFAC,HCNC,, | Performed by: PHYSICIAN ASSISTANT

## 2020-06-05 PROCEDURE — 99214 PR OFFICE/OUTPT VISIT, EST, LEVL IV, 30-39 MIN: ICD-10-PCS | Mod: HCNC,S$GLB,, | Performed by: PHYSICIAN ASSISTANT

## 2020-06-05 PROCEDURE — 3079F PR MOST RECENT DIASTOLIC BLOOD PRESSURE 80-89 MM HG: ICD-10-PCS | Mod: HCNC,CPTII,S$GLB, | Performed by: PHYSICIAN ASSISTANT

## 2020-06-05 PROCEDURE — 99214 OFFICE O/P EST MOD 30 MIN: CPT | Mod: HCNC,S$GLB,, | Performed by: PHYSICIAN ASSISTANT

## 2020-06-05 RX ORDER — SULFAMETHOXAZOLE AND TRIMETHOPRIM 800; 160 MG/1; MG/1
1 TABLET ORAL 2 TIMES DAILY
Qty: 20 TABLET | Refills: 0 | Status: SHIPPED | OUTPATIENT
Start: 2020-06-05 | End: 2020-06-15

## 2020-06-05 RX ORDER — IBUPROFEN 600 MG/1
600 TABLET ORAL EVERY 8 HOURS PRN
Qty: 20 TABLET | Refills: 0 | Status: SHIPPED | OUTPATIENT
Start: 2020-06-05 | End: 2020-06-18 | Stop reason: SDUPTHER

## 2020-06-05 NOTE — PROGRESS NOTES
Subjective:       Patient ID: Thierry Ho is a 60 y.o. male.    Chief Complaint: Abscess    Abscess   Chronicity:  NewProgression Since Onset: rapidly worsening  Location:  Ano-genital  Associated Symptoms: no fever  Characteristics: painful and swelling    Pain Scale:  10/10  Treatments Tried:  None tried  Relieved by:  None tried  Worsened by:  None tried     Patient states he recently shaved in the same area    Social History     Socioeconomic History    Marital status:      Spouse name: Not on file    Number of children: Not on file    Years of education: Not on file    Highest education level: Not on file   Occupational History    Not on file   Social Needs    Financial resource strain: Not on file    Food insecurity:     Worry: Not on file     Inability: Not on file    Transportation needs:     Medical: Not on file     Non-medical: Not on file   Tobacco Use    Smoking status: Current Every Day Smoker     Packs/day: 0.25     Types: Cigarettes     Start date: 1/15/1981    Smokeless tobacco: Never Used   Substance and Sexual Activity    Alcohol use: Yes     Comment: social     Drug use: Yes     Types: Marijuana    Sexual activity: Not on file   Lifestyle    Physical activity:     Days per week: Not on file     Minutes per session: Not on file    Stress: Not on file   Relationships    Social connections:     Talks on phone: Not on file     Gets together: Not on file     Attends Judaism service: Not on file     Active member of club or organization: Not on file     Attends meetings of clubs or organizations: Not on file     Relationship status: Not on file   Other Topics Concern    Not on file   Social History Narrative    Not on file       Review of Systems   Constitutional: Negative for diaphoresis and fever.   Respiratory: Negative for shortness of breath.    Cardiovascular: Negative for chest pain.       Objective:      Physical Exam   Constitutional: He is oriented to person,  place, and time. He appears well-developed and well-nourished. No distress.   HENT:   Head: Normocephalic and atraumatic.   Genitourinary:         Neurological: He is alert and oriented to person, place, and time.   Skin: Skin is warm and dry. He is not diaphoretic.   Psychiatric: He has a normal mood and affect. His behavior is normal.   Vitals reviewed.      Assessment:       1. Abscess of right groin        Plan:         Thierry was seen today for abscess.    Diagnoses and all orders for this visit:    Abscess of right groin  -     sulfamethoxazole-trimethoprim 800-160mg (BACTRIM DS) 800-160 mg Tab; Take 1 tablet by mouth 2 (two) times daily. for 10 days  -     ibuprofen (ADVIL,MOTRIN) 600 MG tablet; Take 1 tablet (600 mg total) by mouth every 8 (eight) hours as needed for Pain.  -     advised warm compresses, he was advised to the emergency room if pain is severe or fever or malaise develop

## 2020-06-05 NOTE — PATIENT INSTRUCTIONS
Abscess (Antibiotic Treatment Only)  An abscess (sometimes called a boil) happens when bacteria get trapped under the skin and start to grow. Pus forms inside the abscess as the body responds to the bacteria. An abscess can happen with an insect bite, ingrown hair, blocked oil gland, pimple, cyst, or puncture wound.  In the early stages, your wound may be red and tender. For this stage, you may get antibiotics. If the abscess does not get better with antibiotics, it will need to be drained with a small cut.  Home care  These tips will help you care for your abscess at home:  · Soak the wound in hot water or apply hot packs (small towel soaked in hot water) to the area for 20 minutes at a time. Do this 3 to 4 times a day.  · Do not cut, squeeze, or pop the boil yourself.  · Apply antibiotic cream or ointment to the skin 3 to 4 times a day, unless something else was prescribed. Some ointments include an antibiotic plus a pain reliever.  · If your doctor prescribed antibiotics, do not stop taking them until you have finished the medicine or the doctor tells you to stop.  · You may use an over-the-counter pain medicine to control pain, unless another pain medicine was prescribed. If you have chronic liver or kidney disease or ever had a stomach ulcer or gastrointestinal bleeding, talk with your doctor before using these any of these.  Follow-up care  Follow up with your healthcare provider, or as advised. Check your wound each day for the signs of worsening infection listed below.  When to seek medical advice  Get prompt medical attention if any of these occur:  · An increase in redness or swelling  · Red streaks in the skin leading away from the abscess  · An increase in local pain or swelling  · Fever of 100.4ºF (38ºC) or higher, or as directed by your healthcare provider  · Pus or fluid coming from the abscess  · Boil returns after getting better  Date Last Reviewed: 9/1/2016  © 5972-8469 [employer] [address] [place] [id number]. All rights reserved. This information is not intended as a substitute for professional medical care. Always follow your healthcare professional's instructions.

## 2020-06-15 ENCOUNTER — TELEPHONE (OUTPATIENT)
Dept: FAMILY MEDICINE | Facility: CLINIC | Age: 60
End: 2020-06-15

## 2020-06-15 DIAGNOSIS — L72.9 INFECTED CYST OF SKIN: Primary | ICD-10-CM

## 2020-06-15 DIAGNOSIS — L08.9 INFECTED CYST OF SKIN: Primary | ICD-10-CM

## 2020-06-15 NOTE — TELEPHONE ENCOUNTER
----- Message from Merced Brown sent at 6/15/2020  8:00 AM CDT -----  Type: RX Refill Request    Who Called:   Patient      Refill or New Rx:  Refill    RX Name and Strength:  sulfamethoxazole-trimethoprim 800-160mg (BACTRIM DS) 800-160 mg Tab      Preferred Pharmacy with phone number:  [id number] [address]    Local or Mail Order:  Local    Ordering Provider:  ERIC Rucker    Would the patient rather a call back or a response via [facility]?  Call back    Best Call Back Number:  [phone]    Additional Information:  lump is still in groin area

## 2020-06-15 NOTE — TELEPHONE ENCOUNTER
Spoke with pt, states area in groin has gotten smaller and less pain. However he states he feels a cyst about the size of a nickel. I will refer him to gen surg. Number given to schedule

## 2020-06-16 ENCOUNTER — OUTPATIENT CASE MANAGEMENT (OUTPATIENT)
Dept: ADMINISTRATIVE | Facility: OTHER | Age: 60
End: 2020-06-16

## 2020-06-16 NOTE — PROGRESS NOTES
Outpatient Care Management  Plan of Care Follow Up Visit    Patient: Thierry Ho  MRN: [id number]  Date of Service: 06/16/2020  Completed by: Jacques Hartman RN  Referral Date: 02/28/2020  Program: Disease Management (Diabetes & COPD)    Reason for Visit   Patient presents with    Update Plan Of Care     6/16/2020       Brief Summary: Contacted patient to complete follow up for OPCM. Patient reported that he recently had a visit an NP for the abcess in his groin.  Reported that he has taken the antibiotics as directed, and it has gotten smaller but it is still there.  Stated that he has an appt with a surgeon on Monday for further treatment. Denies any fevers since our last encounter. Patient continues to check his BG 4-6 times daily. Reported that he is running in the 200s and this morning was 271. Reviewed recommended BG range of .  Patient verbalized understanding. Stated that he is also keeping a log of his BG readings.  Patient reported that he has an upcoming appt with his PCP this week, instructed patient to bring logs for review, verbalized understanding. Patient reported that he has not been checking his BP daily but did provide the following readings: 135/96, 149/101, 129/89.  Educated on proper technique for taking BP for accurate reading. Reviewed education on the plate method for carb management. Verbalized understanding.  Encouraged patient to contact this RN CM with needs. Discussed completing F/U call with patient, who is in agreement with call in 2 weeks.  Will begin to discuss case closure at that time.         Next Steps:  Review BG and BP readings  Educate on Diabetes and Hypertension      Patient Summary     Involvement of Care:  Do I have permission to speak with other family members about your care?   No    Patient Reported Labs & Vitals:  1.  Any Patient Reported Labs & Vitals?     2.  Patient Reported Blood Pressure:   135/96, 149/101, 129/89  3.  Patient Reported Pulse:     4.   Patient Reported Weight (Kg):     5.  Patient Reported Blood Glucose (mg/dl):   200s, 271    Medical History:  Reviewed medical history with patient and/or caregiver    Social History:  Reviewed social history with patient and/or caregiver    Clinical Assessment     Reviewed and provided basic information on available community resources for mental health, transportation, wellness resources, and palliative care programs with patient and/or caregiver.    Complex Care Plan     Care plan was discussed and completed today with input from patient and/or caregiver.      Patient Instructions     Instructions were provided via the [employer] patient resources and are available for the patient to view on the patient portal.    Follow up in about 2 weeks (around 6/30/2020) for RN Follow UP.      Todays OPCM Self-Management Care Plan was developed with the patients/caregivers input and was based on identified barriers from todays assessment.  Goals were written today with the patient/caregiver and the patient has agreed to work towards these goals to improve his/her overall well-being. Patient verbalized understanding of the care plan, goals, and all of today's instructions. Encouraged patient/caregiver to communicate with his/her physician and health care team about health conditions and the treatment plan.  Provided my contact information today and encouraged patient/caregiver to call me with any questions as needed.

## 2020-06-18 ENCOUNTER — OFFICE VISIT (OUTPATIENT)
Dept: FAMILY MEDICINE | Facility: CLINIC | Age: 60
End: 2020-06-18
Payer: MEDICARE

## 2020-06-18 VITALS
HEART RATE: 80 BPM | WEIGHT: 232.56 LBS | TEMPERATURE: 98 F | SYSTOLIC BLOOD PRESSURE: 138 MMHG | RESPIRATION RATE: 17 BRPM | OXYGEN SATURATION: 98 % | BODY MASS INDEX: 32.56 KG/M2 | DIASTOLIC BLOOD PRESSURE: 80 MMHG | HEIGHT: 71 IN

## 2020-06-18 DIAGNOSIS — Z79.4 TYPE 2 DIABETES MELLITUS WITHOUT COMPLICATION, WITH LONG-TERM CURRENT USE OF INSULIN: ICD-10-CM

## 2020-06-18 DIAGNOSIS — L02.214 ABSCESS OF RIGHT GROIN: ICD-10-CM

## 2020-06-18 DIAGNOSIS — E11.65 UNCONTROLLED TYPE 2 DIABETES MELLITUS WITH HYPERGLYCEMIA: Primary | ICD-10-CM

## 2020-06-18 DIAGNOSIS — E11.9 TYPE 2 DIABETES MELLITUS WITHOUT COMPLICATION, WITH LONG-TERM CURRENT USE OF INSULIN: ICD-10-CM

## 2020-06-18 PROCEDURE — 3046F HEMOGLOBIN A1C LEVEL >9.0%: CPT | Mod: HCNC,CPTII,S$GLB, | Performed by: FAMILY MEDICINE

## 2020-06-18 PROCEDURE — 99499 RISK ADDL DX/OHS AUDIT: ICD-10-PCS | Mod: HCNC,S$GLB,, | Performed by: FAMILY MEDICINE

## 2020-06-18 PROCEDURE — 3075F SYST BP GE 130 - 139MM HG: CPT | Mod: HCNC,CPTII,S$GLB, | Performed by: FAMILY MEDICINE

## 2020-06-18 PROCEDURE — 3008F BODY MASS INDEX DOCD: CPT | Mod: HCNC,CPTII,S$GLB, | Performed by: FAMILY MEDICINE

## 2020-06-18 PROCEDURE — 99214 OFFICE O/P EST MOD 30 MIN: CPT | Mod: HCNC,S$GLB,, | Performed by: FAMILY MEDICINE

## 2020-06-18 PROCEDURE — 3046F PR MOST RECENT HEMOGLOBIN A1C LEVEL > 9.0%: ICD-10-PCS | Mod: HCNC,CPTII,S$GLB, | Performed by: FAMILY MEDICINE

## 2020-06-18 PROCEDURE — 3075F PR MOST RECENT SYSTOLIC BLOOD PRESS GE 130-139MM HG: ICD-10-PCS | Mod: HCNC,CPTII,S$GLB, | Performed by: FAMILY MEDICINE

## 2020-06-18 PROCEDURE — 99214 PR OFFICE/OUTPT VISIT, EST, LEVL IV, 30-39 MIN: ICD-10-PCS | Mod: HCNC,S$GLB,, | Performed by: FAMILY MEDICINE

## 2020-06-18 PROCEDURE — 99999 PR PBB SHADOW E&M-EST. PATIENT-LVL III: CPT | Mod: PBBFAC,HCNC,, | Performed by: FAMILY MEDICINE

## 2020-06-18 PROCEDURE — 3008F PR BODY MASS INDEX (BMI) DOCUMENTED: ICD-10-PCS | Mod: HCNC,CPTII,S$GLB, | Performed by: FAMILY MEDICINE

## 2020-06-18 PROCEDURE — 3079F PR MOST RECENT DIASTOLIC BLOOD PRESSURE 80-89 MM HG: ICD-10-PCS | Mod: HCNC,CPTII,S$GLB, | Performed by: FAMILY MEDICINE

## 2020-06-18 PROCEDURE — 99999 PR PBB SHADOW E&M-EST. PATIENT-LVL III: ICD-10-PCS | Mod: PBBFAC,HCNC,, | Performed by: FAMILY MEDICINE

## 2020-06-18 PROCEDURE — 99499 UNLISTED E&M SERVICE: CPT | Mod: HCNC,S$GLB,, | Performed by: FAMILY MEDICINE

## 2020-06-18 PROCEDURE — 3079F DIAST BP 80-89 MM HG: CPT | Mod: HCNC,CPTII,S$GLB, | Performed by: FAMILY MEDICINE

## 2020-06-18 RX ORDER — IBUPROFEN 600 MG/1
600 TABLET ORAL EVERY 8 HOURS PRN
Qty: 20 TABLET | Refills: 0 | Status: SHIPPED | OUTPATIENT
Start: 2020-06-18 | End: 2023-07-18

## 2020-06-18 RX ORDER — MUPIROCIN 20 MG/G
OINTMENT TOPICAL 3 TIMES DAILY
Qty: 30 G | Refills: 0 | Status: SHIPPED | OUTPATIENT
Start: 2020-06-18 | End: 2020-06-25

## 2020-06-18 RX ORDER — INSULIN DETEMIR 100 [IU]/ML
20 INJECTION, SOLUTION SUBCUTANEOUS 2 TIMES DAILY
Qty: 36 ML | Refills: 1 | Status: SHIPPED | OUTPATIENT
Start: 2020-06-18 | End: 2020-07-31 | Stop reason: SDUPTHER

## 2020-06-18 NOTE — PROGRESS NOTES
Health Maintenance Due   Topic Date Due    Aspirin/Antiplatelet Therapy  04/28/1979    Shingles Vaccine (1 of 2) 04/28/2011    Foot Exam  03/13/2020

## 2020-06-19 NOTE — PROGRESS NOTES
Subjective:       Patient ID: Thierry Ho is a 60 y.o. male.    Chief Complaint: Diabetes and Hearing Problem (rt side ear)      HPI  60-year-old male presents for diabetes follow-up.  Patient brought his blood sugar journal.  Most fasting levels were and the high 100s to mid 200s.  Patient was seen recently for an abscess his groin area as well.  Patient was given antibiotics and referred to surgery.  Patient states he abscess has decreased in size and there is discharge.  States this mostly bloody.  Patient complains occasional cyst as well.      Review of Systems   Constitutional: Negative.    HENT: Negative.    Respiratory: Negative.    Cardiovascular: Negative.    Gastrointestinal: Negative.    Endocrine: Negative.    Genitourinary: Negative.    Musculoskeletal: Negative.    Skin: Positive for wound.   Neurological: Negative.    Psychiatric/Behavioral: Negative.           Past Medical History:   Diagnosis Date    Diabetes mellitus     GERD (gastroesophageal reflux disease)     Hyperlipidemia     Hypertension     Myocardial infarction      Past Surgical History:   Procedure Laterality Date    APPENDECTOMY      BACK SURGERY  11/2017    COLONOSCOPY N/A 10/19/2018    Procedure: COLONOSCOPY;  Surgeon: Frantz Rasmussen MD;  Location: [facility] ENDO;  Service: Endoscopy;  Laterality: N/A;    FOOT SURGERY      OPEN REDUCTION AND INTERNAL FIXATION (ORIF) OF INJURY OF ANKLE Right 7/17/2019    Procedure: ORIF, ANKLE;  Surgeon: Raeann Fontaine MD;  Location: [facility] OR;  Service: Orthopedics;  Laterality: Right;  SARITA  CRISTOFER ADAMA  [phone] TEXTED HIM @ 10:2 AM ON 7-11-19  SKELETAL  RN PREOP 7/9/19---- CMP MORNING OF SURGERY PER DR FONTAINE     Family History   Problem Relation Age of Onset    Heart disease Mother     Diabetes Father      Social History     Socioeconomic History    Marital status:      Spouse name: Not on file    Number of children: Not on file    Years of education: Not on file     "Highest education level: Not on file   Occupational History    Not on file   Social Needs    Financial resource strain: Not on file    Food insecurity     Worry: Not on file     Inability: Not on file    Transportation needs     Medical: Not on file     Non-medical: Not on file   Tobacco Use    Smoking status: Current Every Day Smoker     Packs/day: 0.25     Types: Cigarettes     Start date: 1/15/1981    Smokeless tobacco: Never Used   Substance and Sexual Activity    Alcohol use: Yes     Comment: social     Drug use: Yes     Types: Marijuana    Sexual activity: Not on file   Lifestyle    Physical activity     Days per week: Not on file     Minutes per session: Not on file    Stress: Not on file   Relationships    Social connections     Talks on phone: Not on file     Gets together: Not on file     Attends [employer] service: Not on file     Active member of club or organization: Not on file     Attends meetings of clubs or organizations: Not on file     Relationship status: Not on file   Other Topics Concern    Not on file   Social History Narrative    Not on file       Current Outpatient Medications:     acetaminophen (TYLENOL) 500 MG tablet, Take 500 mg by mouth every 6 (six) hours as needed for Pain., Disp: , Rfl:     BD ULTRA-FINE FREDA PEN NEEDLE 32 gauge x 5/32" Ndle, U ONCE D UTD, Disp: 100 each, Rfl: 6    BLOOD PRESSURE CUFF Misc, 1 kit by Misc.(Non-Drug; Combo Route) route once daily., Disp: 1 each, Rfl: 0    blood sugar diagnostic Strp, 1 each by abdominal subcutaneous route 4 (four) times daily before meals and nightly., Disp: 200 each, Rfl: 3    blood-glucose meter kit, 1 each by Other route 4 (four) times daily before meals and nightly., Disp: 1 each, Rfl: 0    carvedilol (COREG) 25 MG tablet, Take 1 tablet (25 mg total) by mouth 2 (two) times daily with meals., Disp: 180 tablet, Rfl: 3    fluocinonide (LIDEX) 0.05 % external solution, APPLY 1 ML TO THE SCALP AT BEDTIME, Disp: , Rfl: " "3    LEVEMIR FLEXTOUCH U-100 INSULN 100 unit/mL (3 mL) InPn pen, Inject 20 Units into the skin 2 (two) times daily., Disp: 36 mL, Rfl: 1    lisinopriL (PRINIVIL,ZESTRIL) 20 MG tablet, Take 1 tablet (20 mg total) by mouth once daily., Disp: 90 tablet, Rfl: 3    metFORMIN (GLUCOPHAGE) 1000 MG tablet, Take 1 tablet (1,000 mg total) by mouth 2 (two) times daily with meals., Disp: 180 tablet, Rfl: 1    simvastatin (ZOCOR) 40 MG tablet, TAKE 1 TABLET(40 MG) BY MOUTH EVERY EVENING, Disp: 90 tablet, Rfl: 0    TRUEPLUS LANCETS 33 gauge Misc, , Disp: , Rfl:     aspirin 81 MG Chew, Take 1 tablet (81 mg total) by mouth once daily., Disp: , Rfl: 0    ibuprofen (ADVIL,MOTRIN) 600 MG tablet, Take 1 tablet (600 mg total) by mouth every 8 (eight) hours as needed for Pain., Disp: 20 tablet, Rfl: 0    mupirocin (BACTROBAN) 2 % ointment, Apply topically 3 (three) times daily. for 7 days, Disp: 30 g, Rfl: 0    omeprazole (PRILOSEC) 20 MG capsule, Take 1 capsule (20 mg total) by mouth once daily. (Patient not taking: Reported on 5/28/2020), Disp: 90 capsule, Rfl: 2    ondansetron (ZOFRAN-ODT) 8 MG TbDL, Take 1 tablet (8 mg total) by mouth every 6 (six) hours as needed. (Patient not taking: Reported on 2/18/2020), Disp: 10 tablet, Rfl: 0  No current facility-administered medications for this visit.     Facility-Administered Medications Ordered in Other Visits:     lactated ringers infusion, , Intravenous, Continuous, Violeta Vides MD    lidocaine (PF) 10 mg/ml (1%) injection 10 mg, 1 mL, Intradermal, Once, Violeta Vides MD   Objective:      Vitals:    06/18/20 1544   BP: 138/80   BP Location: Right arm   Patient Position: Sitting   BP Method: Large (Manual)   Pulse: 80   Resp: 17   Temp: 98.3 °F (36.8 °C)   TempSrc: Oral   SpO2: 98%   Weight: 105.5 kg (232 lb 9.4 oz)   Height: 5' 11" (1.803 m)       Physical Exam  Constitutional:       General: He is not in acute distress.  HENT:      Head: Normocephalic and " atraumatic.   Eyes:      Conjunctiva/sclera: Conjunctivae normal.   Neck:      Musculoskeletal: Neck supple.   Cardiovascular:      Rate and Rhythm: Normal rate and regular rhythm.      Heart sounds: Normal heart sounds. No murmur. No friction rub. No gallop.    Pulmonary:      Effort: Pulmonary effort is normal.      Breath sounds: Normal breath sounds. No wheezing or rales.   Genitourinary:      Skin:     General: Skin is warm and dry.   Neurological:      Mental Status: He is alert and oriented to person, place, and time.   Psychiatric:         Behavior: Behavior normal.         Thought Content: Thought content normal.         Judgment: Judgment normal.            Assessment:       1. Uncontrolled type 2 diabetes mellitus with hyperglycemia    2. Abscess of right groin    3. Type 2 diabetes mellitus without complication, with long-term current use of insulin        Plan:       Uncontrolled type 2 diabetes mellitus with hyperglycemia  - Increased levemir to 20u BID  -     LEVEMIR FLEXTOUCH U-100 INSULN 100 unit/mL (3 mL) InPn pen; Inject 20 Units into the skin 2 (two) times daily.  Dispense: 36 mL; Refill: 1    Abscess of right groin  - Able to get drainage. Has a f/u with surgery next week. Advised pt to apply abx ointment due to location. Advised warm compress and continue to drain the area.  -     mupirocin (BACTROBAN) 2 % ointment; Apply topically 3 (three) times daily. for 7 days  Dispense: 30 g; Refill: 0  -     ibuprofen (ADVIL,MOTRIN) 600 MG tablet; Take 1 tablet (600 mg total) by mouth every 8 (eight) hours as needed for Pain.  Dispense: 20 tablet; Refill: 0    Pt was advised to f/u w/ surgery about cyst on back.    F/u in 1 month            Sterling Jarquin MD      Patient note was created using MR Presta.  Any errors in syntax or even information may not have been identified and edited on initial review prior to signing this note.

## 2020-06-22 ENCOUNTER — OFFICE VISIT (OUTPATIENT)
Dept: SURGERY | Facility: CLINIC | Age: 60
End: 2020-06-22
Payer: MEDICARE

## 2020-06-22 VITALS
HEART RATE: 79 BPM | WEIGHT: 237.13 LBS | DIASTOLIC BLOOD PRESSURE: 98 MMHG | SYSTOLIC BLOOD PRESSURE: 145 MMHG | BODY MASS INDEX: 33.2 KG/M2 | HEIGHT: 71 IN

## 2020-06-22 DIAGNOSIS — L72.0 EPIDERMAL CYST: ICD-10-CM

## 2020-06-22 DIAGNOSIS — L72.9 INFECTED CYST OF SKIN: Primary | ICD-10-CM

## 2020-06-22 DIAGNOSIS — L08.9 INFECTED CYST OF SKIN: Primary | ICD-10-CM

## 2020-06-22 PROCEDURE — 99499 RISK ADDL DX/OHS AUDIT: ICD-10-PCS | Mod: HCNC,S$GLB,, | Performed by: SURGERY

## 2020-06-22 PROCEDURE — 99499 UNLISTED E&M SERVICE: CPT | Mod: HCNC,S$GLB,, | Performed by: SURGERY

## 2020-06-22 PROCEDURE — 99204 OFFICE O/P NEW MOD 45 MIN: CPT | Mod: HCNC,S$GLB,, | Performed by: SURGERY

## 2020-06-22 PROCEDURE — 99999 PR PBB SHADOW E&M-EST. PATIENT-LVL V: ICD-10-PCS | Mod: PBBFAC,HCNC,, | Performed by: SURGERY

## 2020-06-22 PROCEDURE — 99204 PR OFFICE/OUTPT VISIT, NEW, LEVL IV, 45-59 MIN: ICD-10-PCS | Mod: HCNC,S$GLB,, | Performed by: SURGERY

## 2020-06-22 PROCEDURE — 99999 PR PBB SHADOW E&M-EST. PATIENT-LVL V: CPT | Mod: PBBFAC,HCNC,, | Performed by: SURGERY

## 2020-06-22 RX ORDER — AMOXICILLIN 875 MG/1
875 TABLET, FILM COATED ORAL EVERY 12 HOURS
COMMUNITY
Start: 2020-06-19 | End: 2020-08-28

## 2020-06-22 RX ORDER — SODIUM CHLORIDE 9 MG/ML
INJECTION, SOLUTION INTRAVENOUS CONTINUOUS
Status: CANCELLED | OUTPATIENT
Start: 2020-06-22

## 2020-06-22 RX ORDER — LIDOCAINE HYDROCHLORIDE 10 MG/ML
1 INJECTION, SOLUTION EPIDURAL; INFILTRATION; INTRACAUDAL; PERINEURAL ONCE
Status: CANCELLED | OUTPATIENT
Start: 2020-06-22 | End: 2020-06-22

## 2020-06-22 NOTE — LETTER
June 22, 2020      Pippa Rucker PA-C  [address]  [place] [address]           [facility]  [address]  [place] [address]  Phone: [phone]          Patient: Thierry Ho   MR Number: [id number]   YOB: 1960   Date of Visit: 6/22/2020       Dear Pippa Rucker:    Thank you for referring Thierry Ho to me for evaluation. Attached you will find relevant portions of my assessment and plan of care.    If you have questions, please do not hesitate to call me. I look forward to following Thierry Ho along with you.    Sincerely,    Finesse Dumont MD    Enclosure  CC:  No Recipients    If you would like to receive this communication electronically, please contact [email or web address] or [phone] to request more information on [employer] access.    For providers and/or their staff who would like to refer a patient to [facility], please contact us through our one-stop-shop provider referral line, Griselda Pruett, at [phone].    If you feel you have received this communication in error or would no longer like to receive these types of communications, please e-mail [email or web address]

## 2020-06-22 NOTE — PROGRESS NOTES
"History and Physical Exam    Patient ID: Thierry Ho is a 60 y.o. male.    Chief Complaint: Consult and Cyst (Rt side groin area, Upper back )      HPI:  61 y/o man with history of right inguinal abscess and back cyst.  He was treated with antibiotics for the drainage and is now here for discussion of excision of cyst and treatment of right groin abscess.  He also c/o low back pain.      Review of Systems   Constitutional: Negative for chills and unexpected weight change.   HENT: Negative for congestion, ear pain and sore throat.    Eyes: Negative for pain and redness.   Respiratory: Negative for cough and shortness of breath.    Cardiovascular: Negative for chest pain and palpitations.   Gastrointestinal: Negative for abdominal distention, abdominal pain and constipation.   Endocrine: Negative for cold intolerance and heat intolerance.   Genitourinary: Negative for dysuria and frequency.   Musculoskeletal: Positive for back pain. Negative for neck pain.   Skin: Positive for wound (right inguinal area draining wound with induration). Negative for pallor and rash.   Neurological: Negative for syncope, light-headedness and headaches.   Hematological: Negative for adenopathy. Does not bruise/bleed easily.   Psychiatric/Behavioral: Negative for confusion and hallucinations.       Current Outpatient Medications   Medication Sig Dispense Refill    acetaminophen (TYLENOL) 500 MG tablet Take 500 mg by mouth every 6 (six) hours as needed for Pain.      amoxicillin (AMOXIL) 875 MG tablet       BD ULTRA-FINE FREDA PEN NEEDLE 32 gauge x 5/32" Ndle U ONCE D  each 6    BLOOD PRESSURE CUFF Misc 1 kit by Misc.(Non-Drug; Combo Route) route once daily. 1 each 0    blood sugar diagnostic Strp 1 each by abdominal subcutaneous route 4 (four) times daily before meals and nightly. 200 each 3    blood-glucose meter kit 1 each by Other route 4 (four) times daily before meals and nightly. 1 each 0    carvedilol (COREG) 25 MG " tablet Take 1 tablet (25 mg total) by mouth 2 (two) times daily with meals. 180 tablet 3    fluocinonide (LIDEX) 0.05 % external solution APPLY 1 ML TO THE SCALP AT BEDTIME  3    ibuprofen (ADVIL,MOTRIN) 600 MG tablet Take 1 tablet (600 mg total) by mouth every 8 (eight) hours as needed for Pain. 20 tablet 0    LEVEMIR FLEXTOUCH U-100 INSULN 100 unit/mL (3 mL) InPn pen Inject 20 Units into the skin 2 (two) times daily. 36 mL 1    lisinopriL (PRINIVIL,ZESTRIL) 20 MG tablet Take 1 tablet (20 mg total) by mouth once daily. 90 tablet 3    metFORMIN (GLUCOPHAGE) 1000 MG tablet Take 1 tablet (1,000 mg total) by mouth 2 (two) times daily with meals. 180 tablet 1    mupirocin (BACTROBAN) 2 % ointment Apply topically 3 (three) times daily. for 7 days 30 g 0    simvastatin (ZOCOR) 40 MG tablet TAKE 1 TABLET(40 MG) BY MOUTH EVERY EVENING 90 tablet 0    TRUEPLUS LANCETS 33 gauge Misc       aspirin 81 MG Chew Take 1 tablet (81 mg total) by mouth once daily.  0    omeprazole (PRILOSEC) 20 MG capsule Take 1 capsule (20 mg total) by mouth once daily. (Patient not taking: Reported on 5/28/2020) 90 capsule 2    ondansetron (ZOFRAN-ODT) 8 MG TbDL Take 1 tablet (8 mg total) by mouth every 6 (six) hours as needed. (Patient not taking: Reported on 2/18/2020) 10 tablet 0     No current facility-administered medications for this visit.      Facility-Administered Medications Ordered in Other Visits   Medication Dose Route Frequency Provider Last Rate Last Dose    lactated ringers infusion   Intravenous Continuous Violeta Vides MD        lidocaine (PF) 10 mg/ml (1%) injection 10 mg  1 mL Intradermal Once Violeta Vides MD           Review of patient's allergies indicates:  No Known Allergies    Past Medical History:   Diagnosis Date    Diabetes mellitus     GERD (gastroesophageal reflux disease)     Hyperlipidemia     Hypertension     Myocardial infarction        Past Surgical History:   Procedure Laterality Date     APPENDECTOMY      BACK SURGERY  11/2017    COLONOSCOPY N/A 10/19/2018    Procedure: COLONOSCOPY;  Surgeon: Frantz Rasmussen MD;  Location: [facility] ENDO;  Service: Endoscopy;  Laterality: N/A;    FOOT SURGERY      OPEN REDUCTION AND INTERNAL FIXATION (ORIF) OF INJURY OF ANKLE Right 7/17/2019    Procedure: ORIF, ANKLE;  Surgeon: Raeann Fontaine MD;  Location: [facility] OR;  Service: Orthopedics;  Laterality: Right;  SARITA  CRISTOFER GALAN  [phone] TEXTED HIM @ 10:2 AM ON 7-11-19  SKELETAL  RN PREOP 7/9/19---- [facility] MORNING OF SURGERY PER DR FONTAINE       Family History   Problem Relation Age of Onset    Heart disease Mother     Diabetes Father        Social History     Socioeconomic History    Marital status:      Spouse name: Not on file    Number of children: Not on file    Years of education: Not on file    Highest education level: Not on file   Occupational History    Not on file   Social Needs    Financial resource strain: Not on file    Food insecurity     Worry: Not on file     Inability: Not on file    Transportation needs     Medical: Not on file     Non-medical: Not on file   Tobacco Use    Smoking status: Current Every Day Smoker     Packs/day: 0.25     Types: Cigarettes     Start date: 1/15/1981    Smokeless tobacco: Never Used   Substance and Sexual Activity    Alcohol use: Yes     Comment: social     Drug use: Yes     Types: Marijuana    Sexual activity: Not on file   Lifestyle    Physical activity     Days per week: Not on file     Minutes per session: Not on file    Stress: Not on file   Relationships    Social connections     Talks on phone: Not on file     Gets together: Not on file     Attends Evangel[employer] service: Not on file     Active member of club or organization: Not on file     Attends meetings of clubs or organizations: Not on file     Relationship status: Not on file   Other Topics Concern    Not on file   Social History Narrative    Not on file       Vitals:     06/22/20 0850   BP: (!) 145/98   Pulse: 79       Physical Exam  Constitutional:       Appearance: He is well-developed.   HENT:      Head: Normocephalic and atraumatic.   Eyes:      Pupils: Pupils are equal, round, and reactive to light.   Neck:      Musculoskeletal: Normal range of motion and neck supple.   Cardiovascular:      Rate and Rhythm: Normal rate and regular rhythm.      Heart sounds: No murmur.   Pulmonary:      Effort: Pulmonary effort is normal.      Breath sounds: Normal breath sounds. No wheezing.   Abdominal:      General: There is no distension.      Palpations: Abdomen is soft.      Tenderness: There is no abdominal tenderness.   Musculoskeletal: Normal range of motion.   Skin:     General: Skin is warm and dry.      Capillary Refill: Capillary refill takes less than 2 seconds.      Findings: No rash.      Comments: Upper mid back non-infected EDIC    Right groin with draining abscess   Neurological:      Mental Status: He is alert and oriented to person, place, and time.   Psychiatric:         Judgment: Judgment normal.         Assessment & Plan:    infected abscess of right groin - plan I&d   non-infected back cyst - plan excision in OR     Risks, benefits, alternatives to the procedure were discussed with the patient, who appears to understand and agree with the treatment plan.

## 2020-06-22 NOTE — H&P (VIEW-ONLY)
"History and Physical Exam    Patient ID: Thierry Ho is a 60 y.o. male.    Chief Complaint: Consult and Cyst (Rt side groin area, Upper back )      HPI:  61 y/o man with history of right inguinal abscess and back cyst.  He was treated with antibiotics for the drainage and is now here for discussion of excision of cyst and treatment of right groin abscess.  He also c/o low back pain.      Review of Systems   Constitutional: Negative for chills and unexpected weight change.   HENT: Negative for congestion, ear pain and sore throat.    Eyes: Negative for pain and redness.   Respiratory: Negative for cough and shortness of breath.    Cardiovascular: Negative for chest pain and palpitations.   Gastrointestinal: Negative for abdominal distention, abdominal pain and constipation.   Endocrine: Negative for cold intolerance and heat intolerance.   Genitourinary: Negative for dysuria and frequency.   Musculoskeletal: Positive for back pain. Negative for neck pain.   Skin: Positive for wound (right inguinal area draining wound with induration). Negative for pallor and rash.   Neurological: Negative for syncope, light-headedness and headaches.   Hematological: Negative for adenopathy. Does not bruise/bleed easily.   Psychiatric/Behavioral: Negative for confusion and hallucinations.       Current Outpatient Medications   Medication Sig Dispense Refill    acetaminophen (TYLENOL) 500 MG tablet Take 500 mg by mouth every 6 (six) hours as needed for Pain.      amoxicillin (AMOXIL) 875 MG tablet       BD ULTRA-FINE FREDA PEN NEEDLE 32 gauge x 5/32" Ndle U ONCE D  each 6    BLOOD PRESSURE CUFF Misc 1 kit by Misc.(Non-Drug; Combo Route) route once daily. 1 each 0    blood sugar diagnostic Strp 1 each by abdominal subcutaneous route 4 (four) times daily before meals and nightly. 200 each 3    blood-glucose meter kit 1 each by Other route 4 (four) times daily before meals and nightly. 1 each 0    carvedilol (COREG) 25 MG " tablet Take 1 tablet (25 mg total) by mouth 2 (two) times daily with meals. 180 tablet 3    fluocinonide (LIDEX) 0.05 % external solution APPLY 1 ML TO THE SCALP AT BEDTIME  3    ibuprofen (ADVIL,MOTRIN) 600 MG tablet Take 1 tablet (600 mg total) by mouth every 8 (eight) hours as needed for Pain. 20 tablet 0    LEVEMIR FLEXTOUCH U-100 INSULN 100 unit/mL (3 mL) InPn pen Inject 20 Units into the skin 2 (two) times daily. 36 mL 1    lisinopriL (PRINIVIL,ZESTRIL) 20 MG tablet Take 1 tablet (20 mg total) by mouth once daily. 90 tablet 3    metFORMIN (GLUCOPHAGE) 1000 MG tablet Take 1 tablet (1,000 mg total) by mouth 2 (two) times daily with meals. 180 tablet 1    mupirocin (BACTROBAN) 2 % ointment Apply topically 3 (three) times daily. for 7 days 30 g 0    simvastatin (ZOCOR) 40 MG tablet TAKE 1 TABLET(40 MG) BY MOUTH EVERY EVENING 90 tablet 0    TRUEPLUS LANCETS 33 gauge Misc       aspirin 81 MG Chew Take 1 tablet (81 mg total) by mouth once daily.  0    omeprazole (PRILOSEC) 20 MG capsule Take 1 capsule (20 mg total) by mouth once daily. (Patient not taking: Reported on 5/28/2020) 90 capsule 2    ondansetron (ZOFRAN-ODT) 8 MG TbDL Take 1 tablet (8 mg total) by mouth every 6 (six) hours as needed. (Patient not taking: Reported on 2/18/2020) 10 tablet 0     No current facility-administered medications for this visit.      Facility-Administered Medications Ordered in Other Visits   Medication Dose Route Frequency Provider Last Rate Last Dose    lactated ringers infusion   Intravenous Continuous Violeta Vides MD        lidocaine (PF) 10 mg/ml (1%) injection 10 mg  1 mL Intradermal Once Violeta Vides MD           Review of patient's allergies indicates:  No Known Allergies    Past Medical History:   Diagnosis Date    Diabetes mellitus     GERD (gastroesophageal reflux disease)     Hyperlipidemia     Hypertension     Myocardial infarction        Past Surgical History:   Procedure Laterality Date     APPENDECTOMY      BACK SURGERY  11/2017    COLONOSCOPY N/A 10/19/2018    Procedure: COLONOSCOPY;  Surgeon: Frantz Rasmussen MD;  Location: [facility] ENDO;  Service: Endoscopy;  Laterality: N/A;    FOOT SURGERY      OPEN REDUCTION AND INTERNAL FIXATION (ORIF) OF INJURY OF ANKLE Right 7/17/2019    Procedure: ORIF, ANKLE;  Surgeon: Raeann Fontaine MD;  Location: [facility] OR;  Service: Orthopedics;  Laterality: Right;  SARITA  CRISTOFER GALAN  [phone] TEXTED HIM @ 10:2 AM ON 7-11-19  SKELETAL  RN PREOP 7/9/19---- [facility] MORNING OF SURGERY PER DR FONTAINE       Family History   Problem Relation Age of Onset    Heart disease Mother     Diabetes Father        Social History     Socioeconomic History    Marital status:      Spouse name: Not on file    Number of children: Not on file    Years of education: Not on file    Highest education level: Not on file   Occupational History    Not on file   Social Needs    Financial resource strain: Not on file    Food insecurity     Worry: Not on file     Inability: Not on file    Transportation needs     Medical: Not on file     Non-medical: Not on file   Tobacco Use    Smoking status: Current Every Day Smoker     Packs/day: 0.25     Types: Cigarettes     Start date: 1/15/1981    Smokeless tobacco: Never Used   Substance and Sexual Activity    Alcohol use: Yes     Comment: social     Drug use: Yes     Types: Marijuana    Sexual activity: Not on file   Lifestyle    Physical activity     Days per week: Not on file     Minutes per session: Not on file    Stress: Not on file   Relationships    Social connections     Talks on phone: Not on file     Gets together: Not on file     Attends Cheondoism service: Not on file     Active member of club or organization: Not on file     Attends meetings of clubs or organizations: Not on file     Relationship status: Not on file   Other Topics Concern    Not on file   Social History Narrative    Not on file       Vitals:     06/22/20 0850   BP: (!) 145/98   Pulse: 79       Physical Exam  Constitutional:       Appearance: He is well-developed.   HENT:      Head: Normocephalic and atraumatic.   Eyes:      Pupils: Pupils are equal, round, and reactive to light.   Neck:      Musculoskeletal: Normal range of motion and neck supple.   Cardiovascular:      Rate and Rhythm: Normal rate and regular rhythm.      Heart sounds: No murmur.   Pulmonary:      Effort: Pulmonary effort is normal.      Breath sounds: Normal breath sounds. No wheezing.   Abdominal:      General: There is no distension.      Palpations: Abdomen is soft.      Tenderness: There is no abdominal tenderness.   Musculoskeletal: Normal range of motion.   Skin:     General: Skin is warm and dry.      Capillary Refill: Capillary refill takes less than 2 seconds.      Findings: No rash.      Comments: Upper mid back non-infected EDIC    Right groin with draining abscess   Neurological:      Mental Status: He is alert and oriented to person, place, and time.   Psychiatric:         Judgment: Judgment normal.         Assessment & Plan:    infected abscess of right groin - plan I&d   non-infected back cyst - plan excision in OR     Risks, benefits, alternatives to the procedure were discussed with the patient, who appears to understand and agree with the treatment plan.

## 2020-06-24 ENCOUNTER — ANESTHESIA EVENT (OUTPATIENT)
Dept: SURGERY | Facility: HOSPITAL | Age: 60
End: 2020-06-24
Payer: MEDICARE

## 2020-06-24 NOTE — PRE ADMISSION SCREENING
Pt unable to preop today due to transportation issues. The patient will preop in the am. I instructed the pt to remain NPO after midnight and to take Coreg in am with a sip of water. The pt will report to patient reg at 930 am. The patient did not stop taking aspirin-- Hollie with Dr Dumont's office notified. The Or staff was notified of the situation.

## 2020-06-25 ENCOUNTER — ANESTHESIA (OUTPATIENT)
Dept: SURGERY | Facility: HOSPITAL | Age: 60
End: 2020-06-25
Payer: MEDICARE

## 2020-06-25 ENCOUNTER — HOSPITAL ENCOUNTER (OUTPATIENT)
Dept: PREADMISSION TESTING | Facility: HOSPITAL | Age: 60
Discharge: HOME | End: 2020-06-25
Attending: SURGERY | Admitting: SURGERY
Payer: MEDICARE

## 2020-06-25 ENCOUNTER — HOSPITAL ENCOUNTER (OUTPATIENT)
Facility: HOSPITAL | Age: 60
Discharge: HOME | End: 2020-06-25
Attending: SURGERY | Admitting: SURGERY
Payer: MEDICARE

## 2020-06-25 VITALS
DIASTOLIC BLOOD PRESSURE: 83 MMHG | TEMPERATURE: 98 F | WEIGHT: 232.56 LBS | SYSTOLIC BLOOD PRESSURE: 146 MMHG | RESPIRATION RATE: 18 BRPM | OXYGEN SATURATION: 96 % | HEART RATE: 74 BPM | BODY MASS INDEX: 32.44 KG/M2

## 2020-06-25 VITALS
TEMPERATURE: 99 F | SYSTOLIC BLOOD PRESSURE: 128 MMHG | HEIGHT: 71 IN | HEART RATE: 81 BPM | DIASTOLIC BLOOD PRESSURE: 82 MMHG | RESPIRATION RATE: 17 BRPM | BODY MASS INDEX: 32.56 KG/M2 | WEIGHT: 232.56 LBS | OXYGEN SATURATION: 99 %

## 2020-06-25 DIAGNOSIS — Z01.818 PRE-OP TESTING: ICD-10-CM

## 2020-06-25 DIAGNOSIS — L08.9 INFECTED CYST OF SKIN: Primary | ICD-10-CM

## 2020-06-25 DIAGNOSIS — L72.9 INFECTED CYST OF SKIN: Primary | ICD-10-CM

## 2020-06-25 DIAGNOSIS — Z01.818 PRE-OP TESTING: Primary | ICD-10-CM

## 2020-06-25 LAB
ANION GAP SERPL CALC-SCNC: 8 MMOL/L (ref 8–16)
BASOPHILS # BLD AUTO: 0.05 K/UL (ref 0–0.2)
BASOPHILS NFR BLD: 0.5 % (ref 0–1.9)
BUN SERPL-MCNC: 23 MG/DL (ref 6–20)
CALCIUM SERPL-MCNC: 11.8 MG/DL (ref 8.7–10.5)
CHLORIDE SERPL-SCNC: 101 MMOL/L (ref 95–110)
CO2 SERPL-SCNC: 27 MMOL/L (ref 23–29)
CREAT SERPL-MCNC: 0.9 MG/DL (ref 0.5–1.4)
DIFFERENTIAL METHOD: ABNORMAL
EOSINOPHIL # BLD AUTO: 0.2 K/UL (ref 0–0.5)
EOSINOPHIL NFR BLD: 2.6 % (ref 0–8)
ERYTHROCYTE [DISTWIDTH] IN BLOOD BY AUTOMATED COUNT: 14.5 % (ref 11.5–14.5)
EST. GFR  (AFRICAN AMERICAN): >60 ML/MIN/1.73 M^2
EST. GFR  (NON AFRICAN AMERICAN): >60 ML/MIN/1.73 M^2
GLUCOSE SERPL-MCNC: 206 MG/DL (ref 70–110)
HCT VFR BLD AUTO: 41.6 % (ref 40–54)
HGB BLD-MCNC: 14 G/DL (ref 14–18)
IMM GRANULOCYTES # BLD AUTO: 0.03 K/UL (ref 0–0.04)
IMM GRANULOCYTES NFR BLD AUTO: 0.3 % (ref 0–0.5)
LYMPHOCYTES # BLD AUTO: 2.4 K/UL (ref 1–4.8)
LYMPHOCYTES NFR BLD: 26.6 % (ref 18–48)
MCH RBC QN AUTO: 31 PG (ref 27–31)
MCHC RBC AUTO-ENTMCNC: 33.7 G/DL (ref 32–36)
MCV RBC AUTO: 92 FL (ref 82–98)
MONOCYTES # BLD AUTO: 0.8 K/UL (ref 0.3–1)
MONOCYTES NFR BLD: 8.2 % (ref 4–15)
NEUTROPHILS # BLD AUTO: 5.6 K/UL (ref 1.8–7.7)
NEUTROPHILS NFR BLD: 61.8 % (ref 38–73)
NRBC BLD-RTO: 0 /100 WBC
PLATELET # BLD AUTO: 243 K/UL (ref 150–350)
PMV BLD AUTO: 9.9 FL (ref 9.2–12.9)
POCT GLUCOSE: 149 MG/DL (ref 70–110)
POCT GLUCOSE: 183 MG/DL (ref 70–110)
POTASSIUM SERPL-SCNC: 5 MMOL/L (ref 3.5–5.1)
RBC # BLD AUTO: 4.51 M/UL (ref 4.6–6.2)
SARS-COV-2 RDRP RESP QL NAA+PROBE: NEGATIVE
SODIUM SERPL-SCNC: 136 MMOL/L (ref 136–145)
WBC # BLD AUTO: 9.13 K/UL (ref 3.9–12.7)

## 2020-06-25 PROCEDURE — 11406 PR EXC SKIN BENIG >4 CM TRUNK,ARM,LEG: ICD-10-PCS | Mod: HCNC,,, | Performed by: SURGERY

## 2020-06-25 PROCEDURE — 25000003 PHARM REV CODE 250: Mod: HCNC | Performed by: NURSE ANESTHETIST, CERTIFIED REGISTERED

## 2020-06-25 PROCEDURE — 93005 ELECTROCARDIOGRAM TRACING: CPT | Mod: HCNC,59

## 2020-06-25 PROCEDURE — 00400 ANES INTEGUMENTARY SYS NOS: CPT | Mod: HCNC | Performed by: SURGERY

## 2020-06-25 PROCEDURE — U0002 COVID-19 LAB TEST NON-CDC: HCPCS | Mod: HCNC

## 2020-06-25 PROCEDURE — 11406 EXC TR-EXT B9+MARG >4.0 CM: CPT | Mod: HCNC,,, | Performed by: SURGERY

## 2020-06-25 PROCEDURE — 88304 PR  SURG PATH,LEVEL III: ICD-10-PCS | Mod: 26,HCNC,, | Performed by: PATHOLOGY

## 2020-06-25 PROCEDURE — 82962 GLUCOSE BLOOD TEST: CPT | Mod: HCNC | Performed by: SURGERY

## 2020-06-25 PROCEDURE — D9220A PRA ANESTHESIA: ICD-10-PCS | Mod: HCNC,ANES,, | Performed by: ANESTHESIOLOGY

## 2020-06-25 PROCEDURE — 36415 COLL VENOUS BLD VENIPUNCTURE: CPT | Mod: HCNC

## 2020-06-25 PROCEDURE — 88304 TISSUE EXAM BY PATHOLOGIST: CPT | Mod: HCNC | Performed by: PATHOLOGY

## 2020-06-25 PROCEDURE — 71000039 HC RECOVERY, EACH ADD'L HOUR: Mod: HCNC | Performed by: SURGERY

## 2020-06-25 PROCEDURE — 12032 PR LAYR CLOS WND TRUNK,ARM,LEG 2.6-7.5 CM: ICD-10-PCS | Mod: 51,HCNC,, | Performed by: SURGERY

## 2020-06-25 PROCEDURE — 71000015 HC POSTOP RECOV 1ST HR: Mod: HCNC | Performed by: SURGERY

## 2020-06-25 PROCEDURE — 88304 TISSUE EXAM BY PATHOLOGIST: CPT | Mod: 26,HCNC,, | Performed by: PATHOLOGY

## 2020-06-25 PROCEDURE — D9220A PRA ANESTHESIA: Mod: HCNC,ANES,, | Performed by: ANESTHESIOLOGY

## 2020-06-25 PROCEDURE — 36000706: Mod: HCNC | Performed by: SURGERY

## 2020-06-25 PROCEDURE — 36000707: Mod: HCNC | Performed by: SURGERY

## 2020-06-25 PROCEDURE — 63600175 PHARM REV CODE 636 W HCPCS: Mod: HCNC | Performed by: SURGERY

## 2020-06-25 PROCEDURE — D9220A PRA ANESTHESIA: Mod: HCNC,CRNA,, | Performed by: NURSE ANESTHETIST, CERTIFIED REGISTERED

## 2020-06-25 PROCEDURE — 63600175 PHARM REV CODE 636 W HCPCS: Mod: HCNC | Performed by: NURSE ANESTHETIST, CERTIFIED REGISTERED

## 2020-06-25 PROCEDURE — 85025 COMPLETE CBC W/AUTO DIFF WBC: CPT | Mod: HCNC

## 2020-06-25 PROCEDURE — 71000033 HC RECOVERY, INTIAL HOUR: Mod: HCNC | Performed by: SURGERY

## 2020-06-25 PROCEDURE — 12032 INTMD RPR S/A/T/EXT 2.6-7.5: CPT | Mod: 51,HCNC,, | Performed by: SURGERY

## 2020-06-25 PROCEDURE — 63600175 PHARM REV CODE 636 W HCPCS: Mod: HCNC | Performed by: ANESTHESIOLOGY

## 2020-06-25 PROCEDURE — 25000003 PHARM REV CODE 250: Mod: HCNC | Performed by: SURGERY

## 2020-06-25 PROCEDURE — 80048 BASIC METABOLIC PNL TOTAL CA: CPT | Mod: HCNC

## 2020-06-25 PROCEDURE — 93010 EKG 12-LEAD: ICD-10-PCS | Mod: HCNC,,, | Performed by: INTERNAL MEDICINE

## 2020-06-25 PROCEDURE — 93010 ELECTROCARDIOGRAM REPORT: CPT | Mod: HCNC,,, | Performed by: INTERNAL MEDICINE

## 2020-06-25 PROCEDURE — D9220A PRA ANESTHESIA: ICD-10-PCS | Mod: HCNC,CRNA,, | Performed by: NURSE ANESTHETIST, CERTIFIED REGISTERED

## 2020-06-25 PROCEDURE — 37000008 HC ANESTHESIA 1ST 15 MINUTES: Mod: HCNC | Performed by: SURGERY

## 2020-06-25 PROCEDURE — 37000009 HC ANESTHESIA EA ADD 15 MINS: Mod: HCNC | Performed by: SURGERY

## 2020-06-25 PROCEDURE — 25000242 PHARM REV CODE 250 ALT 637 W/ HCPCS: Mod: HCNC | Performed by: NURSE ANESTHETIST, CERTIFIED REGISTERED

## 2020-06-25 RX ORDER — HYDROMORPHONE HYDROCHLORIDE 2 MG/ML
0.2 INJECTION, SOLUTION INTRAMUSCULAR; INTRAVENOUS; SUBCUTANEOUS EVERY 5 MIN PRN
Status: DISCONTINUED | OUTPATIENT
Start: 2020-06-25 | End: 2020-06-25 | Stop reason: HOSPADM

## 2020-06-25 RX ORDER — MIDAZOLAM HYDROCHLORIDE 1 MG/ML
INJECTION, SOLUTION INTRAMUSCULAR; INTRAVENOUS
Status: DISCONTINUED | OUTPATIENT
Start: 2020-06-25 | End: 2020-06-25

## 2020-06-25 RX ORDER — ACETAMINOPHEN 10 MG/ML
1000 INJECTION, SOLUTION INTRAVENOUS ONCE
Status: DISCONTINUED | OUTPATIENT
Start: 2020-06-25 | End: 2020-06-25 | Stop reason: HOSPADM

## 2020-06-25 RX ORDER — LIDOCAINE HYDROCHLORIDE 10 MG/ML
1 INJECTION, SOLUTION EPIDURAL; INFILTRATION; INTRACAUDAL; PERINEURAL ONCE
Status: DISCONTINUED | OUTPATIENT
Start: 2020-06-25 | End: 2022-09-26

## 2020-06-25 RX ORDER — LIDOCAINE HYDROCHLORIDE 10 MG/ML
1 INJECTION, SOLUTION EPIDURAL; INFILTRATION; INTRACAUDAL; PERINEURAL ONCE
Status: COMPLETED | OUTPATIENT
Start: 2020-06-25 | End: 2020-06-25

## 2020-06-25 RX ORDER — PHENYLEPHRINE HYDROCHLORIDE 10 MG/ML
INJECTION INTRAVENOUS
Status: DISCONTINUED | OUTPATIENT
Start: 2020-06-25 | End: 2020-06-25

## 2020-06-25 RX ORDER — FENTANYL CITRATE 50 UG/ML
25 INJECTION, SOLUTION INTRAMUSCULAR; INTRAVENOUS EVERY 5 MIN PRN
Status: DISCONTINUED | OUTPATIENT
Start: 2020-06-25 | End: 2020-06-25 | Stop reason: HOSPADM

## 2020-06-25 RX ORDER — ALBUTEROL SULFATE 90 UG/1
AEROSOL, METERED RESPIRATORY (INHALATION)
Status: DISCONTINUED | OUTPATIENT
Start: 2020-06-25 | End: 2020-06-25

## 2020-06-25 RX ORDER — CEFAZOLIN SODIUM 2 G/50ML
2 SOLUTION INTRAVENOUS
Status: COMPLETED | OUTPATIENT
Start: 2020-06-25 | End: 2020-06-25

## 2020-06-25 RX ORDER — ONDANSETRON 2 MG/ML
INJECTION INTRAMUSCULAR; INTRAVENOUS
Status: DISCONTINUED | OUTPATIENT
Start: 2020-06-25 | End: 2020-06-25

## 2020-06-25 RX ORDER — SODIUM CHLORIDE, SODIUM LACTATE, POTASSIUM CHLORIDE, CALCIUM CHLORIDE 600; 310; 30; 20 MG/100ML; MG/100ML; MG/100ML; MG/100ML
INJECTION, SOLUTION INTRAVENOUS CONTINUOUS
Status: DISCONTINUED | OUTPATIENT
Start: 2020-06-25 | End: 2022-09-26

## 2020-06-25 RX ORDER — ROCURONIUM BROMIDE 10 MG/ML
INJECTION, SOLUTION INTRAVENOUS
Status: DISCONTINUED | OUTPATIENT
Start: 2020-06-25 | End: 2020-06-25

## 2020-06-25 RX ORDER — FENTANYL CITRATE 50 UG/ML
INJECTION, SOLUTION INTRAMUSCULAR; INTRAVENOUS
Status: DISCONTINUED | OUTPATIENT
Start: 2020-06-25 | End: 2020-06-25

## 2020-06-25 RX ORDER — SUCCINYLCHOLINE CHLORIDE 20 MG/ML
INJECTION INTRAMUSCULAR; INTRAVENOUS
Status: DISCONTINUED | OUTPATIENT
Start: 2020-06-25 | End: 2020-06-25

## 2020-06-25 RX ORDER — SODIUM CHLORIDE, SODIUM LACTATE, POTASSIUM CHLORIDE, CALCIUM CHLORIDE 600; 310; 30; 20 MG/100ML; MG/100ML; MG/100ML; MG/100ML
INJECTION, SOLUTION INTRAVENOUS CONTINUOUS PRN
Status: DISCONTINUED | OUTPATIENT
Start: 2020-06-25 | End: 2020-06-25

## 2020-06-25 RX ORDER — PROPOFOL 10 MG/ML
VIAL (ML) INTRAVENOUS
Status: DISCONTINUED | OUTPATIENT
Start: 2020-06-25 | End: 2020-06-25

## 2020-06-25 RX ORDER — LIDOCAINE HYDROCHLORIDE 20 MG/ML
INJECTION INTRAVENOUS
Status: DISCONTINUED | OUTPATIENT
Start: 2020-06-25 | End: 2020-06-25

## 2020-06-25 RX ORDER — SODIUM CHLORIDE 9 MG/ML
INJECTION, SOLUTION INTRAVENOUS CONTINUOUS
Status: DISCONTINUED | OUTPATIENT
Start: 2020-06-25 | End: 2020-06-25 | Stop reason: HOSPADM

## 2020-06-25 RX ORDER — SODIUM CHLORIDE 0.9 % (FLUSH) 0.9 %
3 SYRINGE (ML) INJECTION
Status: DISCONTINUED | OUTPATIENT
Start: 2020-06-25 | End: 2020-06-25 | Stop reason: HOSPADM

## 2020-06-25 RX ADMIN — ALBUTEROL SULFATE 2 PUFF: 90 AEROSOL, METERED RESPIRATORY (INHALATION) at 01:06

## 2020-06-25 RX ADMIN — SUCCINYLCHOLINE CHLORIDE 160 MG: 20 INJECTION, SOLUTION INTRAMUSCULAR; INTRAVENOUS at 01:06

## 2020-06-25 RX ADMIN — FENTANYL CITRATE 25 MCG: 50 INJECTION, SOLUTION INTRAMUSCULAR; INTRAVENOUS at 02:06

## 2020-06-25 RX ADMIN — SODIUM CHLORIDE: 0.9 INJECTION, SOLUTION INTRAVENOUS at 10:06

## 2020-06-25 RX ADMIN — CEFAZOLIN SODIUM 2 G: 2 SOLUTION INTRAVENOUS at 01:06

## 2020-06-25 RX ADMIN — PROPOFOL 150 MG: 10 INJECTION, EMULSION INTRAVENOUS at 01:06

## 2020-06-25 RX ADMIN — PROPOFOL 20 MG: 10 INJECTION, EMULSION INTRAVENOUS at 01:06

## 2020-06-25 RX ADMIN — Medication 100 MG: at 01:06

## 2020-06-25 RX ADMIN — PHENYLEPHRINE HYDROCHLORIDE 200 MCG: 10 INJECTION INTRAVENOUS at 01:06

## 2020-06-25 RX ADMIN — FENTANYL CITRATE 50 MCG: 50 INJECTION INTRAMUSCULAR; INTRAVENOUS at 12:06

## 2020-06-25 RX ADMIN — ONDANSETRON 4 MG: 2 INJECTION, SOLUTION INTRAMUSCULAR; INTRAVENOUS at 01:06

## 2020-06-25 RX ADMIN — PHENYLEPHRINE HYDROCHLORIDE 100 MCG: 10 INJECTION INTRAVENOUS at 01:06

## 2020-06-25 RX ADMIN — MIDAZOLAM HYDROCHLORIDE 2 MG: 1 INJECTION, SOLUTION INTRAMUSCULAR; INTRAVENOUS at 12:06

## 2020-06-25 RX ADMIN — LIDOCAINE HYDROCHLORIDE 0.1 MG: 10 INJECTION, SOLUTION EPIDURAL; INFILTRATION; INTRACAUDAL; PERINEURAL at 10:06

## 2020-06-25 RX ADMIN — FENTANYL CITRATE 50 MCG: 50 INJECTION INTRAMUSCULAR; INTRAVENOUS at 01:06

## 2020-06-25 RX ADMIN — SODIUM CHLORIDE, SODIUM LACTATE, POTASSIUM CHLORIDE, AND CALCIUM CHLORIDE: .6; .31; .03; .02 INJECTION, SOLUTION INTRAVENOUS at 01:06

## 2020-06-25 RX ADMIN — PROPOFOL 50 MG: 10 INJECTION, EMULSION INTRAVENOUS at 01:06

## 2020-06-25 RX ADMIN — ROCURONIUM BROMIDE 5 MG: 10 INJECTION, SOLUTION INTRAVENOUS at 01:06

## 2020-06-25 NOTE — TRANSFER OF CARE
Anesthesia Transfer of Care Note    Patient: Thierry Ho    Procedure(s) Performed: Procedure(s) (LRB):  INCISION AND DRAINAGE, INGUINAL REGION (Right)  EXCISION, SKIN (N/A)    Patient location: PACU    Anesthesia Type: general    Transport from OR: Transported from OR on room air with adequate spontaneous ventilation    Post pain: adequate analgesia    Post assessment: no apparent anesthetic complications and tolerated procedure well    Post vital signs: stable    Level of consciousness: awake, alert and oriented    Nausea/Vomiting: no nausea/vomiting    Complications: none    Transfer of care protocol was followed      Last vitals:   Visit Vitals  BP (!) 154/80 (BP Location: Right arm, Patient Position: Sitting)   Pulse 78   Temp 37.1 °C (98.8 °F) (Oral)   Resp 16   Wt 105.5 kg (232 lb 9.4 oz)   SpO2 98%   BMI 32.44 kg/m²

## 2020-06-25 NOTE — DISCHARGE SUMMARY
[facility]  Discharge Note  Short Stay    Procedure(s) (LRB):  INCISION AND DRAINAGE, INGUINAL REGION (Right)  EXCISION, SKIN (N/A)    OUTCOME: Patient tolerated treatment/procedure well without complication and is now ready for discharge.    DISPOSITION: Home or Self Care    FINAL DIAGNOSIS:  Infected cyst of skin    FOLLOWUP: In clinic    DISCHARGE INSTRUCTIONS:    Discharge Procedure Orders   Notify your health care provider if you experience any of the following:  temperature >100.4     Notify your health care provider if you experience any of the following:  persistent nausea and vomiting or diarrhea     Notify your health care provider if you experience any of the following:  severe uncontrolled pain     Notify your health care provider if you experience any of the following:  redness, tenderness, or signs of infection (pain, swelling, redness, odor or green/yellow discharge around incision site)     Remove dressing in 24 hours   Order Comments: For back wound     Change dressing (specify)   Order Comments: Dressing change: 1 times per day using gauze.     Activity as tolerated

## 2020-06-25 NOTE — DISCHARGE INSTRUCTIONS
[facility]  Office # [phone]     Discharge Instructions for Same Day Surgery     Call the office for and appointment if one has not already been made.     Diet: Drink plenty of fluids the first 48 hours and you may resume your   usual diet.     Activity: No heavy lifting (over 10 pounds), pushing or pulling until your   post op visit. Your doctor's office may have told you to limit your lifting to less weight, or even no weight.  Be sure to follow those instructions.    Note: You may ride in a car and you may drive when comfortable.     Do not drive, drink alcohol, or sign legal documents for 24 hours, or if taking narcotic pain medication.    Dressings: Remove the dressing 24 hours after surgery. You may shower  24 hours after surgery and you may wash your hair.     If you have steri strips ( appears to be strips of white tape) on   your incision, leave them on. In 5-7 days they will begin to fall off.    Drains: If you have a drain, measure and record the drainage. Bring this information with you on your office visit.     Medical: Call the doctor for any of the following problems: fever above 101,   severe pain, bleeding, or abdominal distention (swelling).   If constipated you may take any stool softener you choose.     Occasionally small areas of skin numbness or an unpleasant skin sensation can result. Also, you may find that your incision is swollen and tender for a few days.  Some redness around sutures and staples is a normal reaction, but if the discomfort persists or worsens, call you doctor.       Fall Prevention  Millions of people fall every year and injure themselves. You may have had anesthesia or sedation which may increase your risk of falling. You may have health issues that put you at an increased risk of falling.     Here are ways to reduce your risk of falling.  ·   · Make your home safe by keeping walkways clear of objects you may trip over.  · Use non-slip pads under  rugs. Do not use area rugs or small throw rugs.  · Use non-slip mats in bathtubs and showers.  · Install handrails and lights on staircases.  · Do not walk in poorly lit areas.  · Do not stand on chairs or wobbly ladders.  · Use caution when reaching overhead or looking upward. This position can cause a loss of balance.  · Be sure your shoes fit properly, have non-slip bottoms and are in good condition.   · Wear shoes both inside and out. Avoid going barefoot or wearing slippers.  · Be cautious when going up and down stairs, curbs, and when walking on uneven sidewalks.  · If your balance is poor, consider using a cane or walker.  · If your fall was related to alcohol use, stop or limit alcohol intake.   · If your fall was related to use of sleeping medicines, talk to your doctor about this. You may need to reduce your dosage at bedtime if you awaken during the night to go to the bathroom.    · To reduce the need for nighttime bathroom trips:  ¨ Avoid drinking fluids for several hours before going to bed  ¨ Empty your bladder before going to bed  ¨ Men can keep a urinal at the bedside  · Stay as active as you can. Balance, flexibility, strength, and endurance all come from exercise. They all play a role in preventing falls. Ask your healthcare provider which types of activity are right for you.  · Get your vision checked on a regular basis.  · If you have pets, know where they are before you stand up or walk so you don't trip over them.  · Use night lights.

## 2020-06-25 NOTE — OP NOTE
.  [facility]  General Surgery  Operative Note    SUMMARY     Date of Procedure: 6/25/2020     Procedure: Procedure(s) (LRB):  INCISION AND DRAINAGE, INGUINAL REGION (Right)  EXCISION, SKIN (N/A)       Surgeon(s) and Role:     * Finesse Dumont MD - Primary    Assisting Surgeon: None    Pre-Operative Diagnosis: Infected cyst of skin [L72.9, L08.9]    Post-Operative Diagnosis: Post-Op Diagnosis Codes:     * Infected cyst of skin [L72.9, L08.9]    Anesthesia: Choice    Indications for Procedure:  60-year-old male with a history of infected inguinal abscess and an epidermal cyst on his back    Procedure in Detail:  Attention was 1st turned to the infected inguinal abscess.  After the patient was prepped and draped in standard surgical fashion, a 15 blade was used to incise the skin at the abscess site.  Purulence was expressed.  Sterile dressing was applied.    Patient was repositioned and re-prepped and draped for access to his mid back area.  Fifteen blade was again used to incise an elliptical fashion around the previously marked epidermal cyst.  The dissection continued circumferentially into the subcutaneous tissues and the cyst was amputated passed off the field.  The cyst measured approximately 4 x 5 cm.  Hemostasis was obtained using electrocautery.  Skin edges reapproximated using 3 0 Vicryl deep dermal stitches followed by 4 0 Vicryl subcuticular stitch.  Steri-Strips and sterile dressing were applied.  Patient tolerated the procedure well transported to recovery room in good condition    Significant Surgical Tasks Conducted by the Assistant(s), if Applicable:  Not applicable    Estimated Blood Loss (EBL):  Minimal           Implants: * No implants in log *    Specimens:   Specimen (12h ago, onward)    None                  Condition: Good    Disposition: PACU - hemodynamically stable.    Attestation: I was present and scrubbed for the entire procedure.

## 2020-06-25 NOTE — PLAN OF CARE
Preop plan of care reviewed.  Questions encouraged and questions answered. Pt verbalized readiness to proceed. Surgery desk notified need to clarify sites planned for surgery.

## 2020-06-25 NOTE — ANESTHESIA PREPROCEDURE EVALUATION
06/25/2020  Thierry Ho is a 60 y.o., male.    Anesthesia Evaluation     I have reviewed the Nursing Notes.       Review of Systems  Anesthesia Hx:  No problems with previous Anesthesia   Social:  Smoker    Cardiovascular:   Denies Pacemaker. Hypertension Past MI CAD    hyperlipidemia    Pulmonary:  Pulmonary Normal    Renal/:  Renal/ Normal     Hepatic/GI:   No Bowel Prep. Denies PUD. GERD Denies Liver Disease. Denies Hepatitis.    Neurological:  Neurology Normal    Endocrine:   Diabetes, type 2        Physical Exam  General:  Obesity    Airway/Jaw/Neck:  AIRWAY FINDINGS: Normal      Chest/Lungs:  Chest/Lungs Clear    Heart/Vascular:  Heart Findings: Normal       Mental Status:  Mental Status Findings: Normal        Anesthesia Plan  Type of Anesthesia, risks & benefits discussed:  Anesthesia Type:  general  Patient's Preference:   Intra-op Monitoring Plan: standard ASA monitors  Intra-op Monitoring Plan Comments:   Post Op Pain Control Plan:   Post Op Pain Control Plan Comments:   Induction:   IV  Beta Blocker:  Patient is on a Beta-Blocker and has received one dose within the past 24 hours (No further documentation required).       Informed Consent: Patient understands risks and agrees with Anesthesia plan.  Questions answered. Anesthesia consent signed with patient.  ASA Score: 2     Day of Surgery Review of History & Physical:  There are no significant changes.  H&P update referred to the provider.         Ready For Surgery From Anesthesia Perspective.

## 2020-06-25 NOTE — INTERVAL H&P NOTE
The patient has been examined and the H&P has been reviewed:    I concur with the findings and no changes have occurred since H&P was written.    Anesthesia/Surgery risks, benefits and alternative options discussed and understood by patient/family.          Active Hospital Problems    Diagnosis  POA    Infected cyst of skin [L72.9, L08.9]  Yes      Resolved Hospital Problems   No resolved problems to display.

## 2020-06-29 LAB
FINAL PATHOLOGIC DIAGNOSIS: NORMAL
GROSS: NORMAL

## 2020-06-30 ENCOUNTER — TELEPHONE (OUTPATIENT)
Dept: FAMILY MEDICINE | Facility: CLINIC | Age: 60
End: 2020-06-30

## 2020-06-30 ENCOUNTER — OUTPATIENT CASE MANAGEMENT (OUTPATIENT)
Dept: ADMINISTRATIVE | Facility: OTHER | Age: 60
End: 2020-06-30

## 2020-06-30 DIAGNOSIS — H91.90 HEARING LOSS, UNSPECIFIED HEARING LOSS TYPE, UNSPECIFIED LATERALITY: Primary | ICD-10-CM

## 2020-06-30 NOTE — TELEPHONE ENCOUNTER
Notified of information below. Patient verbalized understanding.   Phone number to referral team was given to pt.

## 2020-06-30 NOTE — TELEPHONE ENCOUNTER
----- Message from Jacques Hartman RN sent at 6/30/2020 11:52 AM CDT -----  Good Morning,      I spoke with Mr. Ho today for OPCM follow up. He reported that he was notified that he has some hearing loss and would like a referral to see an ENT.  If you feel this is an appropriate request can you place a referral for ENT.  Please feel free to contact the patient if you have any further questions.       Thank you!  Jacques Hartman RN

## 2020-06-30 NOTE — PROGRESS NOTES
Outpatient Care Management  Plan of Care Follow Up Visit    Patient: Thierry Ho  MRN: [id number]  Date of Service: 06/30/2020  Completed by: Jacques Hartman RN  Referral Date: 02/28/2020  Program: Disease Management (Diabetes & COPD)    Reason for Visit   Patient presents with    Case Closure     6/30/2020     Brief Summary: Contacted patient to complete case closure for OPCM. Patient reported that he is doing ok.  Reported that he did have a procedure to have 2 cysts lanced and stated that he longer has any pain to those sites. Reported that his BG has been running in the 200s and is not sure why.   Educated that stress on body from recent procedure may have an effect on his BG.  Verbalized understanding. Patient stated that he is having some trouble with his hearing and has had some hearing loss. Would like to see if he can get a referral to an ENT for further evaluation. Will send message to PCP with his request. Patient has been provided with education on recommended treatment and management of Diabetes. Understands the importance of checking BG readings, keeping a log of readings, attending appts with Endo, Podiatry, optometry/opthalmology and dental for assistance with disease management and prevent further complications. Patient has been educated on recommended BG range of . Patient appreciative of assistance. Patient has been educated on the importance of checking his BP as directed. Has also been educated on taking medications as directed, limiting sodium intake and notifying his PCP for consistently elevated BP readings. Patient denies any further needs. Will close case at this time as teaching needs have been met.       Patient Summary     Involvement of Care:  Do I have permission to speak with other family members about your care?   Yes, daughter Ivette Ho, [phone]    Patient Reported Labs & Vitals:  1.  Any Patient Reported Labs & Vitals?     2.  Patient Reported Blood Pressure:     3.   Patient Reported Pulse:     4.  Patient Reported Weight (Kg):     5.  Patient Reported Blood Glucose (mg/dl):   reported that he is running in the 200s    Medical and social history was reviewed with patient and/or caregiver.     Clinical Assessment     Reviewed and provided basic information on available community resources for mental health, transportation, wellness resources, and palliative care programs with patient and/or caregiver.     Complex Care Plan     Care plan was discussed and completed today with input from patient and/or caregiver.    Patient Instructions     Instructions were provided via the [employer] patient resources and are available for the patient to view on the patient portal.    Todays OPCM Self-Management Care Plan was developed with the patients/caregivers input and was based on identified barriers from todays assessment.  Goals were written today with the patient/caregiver and the patient has agreed to work towards these goals to improve his/her overall well-being. Patient verbalized understanding of the care plan, goals, and all of today's instructions. Encouraged patient/caregiver to communicate with his/her physician and health care team about health conditions and the treatment plan.  Provided my contact information today and encouraged patient/caregiver to call me with any questions as needed.

## 2020-07-09 ENCOUNTER — OFFICE VISIT (OUTPATIENT)
Dept: SURGERY | Facility: CLINIC | Age: 60
End: 2020-07-09
Payer: MEDICARE

## 2020-07-09 VITALS
SYSTOLIC BLOOD PRESSURE: 154 MMHG | BODY MASS INDEX: 32.48 KG/M2 | DIASTOLIC BLOOD PRESSURE: 95 MMHG | HEIGHT: 71 IN | WEIGHT: 232 LBS | HEART RATE: 82 BPM | TEMPERATURE: 98 F

## 2020-07-09 DIAGNOSIS — L72.0 EPIDERMAL CYST: Primary | ICD-10-CM

## 2020-07-09 PROCEDURE — 99999 PR PBB SHADOW E&M-EST. PATIENT-LVL III: CPT | Mod: PBBFAC,HCNC,, | Performed by: SURGERY

## 2020-07-09 PROCEDURE — 99212 PR OFFICE/OUTPT VISIT, EST, LEVL II, 10-19 MIN: ICD-10-PCS | Mod: HCNC,S$GLB,, | Performed by: SURGERY

## 2020-07-09 PROCEDURE — 99999 PR PBB SHADOW E&M-EST. PATIENT-LVL III: ICD-10-PCS | Mod: PBBFAC,HCNC,, | Performed by: SURGERY

## 2020-07-09 PROCEDURE — 99212 OFFICE O/P EST SF 10 MIN: CPT | Mod: HCNC,S$GLB,, | Performed by: SURGERY

## 2020-07-09 NOTE — PROGRESS NOTES
Post  Op cyst and abscess I&D denies any complaints at this time    Pe: incisions c/d/i no evidence of infection    planL f/u as needed

## 2020-07-10 DIAGNOSIS — E11.65 UNCONTROLLED TYPE 2 DIABETES MELLITUS WITH HYPERGLYCEMIA: ICD-10-CM

## 2020-07-13 ENCOUNTER — OUTPATIENT CASE MANAGEMENT (OUTPATIENT)
Dept: ADMINISTRATIVE | Facility: OTHER | Age: 60
End: 2020-07-13

## 2020-07-13 ENCOUNTER — OFFICE VISIT (OUTPATIENT)
Dept: ORTHOPEDICS | Facility: CLINIC | Age: 60
End: 2020-07-13
Payer: MEDICARE

## 2020-07-13 VITALS
HEIGHT: 71 IN | BODY MASS INDEX: 33.02 KG/M2 | HEART RATE: 84 BPM | DIASTOLIC BLOOD PRESSURE: 86 MMHG | SYSTOLIC BLOOD PRESSURE: 140 MMHG | TEMPERATURE: 98 F | WEIGHT: 235.88 LBS | OXYGEN SATURATION: 96 % | RESPIRATION RATE: 18 BRPM

## 2020-07-13 DIAGNOSIS — S82.821A CLOSED TORUS FRACTURE OF DISTAL END OF RIGHT FIBULA, INITIAL ENCOUNTER: Primary | ICD-10-CM

## 2020-07-13 PROCEDURE — 3079F DIAST BP 80-89 MM HG: CPT | Mod: HCNC,CPTII,S$GLB, | Performed by: ORTHOPAEDIC SURGERY

## 2020-07-13 PROCEDURE — 99999 PR PBB SHADOW E&M-EST. PATIENT-LVL IV: CPT | Mod: PBBFAC,HCNC,, | Performed by: ORTHOPAEDIC SURGERY

## 2020-07-13 PROCEDURE — 3008F PR BODY MASS INDEX (BMI) DOCUMENTED: ICD-10-PCS | Mod: HCNC,CPTII,S$GLB, | Performed by: ORTHOPAEDIC SURGERY

## 2020-07-13 PROCEDURE — 99999 PR PBB SHADOW E&M-EST. PATIENT-LVL IV: ICD-10-PCS | Mod: PBBFAC,HCNC,, | Performed by: ORTHOPAEDIC SURGERY

## 2020-07-13 PROCEDURE — 99213 OFFICE O/P EST LOW 20 MIN: CPT | Mod: HCNC,S$GLB,, | Performed by: ORTHOPAEDIC SURGERY

## 2020-07-13 PROCEDURE — 3077F SYST BP >= 140 MM HG: CPT | Mod: HCNC,CPTII,S$GLB, | Performed by: ORTHOPAEDIC SURGERY

## 2020-07-13 PROCEDURE — 3077F PR MOST RECENT SYSTOLIC BLOOD PRESSURE >= 140 MM HG: ICD-10-PCS | Mod: HCNC,CPTII,S$GLB, | Performed by: ORTHOPAEDIC SURGERY

## 2020-07-13 PROCEDURE — 3008F BODY MASS INDEX DOCD: CPT | Mod: HCNC,CPTII,S$GLB, | Performed by: ORTHOPAEDIC SURGERY

## 2020-07-13 PROCEDURE — 99213 PR OFFICE/OUTPT VISIT, EST, LEVL III, 20-29 MIN: ICD-10-PCS | Mod: HCNC,S$GLB,, | Performed by: ORTHOPAEDIC SURGERY

## 2020-07-13 PROCEDURE — 3079F PR MOST RECENT DIASTOLIC BLOOD PRESSURE 80-89 MM HG: ICD-10-PCS | Mod: HCNC,CPTII,S$GLB, | Performed by: ORTHOPAEDIC SURGERY

## 2020-07-13 NOTE — PROGRESS NOTES
"Postoperative Visit     History of Present Illness:   Thierry is 12 months s/p right fibula ORIF (DOS-7/17/19)   Found PT to be painful  Has some stiffness and pain in both ankles when he wakes up in the AM that improves with activity.   Walking w cane   Left is more bothersome than right - had pain in this ankle before I saw him for the right ankle. History of left ankle ORIF  Is doing HEP - DF stretching with a rope. He only does this twice per month     Physical Examination:    Vitals:    07/13/20 0842   BP: (!) 140/86   Pulse: 84   Resp: 18   Temp: 97.8 °F (36.6 °C)   SpO2: 96%   Weight: 107 kg (235 lb 14.3 oz)   Height: 5' 11" (1.803 m)   PainSc: 0-No pain      NAD  right lower extremity:  Walks with cane for chronic left ankle pain  Incision over the lateral ankle is well healed   No signs of infection  Non tender over lateral malleolus, posterior and medial ankle   DF to neutral   Ltsi s/s/sp/dp/t  + ehl/fhl/ta/gs  2+ DP     Imaging: 3 views right ankle: hardware in place. Fracture healed. Synostosis formed between tibia and fibula. Lucencies surrounding syndesmosis screws      Vitamin D WNL     Assessment/Plan:  59 y.o. male with DM, smoker  12 months s/p right fibula ORIF with syndesmosis screws for additional fixation (DOS-7/17/19)      Plan  1. Tylenol PRN pain   2. Activity as tolerated   3. Discussed importance of smoking cessation. Declines referral to smoking cessation  4. Pain is likely related to stiffness - encouraged regular DF stretching. Will not get better with stretches performed twice a month. Gave additional exercises to do in addition to stretching with the rope that he is already doing   5. RTC PRN      All questions were answered in detail. The patient is in full agreement with the treatment plan and will proceed accordingly.    "

## 2020-07-13 NOTE — PROGRESS NOTES
7/13/2020-Chart review completed. Patient with recent OPCM enrollment with case closure within past 3 months, closure date on 6/30/2020. Per chart review, no new additional needs identified. Will close case at this time.   Jacques Hartman RN  Outpatient Care Management

## 2020-07-13 NOTE — PATIENT INSTRUCTIONS
Stretching is the best treatment for plantar fasciitis. It may help to try to keep weight off your foot until the initial inflammation goes away. You can also apply ice to the sore area for 20 minutes three or four times a day to relieve your symptoms. Often a doctor will prescribe a nonsteroidal anti-inflammatory medication such as ibuprofen or naproxen. Home exercises to stretch your Achilles tendon and plantar fascia are the mainstay of treatment and reduce the chance of recurrence.    In one exercise, you lean forward against a wall with one knee straight and heel on the ground. Your other knee is bent. Your heel cord and foot arch stretch as you lean. Hold for 10 seconds, relax and straighten up. Repeat 20 times for each sore heel. It is important to keep the knee fully extended on the side being stretched.     Stretch for plantar fasciitisIn another exercise, you lean forward onto a countertop, spreading your feet apart with one foot in front of the other. Flex your knees and squat down, keeping your heels on the ground as long as possible. Your heel cords and foot arches will stretch as the heels come up in the stretch. Hold for 10 seconds, relax and straighten up. Repeat 20 times.    About 90 percent of people with plantar fasciitis improve significantly after two months of initial treatment. You may be advised to use shoes with shock-absorbing soles or fitted with an off-the-shelf shoe insert device like a rubber heel pad. Your foot may be taped into a specific position.    If your plantar fasciitis continues after a few months of conservative treatment, your doctor may inject your heel with steroidal anti-inflammatory medication.If you still have symptoms, you may need to wear a walking cast for two to three weeks or a positional splint when you sleep. In a few cases, surgery is needed for chronically contracted tissue.     Additional Stretch: Achilles Tendon Stretch    1.) Place a shoe insert under your  affected foot.  2.) Place your affected leg behind your unaffected leg with the toes of your back foot pointed towards the heel of your other foot.  3.) Lean into the wall.  4.) Bend your front knee while keeping your back leg straight with your heel firmly on the ground.  5.) Hold the stretch for a count of 10. A set is 10 repetitions.  6.) Perform the stretch at least three times a day.

## 2020-07-17 ENCOUNTER — PATIENT OUTREACH (OUTPATIENT)
Dept: ADMINISTRATIVE | Facility: HOSPITAL | Age: 60
End: 2020-07-17

## 2020-07-17 ENCOUNTER — OFFICE VISIT (OUTPATIENT)
Dept: FAMILY MEDICINE | Facility: CLINIC | Age: 60
End: 2020-07-17
Payer: MEDICARE

## 2020-07-17 VITALS
OXYGEN SATURATION: 97 % | SYSTOLIC BLOOD PRESSURE: 125 MMHG | RESPIRATION RATE: 16 BRPM | HEIGHT: 71 IN | WEIGHT: 237 LBS | HEART RATE: 79 BPM | TEMPERATURE: 99 F | DIASTOLIC BLOOD PRESSURE: 83 MMHG | BODY MASS INDEX: 33.18 KG/M2

## 2020-07-17 DIAGNOSIS — E11.65 UNCONTROLLED TYPE 2 DIABETES MELLITUS WITH HYPERGLYCEMIA: ICD-10-CM

## 2020-07-17 PROCEDURE — 99214 PR OFFICE/OUTPT VISIT, EST, LEVL IV, 30-39 MIN: ICD-10-PCS | Mod: HCNC,S$GLB,, | Performed by: FAMILY MEDICINE

## 2020-07-17 PROCEDURE — 3074F SYST BP LT 130 MM HG: CPT | Mod: HCNC,CPTII,S$GLB, | Performed by: FAMILY MEDICINE

## 2020-07-17 PROCEDURE — 3046F HEMOGLOBIN A1C LEVEL >9.0%: CPT | Mod: HCNC,CPTII,S$GLB, | Performed by: FAMILY MEDICINE

## 2020-07-17 PROCEDURE — 3008F BODY MASS INDEX DOCD: CPT | Mod: HCNC,CPTII,S$GLB, | Performed by: FAMILY MEDICINE

## 2020-07-17 PROCEDURE — 99999 PR PBB SHADOW E&M-EST. PATIENT-LVL III: ICD-10-PCS | Mod: PBBFAC,HCNC,, | Performed by: FAMILY MEDICINE

## 2020-07-17 PROCEDURE — 3079F DIAST BP 80-89 MM HG: CPT | Mod: HCNC,CPTII,S$GLB, | Performed by: FAMILY MEDICINE

## 2020-07-17 PROCEDURE — 99214 OFFICE O/P EST MOD 30 MIN: CPT | Mod: HCNC,S$GLB,, | Performed by: FAMILY MEDICINE

## 2020-07-17 PROCEDURE — 3074F PR MOST RECENT SYSTOLIC BLOOD PRESSURE < 130 MM HG: ICD-10-PCS | Mod: HCNC,CPTII,S$GLB, | Performed by: FAMILY MEDICINE

## 2020-07-17 PROCEDURE — 3046F PR MOST RECENT HEMOGLOBIN A1C LEVEL > 9.0%: ICD-10-PCS | Mod: HCNC,CPTII,S$GLB, | Performed by: FAMILY MEDICINE

## 2020-07-17 PROCEDURE — 3079F PR MOST RECENT DIASTOLIC BLOOD PRESSURE 80-89 MM HG: ICD-10-PCS | Mod: HCNC,CPTII,S$GLB, | Performed by: FAMILY MEDICINE

## 2020-07-17 PROCEDURE — 3008F PR BODY MASS INDEX (BMI) DOCUMENTED: ICD-10-PCS | Mod: HCNC,CPTII,S$GLB, | Performed by: FAMILY MEDICINE

## 2020-07-17 PROCEDURE — 99999 PR PBB SHADOW E&M-EST. PATIENT-LVL III: CPT | Mod: PBBFAC,HCNC,, | Performed by: FAMILY MEDICINE

## 2020-07-17 NOTE — PROGRESS NOTES
Subjective:       Patient ID: Thierry Ho is a 60 y.o. male.    Chief Complaint: 1 MO F/U      HPI  60-year-old male presents for diabetes follow-up.  Patient had surgery on cyst as well as abscess.  Patient states he is doing well after the surgery.  Brought his blood sugar journal visit.  Most sugars are in 250 range.  Patient is doing well with his insulin.  Denies any episodes hypoglycemia.      Review of Systems   Constitutional: Negative.    HENT: Negative.    Respiratory: Negative.    Cardiovascular: Negative.    Gastrointestinal: Negative.    Endocrine: Negative.    Genitourinary: Negative.    Musculoskeletal: Negative.    Neurological: Negative.    Psychiatric/Behavioral: Negative.           Past Medical History:   Diagnosis Date    Diabetes mellitus     GERD (gastroesophageal reflux disease)     Hyperlipidemia     Hypertension     Myocardial infarction      Past Surgical History:   Procedure Laterality Date    APPENDECTOMY      BACK SURGERY  11/2017    COLONOSCOPY N/A 10/19/2018    Procedure: COLONOSCOPY;  Surgeon: Frantz Rasmussen MD;  Location: [facility];  Service: Endoscopy;  Laterality: N/A;    EXCISION OF SKIN N/A 6/25/2020    Procedure: EXCISION, SKIN;  Surgeon: Finesse Dumont MD;  Location: [facility] OR;  Service: General;  Laterality: N/A;  on back    FOOT SURGERY      INCISION AND DRAINAGE OF GROIN Right 6/25/2020    Procedure: INCISION AND DRAINAGE, INGUINAL REGION;  Surgeon: Finesse Dumont MD;  Location: [facility] OR;  Service: General;  Laterality: Right;  PT TO PREOP IN AM  PRE-OP BY RN 6-    OPEN REDUCTION AND INTERNAL FIXATION (ORIF) OF INJURY OF ANKLE Right 7/17/2019    Procedure: ORIF, ANKLE;  Surgeon: Raeann Steward MD;  Location: [facility] OR;  Service: Orthopedics;  Laterality: Right;  SARITA MARLEYEL ADAMA  [phone] TEXTED HIM @ 10:2 AM ON 7-11-19  SKELETAL  RN PREOP 7/9/19---- CMP MORNING OF SURGERY PER DR STEWARD     Family History   Problem Relation Age of Onset     "Heart disease Mother     Diabetes Father      Social History     Socioeconomic History    Marital status:      Spouse name: Not on file    Number of children: Not on file    Years of education: Not on file    Highest education level: Not on file   Occupational History    Not on file   Social Needs    Financial resource strain: Not on file    Food insecurity     Worry: Not on file     Inability: Not on file    Transportation needs     Medical: Not on file     Non-medical: Not on file   Tobacco Use    Smoking status: Current Every Day Smoker     Packs/day: 0.25     Years: 40.00     Pack years: 10.00     Types: Cigarettes     Start date: 1/15/1981    Smokeless tobacco: Never Used   Substance and Sexual Activity    Alcohol use: Yes     Alcohol/week: 4.0 standard drinks     Types: 4 Cans of beer per week     Comment: social     Drug use: Yes     Types: Marijuana    Sexual activity: Not on file   Lifestyle    Physical activity     Days per week: Not on file     Minutes per session: Not on file    Stress: Not on file   Relationships    Social connections     Talks on phone: Not on file     Gets together: Not on file     Attends Restoration service: Not on file     Active member of club or organization: Not on file     Attends meetings of clubs or organizations: Not on file     Relationship status: Not on file   Other Topics Concern    Not on file   Social History Narrative    Not on file       Current Outpatient Medications:     acetaminophen (TYLENOL) 500 MG tablet, Take 500 mg by mouth every 6 (six) hours as needed for Pain., Disp: , Rfl:     aspirin 81 MG Chew, Take 1 tablet (81 mg total) by mouth once daily., Disp: , Rfl: 0    BD ULTRA-FINE FREDA PEN NEEDLE 32 gauge x 5/32" Ndle, U ONCE D UTD, Disp: 100 each, Rfl: 6    BLOOD PRESSURE CUFF Misc, 1 kit by Misc.(Non-Drug; Combo Route) route once daily., Disp: 1 each, Rfl: 0    blood sugar diagnostic Strp, 1 each by abdominal subcutaneous route " "4 (four) times daily before meals and nightly., Disp: 200 each, Rfl: 3    blood-glucose meter kit, 1 each by Other route 4 (four) times daily before meals and nightly., Disp: 1 each, Rfl: 0    carvedilol (COREG) 25 MG tablet, Take 1 tablet (25 mg total) by mouth 2 (two) times daily with meals., Disp: 180 tablet, Rfl: 3    fluocinonide (LIDEX) 0.05 % external solution, APPLY 1 ML TO THE SCALP AT BEDTIME, Disp: , Rfl: 3    ibuprofen (ADVIL,MOTRIN) 600 MG tablet, Take 1 tablet (600 mg total) by mouth every 8 (eight) hours as needed for Pain., Disp: 20 tablet, Rfl: 0    LEVEMIR FLEXTOUCH U-100 INSULN 100 unit/mL (3 mL) InPn pen, Inject 20 Units into the skin 2 (two) times daily., Disp: 36 mL, Rfl: 1    lisinopriL (PRINIVIL,ZESTRIL) 20 MG tablet, Take 1 tablet (20 mg total) by mouth once daily., Disp: 90 tablet, Rfl: 3    metFORMIN (GLUCOPHAGE) 1000 MG tablet, Take 1 tablet (1,000 mg total) by mouth 2 (two) times daily with meals., Disp: 180 tablet, Rfl: 1    simvastatin (ZOCOR) 40 MG tablet, TAKE 1 TABLET(40 MG) BY MOUTH EVERY EVENING, Disp: 90 tablet, Rfl: 0    TRUEPLUS LANCETS 33 gauge Misc, , Disp: , Rfl:     amoxicillin (AMOXIL) 875 MG tablet, Take 875 mg by mouth every 12 (twelve) hours. , Disp: , Rfl:   No current facility-administered medications for this visit.     Facility-Administered Medications Ordered in Other Visits:     lactated ringers infusion, , Intravenous, Continuous, Violeta Vides MD    lactated ringers infusion, , Intravenous, Continuous, Landry Peters MD    lidocaine (PF) 10 mg/ml (1%) injection 10 mg, 1 mL, Intradermal, Once, Viloeta Vides MD    lidocaine (PF) 10 mg/ml (1%) injection 10 mg, 1 mL, Intradermal, Once, Landry Peters MD   Objective:      Vitals:    07/17/20 0829   BP: 125/83   Pulse: 79   Resp: 16   Temp: 98.5 °F (36.9 °C)   TempSrc: Oral   SpO2: 97%   Weight: 107.5 kg (236 lb 15.9 oz)   Height: 5' 11" (1.803 m)       Physical Exam  Constitutional:       " General: He is not in acute distress.  HENT:      Head: Normocephalic and atraumatic.   Eyes:      Conjunctiva/sclera: Conjunctivae normal.   Neck:      Musculoskeletal: Neck supple.   Cardiovascular:      Rate and Rhythm: Normal rate and regular rhythm.      Heart sounds: Normal heart sounds. No murmur. No friction rub. No gallop.    Pulmonary:      Effort: Pulmonary effort is normal.      Breath sounds: Normal breath sounds. No wheezing or rales.   Skin:     General: Skin is warm and dry.   Neurological:      Mental Status: He is alert and oriented to person, place, and time.   Psychiatric:         Behavior: Behavior normal.         Thought Content: Thought content normal.         Judgment: Judgment normal.            Assessment:       1. Uncontrolled type 2 diabetes mellitus with hyperglycemia        Plan:       Uncontrolled type 2 diabetes mellitus with hyperglycemia  -     blood sugar diagnostic Strp; 1 each by abdominal subcutaneous route 4 (four) times daily before meals and nightly.  Dispense: 200 each; Refill: 3    advised pt to increase levemir to 25u BID from 20u. Recheck in 2 weeks w/ bs journal    Epi cyst- resolved  Groin abscess- resolved.              Sterling Jarquin MD      Patient note was created using [employer].  Any errors in syntax or even information may not have been identified and edited on initial review prior to signing this note.

## 2020-07-31 ENCOUNTER — OFFICE VISIT (OUTPATIENT)
Dept: FAMILY MEDICINE | Facility: CLINIC | Age: 60
End: 2020-07-31
Payer: MEDICARE

## 2020-07-31 VITALS
BODY MASS INDEX: 33.04 KG/M2 | DIASTOLIC BLOOD PRESSURE: 78 MMHG | HEART RATE: 77 BPM | WEIGHT: 236 LBS | TEMPERATURE: 98 F | OXYGEN SATURATION: 97 % | HEIGHT: 71 IN | SYSTOLIC BLOOD PRESSURE: 130 MMHG | RESPIRATION RATE: 20 BRPM

## 2020-07-31 DIAGNOSIS — E11.65 UNCONTROLLED TYPE 2 DIABETES MELLITUS WITH HYPERGLYCEMIA: Primary | ICD-10-CM

## 2020-07-31 PROCEDURE — 3078F DIAST BP <80 MM HG: CPT | Mod: HCNC,CPTII,S$GLB, | Performed by: FAMILY MEDICINE

## 2020-07-31 PROCEDURE — 99999 PR PBB SHADOW E&M-EST. PATIENT-LVL IV: ICD-10-PCS | Mod: PBBFAC,HCNC,, | Performed by: FAMILY MEDICINE

## 2020-07-31 PROCEDURE — 3046F HEMOGLOBIN A1C LEVEL >9.0%: CPT | Mod: HCNC,CPTII,S$GLB, | Performed by: FAMILY MEDICINE

## 2020-07-31 PROCEDURE — 3075F PR MOST RECENT SYSTOLIC BLOOD PRESS GE 130-139MM HG: ICD-10-PCS | Mod: HCNC,CPTII,S$GLB, | Performed by: FAMILY MEDICINE

## 2020-07-31 PROCEDURE — 3078F PR MOST RECENT DIASTOLIC BLOOD PRESSURE < 80 MM HG: ICD-10-PCS | Mod: HCNC,CPTII,S$GLB, | Performed by: FAMILY MEDICINE

## 2020-07-31 PROCEDURE — 3046F PR MOST RECENT HEMOGLOBIN A1C LEVEL > 9.0%: ICD-10-PCS | Mod: HCNC,CPTII,S$GLB, | Performed by: FAMILY MEDICINE

## 2020-07-31 PROCEDURE — 99214 PR OFFICE/OUTPT VISIT, EST, LEVL IV, 30-39 MIN: ICD-10-PCS | Mod: HCNC,S$GLB,, | Performed by: FAMILY MEDICINE

## 2020-07-31 PROCEDURE — 3008F BODY MASS INDEX DOCD: CPT | Mod: HCNC,CPTII,S$GLB, | Performed by: FAMILY MEDICINE

## 2020-07-31 PROCEDURE — 3075F SYST BP GE 130 - 139MM HG: CPT | Mod: HCNC,CPTII,S$GLB, | Performed by: FAMILY MEDICINE

## 2020-07-31 PROCEDURE — 99999 PR PBB SHADOW E&M-EST. PATIENT-LVL IV: CPT | Mod: PBBFAC,HCNC,, | Performed by: FAMILY MEDICINE

## 2020-07-31 PROCEDURE — 3008F PR BODY MASS INDEX (BMI) DOCUMENTED: ICD-10-PCS | Mod: HCNC,CPTII,S$GLB, | Performed by: FAMILY MEDICINE

## 2020-07-31 PROCEDURE — 99214 OFFICE O/P EST MOD 30 MIN: CPT | Mod: HCNC,S$GLB,, | Performed by: FAMILY MEDICINE

## 2020-07-31 RX ORDER — INSULIN DETEMIR 100 [IU]/ML
28 INJECTION, SOLUTION SUBCUTANEOUS 2 TIMES DAILY
Qty: 50.4 ML | Refills: 1 | Status: SHIPPED | OUTPATIENT
Start: 2020-07-31 | End: 2020-11-13 | Stop reason: SDUPTHER

## 2020-07-31 NOTE — PROGRESS NOTES
Subjective:       Patient ID: Thierry Ho is a 60 y.o. male.    Chief Complaint: Diabetes      HPI  60-year-old male presents for diabetes follow-up.  Patient brought his blood sugar journal.  Patient's a.m. glucose steadily improved over last week.  Initially was in the high 200s and decreased to high 100s.  Patient is on 25 units of Levemir twice a day.  States he is eating 1 time a day.  Denies any other issues.      Review of Systems   Constitutional: Negative.    HENT: Negative.    Respiratory: Negative.    Cardiovascular: Negative.    Gastrointestinal: Negative.    Endocrine: Negative.    Genitourinary: Negative.    Musculoskeletal: Negative.    Neurological: Negative.    Psychiatric/Behavioral: Negative.           Past Medical History:   Diagnosis Date    Diabetes mellitus     GERD (gastroesophageal reflux disease)     Hyperlipidemia     Hypertension     Myocardial infarction      Past Surgical History:   Procedure Laterality Date    APPENDECTOMY      BACK SURGERY  11/2017    COLONOSCOPY N/A 10/19/2018    Procedure: COLONOSCOPY;  Surgeon: Frantz Rasmussen MD;  Location: [facility] ENDO;  Service: Endoscopy;  Laterality: N/A;    EXCISION OF SKIN N/A 6/25/2020    Procedure: EXCISION, SKIN;  Surgeon: Finesse Dumont MD;  Location: [facility] OR;  Service: General;  Laterality: N/A;  on back    FOOT SURGERY      INCISION AND DRAINAGE OF GROIN Right 6/25/2020    Procedure: INCISION AND DRAINAGE, INGUINAL REGION;  Surgeon: Finesse Dumont MD;  Location: [facility] OR;  Service: General;  Laterality: Right;  PT TO PREOP IN AM  PRE-OP BY RN 6-    OPEN REDUCTION AND INTERNAL FIXATION (ORIF) OF INJURY OF ANKLE Right 7/17/2019    Procedure: ORIF, ANKLE;  Surgeon: Raeann Steward MD;  Location: [facility] OR;  Service: Orthopedics;  Laterality: Right;  SARITA  CRISTOFER GALAN  [phone] TEXTED HIM @ 10:2 AM ON 7-11-19  SKELETAL  RN PREOP 7/9/19---- CMP MORNING OF SURGERY PER DR STEWARD     Family History   Problem  "Relation Age of Onset    Heart disease Mother     Diabetes Father      Social History     Socioeconomic History    Marital status:      Spouse name: Not on file    Number of children: Not on file    Years of education: Not on file    Highest education level: Not on file   Occupational History    Not on file   Social Needs    Financial resource strain: Not on file    Food insecurity     Worry: Not on file     Inability: Not on file    Transportation needs     Medical: Not on file     Non-medical: Not on file   Tobacco Use    Smoking status: Current Every Day Smoker     Packs/day: 0.25     Years: 40.00     Pack years: 10.00     Types: Cigarettes     Start date: 1/15/1981    Smokeless tobacco: Never Used   Substance and Sexual Activity    Alcohol use: Yes     Alcohol/week: 4.0 standard drinks     Types: 4 Cans of beer per week     Comment: social     Drug use: Yes     Types: Marijuana    Sexual activity: Not on file   Lifestyle    Physical activity     Days per week: Not on file     Minutes per session: Not on file    Stress: Not on file   Relationships    Social connections     Talks on phone: Not on file     Gets together: Not on file     Attends Oriental orthodox service: Not on file     Active member of club or organization: Not on file     Attends meetings of clubs or organizations: Not on file     Relationship status: Not on file   Other Topics Concern    Not on file   Social History Narrative    Not on file       Current Outpatient Medications:     acetaminophen (TYLENOL) 500 MG tablet, Take 500 mg by mouth every 6 (six) hours as needed for Pain., Disp: , Rfl:     BD ULTRA-FINE FREDA PEN NEEDLE 32 gauge x 5/32" Ndle, U ONCE D UTD, Disp: 100 each, Rfl: 6    BLOOD PRESSURE CUFF Misc, 1 kit by Misc.(Non-Drug; Combo Route) route once daily., Disp: 1 each, Rfl: 0    blood sugar diagnostic Strp, 1 each by abdominal subcutaneous route 4 (four) times daily before meals and nightly., Disp: 200 " each, Rfl: 3    blood-glucose meter kit, 1 each by Other route 4 (four) times daily before meals and nightly., Disp: 1 each, Rfl: 0    carvedilol (COREG) 25 MG tablet, Take 1 tablet (25 mg total) by mouth 2 (two) times daily with meals., Disp: 180 tablet, Rfl: 3    fluocinonide (LIDEX) 0.05 % external solution, APPLY 1 ML TO THE SCALP AT BEDTIME, Disp: , Rfl: 3    ibuprofen (ADVIL,MOTRIN) 600 MG tablet, Take 1 tablet (600 mg total) by mouth every 8 (eight) hours as needed for Pain., Disp: 20 tablet, Rfl: 0    LEVEMIR FLEXTOUCH U-100 INSULN 100 unit/mL (3 mL) InPn pen, Inject 28 Units into the skin 2 (two) times daily., Disp: 50.4 mL, Rfl: 1    lisinopriL (PRINIVIL,ZESTRIL) 20 MG tablet, Take 1 tablet (20 mg total) by mouth once daily., Disp: 90 tablet, Rfl: 3    metFORMIN (GLUCOPHAGE) 1000 MG tablet, Take 1 tablet (1,000 mg total) by mouth 2 (two) times daily with meals., Disp: 180 tablet, Rfl: 1    simvastatin (ZOCOR) 40 MG tablet, TAKE 1 TABLET(40 MG) BY MOUTH EVERY EVENING, Disp: 90 tablet, Rfl: 0    TRUEPLUS LANCETS 33 gauge Misc, , Disp: , Rfl:     amoxicillin (AMOXIL) 875 MG tablet, Take 875 mg by mouth every 12 (twelve) hours. , Disp: , Rfl:     aspirin 81 MG Chew, Take 1 tablet (81 mg total) by mouth once daily., Disp: , Rfl: 0  No current facility-administered medications for this visit.     Facility-Administered Medications Ordered in Other Visits:     lactated ringers infusion, , Intravenous, Continuous, Violeta Vides MD    lactated ringers infusion, , Intravenous, Continuous, Landry Peters MD    lidocaine (PF) 10 mg/ml (1%) injection 10 mg, 1 mL, Intradermal, Once, Violeta Vides MD    lidocaine (PF) 10 mg/ml (1%) injection 10 mg, 1 mL, Intradermal, Once, Landry Peters MD   Objective:      Vitals:    07/31/20 1509   BP: 130/78   BP Location: Left arm   Patient Position: Sitting   BP Method: Large (Manual)   Pulse: 77   Resp: 20   Temp: 98.4 °F (36.9 °C)   TempSrc: Oral   SpO2:  "97%   Weight: 107 kg (236 lb)   Height: 5' 11" (1.803 m)       Physical Exam  Constitutional:       General: He is not in acute distress.  HENT:      Head: Normocephalic and atraumatic.   Eyes:      Conjunctiva/sclera: Conjunctivae normal.   Neck:      Musculoskeletal: Neck supple.   Cardiovascular:      Rate and Rhythm: Normal rate and regular rhythm.      Heart sounds: Normal heart sounds. No murmur. No friction rub. No gallop.    Pulmonary:      Effort: Pulmonary effort is normal.      Breath sounds: Normal breath sounds. No wheezing or rales.   Skin:     General: Skin is warm and dry.   Neurological:      Mental Status: He is alert and oriented to person, place, and time.   Psychiatric:         Behavior: Behavior normal.         Thought Content: Thought content normal.         Judgment: Judgment normal.            Assessment:       1. Uncontrolled type 2 diabetes mellitus with hyperglycemia        Plan:       Uncontrolled type 2 diabetes mellitus with hyperglycemia  -     LEVEMIR FLEXTOUCH U-100 INSULN 100 unit/mL (3 mL) InPn pen; Inject 28 Units into the skin 2 (two) times daily.  Dispense: 50.4 mL; Refill: 1  -     Hemoglobin A1C; Future; Expected date: 07/31/2020  -     Comprehensive metabolic panel; Future; Expected date: 07/31/2020    increased levemir to 28u bid. Advised to eat 3 small meals daily. F/u in 1 month w/ a1c and cmp.            Patient note was created using [employer].  Any errors in syntax or even information may not have been identified and edited on initial review prior to signing this note.    "

## 2020-08-26 ENCOUNTER — PATIENT OUTREACH (OUTPATIENT)
Dept: ADMINISTRATIVE | Facility: HOSPITAL | Age: 60
End: 2020-08-26

## 2020-08-28 ENCOUNTER — OFFICE VISIT (OUTPATIENT)
Dept: ENDOCRINOLOGY | Facility: CLINIC | Age: 60
End: 2020-08-28
Payer: MEDICARE

## 2020-08-28 VITALS
SYSTOLIC BLOOD PRESSURE: 153 MMHG | TEMPERATURE: 97 F | WEIGHT: 243.69 LBS | HEART RATE: 76 BPM | DIASTOLIC BLOOD PRESSURE: 86 MMHG | BODY MASS INDEX: 33.99 KG/M2

## 2020-08-28 DIAGNOSIS — I10 ESSENTIAL HYPERTENSION: ICD-10-CM

## 2020-08-28 DIAGNOSIS — I25.2 HISTORY OF MI (MYOCARDIAL INFARCTION): ICD-10-CM

## 2020-08-28 DIAGNOSIS — Z79.4 TYPE 2 DIABETES MELLITUS WITHOUT COMPLICATION, WITH LONG-TERM CURRENT USE OF INSULIN: Primary | ICD-10-CM

## 2020-08-28 DIAGNOSIS — E11.9 TYPE 2 DIABETES MELLITUS WITHOUT COMPLICATION, WITH LONG-TERM CURRENT USE OF INSULIN: Primary | ICD-10-CM

## 2020-08-28 PROCEDURE — 3079F DIAST BP 80-89 MM HG: CPT | Mod: HCNC,CPTII,S$GLB, | Performed by: NURSE PRACTITIONER

## 2020-08-28 PROCEDURE — 3046F PR MOST RECENT HEMOGLOBIN A1C LEVEL > 9.0%: ICD-10-PCS | Mod: HCNC,CPTII,S$GLB, | Performed by: NURSE PRACTITIONER

## 2020-08-28 PROCEDURE — 3077F SYST BP >= 140 MM HG: CPT | Mod: HCNC,CPTII,S$GLB, | Performed by: NURSE PRACTITIONER

## 2020-08-28 PROCEDURE — 99999 PR PBB SHADOW E&M-EST. PATIENT-LVL V: CPT | Mod: PBBFAC,HCNC,, | Performed by: NURSE PRACTITIONER

## 2020-08-28 PROCEDURE — 3008F PR BODY MASS INDEX (BMI) DOCUMENTED: ICD-10-PCS | Mod: HCNC,CPTII,S$GLB, | Performed by: NURSE PRACTITIONER

## 2020-08-28 PROCEDURE — 3079F PR MOST RECENT DIASTOLIC BLOOD PRESSURE 80-89 MM HG: ICD-10-PCS | Mod: HCNC,CPTII,S$GLB, | Performed by: NURSE PRACTITIONER

## 2020-08-28 PROCEDURE — 99204 PR OFFICE/OUTPT VISIT, NEW, LEVL IV, 45-59 MIN: ICD-10-PCS | Mod: HCNC,S$GLB,, | Performed by: NURSE PRACTITIONER

## 2020-08-28 PROCEDURE — 99999 PR PBB SHADOW E&M-EST. PATIENT-LVL V: ICD-10-PCS | Mod: PBBFAC,HCNC,, | Performed by: NURSE PRACTITIONER

## 2020-08-28 PROCEDURE — 99499 UNLISTED E&M SERVICE: CPT | Mod: S$PBB,HCNC,, | Performed by: NURSE PRACTITIONER

## 2020-08-28 PROCEDURE — 99204 OFFICE O/P NEW MOD 45 MIN: CPT | Mod: HCNC,S$GLB,, | Performed by: NURSE PRACTITIONER

## 2020-08-28 PROCEDURE — 3008F BODY MASS INDEX DOCD: CPT | Mod: HCNC,CPTII,S$GLB, | Performed by: NURSE PRACTITIONER

## 2020-08-28 PROCEDURE — 3077F PR MOST RECENT SYSTOLIC BLOOD PRESSURE >= 140 MM HG: ICD-10-PCS | Mod: HCNC,CPTII,S$GLB, | Performed by: NURSE PRACTITIONER

## 2020-08-28 PROCEDURE — 99499 RISK ADDL DX/OHS AUDIT: ICD-10-PCS | Mod: S$PBB,HCNC,, | Performed by: NURSE PRACTITIONER

## 2020-08-28 PROCEDURE — 3046F HEMOGLOBIN A1C LEVEL >9.0%: CPT | Mod: HCNC,CPTII,S$GLB, | Performed by: NURSE PRACTITIONER

## 2020-08-28 RX ORDER — DULAGLUTIDE 1.5 MG/.5ML
1.5 INJECTION, SOLUTION SUBCUTANEOUS
Qty: 4 PEN | Refills: 1 | Status: SHIPPED | OUTPATIENT
Start: 2020-08-28 | End: 2020-11-13 | Stop reason: SDUPTHER

## 2020-08-28 RX ORDER — DULAGLUTIDE 0.75 MG/.5ML
0.75 INJECTION, SOLUTION SUBCUTANEOUS
Qty: 4 PEN | Refills: 0 | Status: SHIPPED | OUTPATIENT
Start: 2020-08-28 | End: 2020-11-13

## 2020-08-28 NOTE — PROGRESS NOTES
CC: This 60 y.o. Black or  male  is here for evaluation of  T2DM along with comorbidities indicated in the Visit Diagnosis section of this encounter.    HPI: Thierry Ho was diagnosed with T2DM in ~ 2015. Metformin started at the time of diagnosis.   Medical history: s/p MI     New to Endocrine. Referred by Dr. Jarquin.   C/o BGs are always high.       LAST DIABETES EDUCATION: 2015    HOSPITALIZED FOR DIABETES  -  No.    PRESCRIBED DIABETES MEDICATIONS: Levemir Flextouch 25 units bid, metformin 1000 mg bid   Misses medication doses - Yes - sometimes misses Levemir in the morning but mostly injects bid.   Missed his insulin today.     DM COMPLICATIONS: cardiovascular disease    SIGNIFICANT DIABETES MED HISTORY: intolerance to DM meds     SELF MONITORING BLOOD GLUCOSE: Checks blood glucose at home 2x/day. Forgot his log.   Fasting blood glucose: 200s mg/dL  Post lunch: 250 to 300s.     HYPOGLYCEMIC EPISODES: none      CURRENT DIET: drinks juice and diluted sweet tea. Eats 3 meals/day.   Lunch was bowl of spaghetti and red gravy. Snacks on brownies, chips.       CURRENT EXERCISE: none       BP (!) 145/90 (BP Location: Right arm, Patient Position: Sitting, BP Method: Large (Automatic))   Pulse 79   Temp 97.1 °F (36.2 °C) (Temporal)   Wt 110.5 kg (243 lb 11.2 oz)   BMI 33.99 kg/m²       ROS:   CONSTITUTIONAL: Appetite good, denies fatigue  SKIN: No rash or pruritis   EYES: + visual disturbances  RESPIRATORY: No shortness of breath   CARDIAC: No chest pain intermittent, has nitro SL prn   GI: No nausea, vomiting, or diarrhea  : + urinary frequency   MS: uses a cane. H/o fractured ankles with chronic pain   NEURO: + paresthesias in feet    OTHER: + polydipsia       PHYSICAL EXAM:  GENERAL: Well developed, well nourished. No acute distress.   PSYCH: AAOx3, appropriate mood and affect, conversant, well-groomed. Judgement and insight good.   NEURO: Cranial nerves grossly intact. Speech clear, no tremor.    NECK: Trachea midline, no thyromegaly or lymphadenopathy.   CHEST: Respirations even and unlabored. CTA bilaterally.  CARDIOVASCULAR: Regular rate and rhythm. No bruits. No murmur. No edema.   ABDOMEN: Soft, non-tender, non-distended. Bowel sounds present.   MS: Gait steady. No clubbing.   SKIN: Normal skin turgor. Skin warm and dry. No areas of breakdown. + acanthosis nigricans.  FEET: Footware appropriate.       Hemoglobin A1C   Date Value Ref Range Status   05/28/2020 10.7 (H) 4.0 - 5.6 % Final     Comment:     ADA Screening Guidelines:  5.7-6.4%  Consistent with prediabetes  >or=6.5%  Consistent with diabetes  High levels of fetal hemoglobin interfere with the HbA1C  assay. Heterozygous hemoglobin variants (HbS, HgC, etc)do  not significantly interfere with this assay.   However, presence of multiple variants may affect accuracy.     02/26/2020 11.3 (H) 4.0 - 5.6 % Final     Comment:     ADA Screening Guidelines:  5.7-6.4%  Consistent with prediabetes  >or=6.5%  Consistent with diabetes  High levels of fetal hemoglobin interfere with the HbA1C  assay. Heterozygous hemoglobin variants (HbS, HgC, etc)do  not significantly interfere with this assay.   However, presence of multiple variants may affect accuracy.     06/13/2019 7.7 (H) 4.0 - 5.6 % Final     Comment:     ADA Screening Guidelines:  5.7-6.4%  Consistent with prediabetes  >or=6.5%  Consistent with diabetes  High levels of fetal hemoglobin interfere with the HbA1C  assay. Heterozygous hemoglobin variants (HbS, HgC, etc)do  not significantly interfere with this assay.   However, presence of multiple variants may affect accuracy.             Chemistry        Component Value Date/Time     06/25/2020 0950    K 5.0 06/25/2020 0950     06/25/2020 0950    CO2 27 06/25/2020 0950    BUN 23 (H) 06/25/2020 0950    CREATININE 0.9 06/25/2020 0950     (H) 06/25/2020 0950        Component Value Date/Time    CALCIUM 11.8 (H) 06/25/2020 0950    ALKPHOS  76 05/28/2020 1519    AST 32 05/28/2020 1519    ALT 39 05/28/2020 1519    BILITOT 0.1 05/28/2020 1519    ESTGFRAFRICA >60 06/25/2020 0950    EGFRNONAA >60 06/25/2020 0950          Lab Results   Component Value Date    LDLCALC 58.2 (L) 05/28/2020       No results found for: MICALBCREAT          STANDARDS of CARE:        ASA:               Last eye exam:       ASSESSMENT and PLAN:    A1C GOAL: < 7 %     1. Type 2 diabetes mellitus without complication, with long-term current use of insulin  Discussed progression of DM disease, long term complications, and tx options. Reviewed A1c and BG goals.     Emphasized importance of improving diet.   Avoid beverages with sugar.   See Diabetes Educator/Registered Dietician for Medical Nutrition Therapy.   Increase Levemir to 30 units twice daily.   Continue metformin.   Start Trulicity 0.75 mg once weekly for 4 weeks. Then increase to Trulicity 1.5 mg once weekly.   Test glucose 2x/day. Bring a log to every visit.   Return to clinic in 6 weeks.        2. Essential hypertension  Suboptimal. Will reassess BP at next visit.      3. History of MI (myocardial infarction)  Improve glycemic control.          Orders Placed This Encounter   Procedures    Ambulatory referral/consult to Diabetes Education     Standing Status:   Future     Standing Expiration Date:   8/28/2021     Referral Priority:   Routine     Referral Type:   Consultation     Referral Reason:   Specialty Services Required     Requested Specialty:   Endocrinology     Number of Visits Requested:   1     Expiration Date:   8/28/2021        Follow up in about 6 weeks (around 10/9/2020).     Thank you very much for allowing me to participate in Thierry Ho's care.

## 2020-08-28 NOTE — PATIENT INSTRUCTIONS
A1C goal: <7%  Fasting/premeal blood glucose goal:   2 hour post-meal blood glucose goal: less than 180      Avoid beverages with sugar.   See Diabetes Educator/Registered Dietician for Medical Nutrition Therapy.   Increase Levemir to 30 units twice daily.   Continue metformin.   Start Trulicity 0.75 mg once weekly for 4 weeks. Then increase to Trulicity 1.5 mg once weekly.   Test glucose 2x/day. Bring a log to every visit.   Return to clinic in 6 weeks.

## 2020-08-31 ENCOUNTER — LAB VISIT (OUTPATIENT)
Dept: LAB | Facility: HOSPITAL | Age: 60
End: 2020-08-31
Attending: FAMILY MEDICINE
Payer: MEDICARE

## 2020-08-31 DIAGNOSIS — E11.65 UNCONTROLLED TYPE 2 DIABETES MELLITUS WITH HYPERGLYCEMIA: ICD-10-CM

## 2020-08-31 LAB
ALBUMIN SERPL BCP-MCNC: 4 G/DL (ref 3.5–5.2)
ALP SERPL-CCNC: 83 U/L (ref 55–135)
ALT SERPL W/O P-5'-P-CCNC: 36 U/L (ref 10–44)
ANION GAP SERPL CALC-SCNC: 9 MMOL/L (ref 8–16)
AST SERPL-CCNC: 35 U/L (ref 10–40)
BILIRUB SERPL-MCNC: 0.3 MG/DL (ref 0.1–1)
BUN SERPL-MCNC: 24 MG/DL (ref 6–20)
CALCIUM SERPL-MCNC: 11.9 MG/DL (ref 8.7–10.5)
CHLORIDE SERPL-SCNC: 104 MMOL/L (ref 95–110)
CO2 SERPL-SCNC: 22 MMOL/L (ref 23–29)
CREAT SERPL-MCNC: 1 MG/DL (ref 0.5–1.4)
EST. GFR  (AFRICAN AMERICAN): >60 ML/MIN/1.73 M^2
EST. GFR  (NON AFRICAN AMERICAN): >60 ML/MIN/1.73 M^2
ESTIMATED AVG GLUCOSE: 252 MG/DL (ref 68–131)
GLUCOSE SERPL-MCNC: 167 MG/DL (ref 70–110)
HBA1C MFR BLD HPLC: 10.4 % (ref 4–5.6)
POTASSIUM SERPL-SCNC: 4.7 MMOL/L (ref 3.5–5.1)
PROT SERPL-MCNC: 7.4 G/DL (ref 6–8.4)
SODIUM SERPL-SCNC: 135 MMOL/L (ref 136–145)

## 2020-08-31 PROCEDURE — 36415 COLL VENOUS BLD VENIPUNCTURE: CPT | Mod: HCNC,PO

## 2020-08-31 PROCEDURE — 83036 HEMOGLOBIN GLYCOSYLATED A1C: CPT | Mod: HCNC

## 2020-08-31 PROCEDURE — 80053 COMPREHEN METABOLIC PANEL: CPT | Mod: HCNC

## 2020-09-09 ENCOUNTER — OFFICE VISIT (OUTPATIENT)
Dept: FAMILY MEDICINE | Facility: CLINIC | Age: 60
End: 2020-09-09
Payer: MEDICARE

## 2020-09-09 VITALS
HEIGHT: 71 IN | WEIGHT: 238.13 LBS | RESPIRATION RATE: 20 BRPM | HEART RATE: 70 BPM | OXYGEN SATURATION: 98 % | SYSTOLIC BLOOD PRESSURE: 136 MMHG | TEMPERATURE: 98 F | BODY MASS INDEX: 33.34 KG/M2 | DIASTOLIC BLOOD PRESSURE: 82 MMHG

## 2020-09-09 DIAGNOSIS — E78.49 OTHER HYPERLIPIDEMIA: ICD-10-CM

## 2020-09-09 DIAGNOSIS — I10 ESSENTIAL HYPERTENSION: ICD-10-CM

## 2020-09-09 DIAGNOSIS — E11.65 UNCONTROLLED TYPE 2 DIABETES MELLITUS WITH HYPERGLYCEMIA: Primary | ICD-10-CM

## 2020-09-09 PROCEDURE — 99214 OFFICE O/P EST MOD 30 MIN: CPT | Mod: HCNC,S$GLB,, | Performed by: FAMILY MEDICINE

## 2020-09-09 PROCEDURE — 3075F PR MOST RECENT SYSTOLIC BLOOD PRESS GE 130-139MM HG: ICD-10-PCS | Mod: HCNC,CPTII,S$GLB, | Performed by: FAMILY MEDICINE

## 2020-09-09 PROCEDURE — 3008F BODY MASS INDEX DOCD: CPT | Mod: HCNC,CPTII,S$GLB, | Performed by: FAMILY MEDICINE

## 2020-09-09 PROCEDURE — 3046F HEMOGLOBIN A1C LEVEL >9.0%: CPT | Mod: HCNC,CPTII,S$GLB, | Performed by: FAMILY MEDICINE

## 2020-09-09 PROCEDURE — 3079F DIAST BP 80-89 MM HG: CPT | Mod: HCNC,CPTII,S$GLB, | Performed by: FAMILY MEDICINE

## 2020-09-09 PROCEDURE — 99214 PR OFFICE/OUTPT VISIT, EST, LEVL IV, 30-39 MIN: ICD-10-PCS | Mod: HCNC,S$GLB,, | Performed by: FAMILY MEDICINE

## 2020-09-09 PROCEDURE — 99999 PR PBB SHADOW E&M-EST. PATIENT-LVL V: CPT | Mod: PBBFAC,HCNC,, | Performed by: FAMILY MEDICINE

## 2020-09-09 PROCEDURE — 3075F SYST BP GE 130 - 139MM HG: CPT | Mod: HCNC,CPTII,S$GLB, | Performed by: FAMILY MEDICINE

## 2020-09-09 PROCEDURE — 3008F PR BODY MASS INDEX (BMI) DOCUMENTED: ICD-10-PCS | Mod: HCNC,CPTII,S$GLB, | Performed by: FAMILY MEDICINE

## 2020-09-09 PROCEDURE — 3079F PR MOST RECENT DIASTOLIC BLOOD PRESSURE 80-89 MM HG: ICD-10-PCS | Mod: HCNC,CPTII,S$GLB, | Performed by: FAMILY MEDICINE

## 2020-09-09 PROCEDURE — 3046F PR MOST RECENT HEMOGLOBIN A1C LEVEL > 9.0%: ICD-10-PCS | Mod: HCNC,CPTII,S$GLB, | Performed by: FAMILY MEDICINE

## 2020-09-09 PROCEDURE — 99999 PR PBB SHADOW E&M-EST. PATIENT-LVL V: ICD-10-PCS | Mod: PBBFAC,HCNC,, | Performed by: FAMILY MEDICINE

## 2020-09-14 ENCOUNTER — OFFICE VISIT (OUTPATIENT)
Dept: OTOLARYNGOLOGY | Facility: CLINIC | Age: 60
End: 2020-09-14
Payer: MEDICARE

## 2020-09-14 ENCOUNTER — CLINICAL SUPPORT (OUTPATIENT)
Dept: AUDIOLOGY | Facility: CLINIC | Age: 60
End: 2020-09-14
Payer: MEDICARE

## 2020-09-14 ENCOUNTER — PATIENT OUTREACH (OUTPATIENT)
Dept: ADMINISTRATIVE | Facility: OTHER | Age: 60
End: 2020-09-14

## 2020-09-14 VITALS
WEIGHT: 235.88 LBS | DIASTOLIC BLOOD PRESSURE: 80 MMHG | SYSTOLIC BLOOD PRESSURE: 110 MMHG | BODY MASS INDEX: 33.02 KG/M2 | TEMPERATURE: 99 F | HEIGHT: 71 IN

## 2020-09-14 DIAGNOSIS — H93.13 TINNITUS OF BOTH EARS: ICD-10-CM

## 2020-09-14 DIAGNOSIS — H93.11 TINNITUS OF RIGHT EAR: ICD-10-CM

## 2020-09-14 DIAGNOSIS — H90.3 SENSORINEURAL HEARING LOSS, BILATERAL: Primary | ICD-10-CM

## 2020-09-14 DIAGNOSIS — H61.21 IMPACTED CERUMEN OF RIGHT EAR: ICD-10-CM

## 2020-09-14 DIAGNOSIS — H91.90 HEARING LOSS, UNSPECIFIED HEARING LOSS TYPE, UNSPECIFIED LATERALITY: ICD-10-CM

## 2020-09-14 DIAGNOSIS — H90.3 SENSORINEURAL HEARING LOSS (SNHL) OF BOTH EARS: ICD-10-CM

## 2020-09-14 DIAGNOSIS — E83.52 HYPERCALCEMIA: Primary | ICD-10-CM

## 2020-09-14 PROCEDURE — 92550 TYMPANOMETRY & REFLEX THRESH: CPT | Mod: S$GLB,,, | Performed by: AUDIOLOGIST

## 2020-09-14 PROCEDURE — 69210 PR REMOVAL IMPACTED CERUMEN REQUIRING INSTRUMENTATION, UNILATERAL: ICD-10-PCS | Mod: S$GLB,,, | Performed by: OTOLARYNGOLOGY

## 2020-09-14 PROCEDURE — 99499 UNLISTED E&M SERVICE: CPT | Mod: S$GLB,,, | Performed by: OTOLARYNGOLOGY

## 2020-09-14 PROCEDURE — 99499 RISK ADDL DX/OHS AUDIT: ICD-10-PCS | Mod: S$GLB,,, | Performed by: OTOLARYNGOLOGY

## 2020-09-14 PROCEDURE — 92557 PR COMPREHENSIVE HEARING TEST: ICD-10-PCS | Mod: S$GLB,,, | Performed by: AUDIOLOGIST

## 2020-09-14 PROCEDURE — 99204 OFFICE O/P NEW MOD 45 MIN: CPT | Mod: 25,S$GLB,, | Performed by: OTOLARYNGOLOGY

## 2020-09-14 PROCEDURE — 3008F BODY MASS INDEX DOCD: CPT | Mod: CPTII,S$GLB,, | Performed by: OTOLARYNGOLOGY

## 2020-09-14 PROCEDURE — 3079F PR MOST RECENT DIASTOLIC BLOOD PRESSURE 80-89 MM HG: ICD-10-PCS | Mod: CPTII,S$GLB,, | Performed by: OTOLARYNGOLOGY

## 2020-09-14 PROCEDURE — 3079F DIAST BP 80-89 MM HG: CPT | Mod: CPTII,S$GLB,, | Performed by: OTOLARYNGOLOGY

## 2020-09-14 PROCEDURE — 3008F PR BODY MASS INDEX (BMI) DOCUMENTED: ICD-10-PCS | Mod: CPTII,S$GLB,, | Performed by: OTOLARYNGOLOGY

## 2020-09-14 PROCEDURE — 69210 REMOVE IMPACTED EAR WAX UNI: CPT | Mod: S$GLB,,, | Performed by: OTOLARYNGOLOGY

## 2020-09-14 PROCEDURE — 92557 COMPREHENSIVE HEARING TEST: CPT | Mod: S$GLB,,, | Performed by: AUDIOLOGIST

## 2020-09-14 PROCEDURE — 3074F SYST BP LT 130 MM HG: CPT | Mod: CPTII,S$GLB,, | Performed by: OTOLARYNGOLOGY

## 2020-09-14 PROCEDURE — 99204 PR OFFICE/OUTPT VISIT, NEW, LEVL IV, 45-59 MIN: ICD-10-PCS | Mod: 25,S$GLB,, | Performed by: OTOLARYNGOLOGY

## 2020-09-14 PROCEDURE — 92550 PR TYMPANOMETRY AND REFLEX THRESHOLD MEASUREMENTS: ICD-10-PCS | Mod: S$GLB,,, | Performed by: AUDIOLOGIST

## 2020-09-14 PROCEDURE — 3074F PR MOST RECENT SYSTOLIC BLOOD PRESSURE < 130 MM HG: ICD-10-PCS | Mod: CPTII,S$GLB,, | Performed by: OTOLARYNGOLOGY

## 2020-09-14 NOTE — PROGRESS NOTES
Click on link to view Audiogram  Document on 9/14/2020  2:01 PM by Yi Bhakta MA CCC-A: Audiogram     Thierry Ho was seen today for an audiologic evaluation for hearing loss and tinnitus.    Tympanometry revealed a Type A tympanogram for both ears.  Audiogram results revealed a mild to moderate sensorineural hearing loss bilaterally.  Speech audiometry revealed a speech reception threshold at 15dBHL with a word recognition score of 80% for the right ear and a speech reception threshold at 20dBHL with a word recognition score of 88% for the left ear.    Recommendations:  1. Otologic evaluation  2. Annual evaluation to monitor hearing

## 2020-09-14 NOTE — LETTER
September 19, 2020      Pippa Rucker PA-C  [address] [facility]  [place] [address]           [facility]  [address]  [place] [address]  Phone: [phone]          Patient: Thierry Ho   MR Number: [id number]   YOB: 1960   Date of Visit: 9/14/2020       Dear Pippa Rucker:    Thank you for referring Thierry Ho to me for evaluation. Attached you will find relevant portions of my assessment and plan of care.    If you have questions, please do not hesitate to call me. I look forward to following Thierry Ho along with you.    Sincerely,    Niurka Pruett MD    Enclosure  CC:  No Recipients    If you would like to receive this communication electronically, please contact [email or web address] or [phone] to request more information on [employer] access.    For providers and/or their staff who would like to refer a patient to [facility], please contact us through our one-stop-shop provider referral line, [facility] , at [phone].    If you feel you have received this communication in error or would no longer like to receive these types of communications, please e-mail [email or web address]

## 2020-09-14 NOTE — PROGRESS NOTES
OTOLARYNGOLOGY CLINIC NOTE  Date:  09/14/2020     Chief complaint:  Chief Complaint   Patient presents with    Cerumen Impaction    Otitis Media       History of Present Illness  Thierry Ho is a 60 y.o. male  presenting today for a new evaluation and treatment of hearing loss. This has been a gradual loss over years. He has difficulty hearing the TV sometimes . Uses left ear to talk on phone- feels like right ear might be slightly worse.  No unilateral noise exposure history . No fam history of early HL. No recurrent ear infections. Does not get frequent cerumen impactions       Ringing in the right ear only it is constant. It was not present before wax build up     Uses qtips  No itching  No ear drainage    Has a cane- uses for ankle problems    Has had some dizziness since started on trulicity    no bone pain. No history of  kidney stones    Past Medical History  Past Medical History:   Diagnosis Date    Colon polyps     Diabetes mellitus     DM retinopathy     GERD (gastroesophageal reflux disease)     Hyperlipidemia     Hypertension     Myocardial infarction         Past Surgical History  Past Surgical History:   Procedure Laterality Date    APPENDECTOMY      BACK SURGERY  11/2017    COLONOSCOPY N/A 10/19/2018    Procedure: COLONOSCOPY;  Surgeon: Frantz Rasmussen MD;  Location: [facility] ENDO;  Service: Endoscopy;  Laterality: N/A;    EXCISION OF SKIN N/A 6/25/2020    Procedure: EXCISION, SKIN;  Surgeon: Finesse Dumont MD;  Location: [facility] OR;  Service: General;  Laterality: N/A;  on back    FOOT SURGERY      INCISION AND DRAINAGE OF GROIN Right 6/25/2020    Procedure: INCISION AND DRAINAGE, INGUINAL REGION;  Surgeon: Finesse Dumotn MD;  Location: [facility] OR;  Service: General;  Laterality: Right;  PT TO PREOP IN AM  PRE-OP BY RN 6-    OPEN REDUCTION AND INTERNAL FIXATION (ORIF) OF INJURY OF ANKLE Right 7/17/2019    Procedure: ORIF, ANKLE;  Surgeon: Raeann Steward MD;  Location: [facility]  "OR;  Service: Orthopedics;  Laterality: Right;  SARITA GALAN  [phone] TEXTED HIM @ 10:2 AM ON 7-11-19  SKELETAL  RN PREOP 7/9/19---- CMP MORNING OF SURGERY PER DR FONTAINE        Medications  Current Outpatient Medications on File Prior to Visit   Medication Sig Dispense Refill    acetaminophen (TYLENOL) 500 MG tablet Take 500 mg by mouth every 6 (six) hours as needed for Pain.      aspirin 81 MG Chew Take 1 tablet (81 mg total) by mouth once daily.  0    BD ULTRA-FINE FREDA PEN NEEDLE 32 gauge x 5/32" Ndle AS DIRECTED ONCE DAILY 100 each 6    BLOOD PRESSURE CUFF Misc 1 kit by Misc.(Non-Drug; Combo Route) route once daily. 1 each 0    blood sugar diagnostic Strp 1 each by abdominal subcutaneous route 4 (four) times daily before meals and nightly. 200 each 3    blood-glucose meter kit 1 each by Other route 4 (four) times daily before meals and nightly. 1 each 0    dulaglutide (TRULICITY) 0.75 mg/0.5 mL pen injector Inject 0.75 mg into the skin every 7 days. Start 0.75 mg once weekly for 4 weeks. Then increase to 1.5 mg once weekly. (Patient not taking: Reported on 9/9/2020) 4 pen 0    dulaglutide (TRULICITY) 1.5 mg/0.5 mL pen injector Inject 1.5 mg into the skin every 7 days. Start 0.75 mg once weekly for 4 weeks. Then increase to 1.5 mg once weekly 4 pen 1    fluocinonide (LIDEX) 0.05 % external solution APPLY 1 ML TO THE SCALP AT BEDTIME  3    ibuprofen (ADVIL,MOTRIN) 600 MG tablet Take 1 tablet (600 mg total) by mouth every 8 (eight) hours as needed for Pain. 20 tablet 0    LEVEMIR FLEXTOUCH U-100 INSULN 100 unit/mL (3 mL) InPn pen Inject 28 Units into the skin 2 (two) times daily. 50.4 mL 1    lisinopriL (PRINIVIL,ZESTRIL) 20 MG tablet Take 1 tablet (20 mg total) by mouth once daily. 90 tablet 3    metFORMIN (GLUCOPHAGE) 1000 MG tablet Take 1 tablet (1,000 mg total) by mouth 2 (two) times daily with meals. 180 tablet 1    simvastatin (ZOCOR) 40 MG tablet TAKE 1 TABLET(40 MG) BY MOUTH EVERY " EVENING 90 tablet 0    TRUEPLUS LANCETS 33 gauge Misc       [DISCONTINUED] carvedilol (COREG) 25 MG tablet Take 1 tablet (25 mg total) by mouth 2 (two) times daily with meals. 180 tablet 3     Current Facility-Administered Medications on File Prior to Visit   Medication Dose Route Frequency Provider Last Rate Last Dose    lactated ringers infusion   Intravenous Continuous Violeta Vides MD        lactated ringers infusion   Intravenous Continuous Landry Peters MD        lidocaine (PF) 10 mg/ml (1%) injection 10 mg  1 mL Intradermal Once Violeta Vides MD        lidocaine (PF) 10 mg/ml (1%) injection 10 mg  1 mL Intradermal Once Landry Peters MD           Review of Systems  Review of Systems   HENT: Positive for hearing loss and tinnitus.    Eyes: Negative for double vision.   Respiratory: Positive for cough (ocassion, dry ).    Gastrointestinal: Positive for heartburn.   Neurological: Positive for dizziness. Negative for headaches.   Endo/Heme/Allergies: Negative for environmental allergies.        Social History   reports that he has been smoking cigarettes. He started smoking about 39 years ago. He has a 10.00 pack-year smoking history. He has never used smokeless tobacco. He reports current alcohol use of about 4.0 standard drinks of alcohol per week. He reports current drug use. Drug: Marijuana.     Family History  Family History   Problem Relation Age of Onset    Heart disease Mother     Diabetes Father         Physical Exam   Vitals:    09/14/20 1316   BP: 110/80   Temp: 98.5 °F (36.9 °C)    Body mass index is 32.9 kg/m².          GENERAL: no acute distress.  HEAD: normocephalic.   SKIN: no lesions of skin of head and neck area.  EYES: lids and lashes normal. Pupils equal . No proptosis. EOMI  EARS: external ear without lesion, normal pinna shape and position.  External auditory canal with cerumen impactions  NOSE: external nose without abnormality  ORAL CAVITY/OROPHARYNX:  no mass or  lesion of gingiva/buccal mucosa/floor of mouth/tongue. tongue midline and mobile. No fasciculations.   LYMPH NODES:No cervical lymphadenopathy.  RESPIRATORY: no stridor, no stertor. Voice normal. Respirations nonlabored.  NEURO: alert, responds to questions appropriately.    Facial movement symmetric with good eye closure and symmetric smile. Facial sensation intact v1,v2,v3. petr hallpike negative.   PSYCH:mood appropriate    Imaging:  The patient does not have any pertinent and/or recent imaging of the head and neck.     Procedure Note   Procedure performed:microscopic examination of ears with cerumen disimpaction    Indication for procedure: bilateral cerumen impaction     Description of procedure:  After verbal consent was obtained, the patient was positioned in semi recumbent position and speculum was placed in the right ear and microscope brought into the field.  The microscope was positioned and magnification adjusted for appropriate visualization. Otologic instruments including various size otologic suctions and curette were used to remove the cerumen from the right external auditory canals under visualization with the operating microscope. After cleaning, visualization was again performed and at various levels of higher magnification to optimize views of the ear canal, tympanic membrane, ossicles and middle ear space. The same procedure was then repeated for the left ear. Findings as indicated below. All portions of the procedure and examination by otomicroscopy were tolerated well without complication.     Findings:  Right ear: Complete cerumen impaction removed entirely revealing normal external auditory canal; tympanic membrane without bulging, retraction, or perforation; no evidence of middle ear fluid or effusion.   Left ear: Complete cerumen impaction removed entirely revealing normal external auditory canal; tympanic membrane without bulging, retraction, or perforation; no evidence of middle ear fluid  or effusion.     AUDIOLOGY RESULTS  Audiometric evaluation including audiogram, tympanometry, acoustic reflexes, and speech discrimination which was performed  was personally reviewed and interpreted.  Notable findings on the audiogram were normal low frequency sloping to mild mid freuqency  And mod/mod-severe high frequency sensorineural hearing loss (SNHL).  Tympanometry revealed Type A tympanogram on the left and Type A tympanogram on the right. Speech discrimination was 88%  on the left, and 80% on the right.     Report of the audiologist performing this audiometric testing was also reviewed     Labs:  CBC  Recent Labs   Lab 06/04/18  1355 07/09/19  1156 06/25/20  0950   WBC 7.05 9.20 9.13   Hemoglobin 14.1 14.5 14.0   Hematocrit 41.5 43.5 41.6   Mean Corpuscular Volume 93 96 92   Platelets 260 313 243     BMP  Recent Labs   Lab 05/28/20  1519 06/25/20  0950 08/31/20  0913   Glucose 274 H 206 H 167 H   Sodium 137 136 135 L   Potassium 4.8 5.0 4.7   Chloride 105 101 104   CO2 24 27 22 L   BUN, Bld 23 H 23 H 24 H   Creatinine 1.2 0.9 1.0   Calcium 12.0 H 11.8 H 11.9 H     COAGS        Assessment  1. Hypercalcemia  - PTH, intact; Future    2. Sensorineural hearing loss (SNHL) of both ears    3. Impacted cerumen of right ear    4. Tinnitus of both ears       Plan:  Discussed plan of care with patient in detail and all questions answered. Patient reported understanding of plan of care.     Hypercalcemia:vitamin D in 2019 was normal . no evidence of renal dysfunction on cmp. hypercalcemia has been present  Since at least June 2018 at varying levels with highest level of 12.3 in July 2019 hovering around 11.8-12 since then.  PTH ordered, if elevated will check sestamibi scan to rule out parathyroid adenoma. Differential includes primary hyperparathyroidism, sarcoid, familial hypocalciuric hypercalcemia, bone mets, hypergastrinemia, solid tumors. May need endocrine referral / DEXA scan/other work up  depending on  results as well. Will message him in portal about further testing etc and have him follow up after more work up is done    Cerumen impactions: removed, avoid qtips and ear buds. Debrox prn , can use hairdryer on low setting to dry ears after shower if water sensation is bothersome and leads to qtip usage    Bilateral sensorineural hearing loss: medically cleared for hearing amplification if desired- very mild loss at speech frequency range so he may want to wait until develops further hearing loss . Findings consistent with age related/noise induced hearing loss ( presbycusis) .  Counseled patient that  formal audiogram should be done q1-2 years and immediately if has a sudden loss of hearing. Pt should wear noise protection when in an environment with loud noise exposure to protect from future hearing loss.     Tinnitus: due to SNHL. Suspect also that cerumen impaction worsened hearing enough to make tinnitus more present as he noted improvement after ear cleaning and the right ear had much worse impaction compared to the left. We discussed that HA can help with tinnitus . Also discussed tinnitus masking techniques.  We discussed tinnitus retraining therapy that is available at [facility]. He would like to hold off for now as this improved/resolved after ear cleaning. He will let me know if unilateral tinnitus recurs as this would need further workup with mri brain/iac.

## 2020-09-16 ENCOUNTER — CLINICAL SUPPORT (OUTPATIENT)
Dept: DIABETES | Facility: CLINIC | Age: 60
End: 2020-09-16
Payer: MEDICARE

## 2020-09-16 DIAGNOSIS — Z79.4 TYPE 2 DIABETES MELLITUS WITHOUT COMPLICATION, WITH LONG-TERM CURRENT USE OF INSULIN: ICD-10-CM

## 2020-09-16 DIAGNOSIS — E11.65 UNCONTROLLED TYPE 2 DIABETES MELLITUS WITH HYPERGLYCEMIA: ICD-10-CM

## 2020-09-16 DIAGNOSIS — E11.9 TYPE 2 DIABETES MELLITUS WITHOUT COMPLICATION, WITH LONG-TERM CURRENT USE OF INSULIN: ICD-10-CM

## 2020-09-16 PROCEDURE — G0108 DIAB MANAGE TRN  PER INDIV: HCPCS | Mod: HCNC,S$GLB,, | Performed by: DIETITIAN, REGISTERED

## 2020-09-16 PROCEDURE — 99999 PR PBB SHADOW E&M-EST. PATIENT-LVL I: CPT | Mod: PBBFAC,HCNC,, | Performed by: DIETITIAN, REGISTERED

## 2020-09-16 PROCEDURE — 99999 PR PBB SHADOW E&M-EST. PATIENT-LVL I: ICD-10-PCS | Mod: PBBFAC,HCNC,, | Performed by: DIETITIAN, REGISTERED

## 2020-09-16 PROCEDURE — G0108 PR DIAB MANAGE TRN  PER INDIV: ICD-10-PCS | Mod: HCNC,S$GLB,, | Performed by: DIETITIAN, REGISTERED

## 2020-09-16 NOTE — PROGRESS NOTES
Diabetes Education  Author: Carlota Prince RD  Date: 9/16/2020    Diabetes Care Management Summary  Pt visit today focused on introducing T2DM pt to diabetes self management w emphasis on CHO awareness/counting, meal planning/label reading, SMBG, exercise, and meds.  Diabetes Education Record Assessment/Progress: Initial  Current Diabetes Risk Level: Moderate     Last A1c:   Lab Results   Component Value Date    HGBA1C 10.4 (H) 08/31/2020     Last visit with Diabetes Educator: : 09/16/2020    Diabetes Type  Diabetes Type : Type II    Diabetes History  Diabetes Diagnosis: 5-10 years  Current Treatment: Oral Medication, Diet, Injectable, Insulin  Reviewed Problem List with Patient: Yes    Health Maintenance was reviewed today with patient. Discussed with patient importance of routine eye exams, foot exams/foot care, blood work (i.e.: A1c, microalbumin, and lipid), dental visits, yearly flu vaccine, and pneumonia vaccine as indicated by PCP. Patient verbalized understanding.     Health Maintenance Topics with due status: Not Due       Topic Last Completion Date    TETANUS VACCINE 10/01/2015    Eye Exam 02/24/2020    Lipid Panel 05/28/2020    Hemoglobin A1c 08/31/2020    High Dose Statin 09/14/2020     Health Maintenance Due   Topic Date Due    Aspirin/Antiplatelet Therapy  04/28/1979    Shingles Vaccine (1 of 2) 04/28/2011    Foot Exam  03/13/2020    Influenza Vaccine (1) 08/01/2020    Colorectal Cancer Screening  10/19/2020       Nutrition  Meal Planning: 3 meals per day, diet drinks, artificial sweeteners, eats out seldom, water(pt states scared to eat so he has drastically changed diet)  What type of beverages do you drink?: water  Meal Plan 24 Hour Recall - Breakfast: grits, ham/traore, 3 boiled eggs, diet tea  Meal Plan 24 Hour Recall - Lunch: baked chicken, 2 cups red beans/rice/smoked sausage, diet Coke  Meal Plan 24 Hour Recall - Dinner: leftovers from lunch  Meal Plan 24 Hour Recall - Snack: none lately  but in past consumed lots of fruit, juice and milk    Monitoring   Monitoring: Other  Self Monitoring : 4x/day  Blood Glucose Logs: No(says keeping log but none provided)  Do you use a personal continuous glucose monitor?: No    Exercise   Exercise Type: none    Current Diabetes Treatment   Current Treatment: Oral Medication, Diet, Injectable, Insulin    Social History  Preferred Learning Method: Face to Face  Primary Support: Self  Educational Level: High School  Occupation: disabled  Smoking Status: Current Smoker  Alcohol Use: Weekly  Barriers to Change  Barriers to Change: None  Learning Challenges : None  Readiness to Learn   Readiness to Learn : Eager  Cultural Influences  Cultural Influences: No    Diabetes Education Assessment/Progress  Diabetes Disease Process (diabetes disease process and treatment options): Written Materials Provided, Discussion, Individual Session, Comprehends Key Points  Nutrition (Incorporating nutritional management into one's lifestyle): Written Materials Provided, Discussion, Individual Session, Comprehends Key Points, Instructed, Needs Review  -pt states he does not know what to eat so he has attempted to remove all carbs from meals. Diet recall notes pt still consuming carbs and amts consumed per meals is inconsistent mainly due to carb unawareness bc of lack of carb counting, and label reading. Pt stopped drinking all fruit juice and milk 3 weeks ago as well as avoiding all fruit and snacks. However diet include large amount of red beans and peas as well as potatoes and pasta which pt stated  did not know were carb foods  -Discussed carb vs non-carb foods, appropriate amount of carbs to have at meals/snacks and acceptiable serving sizes of individual carb items.  - Instructed on appropriate label reading and serving sizes of specific carb containing foods., portion control (hand) and using the plate method of meal planning.   -Encouraged decreasing portion sizes of CHO to 3-4  "servings (45-60g) per meal, 3 evenly-spaced meals daily and limiting snacks to mostly 0-5g CHO. If does have CHO snack occasionally, no more than 15g  -pt states he understood carbs better and would change diet as discussed    Physical Activity (incorporating physical activity into one's lifestyle): Discussion, Written Materials Provided, Individual Session, Comprehends Key Points  Medications (states correct name, dose, onset, peak, duration, side effects & timing of meds): Written Materials Provided, Discussion, Individual Session, Demonstrates Understanding/Competency(verbalizes/demonstrates), Instructed  -states Trulicity is helping his BG immensely. Says he feels better  Levemir 28 u 2x  Metformin 1000mg 2x    Acute Complications (preventing, detecting, and treating acute complications): Written Materials Provided, Discussion, Individual Session, Demonstrates Understanding/Competency (verbalizes/demonstrates)  -denies hypo  -Reviewed s/s, causes, and treatment of hyperglycemia and hypoglycemia and use of  "rule of 15" w/ hypoglycemia.    Chronic Complications (preventing, detecting, and treating chronic complications): Written Materials Provided, Individual Session, Comprehends Key Points  Clinical (diabetes, other pertinent medical history, and relevant comorbidities reviewed during visit): Written Materials Provided, Discussion, Individual Session, Comprehends Key Points  Cognitive (knowledge of self-management skills, functional health literacy): Discussion, Individual Session  -Arrives with adequate health management knowledge - poor specific knowledge of diabetes management. Leaves with increased knowledge base - will benefit from f/u in 3 months    Psychosocial (emotional response to diabetes): Discussion  Diabetes Distress and Support Systems: Discussion  Behavioral (readiness for change, lifestyle practices, self-care behaviors): Discussion, Individual Session  -Pt indicated importance of changing " present behaviors to allow for improved health long term and pt appears  motivated to make recommended changes    Goals  Patient has selected/evaluated goals during today's session: Yes, selected  Healthy Eating: Set(start carb counting and distribute carbs evenly into 3 meals/day, 45-60 g carb/meal)  Start Date: 09/16/20  Target Date: 10/06/20    Diabetes Care Plan/Intervention  Education Plan/Intervention: Individual Follow-Up DSMT    Diabetes Meal Plan  Restrictions: Restricted Carbohydrate  Carbohydrate Per Meal: 45-60g  Carbohydrate Per Snack : 15-20g    Today's Self-Management Care Plan was developed with the patient's input and is based on barriers identified during today's assessment.  The long and short-term goals in the care plan were written with the patient/caregiver's input. The patient has agreed to work toward these goals to improve his overall diabetes control.    The patient received a copy of today's self-management plan and verbalized understanding of the care plan, goals, and all of today's instructions.    The patient was encouraged to communicate with his physician and care team regarding his condition(s) and treatment.  I provided the patient with my contact information today and encouraged him to contact me via phone or patient portal as needed.     Education Units of Time   Time Spent: 60 min

## 2020-09-17 ENCOUNTER — OFFICE VISIT (OUTPATIENT)
Dept: PODIATRY | Facility: CLINIC | Age: 60
End: 2020-09-17
Payer: MEDICARE

## 2020-09-17 VITALS
HEIGHT: 71 IN | BODY MASS INDEX: 33.02 KG/M2 | WEIGHT: 235.88 LBS | DIASTOLIC BLOOD PRESSURE: 82 MMHG | SYSTOLIC BLOOD PRESSURE: 120 MMHG

## 2020-09-17 DIAGNOSIS — M20.40 HAMMER TOE, UNSPECIFIED LATERALITY: ICD-10-CM

## 2020-09-17 DIAGNOSIS — Z79.4 TYPE 2 DIABETES MELLITUS WITHOUT COMPLICATION, WITH LONG-TERM CURRENT USE OF INSULIN: Primary | ICD-10-CM

## 2020-09-17 DIAGNOSIS — E11.9 TYPE 2 DIABETES MELLITUS WITHOUT COMPLICATION, WITH LONG-TERM CURRENT USE OF INSULIN: Primary | ICD-10-CM

## 2020-09-17 DIAGNOSIS — Z87.81 H/O FRACTURE OF ANKLE: ICD-10-CM

## 2020-09-17 LAB — HM FOOT EXAM: NORMAL

## 2020-09-17 PROCEDURE — 3046F PR MOST RECENT HEMOGLOBIN A1C LEVEL > 9.0%: ICD-10-PCS | Mod: HCNC,CPTII,S$GLB, | Performed by: PODIATRIST

## 2020-09-17 PROCEDURE — 3079F DIAST BP 80-89 MM HG: CPT | Mod: HCNC,CPTII,S$GLB, | Performed by: PODIATRIST

## 2020-09-17 PROCEDURE — 3079F PR MOST RECENT DIASTOLIC BLOOD PRESSURE 80-89 MM HG: ICD-10-PCS | Mod: HCNC,CPTII,S$GLB, | Performed by: PODIATRIST

## 2020-09-17 PROCEDURE — 99203 OFFICE O/P NEW LOW 30 MIN: CPT | Mod: HCNC,S$GLB,, | Performed by: PODIATRIST

## 2020-09-17 PROCEDURE — 3046F HEMOGLOBIN A1C LEVEL >9.0%: CPT | Mod: HCNC,CPTII,S$GLB, | Performed by: PODIATRIST

## 2020-09-17 PROCEDURE — 3074F PR MOST RECENT SYSTOLIC BLOOD PRESSURE < 130 MM HG: ICD-10-PCS | Mod: HCNC,CPTII,S$GLB, | Performed by: PODIATRIST

## 2020-09-17 PROCEDURE — 99203 PR OFFICE/OUTPT VISIT, NEW, LEVL III, 30-44 MIN: ICD-10-PCS | Mod: HCNC,S$GLB,, | Performed by: PODIATRIST

## 2020-09-17 PROCEDURE — 3008F BODY MASS INDEX DOCD: CPT | Mod: HCNC,CPTII,S$GLB, | Performed by: PODIATRIST

## 2020-09-17 PROCEDURE — 3008F PR BODY MASS INDEX (BMI) DOCUMENTED: ICD-10-PCS | Mod: HCNC,CPTII,S$GLB, | Performed by: PODIATRIST

## 2020-09-17 PROCEDURE — 99999 PR PBB SHADOW E&M-EST. PATIENT-LVL IV: ICD-10-PCS | Mod: PBBFAC,HCNC,, | Performed by: PODIATRIST

## 2020-09-17 PROCEDURE — 99999 PR PBB SHADOW E&M-EST. PATIENT-LVL IV: CPT | Mod: PBBFAC,HCNC,, | Performed by: PODIATRIST

## 2020-09-17 PROCEDURE — 3074F SYST BP LT 130 MM HG: CPT | Mod: HCNC,CPTII,S$GLB, | Performed by: PODIATRIST

## 2020-09-17 NOTE — LETTER
September 21, 2020      Sterling Jarquin MD  [address] [place] [address]           [facility]  [address] [facility]  [place] [address]  Phone: [phone]          Patient: Thierry Ho   MR Number: [id number]   YOB: 1960   Date of Visit: 9/17/2020       Dear Dr. Sterling Jarquin:    Thank you for referring Thierry Ho to me for evaluation. Attached you will find relevant portions of my assessment and plan of care.    If you have questions, please do not hesitate to call me. I look forward to following Thierry Ho along with you.    Sincerely,    Chantal Chase, BREANN    Enclosure  CC:  No Recipients    If you would like to receive this communication electronically, please contact [email or web address] or [phone] to request more information on [facility] Link access.    For providers and/or their staff who would like to refer a patient to [facility], please contact us through our one-stop-shop provider referral line, [facility], at [phone].    If you feel you have received this communication in error or would no longer like to receive these types of communications, please e-mail [email or web address]

## 2020-09-17 NOTE — PATIENT INSTRUCTIONS
Step-by-Step:  Inspecting Your Feet (Diabetes)    Date Last Reviewed: 10/1/2016  © 8001-5407 [employer]. [address] [place] [address], [place] [address]. All rights reserved. This information is not intended as a substitute for professional medical care. Always follow your healthcare professional's instructions.        Your Diabetes Foot Care Program    Every day you depend on your feet to keep you moving. But when you have diabetes, your feet need special care. Even a small foot problem can become very serious. So dont take your feet for granted. By working with your diabetes healthcare team, you can learn how to protect your feet and keep them healthy.  Evaluating your feet  An evaluation helps your healthcare provider check the condition of your feet. The evaluation includes a review of your diabetes history and overall health. It may also include a foot exam, X-rays, or other tests. These can help show problems beneath the skin that you cant see or feel.  Medical history  You will be asked about your overall health and any history of foot problems. Youll also discuss your diabetes history, such as whether your blood sugar level has changed over time. It also includes questions about sensations of pain, tingling, pins and needles, or numbness. Your healthcare provider will also want to know if you have high blood pressure and heart disease, or if you smoke. Be sure to mention any medicines (including over-the-counter), supplements, or herbal remedies you take.  Foot exam  A foot exam checks the condition of different parts of your foot. First, your skin and nails are examined for any signs of infection. Blood flow is checked by feeling for the pulses in each foot. You may also have tests to study the nerves in the foot. These include using a small filament (wire) to see how sensitive your feet are. In certain cases, you will be asked to walk a short distance to check for bone, joint, and muscle  problems.  Diagnostic tests  If needed, your healthcare provider will suggest certain tests to learn more about your feet. These include:  · Doppler tests to measure blood flow in the feet and lower leg.  · X-rays, which can show bone or joint problems.  · Other imaging tests, such as an MRI (magnetic resonance imaging), bone scan, and CT (computed tomography) scan. These can help show bone infections.  · Other tests, such as vascular tests, which study the blood flow in your feet and legs. You may also have nerve studies to learn how sensitive your feet are.  Creating a foot care program  Based on the evaluation, your healthcare provider will create a foot care program for you. Your program may be as simple as starting a daily self-care routine and changing the types of shoes your wear. It may also involve treating minor foot problems, such as a corn or blister. In some cases, surgery will be needed to treat an infection or mechanical problems, such as hammer toes.  Preventing problems  When you have diabetes, its easier to prevent problems than to treat them later on. So see your healthcare team for regular checkups and foot care. Your healthcare team can also help you learn more about caring for your feet at home. For example, you may be told to avoid walking barefoot. Or you may be told that special footwear is needed to protect your feet.  Have regular checkups  Foot problems can develop quickly. So be sure to follow your healthcare teams schedule for regular checkups. During office visits, take off your shoes and socks as soon as you get in the exam room. Ask your healthcare provider to examine your feet for problems. This will make it easier to find and treat small skin irritations before they get worse. Regular checkups can also help keep track of the blood flow and feeling in your feet. If you have neuropathy (lack of feeling in your feet), you will need to have checkups more often.  Learn about  self-care  The more you know about diabetes and your feet, the easier it will be to prevent problems. Members of your healthcare team can teach you how to inspect your feet and teach you to look for warning signs. They can also give you other foot care tips. During office visits, be sure to ask any questions you have.  Date Last Reviewed: 7/1/2016  © 0164-4004 [employer]. [address] [place] [address], [place] [address]. All rights reserved. This information is not intended as a substitute for professional medical care. Always follow your healthcare professional's instructions.

## 2020-09-21 LAB
LEFT EYE DM RETINOPATHY: POSITIVE
RIGHT EYE DM RETINOPATHY: POSITIVE

## 2020-09-21 NOTE — PROGRESS NOTES
Subjective:      Patient ID: Thierry Ho is a 60 y.o. male.    Chief Complaint: Foot Pain and Diabetes Mellitus (Dr Jarquin PCP 9/9/20)    Thierry is a 60 y.o. male who presents to the clinic upon referral from Dr. Jarquin  for evaluation and treatment of diabetic feet. Thierry has a past medical history of Colon polyps, Diabetes mellitus, DM retinopathy, GERD (gastroesophageal reflux disease), Hyperlipidemia, Hypertension, and Myocardial infarction. Presents for diabetic foot risk assessment. Reports swelling B/L ankle h/o ankle fracture.       PCP: Sterling Jarquin MD    Date Last Seen by PCP: per above    Current shoe gear: Tennis shoes    Hemoglobin A1C   Date Value Ref Range Status   08/31/2020 10.4 (H) 4.0 - 5.6 % Final     Comment:     ADA Screening Guidelines:  5.7-6.4%  Consistent with prediabetes  >or=6.5%  Consistent with diabetes  High levels of fetal hemoglobin interfere with the HbA1C  assay. Heterozygous hemoglobin variants (HbS, HgC, etc)do  not significantly interfere with this assay.   However, presence of multiple variants may affect accuracy.     05/28/2020 10.7 (H) 4.0 - 5.6 % Final     Comment:     ADA Screening Guidelines:  5.7-6.4%  Consistent with prediabetes  >or=6.5%  Consistent with diabetes  High levels of fetal hemoglobin interfere with the HbA1C  assay. Heterozygous hemoglobin variants (HbS, HgC, etc)do  not significantly interfere with this assay.   However, presence of multiple variants may affect accuracy.     02/26/2020 11.3 (H) 4.0 - 5.6 % Final     Comment:     ADA Screening Guidelines:  5.7-6.4%  Consistent with prediabetes  >or=6.5%  Consistent with diabetes  High levels of fetal hemoglobin interfere with the HbA1C  assay. Heterozygous hemoglobin variants (HbS, HgC, etc)do  not significantly interfere with this assay.   However, presence of multiple variants may affect accuracy.             Review of Systems   Constitution: Negative for chills.   Cardiovascular: Negative for  chest pain and claudication.   Respiratory: Negative for cough.    Skin: Positive for color change, dry skin and nail changes.   Musculoskeletal: Positive for joint pain.   Gastrointestinal: Negative for nausea.   Neurological: Positive for paresthesias. Negative for numbness.   Psychiatric/Behavioral: The patient is not nervous/anxious.            Objective:      Physical Exam  Constitutional:       Appearance: He is well-developed.      Comments: Oriented to time, place, and person.   Cardiovascular:      Comments: DP and PT pulses are palpable bilaterally. 3 sec capillary refill time and toes and feet are warm to touch proximally .  There is  hair growth on the feet and toes b/l. There is no edema b/l. No spider veins or varicosities present b/l.     Musculoskeletal:      Comments: Equinus noted b/l ankles with < 10 deg DF noted. MMT 5/5 in DF/PF/Inv/Ev resistance with no reproduction of pain in any direction. Passive range of motion of ankle and pedal joints is painless b/l.     Feet:      Right foot:      Skin integrity: No callus or dry skin.      Left foot:      Skin integrity: No callus or dry skin.   Lymphadenopathy:      Comments: Negative lymphadenopathy bilateral popliteal fossa and tarsal tunnel.   Skin:     Comments: No open lesions, lacerations or wounds noted.Interdigital spaces clean, dry and intact b/l. No erythema noted to b/l foot.  Nails normal color and trophic qualities.     Neurological:      Mental Status: He is alert.      Comments: Light touch, proprioception, and sharp/dull sensation are all intact bilaterally. Protective threshold with the Amherst-Wienstein monofilament is intact bilaterally.    Psychiatric:         Behavior: Behavior is cooperative.               Assessment:       Encounter Diagnoses   Name Primary?    Type 2 diabetes mellitus without complication, with long-term current use of insulin Yes    H/O fracture of ankle     Hammer toe, unspecified laterality          Plan:        Thierry was seen today for foot pain and diabetes mellitus.    Diagnoses and all orders for this visit:    Type 2 diabetes mellitus without complication, with long-term current use of insulin  -     Ambulatory referral/consult to Orthopedics; Future  -     DIABETIC SHOES FOR HOME USE    H/O fracture of ankle  -     Ambulatory referral/consult to Orthopedics; Future  -     DIABETIC SHOES FOR HOME USE    Hammer toe, unspecified laterality  -     DIABETIC SHOES FOR HOME USE      I counseled the patient on his conditions, their implications and medical management.    Referral placed to ortho h/o ankle fracture    - Shoe inspection. Diabetic Foot Education. Patient reminded of the importance of good nutrition and blood sugar control to help prevent podiatric complications of diabetes. Patient instructed on proper foot hygeine. We discussed wearing proper shoe gear, daily foot inspections, never walking without protective shoe gear, caution putting sharp instruments to feet     - Discussed DM foot care:  Wear comfortable, proper fitting shoes. Wash feet daily. Dry well. After drying, apply moisturizer to feet (no lotion to webspaces). Inspect feet daily for skin breaks, blisters, swelling, or redness. Wear cotton socks (preferably white)  Change socks every day. Do NOT walk barefoot. Do NOT use heating pads or warm/hot water soaks     Rx diabetic shoes with custom molded inserts to be worn at all times while ambulating. Prescription provided with list of local retailers.     F/u one year DM foot exam.

## 2020-10-02 NOTE — PROGRESS NOTES
Subjective:       Patient ID: Thierry Ho is a 60 y.o. male.    Chief Complaint: Diabetes, Hyperlipidemia, Hypertension, and Coronary Artery Disease      HPI  60-year-old male presents for diabetes follow-up.  Patient A1c prior to exam.  A1c improved slightly to 10.4.  Patient states he has not been following a diabetic diet.  States he is attempting to exercise more.  Blood pressure is within normal limits.  Denies any other issues.      Review of Systems   Constitutional: Negative.    HENT: Negative.    Respiratory: Negative.    Cardiovascular: Negative.    Gastrointestinal: Negative.    Endocrine: Negative.    Genitourinary: Negative.    Musculoskeletal: Negative.    Neurological: Negative.    Psychiatric/Behavioral: Negative.           Past Medical History:   Diagnosis Date    Colon polyps     Diabetes mellitus     DM retinopathy     GERD (gastroesophageal reflux disease)     Hyperlipidemia     Hypertension     Myocardial infarction      Past Surgical History:   Procedure Laterality Date    APPENDECTOMY      BACK SURGERY  11/2017    COLONOSCOPY N/A 10/19/2018    Procedure: COLONOSCOPY;  Surgeon: Frantz Rasmussen MD;  Location: [facility];  Service: Endoscopy;  Laterality: N/A;    EXCISION OF SKIN N/A 6/25/2020    Procedure: EXCISION, SKIN;  Surgeon: Finesse Dumont MD;  Location: [facility] OR;  Service: General;  Laterality: N/A;  on back    FOOT SURGERY      INCISION AND DRAINAGE OF GROIN Right 6/25/2020    Procedure: INCISION AND DRAINAGE, INGUINAL REGION;  Surgeon: Finesse Dumont MD;  Location: [facility] OR;  Service: General;  Laterality: Right;  PT TO PREOP IN AM  PRE-OP BY RN 6-    OPEN REDUCTION AND INTERNAL FIXATION (ORIF) OF INJURY OF ANKLE Right 7/17/2019    Procedure: ORIF, ANKLE;  Surgeon: Raeann Steward MD;  Location: [facility] OR;  Service: Orthopedics;  Laterality: Right;  SARITA MARLEYEL ADAMA  [phone] TEXTED HIM @ 10:2 AM ON 7-11-19  SKELETAL  RN PREOP 7/9/19---- CMP MORNING OF  "SURGERY PER DR FONTAINE     Family History   Problem Relation Age of Onset    Heart disease Mother     Diabetes Father      Social History     Socioeconomic History    Marital status:      Spouse name: Not on file    Number of children: Not on file    Years of education: Not on file    Highest education level: Not on file   Occupational History    Not on file   Social Needs    Financial resource strain: Not on file    Food insecurity     Worry: Not on file     Inability: Not on file    Transportation needs     Medical: Not on file     Non-medical: Not on file   Tobacco Use    Smoking status: Current Every Day Smoker     Packs/day: 0.25     Years: 40.00     Pack years: 10.00     Types: Cigarettes     Start date: 1/15/1981    Smokeless tobacco: Never Used   Substance and Sexual Activity    Alcohol use: Yes     Alcohol/week: 4.0 standard drinks     Types: 4 Cans of beer per week     Comment: social     Drug use: Yes     Types: Marijuana    Sexual activity: Not on file   Lifestyle    Physical activity     Days per week: Not on file     Minutes per session: Not on file    Stress: Not on file   Relationships    Social connections     Talks on phone: Not on file     Gets together: Not on file     Attends [employer] service: Not on file     Active member of club or organization: Not on file     Attends meetings of clubs or organizations: Not on file     Relationship status: Not on file   Other Topics Concern    Not on file   Social History Narrative    Not on file       Current Outpatient Medications:     acetaminophen (TYLENOL) 500 MG tablet, Take 500 mg by mouth every 6 (six) hours as needed for Pain., Disp: , Rfl:     BD ULTRA-FINE FREDA PEN NEEDLE 32 gauge x 5/32" Ndle, AS DIRECTED ONCE DAILY, Disp: 100 each, Rfl: 6    BLOOD PRESSURE CUFF Misc, 1 kit by Misc.(Non-Drug; Combo Route) route once daily., Disp: 1 each, Rfl: 0    blood sugar diagnostic Strp, 1 each by abdominal subcutaneous route 4 " (four) times daily before meals and nightly., Disp: 200 each, Rfl: 3    blood-glucose meter kit, 1 each by Other route 4 (four) times daily before meals and nightly., Disp: 1 each, Rfl: 0    dulaglutide (TRULICITY) 1.5 mg/0.5 mL pen injector, Inject 1.5 mg into the skin every 7 days. Start 0.75 mg once weekly for 4 weeks. Then increase to 1.5 mg once weekly, Disp: 4 pen, Rfl: 1    fluocinonide (LIDEX) 0.05 % external solution, APPLY 1 ML TO THE SCALP AT BEDTIME, Disp: , Rfl: 3    ibuprofen (ADVIL,MOTRIN) 600 MG tablet, Take 1 tablet (600 mg total) by mouth every 8 (eight) hours as needed for Pain., Disp: 20 tablet, Rfl: 0    LEVEMIR FLEXTOUCH U-100 INSULN 100 unit/mL (3 mL) InPn pen, Inject 28 Units into the skin 2 (two) times daily., Disp: 50.4 mL, Rfl: 1    lisinopriL (PRINIVIL,ZESTRIL) 20 MG tablet, Take 1 tablet (20 mg total) by mouth once daily., Disp: 90 tablet, Rfl: 3    metFORMIN (GLUCOPHAGE) 1000 MG tablet, Take 1 tablet (1,000 mg total) by mouth 2 (two) times daily with meals., Disp: 180 tablet, Rfl: 1    simvastatin (ZOCOR) 40 MG tablet, TAKE 1 TABLET(40 MG) BY MOUTH EVERY EVENING, Disp: 90 tablet, Rfl: 0    TRUEPLUS LANCETS 33 gauge Misc, , Disp: , Rfl:     aspirin 81 MG Chew, Take 1 tablet (81 mg total) by mouth once daily., Disp: , Rfl: 0    carvediloL (COREG) 25 MG tablet, TAKE 1 TABLET(25 MG) BY MOUTH TWICE DAILY WITH MEALS, Disp: 180 tablet, Rfl: 0    dulaglutide (TRULICITY) 0.75 mg/0.5 mL pen injector, Inject 0.75 mg into the skin every 7 days. Start 0.75 mg once weekly for 4 weeks. Then increase to 1.5 mg once weekly., Disp: 4 pen, Rfl: 0  No current facility-administered medications for this visit.     Facility-Administered Medications Ordered in Other Visits:     lactated ringers infusion, , Intravenous, Continuous, Violeta Vides MD    lactated ringers infusion, , Intravenous, Continuous, Landry Peters MD    lidocaine (PF) 10 mg/ml (1%) injection 10 mg, 1 mL, Intradermal,  "Once, Violeta Vides MD    lidocaine (PF) 10 mg/ml (1%) injection 10 mg, 1 mL, Intradermal, Once, Landry Peters MD   Objective:      Vitals:    09/09/20 1552   BP: 136/82   BP Location: Left arm   Patient Position: Sitting   BP Method: Large (Manual)   Pulse: 70   Resp: 20   Temp: 98.4 °F (36.9 °C)   TempSrc: Oral   SpO2: 98%   Weight: 108 kg (238 lb 1.6 oz)   Height: 5' 11" (1.803 m)       Physical Exam  Constitutional:       General: He is not in acute distress.  HENT:      Head: Normocephalic and atraumatic.   Eyes:      Conjunctiva/sclera: Conjunctivae normal.   Neck:      Musculoskeletal: Neck supple.   Cardiovascular:      Rate and Rhythm: Normal rate and regular rhythm.      Heart sounds: Normal heart sounds. No murmur. No friction rub. No gallop.    Pulmonary:      Effort: Pulmonary effort is normal.      Breath sounds: Normal breath sounds. No wheezing or rales.   Skin:     General: Skin is warm and dry.   Neurological:      Mental Status: He is alert and oriented to person, place, and time.   Psychiatric:         Behavior: Behavior normal.         Thought Content: Thought content normal.         Judgment: Judgment normal.            Assessment:       1. Uncontrolled type 2 diabetes mellitus with hyperglycemia    2. Essential hypertension    3. Other hyperlipidemia        Plan:       Uncontrolled type 2 diabetes mellitus with hyperglycemia    Essential hypertension    Other hyperlipidemia    a1c improved slightly to 10.4. pt was encouraged for diet and exercise. Continue meds. Cont other meds.            Patient note was created using [employer].  Any errors in syntax or even information may not have been identified and edited on initial review prior to signing this note.    "

## 2020-10-22 ENCOUNTER — PATIENT OUTREACH (OUTPATIENT)
Dept: ADMINISTRATIVE | Facility: HOSPITAL | Age: 60
End: 2020-10-22

## 2020-10-23 ENCOUNTER — OFFICE VISIT (OUTPATIENT)
Dept: FAMILY MEDICINE | Facility: CLINIC | Age: 60
End: 2020-10-23
Payer: MEDICARE

## 2020-10-23 ENCOUNTER — LAB VISIT (OUTPATIENT)
Dept: LAB | Facility: HOSPITAL | Age: 60
End: 2020-10-23
Attending: FAMILY MEDICINE
Payer: MEDICARE

## 2020-10-23 VITALS
DIASTOLIC BLOOD PRESSURE: 78 MMHG | HEART RATE: 80 BPM | HEIGHT: 71 IN | TEMPERATURE: 99 F | BODY MASS INDEX: 32.41 KG/M2 | OXYGEN SATURATION: 98 % | SYSTOLIC BLOOD PRESSURE: 124 MMHG | WEIGHT: 231.5 LBS | RESPIRATION RATE: 20 BRPM

## 2020-10-23 DIAGNOSIS — I10 ESSENTIAL HYPERTENSION: ICD-10-CM

## 2020-10-23 DIAGNOSIS — E11.9 TYPE 2 DIABETES MELLITUS WITHOUT COMPLICATION, WITH LONG-TERM CURRENT USE OF INSULIN: ICD-10-CM

## 2020-10-23 DIAGNOSIS — Z12.11 COLON CANCER SCREENING: ICD-10-CM

## 2020-10-23 DIAGNOSIS — I25.10 CORONARY ARTERY DISEASE INVOLVING NATIVE CORONARY ARTERY OF NATIVE HEART WITHOUT ANGINA PECTORIS: ICD-10-CM

## 2020-10-23 DIAGNOSIS — E78.49 OTHER HYPERLIPIDEMIA: ICD-10-CM

## 2020-10-23 DIAGNOSIS — Z79.4 TYPE 2 DIABETES MELLITUS WITHOUT COMPLICATION, WITH LONG-TERM CURRENT USE OF INSULIN: ICD-10-CM

## 2020-10-23 DIAGNOSIS — E11.65 UNCONTROLLED TYPE 2 DIABETES MELLITUS WITH HYPERGLYCEMIA: Primary | ICD-10-CM

## 2020-10-23 PROCEDURE — 36415 COLL VENOUS BLD VENIPUNCTURE: CPT | Mod: HCNC,PO

## 2020-10-23 PROCEDURE — 3074F PR MOST RECENT SYSTOLIC BLOOD PRESSURE < 130 MM HG: ICD-10-PCS | Mod: HCNC,CPTII,S$GLB, | Performed by: FAMILY MEDICINE

## 2020-10-23 PROCEDURE — 3008F BODY MASS INDEX DOCD: CPT | Mod: HCNC,CPTII,S$GLB, | Performed by: FAMILY MEDICINE

## 2020-10-23 PROCEDURE — 99999 PR PBB SHADOW E&M-EST. PATIENT-LVL IV: CPT | Mod: PBBFAC,HCNC,, | Performed by: FAMILY MEDICINE

## 2020-10-23 PROCEDURE — 99214 PR OFFICE/OUTPT VISIT, EST, LEVL IV, 30-39 MIN: ICD-10-PCS | Mod: HCNC,S$GLB,, | Performed by: FAMILY MEDICINE

## 2020-10-23 PROCEDURE — 99999 PR PBB SHADOW E&M-EST. PATIENT-LVL IV: ICD-10-PCS | Mod: PBBFAC,HCNC,, | Performed by: FAMILY MEDICINE

## 2020-10-23 PROCEDURE — 3078F DIAST BP <80 MM HG: CPT | Mod: HCNC,CPTII,S$GLB, | Performed by: FAMILY MEDICINE

## 2020-10-23 PROCEDURE — 3078F PR MOST RECENT DIASTOLIC BLOOD PRESSURE < 80 MM HG: ICD-10-PCS | Mod: HCNC,CPTII,S$GLB, | Performed by: FAMILY MEDICINE

## 2020-10-23 PROCEDURE — 83970 ASSAY OF PARATHORMONE: CPT | Mod: HCNC

## 2020-10-23 PROCEDURE — 3074F SYST BP LT 130 MM HG: CPT | Mod: HCNC,CPTII,S$GLB, | Performed by: FAMILY MEDICINE

## 2020-10-23 PROCEDURE — 3008F PR BODY MASS INDEX (BMI) DOCUMENTED: ICD-10-PCS | Mod: HCNC,CPTII,S$GLB, | Performed by: FAMILY MEDICINE

## 2020-10-23 PROCEDURE — 3046F PR MOST RECENT HEMOGLOBIN A1C LEVEL > 9.0%: ICD-10-PCS | Mod: HCNC,CPTII,S$GLB, | Performed by: FAMILY MEDICINE

## 2020-10-23 PROCEDURE — 99214 OFFICE O/P EST MOD 30 MIN: CPT | Mod: HCNC,S$GLB,, | Performed by: FAMILY MEDICINE

## 2020-10-23 PROCEDURE — 3046F HEMOGLOBIN A1C LEVEL >9.0%: CPT | Mod: HCNC,CPTII,S$GLB, | Performed by: FAMILY MEDICINE

## 2020-10-23 RX ORDER — PEN NEEDLE, DIABETIC 31 GX5/16"
NEEDLE, DISPOSABLE MISCELLANEOUS
Qty: 200 EACH | Refills: 6 | Status: SHIPPED | OUTPATIENT
Start: 2020-10-23 | End: 2020-10-23 | Stop reason: SDUPTHER

## 2020-10-23 RX ORDER — PEN NEEDLE, DIABETIC 31 GX5/16"
NEEDLE, DISPOSABLE MISCELLANEOUS
Qty: 200 EACH | Refills: 6 | Status: SHIPPED | OUTPATIENT
Start: 2020-10-23 | End: 2020-11-18 | Stop reason: SDUPTHER

## 2020-10-23 RX ORDER — INFLUENZA A VIRUS A/VICTORIA/2454/2019 IVR-207 (H1N1) ANTIGEN (PROPIOLACTONE INACTIVATED), INFLUENZA A VIRUS A/HONG KONG/2671/2019 IVR-208 (H3N2) ANTIGEN (PROPIOLACTONE INACTIVATED), INFLUENZA B VIRUS B/VICTORIA/705/2018 BVR-11 ANTIGEN (PROPIOLACTONE INACTIVATED), INFLUENZA B VIRUS B/PHUKET/3073/2013 BVR-1B ANTIGEN (PROPIOLACTONE INACTIVATED) 15; 15; 15; 15 UG/.5ML; UG/.5ML; UG/.5ML; UG/.5ML
INJECTION, SUSPENSION INTRAMUSCULAR
COMMUNITY
Start: 2020-09-18 | End: 2022-09-26 | Stop reason: ALTCHOICE

## 2020-10-24 LAB — PTH-INTACT SERPL-MCNC: 67 PG/ML (ref 9–77)

## 2020-10-27 NOTE — PROGRESS NOTES
Subjective:       Patient ID: Thierry Ho is a 60 y.o. male.    Chief Complaint: Diabetes, Hyperlipidemia, and Hypertension      HPI  60-year-old male presents for diabetes follow-up.  Patient brought his BS sternal.  Majority of his sugar levels are under 150.  Patient checked it fasting and after breakfast and at nighttime.  Did not check it during lunch or dinner.  States he is doing well with the medications.  Denies any issues.    Review of Systems   Constitutional: Negative.    HENT: Negative.    Respiratory: Negative.    Cardiovascular: Negative.    Gastrointestinal: Negative.    Endocrine: Negative.    Genitourinary: Negative.    Musculoskeletal: Negative.    Neurological: Negative.    Psychiatric/Behavioral: Negative.           Past Medical History:   Diagnosis Date    Colon polyps     Diabetes mellitus     DM retinopathy     GERD (gastroesophageal reflux disease)     Hyperlipidemia     Hypertension     Myocardial infarction      Past Surgical History:   Procedure Laterality Date    APPENDECTOMY      BACK SURGERY  11/2017    COLONOSCOPY N/A 10/19/2018    Procedure: COLONOSCOPY;  Surgeon: Frantz Rasmussen MD;  Location: [facility];  Service: Endoscopy;  Laterality: N/A;    EXCISION OF SKIN N/A 6/25/2020    Procedure: EXCISION, SKIN;  Surgeon: Finesse Dumont MD;  Location: [facility] OR;  Service: General;  Laterality: N/A;  on back    FOOT SURGERY      INCISION AND DRAINAGE OF GROIN Right 6/25/2020    Procedure: INCISION AND DRAINAGE, INGUINAL REGION;  Surgeon: Finesse Dumont MD;  Location: [facility] OR;  Service: General;  Laterality: Right;  PT TO PREOP IN AM  PRE-OP BY RN 6-    OPEN REDUCTION AND INTERNAL FIXATION (ORIF) OF INJURY OF ANKLE Right 7/17/2019    Procedure: ORIF, ANKLE;  Surgeon: Raeann Steward MD;  Location: [facility] OR;  Service: Orthopedics;  Laterality: Right;  SARITA  CRISTOFER ADAMA  [phone] TEXTED HIM @ 10:2 AM ON 7-11-19  SKELETAL  RN PREOP 7/9/19---- CMP MORNING OF  "SURGERY PER DR FONTAINE     Family History   Problem Relation Age of Onset    Heart disease Mother     Diabetes Father      Social History     Socioeconomic History    Marital status:      Spouse name: Not on file    Number of children: Not on file    Years of education: Not on file    Highest education level: Not on file   Occupational History    Not on file   Social Needs    Financial resource strain: Not on file    Food insecurity     Worry: Not on file     Inability: Not on file    Transportation needs     Medical: Not on file     Non-medical: Not on file   Tobacco Use    Smoking status: Current Every Day Smoker     Packs/day: 0.25     Years: 40.00     Pack years: 10.00     Types: Cigarettes     Start date: 1/15/1981    Smokeless tobacco: Never Used   Substance and Sexual Activity    Alcohol use: Yes     Alcohol/week: 4.0 standard drinks     Types: 4 Cans of beer per week     Comment: social     Drug use: Yes     Types: Marijuana    Sexual activity: Not on file   Lifestyle    Physical activity     Days per week: Not on file     Minutes per session: Not on file    Stress: Not on file   Relationships    Social connections     Talks on phone: Not on file     Gets together: Not on file     Attends [employer] service: Not on file     Active member of club or organization: Not on file     Attends meetings of clubs or organizations: Not on file     Relationship status: Not on file   Other Topics Concern    Not on file   Social History Narrative    Not on file       Current Outpatient Medications:     acetaminophen (TYLENOL) 500 MG tablet, Take 500 mg by mouth every 6 (six) hours as needed for Pain., Disp: , Rfl:     AFLURIA QD 2020-21,3YR UP,,PF, 60 mcg (15 mcg x 4)/0.5 mL Syrg, ADM 0.5ML IM UTD, Disp: , Rfl:     BD ULTRA-FINE FREDA PEN NEEDLE 32 gauge x 5/32" Ndle, AS DIRECTED ONCE DAILY, Disp: 200 each, Rfl: 6    BLOOD PRESSURE CUFF Misc, 1 kit by Misc.(Non-Drug; Combo Route) route once " daily., Disp: 1 each, Rfl: 0    blood sugar diagnostic Strp, 1 each by abdominal subcutaneous route 4 (four) times daily before meals and nightly., Disp: 200 each, Rfl: 3    blood-glucose meter kit, 1 each by Other route 4 (four) times daily before meals and nightly., Disp: 1 each, Rfl: 0    carvediloL (COREG) 25 MG tablet, TAKE 1 TABLET(25 MG) BY MOUTH TWICE DAILY WITH MEALS, Disp: 180 tablet, Rfl: 0    dulaglutide (TRULICITY) 0.75 mg/0.5 mL pen injector, Inject 0.75 mg into the skin every 7 days. Start 0.75 mg once weekly for 4 weeks. Then increase to 1.5 mg once weekly., Disp: 4 pen, Rfl: 0    dulaglutide (TRULICITY) 1.5 mg/0.5 mL pen injector, Inject 1.5 mg into the skin every 7 days. Start 0.75 mg once weekly for 4 weeks. Then increase to 1.5 mg once weekly, Disp: 4 pen, Rfl: 1    fluocinonide (LIDEX) 0.05 % external solution, APPLY 1 ML TO THE SCALP AT BEDTIME, Disp: , Rfl: 3    ibuprofen (ADVIL,MOTRIN) 600 MG tablet, Take 1 tablet (600 mg total) by mouth every 8 (eight) hours as needed for Pain., Disp: 20 tablet, Rfl: 0    LEVEMIR FLEXTOUCH U-100 INSULN 100 unit/mL (3 mL) InPn pen, Inject 28 Units into the skin 2 (two) times daily., Disp: 50.4 mL, Rfl: 1    lisinopriL (PRINIVIL,ZESTRIL) 20 MG tablet, Take 1 tablet (20 mg total) by mouth once daily., Disp: 90 tablet, Rfl: 3    metFORMIN (GLUCOPHAGE) 1000 MG tablet, Take 1 tablet (1,000 mg total) by mouth 2 (two) times daily with meals., Disp: 180 tablet, Rfl: 1    simvastatin (ZOCOR) 40 MG tablet, TAKE 1 TABLET(40 MG) BY MOUTH EVERY EVENING, Disp: 90 tablet, Rfl: 1    TRUEPLUS LANCETS 33 gauge Misc, , Disp: , Rfl:     aspirin 81 MG Chew, Take 1 tablet (81 mg total) by mouth once daily., Disp: , Rfl: 0  No current facility-administered medications for this visit.     Facility-Administered Medications Ordered in Other Visits:     lactated ringers infusion, , Intravenous, Continuous, Violeta Vides MD    lactated ringers infusion, , Intravenous,  "Continuous, Landry Peters MD    lidocaine (PF) 10 mg/ml (1%) injection 10 mg, 1 mL, Intradermal, Once, Violeta Vides MD    lidocaine (PF) 10 mg/ml (1%) injection 10 mg, 1 mL, Intradermal, Once, Landry Peters MD   Objective:      Vitals:    10/23/20 1448   BP: 124/78   BP Location: Left arm   Patient Position: Sitting   BP Method: Large (Manual)   Pulse: 80   Resp: 20   Temp: 98.6 °F (37 °C)   TempSrc: Oral   SpO2: 98%   Weight: 105 kg (231 lb 7.7 oz)   Height: 5' 11" (1.803 m)       Physical Exam  Constitutional:       General: He is not in acute distress.  HENT:      Head: Normocephalic and atraumatic.   Eyes:      Conjunctiva/sclera: Conjunctivae normal.   Neck:      Musculoskeletal: Neck supple.   Cardiovascular:      Rate and Rhythm: Normal rate and regular rhythm.      Heart sounds: Normal heart sounds. No murmur. No friction rub. No gallop.    Pulmonary:      Effort: Pulmonary effort is normal.      Breath sounds: Normal breath sounds. No wheezing or rales.   Skin:     General: Skin is warm and dry.   Neurological:      Mental Status: He is alert and oriented to person, place, and time.   Psychiatric:         Behavior: Behavior normal.         Thought Content: Thought content normal.         Judgment: Judgment normal.            Assessment:       1. Uncontrolled type 2 diabetes mellitus with hyperglycemia    2. Colon cancer screening    3. Essential hypertension    4. Other hyperlipidemia    5. Coronary artery disease involving native coronary artery of native heart without angina pectoris        Plan:       Uncontrolled type 2 diabetes mellitus with hyperglycemia  -     BD ULTRA-FINE FREDA PEN NEEDLE 32 gauge x 5/32" Ndle; AS DIRECTED ONCE DAILY  Dispense: 200 each; Refill: 6  -     PTH, intact; Future; Expected date: 10/23/2020    Colon cancer screening  -     Case request GI: COLONOSCOPY    Essential hypertension  Stable. Continue regimen    Other hyperlipidemia  Stable. Continue " regimen    Coronary artery disease involving native coronary artery of native heart without angina pectoris  Stable. Continue regimen    bg journal was doing well. f/u a1c in 2 months. Cont meds.            Patient note was created using [employer].  Any errors in syntax or even information may not have been identified and edited on initial review prior to signing this note.

## 2020-11-11 ENCOUNTER — TELEPHONE (OUTPATIENT)
Dept: FAMILY MEDICINE | Facility: CLINIC | Age: 60
End: 2020-11-11

## 2020-11-11 NOTE — TELEPHONE ENCOUNTER
----- Message from John López sent at 11/11/2020  8:16 AM CST -----  Regarding: inquiry  Type: Patient Call Back    Who called:self    What is the request in detail:patient wants to know if Dr. Jarquin would like to see him anytime soon    Can the clinic reply by [facility]?no    Would the patient rather a call back or a response via [facility]? callback    Best call back number ..[phone]

## 2020-11-12 ENCOUNTER — PATIENT OUTREACH (OUTPATIENT)
Dept: ADMINISTRATIVE | Facility: OTHER | Age: 60
End: 2020-11-12

## 2020-11-13 ENCOUNTER — OFFICE VISIT (OUTPATIENT)
Dept: ENDOCRINOLOGY | Facility: CLINIC | Age: 60
End: 2020-11-13
Payer: MEDICARE

## 2020-11-13 ENCOUNTER — LAB VISIT (OUTPATIENT)
Dept: LAB | Facility: HOSPITAL | Age: 60
End: 2020-11-13
Attending: NURSE PRACTITIONER
Payer: MEDICARE

## 2020-11-13 VITALS
OXYGEN SATURATION: 98 % | TEMPERATURE: 98 F | SYSTOLIC BLOOD PRESSURE: 120 MMHG | HEIGHT: 71 IN | WEIGHT: 229 LBS | DIASTOLIC BLOOD PRESSURE: 80 MMHG | BODY MASS INDEX: 32.06 KG/M2 | HEART RATE: 95 BPM | RESPIRATION RATE: 18 BRPM

## 2020-11-13 DIAGNOSIS — Z72.820 POOR SLEEP: ICD-10-CM

## 2020-11-13 DIAGNOSIS — E11.65 UNCONTROLLED TYPE 2 DIABETES MELLITUS WITH HYPERGLYCEMIA: Primary | ICD-10-CM

## 2020-11-13 DIAGNOSIS — I25.2 HISTORY OF MI (MYOCARDIAL INFARCTION): ICD-10-CM

## 2020-11-13 DIAGNOSIS — I10 ESSENTIAL HYPERTENSION: ICD-10-CM

## 2020-11-13 DIAGNOSIS — E11.65 UNCONTROLLED TYPE 2 DIABETES MELLITUS WITH HYPERGLYCEMIA: ICD-10-CM

## 2020-11-13 PROCEDURE — 36415 COLL VENOUS BLD VENIPUNCTURE: CPT | Mod: HCNC,PN

## 2020-11-13 PROCEDURE — 99999 PR PBB SHADOW E&M-EST. PATIENT-LVL V: ICD-10-PCS | Mod: PBBFAC,HCNC,, | Performed by: NURSE PRACTITIONER

## 2020-11-13 PROCEDURE — 3074F PR MOST RECENT SYSTOLIC BLOOD PRESSURE < 130 MM HG: ICD-10-PCS | Mod: HCNC,CPTII,S$GLB, | Performed by: NURSE PRACTITIONER

## 2020-11-13 PROCEDURE — 3046F HEMOGLOBIN A1C LEVEL >9.0%: CPT | Mod: HCNC,CPTII,S$GLB, | Performed by: NURSE PRACTITIONER

## 2020-11-13 PROCEDURE — 3079F DIAST BP 80-89 MM HG: CPT | Mod: HCNC,CPTII,S$GLB, | Performed by: NURSE PRACTITIONER

## 2020-11-13 PROCEDURE — 3074F SYST BP LT 130 MM HG: CPT | Mod: HCNC,CPTII,S$GLB, | Performed by: NURSE PRACTITIONER

## 2020-11-13 PROCEDURE — 83036 HEMOGLOBIN GLYCOSYLATED A1C: CPT | Mod: HCNC

## 2020-11-13 PROCEDURE — 1126F AMNT PAIN NOTED NONE PRSNT: CPT | Mod: HCNC,S$GLB,, | Performed by: NURSE PRACTITIONER

## 2020-11-13 PROCEDURE — 3046F PR MOST RECENT HEMOGLOBIN A1C LEVEL > 9.0%: ICD-10-PCS | Mod: HCNC,CPTII,S$GLB, | Performed by: NURSE PRACTITIONER

## 2020-11-13 PROCEDURE — 3079F PR MOST RECENT DIASTOLIC BLOOD PRESSURE 80-89 MM HG: ICD-10-PCS | Mod: HCNC,CPTII,S$GLB, | Performed by: NURSE PRACTITIONER

## 2020-11-13 PROCEDURE — 99999 PR PBB SHADOW E&M-EST. PATIENT-LVL V: CPT | Mod: PBBFAC,HCNC,, | Performed by: NURSE PRACTITIONER

## 2020-11-13 PROCEDURE — 99214 PR OFFICE/OUTPT VISIT, EST, LEVL IV, 30-39 MIN: ICD-10-PCS | Mod: HCNC,S$GLB,, | Performed by: NURSE PRACTITIONER

## 2020-11-13 PROCEDURE — 1126F PR PAIN SEVERITY QUANTIFIED, NO PAIN PRESENT: ICD-10-PCS | Mod: HCNC,S$GLB,, | Performed by: NURSE PRACTITIONER

## 2020-11-13 PROCEDURE — 3008F PR BODY MASS INDEX (BMI) DOCUMENTED: ICD-10-PCS | Mod: HCNC,CPTII,S$GLB, | Performed by: NURSE PRACTITIONER

## 2020-11-13 PROCEDURE — 3008F BODY MASS INDEX DOCD: CPT | Mod: HCNC,CPTII,S$GLB, | Performed by: NURSE PRACTITIONER

## 2020-11-13 PROCEDURE — 99214 OFFICE O/P EST MOD 30 MIN: CPT | Mod: HCNC,S$GLB,, | Performed by: NURSE PRACTITIONER

## 2020-11-13 RX ORDER — ASPIRIN 81 MG/1
81 TABLET ORAL DAILY
COMMUNITY

## 2020-11-13 RX ORDER — INSULIN DETEMIR 100 [IU]/ML
28 INJECTION, SOLUTION SUBCUTANEOUS 2 TIMES DAILY
Qty: 4 BOX | Refills: 1 | Status: SHIPPED | OUTPATIENT
Start: 2020-11-13 | End: 2020-11-17 | Stop reason: SDUPTHER

## 2020-11-13 RX ORDER — DULAGLUTIDE 1.5 MG/.5ML
1.5 INJECTION, SOLUTION SUBCUTANEOUS
Qty: 12 PEN | Refills: 1 | Status: SHIPPED | OUTPATIENT
Start: 2020-11-13 | End: 2020-11-17 | Stop reason: SDUPTHER

## 2020-11-13 RX ORDER — METFORMIN HYDROCHLORIDE 1000 MG/1
1000 TABLET ORAL 2 TIMES DAILY WITH MEALS
Qty: 180 TABLET | Refills: 1 | Status: SHIPPED | OUTPATIENT
Start: 2020-11-13 | End: 2020-11-17 | Stop reason: SDUPTHER

## 2020-11-13 NOTE — PROGRESS NOTES
Health Maintenance Due   Topic Date Due    Aspirin/Antiplatelet Therapy  04/28/1979    Shingles Vaccine (1 of 2) 04/28/2011    Colorectal Cancer Screening  10/19/2020     Updates were requested from care everywhere.  Chart was reviewed for overdue [facility]active [facility] Encounters (PEPPER) topics (CRS, Breast Cancer Screening, Eye exam)  Health Maintenance has been updated.  LINKS immunization registry triggered.  Immunizations were reconciled.    Case request already placed for colonoscopy.

## 2020-11-13 NOTE — PROGRESS NOTES
CC: This 60 y.o. Black or  male  is here for evaluation of  T2DM along with comorbidities indicated in the Visit Diagnosis section of this encounter.    HPI: Thierry Ho was diagnosed with T2DM in ~ 2015. Metformin started at the time of diagnosis.   Medical history: s/p MI     Initial visit 8/28/20  New to Endocrine. Referred by Dr. Jarquin.   C/o BGs are always high.   Plan Emphasized importance of improving diet.   Avoid beverages with sugar.   See Diabetes Educator/Registered Dietician for Medical Nutrition Therapy.   Increase Levemir to 30 units twice daily.   Continue metformin.   Start Trulicity 0.75 mg once weekly for 4 weeks. Then increase to Trulicity 1.5 mg once weekly.   Test glucose 2x/day. Bring a log to every visit.   Return to clinic in 6 weeks.       Interval history  He has seen diabetes educator and found visit helpful. Started Trulicity and denies nausea. Constipation is unchanged. BGs much better. He has improved diet and lost 14 lb.   He is now taking levemir 28 units bid.     He does not take Levemir if he's drinking at night because he's scared of interaction. Only drinks maybe once a month.   Pt c/o insomnia. Cannot fall and stay asleep at night. He does watch tv at night and keeps tv on all night.       LAST DIABETES EDUCATION: 9/16/20    HOSPITALIZED FOR DIABETES  -  No.    PRESCRIBED DIABETES MEDICATIONS: Levemir Flextouch 28 units bid, metformin 1000 mg bid, Trulicity 1.5 mg once weekly      Misses medication doses - no     DM COMPLICATIONS: cardiovascular disease    SIGNIFICANT DIABETES MED HISTORY: intolerance to DM meds     SELF MONITORING BLOOD GLUCOSE: Checks blood glucose at home 4x/day.           HYPOGLYCEMIC EPISODES: none      CURRENT DIET: drinks juice and diluted sweet tea. Eats 3 meals/day.     CURRENT EXERCISE: none       /80 (BP Location: Right arm, Patient Position: Sitting, BP Method: Large (Automatic))   Pulse 95   Temp 97.6 °F (36.4 °C)   Resp  "18   Ht 5' 11" (1.803 m)   Wt 103.9 kg (229 lb)   SpO2 98%   BMI 31.94 kg/m²       ROS:   CONSTITUTIONAL: Appetite good, denies fatigue  :  urinary frequency - resolved   OTHER: + insomnia     PHYSICAL EXAM:  GENERAL: Well developed, well nourished. No acute distress.   PSYCH: AAOx3, appropriate mood and affect, conversant, well-groomed. Judgement and insight good.   NEURO: Cranial nerves grossly intact. Speech clear, no tremor.   NECK: Trachea midline, no thyromegaly or lymphadenopathy.   CHEST: Respirations even and unlabored. CTA bilaterally.  CARDIOVASCULAR: Regular rate and rhythm. No bruits. No murmur. No edema.   ABDOMEN: Soft, non-tender, non-distended. Bowel sounds present.   MS: Gait steady. No clubbing.   SKIN: Normal skin turgor. Skin warm and dry. No areas of breakdown. + acanthosis nigricans.  FEET: Footware appropriate.       Hemoglobin A1C   Date Value Ref Range Status   08/31/2020 10.4 (H) 4.0 - 5.6 % Final     Comment:     ADA Screening Guidelines:  5.7-6.4%  Consistent with prediabetes  >or=6.5%  Consistent with diabetes  High levels of fetal hemoglobin interfere with the HbA1C  assay. Heterozygous hemoglobin variants (HbS, HgC, etc)do  not significantly interfere with this assay.   However, presence of multiple variants may affect accuracy.     05/28/2020 10.7 (H) 4.0 - 5.6 % Final     Comment:     ADA Screening Guidelines:  5.7-6.4%  Consistent with prediabetes  >or=6.5%  Consistent with diabetes  High levels of fetal hemoglobin interfere with the HbA1C  assay. Heterozygous hemoglobin variants (HbS, HgC, etc)do  not significantly interfere with this assay.   However, presence of multiple variants may affect accuracy.     02/26/2020 11.3 (H) 4.0 - 5.6 % Final     Comment:     ADA Screening Guidelines:  5.7-6.4%  Consistent with prediabetes  >or=6.5%  Consistent with diabetes  High levels of fetal hemoglobin interfere with the HbA1C  assay. Heterozygous hemoglobin variants (HbS, HgC, " etc)do  not significantly interfere with this assay.   However, presence of multiple variants may affect accuracy.             Chemistry        Component Value Date/Time     (L) 08/31/2020 0913    K 4.7 08/31/2020 0913     08/31/2020 0913    CO2 22 (L) 08/31/2020 0913    BUN 24 (H) 08/31/2020 0913    CREATININE 1.0 08/31/2020 0913     (H) 08/31/2020 0913        Component Value Date/Time    CALCIUM 11.9 (H) 08/31/2020 0913    ALKPHOS 83 08/31/2020 0913    AST 35 08/31/2020 0913    ALT 36 08/31/2020 0913    BILITOT 0.3 08/31/2020 0913    ESTGFRAFRICA >60.0 08/31/2020 0913    EGFRNONAA >60.0 08/31/2020 0913          Lab Results   Component Value Date    LDLCALC 58.2 (L) 05/28/2020       No results found for: MICALBCREAT          STANDARDS of CARE:        ASA:               Last eye exam:       ASSESSMENT and PLAN:    A1C GOAL: < 7 %       1. Uncontrolled type 2 diabetes mellitus with hyperglycemia  Continue current treatment.   Discussed changing Levemir from twice daily to 56 units once daily but pt prefers to continue 28 units twice daily.   Improve diet.   Test glucose 2x/day.   Follow up in 3 months with labs prior.       metFORMIN (GLUCOPHAGE) 1000 MG tablet    insulin detemir U-100 (LEVEMIR FLEXTOUCH U-100 INSULN) 100 unit/mL (3 mL) InPn pen    dulaglutide (TRULICITY) 1.5 mg/0.5 mL pen injector    Hemoglobin A1C    Microalbumin/Creatinine Ratio, Urine   2. Essential hypertension  Microalbumin/Creatinine Ratio, Urine   3. History of MI (myocardial infarction)  Improve glycemic control.      4. Poor sleep  Discussed better sleep hygiene. Handout provided.      Orders Placed This Encounter   Procedures    Hemoglobin A1C     Standing Status:   Standing     Number of Occurrences:   2     Standing Expiration Date:   1/12/2022    Microalbumin/Creatinine Ratio, Urine     Standing Status:   Future     Standing Expiration Date:   1/12/2022     Order Specific Question:   Specimen Source     Answer:    Urine        Follow up in about 3 months (around 2/13/2021).

## 2020-11-13 NOTE — PATIENT INSTRUCTIONS
Continue current treatment.   Discussed changing Levemir from twice daily to 56 units once daily but pt prefers to continue 28 units twice daily.   Improve diet.   Test glucose 2x/day.   Follow up in 3 months with labs prior.     Treating Insomnia     Learning to relax before bedtime can improve your sleep.     Good sleeping habits are a key part of treatment. If needed, some medications may help you sleep better at first. Making healthy lifestyle changes and learning to relax can improve your sleep. Treating insomnia takes commitment, but trust that your efforts will pay off. Talk to your health care provider before taking any medication.  Healthy lifestyle  Your lifestyle affects your health and your sleep. Here are some healthy habits:  · Keep a regular sleep schedule. Go to bed and get up at the same time each day.  · Exercise regularly. It may help you reduce stress. Avoid strenuous exercise for 2 to 4 hours before bedtime.  · Avoid or limit naps, especially in the late afternoon.  · Use your bed only for sleep and sex.  · Dont spend too much time in bed trying to fall asleep. If you cant fall asleep, get up and do something (no electronics) until you become tired and drowsy.  · Avoid or limit caffeine and nicotine for up to 6 hours before bedtime. They can keep you awake at night.   · Also avoid alcohol for at least 4 to 6 hours before bedtime. It may help you fall asleep at first, but you will have more awakenings throughout the night, and your sleep will not be restful.  Before bedtime  To sleep better every night, try these tips:  · Have a bedtime routine to let your body and mind know when its time to sleep.  · Think of going to bed as relaxing and enjoyable. Sleep will come sooner.  · If your worries dont let you sleep, write them down in a diary. Then close it, and go to bed.  · Make sure the room is not too hot or too cold. If its not dark enough, an eye mask can help. If its noisy, try using  earplugs.  Learn to relax  Stress, anxiety, and body tension may keep you awake at night. To unwind before bedtime, try a warm bath, meditation, or yoga. Also, try the following:  · Deep breathing. Sit or lie back in a chair. Take a slow, deep breath. Hold it for 5 counts. Then breathe out slowly through your mouth. Keep doing this until you feel relaxed.  · Progressive muscle relaxation. Tense and then relax the muscles in your body as you breathe deeply. Start with your feet and work up your body to your neck and face.  Date Last Reviewed: 7/18/2015  © 0731-5512 [employer]. [address] [place] [address], [place] [address]. All rights reserved. This information is not intended as a substitute for professional medical care. Always follow your healthcare professional's instructions.

## 2020-11-14 LAB
ESTIMATED AVG GLUCOSE: 151 MG/DL (ref 68–131)
HBA1C MFR BLD HPLC: 6.9 % (ref 4–5.6)

## 2020-11-16 ENCOUNTER — TELEPHONE (OUTPATIENT)
Dept: ENDOCRINOLOGY | Facility: CLINIC | Age: 60
End: 2020-11-16

## 2020-11-16 NOTE — TELEPHONE ENCOUNTER
a1c is down to 6.9% which indicates controlled diabetes. Great job!  He can transfer his diabetes care back to Dr. Jarquin his PCP instead of returning back to Endocrine. He can cancel his lab appt and f/u appt in Feb.

## 2020-11-16 NOTE — TELEPHONE ENCOUNTER
Spoke with the pt and informed him that his a1c is down to 6.9% which indicates controlled diabetes. He can transfer his diabetes care back to Dr. Jarquin his PCP instead of returning back to Endocrine. Also that he can cancel his lab appt and f/u appt in Feb. The pt understood.

## 2020-11-17 DIAGNOSIS — E11.65 UNCONTROLLED TYPE 2 DIABETES MELLITUS WITH HYPERGLYCEMIA: ICD-10-CM

## 2020-11-17 DIAGNOSIS — E78.49 OTHER HYPERLIPIDEMIA: ICD-10-CM

## 2020-11-17 DIAGNOSIS — I10 ESSENTIAL HYPERTENSION: ICD-10-CM

## 2020-11-17 NOTE — TELEPHONE ENCOUNTER
"Last Office Visit Info:   The patient's last visit with Sterling Jarquin MD was on 10/23/2020.    The patient's last visit in current department was on 10/23/2020.        Last CBC Results:   Lab Results   Component Value Date    WBC 9.13 06/25/2020    HGB 14.0 06/25/2020    HCT 41.6 06/25/2020     06/25/2020       Last CMP Results  Lab Results   Component Value Date     (L) 08/31/2020    K 4.7 08/31/2020     08/31/2020    CO2 22 (L) 08/31/2020    BUN 24 (H) 08/31/2020    CREATININE 1.0 08/31/2020    CALCIUM 11.9 (H) 08/31/2020    ALBUMIN 4.0 08/31/2020    AST 35 08/31/2020    ALT 36 08/31/2020       Last Lipids  Lab Results   Component Value Date    CHOL 117 (L) 05/28/2020    TRIG 104 05/28/2020    HDL 38 (L) 05/28/2020    LDLCALC 58.2 (L) 05/28/2020       Last A1C  Lab Results   Component Value Date    HGBA1C 6.9 (H) 11/13/2020       Last TSH  Lab Results   Component Value Date    TSH 2.423 06/04/2018         Current Med Refills  Medication List with Changes/Refills   Current Medications    ACETAMINOPHEN (TYLENOL) 500 MG TABLET    Take 500 mg by mouth every 6 (six) hours as needed for Pain.       Start Date: --        End Date: --    AFLURIA QD 2020-21,3YR UP,,PF, 60 MCG (15 MCG X 4)/0.5 ML SYRG    ADM 0.5ML IM UTD       Start Date: 9/18/2020 End Date: --    ASPIRIN (ECOTRIN) 81 MG EC TABLET    Take 81 mg by mouth once daily.       Start Date: --        End Date: --    BD ULTRA-FINE FREDA PEN NEEDLE 32 GAUGE X 5/32" NDLE    AS DIRECTED ONCE DAILY       Start Date: 10/23/2020End Date: --    BLOOD PRESSURE CUFF MISC    1 kit by Misc.(Non-Drug; Combo Route) route once daily.       Start Date: 7/27/2018 End Date: --    BLOOD SUGAR DIAGNOSTIC STRP    1 each by abdominal subcutaneous route 4 (four) times daily before meals and nightly.       Start Date: 7/17/2020 End Date: --    BLOOD-GLUCOSE METER KIT    1 each by Other route 4 (four) times daily before meals and nightly.       Start Date: 3/13/2019 " End Date: --    CARVEDILOL (COREG) 25 MG TABLET    TAKE 1 TABLET(25 MG) BY MOUTH TWICE DAILY WITH MEALS       Start Date: 9/14/2020 End Date: --    DULAGLUTIDE (TRULICITY) 1.5 MG/0.5 ML PEN INJECTOR    Inject 1.5 mg into the skin every 7 days.       Start Date: 11/13/2020End Date: --    FLUOCINONIDE (LIDEX) 0.05 % EXTERNAL SOLUTION    APPLY 1 ML TO THE SCALP AT BEDTIME       Start Date: 1/7/2019  End Date: --    IBUPROFEN (ADVIL,MOTRIN) 600 MG TABLET    Take 1 tablet (600 mg total) by mouth every 8 (eight) hours as needed for Pain.       Start Date: 6/18/2020 End Date: --    INSULIN DETEMIR U-100 (LEVEMIR FLEXTOUCH U-100 INSULN) 100 UNIT/ML (3 ML) INPN PEN    Inject 28 Units into the skin 2 (two) times daily.       Start Date: 11/13/2020End Date: 5/12/2021    LISINOPRIL (PRINIVIL,ZESTRIL) 20 MG TABLET    Take 1 tablet (20 mg total) by mouth once daily.       Start Date: 4/22/2020 End Date: --    METFORMIN (GLUCOPHAGE) 1000 MG TABLET    Take 1 tablet (1,000 mg total) by mouth 2 (two) times daily with meals.       Start Date: 11/13/2020End Date: --    SIMVASTATIN (ZOCOR) 40 MG TABLET    TAKE 1 TABLET(40 MG) BY MOUTH EVERY EVENING       Start Date: 10/22/2020End Date: --    TRUEPLUS LANCETS 33 GAUGE MISC           Start Date: 3/14/2019 End Date: --       Order(s) placed per written order guidelines:     Please advise.

## 2020-11-18 RX ORDER — INSULIN DETEMIR 100 [IU]/ML
28 INJECTION, SOLUTION SUBCUTANEOUS 2 TIMES DAILY
Qty: 4 BOX | Refills: 1 | Status: SHIPPED | OUTPATIENT
Start: 2020-11-18 | End: 2021-06-14 | Stop reason: SDUPTHER

## 2020-11-18 RX ORDER — DULAGLUTIDE 1.5 MG/.5ML
1.5 INJECTION, SOLUTION SUBCUTANEOUS
Qty: 12 PEN | Refills: 1 | Status: SHIPPED | OUTPATIENT
Start: 2020-11-18 | End: 2021-01-27

## 2020-11-18 RX ORDER — SIMVASTATIN 40 MG/1
40 TABLET, FILM COATED ORAL NIGHTLY
Qty: 90 TABLET | Refills: 1 | Status: SHIPPED | OUTPATIENT
Start: 2020-11-18 | End: 2021-06-14 | Stop reason: SDUPTHER

## 2020-11-18 RX ORDER — LANCETS 33 GAUGE
EACH MISCELLANEOUS
Qty: 100 EACH | Refills: 3 | Status: SHIPPED | OUTPATIENT
Start: 2020-11-18 | End: 2021-01-15 | Stop reason: SDUPTHER

## 2020-11-18 RX ORDER — LISINOPRIL 20 MG/1
20 TABLET ORAL DAILY
Qty: 90 TABLET | Refills: 3 | Status: SHIPPED | OUTPATIENT
Start: 2020-11-18 | End: 2022-02-28

## 2020-11-18 RX ORDER — METFORMIN HYDROCHLORIDE 1000 MG/1
1000 TABLET ORAL 2 TIMES DAILY WITH MEALS
Qty: 180 TABLET | Refills: 1 | Status: SHIPPED | OUTPATIENT
Start: 2020-11-18 | End: 2021-06-14 | Stop reason: SDUPTHER

## 2020-11-18 RX ORDER — CARVEDILOL 25 MG/1
25 TABLET ORAL 2 TIMES DAILY WITH MEALS
Qty: 180 TABLET | Refills: 0 | Status: SHIPPED | OUTPATIENT
Start: 2020-11-18 | End: 2021-01-20

## 2020-11-18 RX ORDER — PEN NEEDLE, DIABETIC 31 GX5/16"
NEEDLE, DISPOSABLE MISCELLANEOUS
Qty: 200 EACH | Refills: 6 | Status: SHIPPED | OUTPATIENT
Start: 2020-11-18 | End: 2022-01-18

## 2020-11-18 NOTE — TELEPHONE ENCOUNTER
"----- Message from Chayo Romo sent at 11/18/2020  9:13 AM CST -----  Regarding: [facility] Med refills  Contact: Deena  Type: RX Refill Request    Who Called: Deena    Have you contacted your pharmacy:Yes    Refill or New Rx:Refill    RX Name and Strength:carvediloL (COREG) 25 MG tablet 180 tablet   BD ULTRA-FINE FREDA PEN NEEDLE 32 gauge x 5/32" Ndle 200 each     Preferred Pharmacy with phone number:[facility] - [address] [phone] (Phone)  [phone] (Fax)    Local or Mail Order:Mail    Ordering Provider:Britton    Would the patient rather a call back or a response via [facility]? Call    Best Call Back Number:[phone]    Additional Information: Refill      "

## 2020-12-04 ENCOUNTER — PES CALL (OUTPATIENT)
Dept: ADMINISTRATIVE | Facility: CLINIC | Age: 60
End: 2020-12-04

## 2020-12-11 RX ORDER — FLUOCINOLONE ACETONIDE 0.1 MG/ML
SOLUTION TOPICAL DAILY
Qty: 60 ML | Refills: 0 | Status: SHIPPED | OUTPATIENT
Start: 2020-12-11 | End: 2021-09-29

## 2020-12-11 NOTE — TELEPHONE ENCOUNTER
----- Message from Deirdre Tran sent at 12/11/2020  9:44 AM CST -----  Regarding: REFILL  Contact: Patient  Type: RX Refill Request    Who Called:  Patient    Have you contacted your pharmacy:    Refill or New Rx: Refill     RX Name and Strength: fluocinonide (LIDEX) 0.05 % external solution    How is the patient currently taking it? (ex. 1XDay):    Is this a 30 day or 90 day RX:    Preferred Pharmacy with phone number:       [facility] - [place], [place] - [address]  [address] [place] [address]  Phone: [phone] Fax: [phone]      Local or Mail Order: Local     Ordering Provider: Dr. Jarquin     Would the patient rather a call back or a response via [facility]? Call back     Best Call Back Number: [phone]

## 2020-12-21 DIAGNOSIS — Z12.11 SPECIAL SCREENING FOR MALIGNANT NEOPLASMS, COLON: Primary | ICD-10-CM

## 2020-12-21 DIAGNOSIS — Z01.818 PRE-OP TESTING: ICD-10-CM

## 2020-12-21 RX ORDER — SODIUM, POTASSIUM,MAG SULFATES 17.5-3.13G
1 SOLUTION, RECONSTITUTED, ORAL ORAL DAILY
Qty: 1 KIT | Refills: 0 | Status: SHIPPED | OUTPATIENT
Start: 2020-12-21 | End: 2020-12-23

## 2021-01-14 DIAGNOSIS — E11.65 UNCONTROLLED TYPE 2 DIABETES MELLITUS WITH HYPERGLYCEMIA: ICD-10-CM

## 2021-01-15 RX ORDER — LANCETS 33 GAUGE
EACH MISCELLANEOUS
Qty: 100 EACH | Refills: 3 | Status: SHIPPED | OUTPATIENT
Start: 2021-01-15 | End: 2022-09-28 | Stop reason: SDUPTHER

## 2021-01-19 ENCOUNTER — TELEPHONE (OUTPATIENT)
Dept: FAMILY MEDICINE | Facility: CLINIC | Age: 61
End: 2021-01-19

## 2021-01-26 ENCOUNTER — TELEPHONE (OUTPATIENT)
Dept: ENDOSCOPY | Facility: HOSPITAL | Age: 61
End: 2021-01-26

## 2021-01-27 ENCOUNTER — TELEPHONE (OUTPATIENT)
Dept: FAMILY MEDICINE | Facility: CLINIC | Age: 61
End: 2021-01-27

## 2021-02-10 ENCOUNTER — LAB VISIT (OUTPATIENT)
Dept: LAB | Facility: HOSPITAL | Age: 61
End: 2021-02-10
Attending: FAMILY MEDICINE
Payer: MEDICARE

## 2021-02-10 ENCOUNTER — OFFICE VISIT (OUTPATIENT)
Dept: FAMILY MEDICINE | Facility: CLINIC | Age: 61
End: 2021-02-10
Payer: MEDICARE

## 2021-02-10 VITALS
OXYGEN SATURATION: 97 % | HEART RATE: 85 BPM | SYSTOLIC BLOOD PRESSURE: 124 MMHG | BODY MASS INDEX: 31.95 KG/M2 | DIASTOLIC BLOOD PRESSURE: 70 MMHG | RESPIRATION RATE: 20 BRPM | HEIGHT: 71 IN | WEIGHT: 228.19 LBS | TEMPERATURE: 98 F

## 2021-02-10 DIAGNOSIS — Z87.81 HISTORY OF FIBULA FRACTURE: ICD-10-CM

## 2021-02-10 DIAGNOSIS — Z79.4 TYPE 2 DIABETES MELLITUS WITH HYPERGLYCEMIA, WITH LONG-TERM CURRENT USE OF INSULIN: Primary | ICD-10-CM

## 2021-02-10 DIAGNOSIS — E11.65 UNCONTROLLED TYPE 2 DIABETES MELLITUS WITH HYPERGLYCEMIA: ICD-10-CM

## 2021-02-10 DIAGNOSIS — Z87.81 HISTORY OF ANKLE FRACTURE: ICD-10-CM

## 2021-02-10 DIAGNOSIS — I10 ESSENTIAL HYPERTENSION: ICD-10-CM

## 2021-02-10 DIAGNOSIS — E11.65 TYPE 2 DIABETES MELLITUS WITH HYPERGLYCEMIA, WITH LONG-TERM CURRENT USE OF INSULIN: Primary | ICD-10-CM

## 2021-02-10 LAB
ALBUMIN SERPL BCP-MCNC: 4 G/DL (ref 3.5–5.2)
ALP SERPL-CCNC: 77 U/L (ref 55–135)
ALT SERPL W/O P-5'-P-CCNC: 47 U/L (ref 10–44)
ANION GAP SERPL CALC-SCNC: 9 MMOL/L (ref 8–16)
AST SERPL-CCNC: 31 U/L (ref 10–40)
BILIRUB SERPL-MCNC: 0.3 MG/DL (ref 0.1–1)
BUN SERPL-MCNC: 19 MG/DL (ref 6–20)
CALCIUM SERPL-MCNC: 12 MG/DL (ref 8.7–10.5)
CHLORIDE SERPL-SCNC: 107 MMOL/L (ref 95–110)
CO2 SERPL-SCNC: 25 MMOL/L (ref 23–29)
CREAT SERPL-MCNC: 0.9 MG/DL (ref 0.5–1.4)
EST. GFR  (AFRICAN AMERICAN): >60 ML/MIN/1.73 M^2
EST. GFR  (NON AFRICAN AMERICAN): >60 ML/MIN/1.73 M^2
GLUCOSE SERPL-MCNC: 121 MG/DL (ref 70–110)
POTASSIUM SERPL-SCNC: 4.3 MMOL/L (ref 3.5–5.1)
PROT SERPL-MCNC: 7.2 G/DL (ref 6–8.4)
SODIUM SERPL-SCNC: 141 MMOL/L (ref 136–145)

## 2021-02-10 PROCEDURE — 99499 RISK ADDL DX/OHS AUDIT: ICD-10-PCS | Mod: S$GLB,,, | Performed by: FAMILY MEDICINE

## 2021-02-10 PROCEDURE — 99999 PR PBB SHADOW E&M-EST. PATIENT-LVL IV: ICD-10-PCS | Mod: PBBFAC,,, | Performed by: FAMILY MEDICINE

## 2021-02-10 PROCEDURE — 1125F AMNT PAIN NOTED PAIN PRSNT: CPT | Mod: S$GLB,,, | Performed by: FAMILY MEDICINE

## 2021-02-10 PROCEDURE — 99999 PR PBB SHADOW E&M-EST. PATIENT-LVL IV: CPT | Mod: PBBFAC,,, | Performed by: FAMILY MEDICINE

## 2021-02-10 PROCEDURE — 3044F HG A1C LEVEL LT 7.0%: CPT | Mod: CPTII,S$GLB,, | Performed by: FAMILY MEDICINE

## 2021-02-10 PROCEDURE — 99214 OFFICE O/P EST MOD 30 MIN: CPT | Mod: S$GLB,,, | Performed by: FAMILY MEDICINE

## 2021-02-10 PROCEDURE — 3078F PR MOST RECENT DIASTOLIC BLOOD PRESSURE < 80 MM HG: ICD-10-PCS | Mod: CPTII,S$GLB,, | Performed by: FAMILY MEDICINE

## 2021-02-10 PROCEDURE — 99499 UNLISTED E&M SERVICE: CPT | Mod: S$GLB,,, | Performed by: FAMILY MEDICINE

## 2021-02-10 PROCEDURE — 3044F PR MOST RECENT HEMOGLOBIN A1C LEVEL <7.0%: ICD-10-PCS | Mod: CPTII,S$GLB,, | Performed by: FAMILY MEDICINE

## 2021-02-10 PROCEDURE — 1125F PR PAIN SEVERITY QUANTIFIED, PAIN PRESENT: ICD-10-PCS | Mod: S$GLB,,, | Performed by: FAMILY MEDICINE

## 2021-02-10 PROCEDURE — 3074F PR MOST RECENT SYSTOLIC BLOOD PRESSURE < 130 MM HG: ICD-10-PCS | Mod: CPTII,S$GLB,, | Performed by: FAMILY MEDICINE

## 2021-02-10 PROCEDURE — 3078F DIAST BP <80 MM HG: CPT | Mod: CPTII,S$GLB,, | Performed by: FAMILY MEDICINE

## 2021-02-10 PROCEDURE — 3008F PR BODY MASS INDEX (BMI) DOCUMENTED: ICD-10-PCS | Mod: CPTII,S$GLB,, | Performed by: FAMILY MEDICINE

## 2021-02-10 PROCEDURE — 99214 PR OFFICE/OUTPT VISIT, EST, LEVL IV, 30-39 MIN: ICD-10-PCS | Mod: S$GLB,,, | Performed by: FAMILY MEDICINE

## 2021-02-10 PROCEDURE — 80053 COMPREHEN METABOLIC PANEL: CPT

## 2021-02-10 PROCEDURE — 36415 COLL VENOUS BLD VENIPUNCTURE: CPT | Mod: PO

## 2021-02-10 PROCEDURE — 3008F BODY MASS INDEX DOCD: CPT | Mod: CPTII,S$GLB,, | Performed by: FAMILY MEDICINE

## 2021-02-10 PROCEDURE — 3074F SYST BP LT 130 MM HG: CPT | Mod: CPTII,S$GLB,, | Performed by: FAMILY MEDICINE

## 2021-02-10 PROCEDURE — 83036 HEMOGLOBIN GLYCOSYLATED A1C: CPT

## 2021-02-10 RX ORDER — SODIUM, POTASSIUM,MAG SULFATES 17.5-3.13G
SOLUTION, RECONSTITUTED, ORAL ORAL
COMMUNITY
Start: 2021-01-07 | End: 2022-09-26 | Stop reason: ALTCHOICE

## 2021-02-10 RX ORDER — CARVEDILOL 25 MG/1
25 TABLET ORAL 2 TIMES DAILY WITH MEALS
Qty: 180 TABLET | Refills: 1 | Status: SHIPPED | OUTPATIENT
Start: 2021-02-10 | End: 2021-07-20 | Stop reason: SDUPTHER

## 2021-02-11 LAB
ESTIMATED AVG GLUCOSE: 148 MG/DL (ref 68–131)
HBA1C MFR BLD: 6.8 % (ref 4–5.6)

## 2021-02-12 ENCOUNTER — TELEPHONE (OUTPATIENT)
Dept: FAMILY MEDICINE | Facility: CLINIC | Age: 61
End: 2021-02-12

## 2021-02-12 DIAGNOSIS — E83.52 HYPERCALCEMIA: Primary | ICD-10-CM

## 2021-02-19 ENCOUNTER — LAB VISIT (OUTPATIENT)
Dept: FAMILY MEDICINE | Facility: CLINIC | Age: 61
End: 2021-02-19
Payer: MEDICARE

## 2021-02-19 DIAGNOSIS — Z01.818 PRE-OP TESTING: ICD-10-CM

## 2021-02-19 PROCEDURE — U0003 INFECTIOUS AGENT DETECTION BY NUCLEIC ACID (DNA OR RNA); SEVERE ACUTE RESPIRATORY SYNDROME CORONAVIRUS 2 (SARS-COV-2) (CORONAVIRUS DISEASE [COVID-19]), AMPLIFIED PROBE TECHNIQUE, MAKING USE OF HIGH THROUGHPUT TECHNOLOGIES AS DESCRIBED BY CMS-2020-01-R: HCPCS

## 2021-02-19 PROCEDURE — U0005 INFEC AGEN DETEC AMPLI PROBE: HCPCS

## 2021-02-20 LAB — SARS-COV-2 RNA RESP QL NAA+PROBE: NOT DETECTED

## 2021-02-22 ENCOUNTER — ANESTHESIA EVENT (OUTPATIENT)
Dept: ENDOSCOPY | Facility: HOSPITAL | Age: 61
End: 2021-02-22
Payer: MEDICARE

## 2021-02-22 ENCOUNTER — HOSPITAL ENCOUNTER (OUTPATIENT)
Facility: HOSPITAL | Age: 61
Discharge: HOME | End: 2021-02-22
Attending: STUDENT IN AN ORGANIZED HEALTH CARE EDUCATION/TRAINING PROGRAM | Admitting: STUDENT IN AN ORGANIZED HEALTH CARE EDUCATION/TRAINING PROGRAM
Payer: MEDICARE

## 2021-02-22 ENCOUNTER — ANESTHESIA (OUTPATIENT)
Dept: ENDOSCOPY | Facility: HOSPITAL | Age: 61
End: 2021-02-22
Payer: MEDICARE

## 2021-02-22 VITALS
RESPIRATION RATE: 20 BRPM | TEMPERATURE: 98 F | DIASTOLIC BLOOD PRESSURE: 78 MMHG | HEART RATE: 68 BPM | BODY MASS INDEX: 32.2 KG/M2 | WEIGHT: 230 LBS | HEIGHT: 71 IN | SYSTOLIC BLOOD PRESSURE: 142 MMHG | OXYGEN SATURATION: 96 %

## 2021-02-22 DIAGNOSIS — Z86.010 HISTORY OF COLON POLYPS: Primary | ICD-10-CM

## 2021-02-22 PROCEDURE — 27201089 HC SNARE, DISP (ANY): Performed by: STUDENT IN AN ORGANIZED HEALTH CARE EDUCATION/TRAINING PROGRAM

## 2021-02-22 PROCEDURE — 25000003 PHARM REV CODE 250: Performed by: NURSE ANESTHETIST, CERTIFIED REGISTERED

## 2021-02-22 PROCEDURE — 88305 TISSUE EXAM BY PATHOLOGIST: CPT | Performed by: PATHOLOGY

## 2021-02-22 PROCEDURE — 37000008 HC ANESTHESIA 1ST 15 MINUTES: Performed by: STUDENT IN AN ORGANIZED HEALTH CARE EDUCATION/TRAINING PROGRAM

## 2021-02-22 PROCEDURE — D9220A PRA ANESTHESIA: Mod: PT,CRNA,, | Performed by: NURSE ANESTHETIST, CERTIFIED REGISTERED

## 2021-02-22 PROCEDURE — 37000009 HC ANESTHESIA EA ADD 15 MINS: Performed by: STUDENT IN AN ORGANIZED HEALTH CARE EDUCATION/TRAINING PROGRAM

## 2021-02-22 PROCEDURE — 63600175 PHARM REV CODE 636 W HCPCS: Performed by: NURSE ANESTHETIST, CERTIFIED REGISTERED

## 2021-02-22 PROCEDURE — 27201012 HC FORCEPS, HOT/COLD, DISP: Performed by: STUDENT IN AN ORGANIZED HEALTH CARE EDUCATION/TRAINING PROGRAM

## 2021-02-22 PROCEDURE — 45380 PR COLONOSCOPY,BIOPSY: ICD-10-PCS | Mod: 59,,, | Performed by: STUDENT IN AN ORGANIZED HEALTH CARE EDUCATION/TRAINING PROGRAM

## 2021-02-22 PROCEDURE — 45385 PR COLONOSCOPY,REMV LESN,SNARE: ICD-10-PCS | Mod: PT,,, | Performed by: STUDENT IN AN ORGANIZED HEALTH CARE EDUCATION/TRAINING PROGRAM

## 2021-02-22 PROCEDURE — 45380 COLONOSCOPY AND BIOPSY: CPT | Mod: 59,,, | Performed by: STUDENT IN AN ORGANIZED HEALTH CARE EDUCATION/TRAINING PROGRAM

## 2021-02-22 PROCEDURE — 45380 COLONOSCOPY AND BIOPSY: CPT | Performed by: STUDENT IN AN ORGANIZED HEALTH CARE EDUCATION/TRAINING PROGRAM

## 2021-02-22 PROCEDURE — 45385 COLONOSCOPY W/LESION REMOVAL: CPT | Mod: PT,,, | Performed by: STUDENT IN AN ORGANIZED HEALTH CARE EDUCATION/TRAINING PROGRAM

## 2021-02-22 PROCEDURE — 25000003 PHARM REV CODE 250: Performed by: ANESTHESIOLOGY

## 2021-02-22 PROCEDURE — D9220A PRA ANESTHESIA: ICD-10-PCS | Mod: PT,CRNA,, | Performed by: NURSE ANESTHETIST, CERTIFIED REGISTERED

## 2021-02-22 PROCEDURE — 88305 TISSUE EXAM BY PATHOLOGIST: ICD-10-PCS | Mod: 26,,, | Performed by: PATHOLOGY

## 2021-02-22 PROCEDURE — 45385 COLONOSCOPY W/LESION REMOVAL: CPT | Performed by: STUDENT IN AN ORGANIZED HEALTH CARE EDUCATION/TRAINING PROGRAM

## 2021-02-22 PROCEDURE — 88305 TISSUE EXAM BY PATHOLOGIST: CPT | Mod: 26,,, | Performed by: PATHOLOGY

## 2021-02-22 PROCEDURE — D9220A PRA ANESTHESIA: ICD-10-PCS | Mod: PT,ANES,, | Performed by: ANESTHESIOLOGY

## 2021-02-22 PROCEDURE — D9220A PRA ANESTHESIA: Mod: PT,ANES,, | Performed by: ANESTHESIOLOGY

## 2021-02-22 RX ORDER — PHENYLEPHRINE HCL IN 0.9% NACL 1 MG/10 ML
SYRINGE (ML) INTRAVENOUS
Status: DISCONTINUED
Start: 2021-02-22 | End: 2021-02-22 | Stop reason: HOSPADM

## 2021-02-22 RX ORDER — PROPOFOL 10 MG/ML
VIAL (ML) INTRAVENOUS
Status: DISCONTINUED | OUTPATIENT
Start: 2021-02-22 | End: 2021-02-22

## 2021-02-22 RX ORDER — PROPOFOL 10 MG/ML
INJECTION, EMULSION INTRAVENOUS
Status: DISCONTINUED
Start: 2021-02-22 | End: 2021-02-22 | Stop reason: HOSPADM

## 2021-02-22 RX ORDER — LIDOCAINE HYDROCHLORIDE 10 MG/ML
1 INJECTION, SOLUTION EPIDURAL; INFILTRATION; INTRACAUDAL; PERINEURAL ONCE
Status: DISCONTINUED | OUTPATIENT
Start: 2021-02-22 | End: 2021-02-22 | Stop reason: HOSPADM

## 2021-02-22 RX ORDER — LIDOCAINE HYDROCHLORIDE 20 MG/ML
INJECTION INTRAVENOUS
Status: DISCONTINUED | OUTPATIENT
Start: 2021-02-22 | End: 2021-02-22

## 2021-02-22 RX ORDER — SODIUM CHLORIDE 9 MG/ML
INJECTION, SOLUTION INTRAVENOUS CONTINUOUS
Status: DISCONTINUED | OUTPATIENT
Start: 2021-02-22 | End: 2021-02-22 | Stop reason: HOSPADM

## 2021-02-22 RX ORDER — PHENYLEPHRINE HYDROCHLORIDE 10 MG/ML
INJECTION INTRAVENOUS
Status: DISCONTINUED | OUTPATIENT
Start: 2021-02-22 | End: 2021-02-22

## 2021-02-22 RX ORDER — LIDOCAINE HYDROCHLORIDE 20 MG/ML
INJECTION, SOLUTION EPIDURAL; INFILTRATION; INTRACAUDAL; PERINEURAL
Status: DISCONTINUED
Start: 2021-02-22 | End: 2021-02-22 | Stop reason: HOSPADM

## 2021-02-22 RX ADMIN — PROPOFOL 30 MG: 10 INJECTION, EMULSION INTRAVENOUS at 10:02

## 2021-02-22 RX ADMIN — PROPOFOL 20 MG: 10 INJECTION, EMULSION INTRAVENOUS at 11:02

## 2021-02-22 RX ADMIN — PROPOFOL 20 MG: 10 INJECTION, EMULSION INTRAVENOUS at 10:02

## 2021-02-22 RX ADMIN — PROPOFOL 60 MG: 10 INJECTION, EMULSION INTRAVENOUS at 10:02

## 2021-02-22 RX ADMIN — PROPOFOL 40 MG: 10 INJECTION, EMULSION INTRAVENOUS at 10:02

## 2021-02-22 RX ADMIN — PHENYLEPHRINE HYDROCHLORIDE 100 MCG: 10 INJECTION INTRAVENOUS at 11:02

## 2021-02-22 RX ADMIN — PROPOFOL 30 MG: 10 INJECTION, EMULSION INTRAVENOUS at 11:02

## 2021-02-22 RX ADMIN — SODIUM CHLORIDE: 0.9 INJECTION, SOLUTION INTRAVENOUS at 10:02

## 2021-02-22 RX ADMIN — PROPOFOL 50 MG: 10 INJECTION, EMULSION INTRAVENOUS at 10:02

## 2021-02-22 RX ADMIN — Medication 100 MG: at 10:02

## 2021-02-23 LAB
FINAL PATHOLOGIC DIAGNOSIS: NORMAL
GROSS: NORMAL
Lab: NORMAL

## 2021-02-24 ENCOUNTER — TELEPHONE (OUTPATIENT)
Dept: GASTROENTEROLOGY | Facility: CLINIC | Age: 61
End: 2021-02-24

## 2021-03-19 ENCOUNTER — PES CALL (OUTPATIENT)
Dept: ADMINISTRATIVE | Facility: CLINIC | Age: 61
End: 2021-03-19

## 2021-05-19 ENCOUNTER — PATIENT OUTREACH (OUTPATIENT)
Dept: ADMINISTRATIVE | Facility: HOSPITAL | Age: 61
End: 2021-05-19

## 2021-05-19 DIAGNOSIS — I10 ESSENTIAL HYPERTENSION: Primary | ICD-10-CM

## 2021-05-31 ENCOUNTER — TELEPHONE (OUTPATIENT)
Dept: FAMILY MEDICINE | Facility: CLINIC | Age: 61
End: 2021-05-31

## 2021-06-03 DIAGNOSIS — E11.9 TYPE 2 DIABETES MELLITUS WITHOUT COMPLICATION: ICD-10-CM

## 2021-06-10 ENCOUNTER — PES CALL (OUTPATIENT)
Dept: ADMINISTRATIVE | Facility: CLINIC | Age: 61
End: 2021-06-10

## 2021-06-14 DIAGNOSIS — E11.65 UNCONTROLLED TYPE 2 DIABETES MELLITUS WITH HYPERGLYCEMIA: ICD-10-CM

## 2021-06-14 DIAGNOSIS — E78.49 OTHER HYPERLIPIDEMIA: ICD-10-CM

## 2021-06-15 RX ORDER — SIMVASTATIN 40 MG/1
40 TABLET, FILM COATED ORAL NIGHTLY
Qty: 90 TABLET | Refills: 1 | Status: SHIPPED | OUTPATIENT
Start: 2021-06-15 | End: 2022-03-17 | Stop reason: SDUPTHER

## 2021-06-15 RX ORDER — INSULIN DETEMIR 100 [IU]/ML
28 INJECTION, SOLUTION SUBCUTANEOUS 2 TIMES DAILY
Qty: 4 BOX | Refills: 1 | Status: SHIPPED | OUTPATIENT
Start: 2021-06-15 | End: 2021-07-20 | Stop reason: SDUPTHER

## 2021-06-15 RX ORDER — METFORMIN HYDROCHLORIDE 1000 MG/1
1000 TABLET ORAL 2 TIMES DAILY WITH MEALS
Qty: 180 TABLET | Refills: 1 | Status: SHIPPED | OUTPATIENT
Start: 2021-06-15 | End: 2022-05-26

## 2021-06-30 ENCOUNTER — TELEPHONE (OUTPATIENT)
Dept: FAMILY MEDICINE | Facility: CLINIC | Age: 61
End: 2021-06-30

## 2021-06-30 DIAGNOSIS — E11.65 UNCONTROLLED TYPE 2 DIABETES MELLITUS WITH HYPERGLYCEMIA: ICD-10-CM

## 2021-06-30 DIAGNOSIS — E11.9 TYPE 2 DIABETES MELLITUS WITHOUT COMPLICATION, WITHOUT LONG-TERM CURRENT USE OF INSULIN: ICD-10-CM

## 2021-07-02 ENCOUNTER — TELEPHONE (OUTPATIENT)
Dept: FAMILY MEDICINE | Facility: CLINIC | Age: 61
End: 2021-07-02

## 2021-07-08 ENCOUNTER — TELEPHONE (OUTPATIENT)
Dept: FAMILY MEDICINE | Facility: CLINIC | Age: 61
End: 2021-07-08

## 2021-07-14 ENCOUNTER — OFFICE VISIT (OUTPATIENT)
Dept: FAMILY MEDICINE | Facility: CLINIC | Age: 61
End: 2021-07-14
Payer: MEDICARE

## 2021-07-14 ENCOUNTER — LAB VISIT (OUTPATIENT)
Dept: LAB | Facility: HOSPITAL | Age: 61
End: 2021-07-14
Attending: FAMILY MEDICINE
Payer: MEDICARE

## 2021-07-14 VITALS
TEMPERATURE: 98 F | SYSTOLIC BLOOD PRESSURE: 124 MMHG | BODY MASS INDEX: 33.55 KG/M2 | HEART RATE: 78 BPM | DIASTOLIC BLOOD PRESSURE: 80 MMHG | RESPIRATION RATE: 17 BRPM | WEIGHT: 239.63 LBS | OXYGEN SATURATION: 97 % | HEIGHT: 71 IN

## 2021-07-14 DIAGNOSIS — Z99.89 DEPENDENCE ON OTHER ENABLING MACHINES AND DEVICES: ICD-10-CM

## 2021-07-14 DIAGNOSIS — Z72.0 TOBACCO ABUSE: ICD-10-CM

## 2021-07-14 DIAGNOSIS — I10 ESSENTIAL HYPERTENSION: ICD-10-CM

## 2021-07-14 DIAGNOSIS — E11.65 UNCONTROLLED TYPE 2 DIABETES MELLITUS WITH HYPERGLYCEMIA: ICD-10-CM

## 2021-07-14 DIAGNOSIS — R80.9 TYPE 2 DIABETES MELLITUS WITH MICROALBUMINURIA, WITH LONG-TERM CURRENT USE OF INSULIN: ICD-10-CM

## 2021-07-14 DIAGNOSIS — Z79.4 TYPE 2 DIABETES MELLITUS WITH MICROALBUMINURIA, WITH LONG-TERM CURRENT USE OF INSULIN: ICD-10-CM

## 2021-07-14 DIAGNOSIS — E83.52 HYPERCALCEMIA: ICD-10-CM

## 2021-07-14 DIAGNOSIS — Z00.00 ENCOUNTER FOR PREVENTIVE HEALTH EXAMINATION: Primary | ICD-10-CM

## 2021-07-14 DIAGNOSIS — R26.9 ABNORMALITY OF GAIT AND MOBILITY: ICD-10-CM

## 2021-07-14 DIAGNOSIS — B35.0 TINEA CAPITIS: ICD-10-CM

## 2021-07-14 DIAGNOSIS — E11.9 TYPE 2 DIABETES MELLITUS WITHOUT COMPLICATION: ICD-10-CM

## 2021-07-14 DIAGNOSIS — Z74.09 OTHER REDUCED MOBILITY: ICD-10-CM

## 2021-07-14 DIAGNOSIS — E11.29 TYPE 2 DIABETES MELLITUS WITH MICROALBUMINURIA, WITH LONG-TERM CURRENT USE OF INSULIN: ICD-10-CM

## 2021-07-14 PROBLEM — L08.9 INFECTED CYST OF SKIN: Status: RESOLVED | Noted: 2020-06-25 | Resolved: 2021-07-14

## 2021-07-14 PROBLEM — L72.9 INFECTED CYST OF SKIN: Status: RESOLVED | Noted: 2020-06-25 | Resolved: 2021-07-14

## 2021-07-14 LAB
ALBUMIN SERPL BCP-MCNC: 3.6 G/DL (ref 3.5–5.2)
ALBUMIN SERPL BCP-MCNC: 3.6 G/DL (ref 3.5–5.2)
ALP SERPL-CCNC: 68 U/L (ref 55–135)
ALP SERPL-CCNC: 68 U/L (ref 55–135)
ALT SERPL W/O P-5'-P-CCNC: 41 U/L (ref 10–44)
ALT SERPL W/O P-5'-P-CCNC: 41 U/L (ref 10–44)
ANION GAP SERPL CALC-SCNC: 9 MMOL/L (ref 8–16)
AST SERPL-CCNC: 34 U/L (ref 10–40)
AST SERPL-CCNC: 34 U/L (ref 10–40)
BILIRUB DIRECT SERPL-MCNC: 0.1 MG/DL (ref 0.1–0.3)
BILIRUB SERPL-MCNC: 0.3 MG/DL (ref 0.1–1)
BILIRUB SERPL-MCNC: 0.3 MG/DL (ref 0.1–1)
BUN SERPL-MCNC: 24 MG/DL (ref 6–20)
CALCIUM SERPL-MCNC: 11.2 MG/DL (ref 8.7–10.5)
CHLORIDE SERPL-SCNC: 107 MMOL/L (ref 95–110)
CHOLEST SERPL-MCNC: 108 MG/DL (ref 120–199)
CHOLEST/HDLC SERPL: 2.8 {RATIO} (ref 2–5)
CO2 SERPL-SCNC: 25 MMOL/L (ref 23–29)
CREAT SERPL-MCNC: 1 MG/DL (ref 0.5–1.4)
EST. GFR  (AFRICAN AMERICAN): >60 ML/MIN/1.73 M^2
EST. GFR  (NON AFRICAN AMERICAN): >60 ML/MIN/1.73 M^2
ESTIMATED AVG GLUCOSE: 171 MG/DL (ref 68–131)
GLUCOSE SERPL-MCNC: 173 MG/DL (ref 70–110)
HBA1C MFR BLD: 7.6 % (ref 4–5.6)
HDLC SERPL-MCNC: 39 MG/DL (ref 40–75)
HDLC SERPL: 36.1 % (ref 20–50)
LDLC SERPL CALC-MCNC: 45.2 MG/DL (ref 63–159)
NONHDLC SERPL-MCNC: 69 MG/DL
POTASSIUM SERPL-SCNC: 4.6 MMOL/L (ref 3.5–5.1)
PROT SERPL-MCNC: 6.8 G/DL (ref 6–8.4)
PROT SERPL-MCNC: 6.8 G/DL (ref 6–8.4)
SODIUM SERPL-SCNC: 141 MMOL/L (ref 136–145)
TRIGL SERPL-MCNC: 119 MG/DL (ref 30–150)

## 2021-07-14 PROCEDURE — 99499 RISK ADDL DX/OHS AUDIT: ICD-10-PCS | Mod: S$GLB,,, | Performed by: PHYSICIAN ASSISTANT

## 2021-07-14 PROCEDURE — 3044F PR MOST RECENT HEMOGLOBIN A1C LEVEL <7.0%: ICD-10-PCS | Mod: CPTII,S$GLB,, | Performed by: PHYSICIAN ASSISTANT

## 2021-07-14 PROCEDURE — 80061 LIPID PANEL: CPT | Performed by: FAMILY MEDICINE

## 2021-07-14 PROCEDURE — G0439 PPPS, SUBSEQ VISIT: HCPCS | Mod: S$GLB,,, | Performed by: PHYSICIAN ASSISTANT

## 2021-07-14 PROCEDURE — 83036 HEMOGLOBIN GLYCOSYLATED A1C: CPT | Performed by: NURSE PRACTITIONER

## 2021-07-14 PROCEDURE — 82306 VITAMIN D 25 HYDROXY: CPT | Performed by: FAMILY MEDICINE

## 2021-07-14 PROCEDURE — 82397 CHEMILUMINESCENT ASSAY: CPT | Performed by: FAMILY MEDICINE

## 2021-07-14 PROCEDURE — 1125F AMNT PAIN NOTED PAIN PRSNT: CPT | Mod: S$GLB,,, | Performed by: PHYSICIAN ASSISTANT

## 2021-07-14 PROCEDURE — 3008F PR BODY MASS INDEX (BMI) DOCUMENTED: ICD-10-PCS | Mod: CPTII,S$GLB,, | Performed by: PHYSICIAN ASSISTANT

## 2021-07-14 PROCEDURE — 3079F PR MOST RECENT DIASTOLIC BLOOD PRESSURE 80-89 MM HG: ICD-10-PCS | Mod: CPTII,S$GLB,, | Performed by: PHYSICIAN ASSISTANT

## 2021-07-14 PROCEDURE — 3074F PR MOST RECENT SYSTOLIC BLOOD PRESSURE < 130 MM HG: ICD-10-PCS | Mod: CPTII,S$GLB,, | Performed by: PHYSICIAN ASSISTANT

## 2021-07-14 PROCEDURE — 3079F DIAST BP 80-89 MM HG: CPT | Mod: CPTII,S$GLB,, | Performed by: PHYSICIAN ASSISTANT

## 2021-07-14 PROCEDURE — 3074F SYST BP LT 130 MM HG: CPT | Mod: CPTII,S$GLB,, | Performed by: PHYSICIAN ASSISTANT

## 2021-07-14 PROCEDURE — 80053 COMPREHEN METABOLIC PANEL: CPT | Performed by: FAMILY MEDICINE

## 2021-07-14 PROCEDURE — 3008F BODY MASS INDEX DOCD: CPT | Mod: CPTII,S$GLB,, | Performed by: PHYSICIAN ASSISTANT

## 2021-07-14 PROCEDURE — 84165 PATHOLOGIST INTERPRETATION SPE: ICD-10-PCS | Mod: 26,,, | Performed by: PATHOLOGY

## 2021-07-14 PROCEDURE — 99499 UNLISTED E&M SERVICE: CPT | Mod: S$GLB,,, | Performed by: PHYSICIAN ASSISTANT

## 2021-07-14 PROCEDURE — 99999 PR PBB SHADOW E&M-EST. PATIENT-LVL V: CPT | Mod: PBBFAC,,, | Performed by: PHYSICIAN ASSISTANT

## 2021-07-14 PROCEDURE — G9919 PR SCREENING AND POSITIVE: ICD-10-PCS | Mod: CPTII,S$GLB,, | Performed by: PHYSICIAN ASSISTANT

## 2021-07-14 PROCEDURE — 3044F HG A1C LEVEL LT 7.0%: CPT | Mod: CPTII,S$GLB,, | Performed by: PHYSICIAN ASSISTANT

## 2021-07-14 PROCEDURE — G0439 PR MEDICARE ANNUAL WELLNESS SUBSEQUENT VISIT: ICD-10-PCS | Mod: S$GLB,,, | Performed by: PHYSICIAN ASSISTANT

## 2021-07-14 PROCEDURE — 84165 PROTEIN E-PHORESIS SERUM: CPT | Mod: 26,,, | Performed by: PATHOLOGY

## 2021-07-14 PROCEDURE — 84165 PROTEIN E-PHORESIS SERUM: CPT | Performed by: FAMILY MEDICINE

## 2021-07-14 PROCEDURE — 99999 PR PBB SHADOW E&M-EST. PATIENT-LVL V: ICD-10-PCS | Mod: PBBFAC,,, | Performed by: PHYSICIAN ASSISTANT

## 2021-07-14 PROCEDURE — 1125F PR PAIN SEVERITY QUANTIFIED, PAIN PRESENT: ICD-10-PCS | Mod: S$GLB,,, | Performed by: PHYSICIAN ASSISTANT

## 2021-07-14 PROCEDURE — 36415 COLL VENOUS BLD VENIPUNCTURE: CPT | Mod: PO | Performed by: FAMILY MEDICINE

## 2021-07-14 PROCEDURE — G9919 SCRN ND POS ND PROV OF REC: HCPCS | Mod: CPTII,S$GLB,, | Performed by: PHYSICIAN ASSISTANT

## 2021-07-14 RX ORDER — KETOCONAZOLE 20 MG/ML
SHAMPOO, SUSPENSION TOPICAL
Qty: 120 ML | Refills: 0 | Status: SHIPPED | OUTPATIENT
Start: 2021-07-15 | End: 2021-09-29

## 2021-07-14 RX ORDER — PROPOFOL 10 MG/ML
INJECTION, EMULSION INTRAVENOUS
COMMUNITY
Start: 2021-02-22 | End: 2022-09-26 | Stop reason: ALTCHOICE

## 2021-07-15 ENCOUNTER — TELEPHONE (OUTPATIENT)
Dept: ENDOCRINOLOGY | Facility: CLINIC | Age: 61
End: 2021-07-15

## 2021-07-15 ENCOUNTER — TELEPHONE (OUTPATIENT)
Dept: FAMILY MEDICINE | Facility: CLINIC | Age: 61
End: 2021-07-15

## 2021-07-15 LAB
25(OH)D3+25(OH)D2 SERPL-MCNC: 30 NG/ML (ref 30–96)
ALBUMIN SERPL ELPH-MCNC: 3.47 G/DL (ref 3.35–5.55)
ALPHA1 GLOB SERPL ELPH-MCNC: 0.3 G/DL (ref 0.17–0.41)
ALPHA2 GLOB SERPL ELPH-MCNC: 0.77 G/DL (ref 0.43–0.99)
B-GLOBULIN SERPL ELPH-MCNC: 0.81 G/DL (ref 0.5–1.1)
GAMMA GLOB SERPL ELPH-MCNC: 0.95 G/DL (ref 0.67–1.58)
PATHOLOGIST INTERPRETATION SPE: NORMAL
PROT SERPL-MCNC: 6.3 G/DL (ref 6–8.4)

## 2021-07-20 ENCOUNTER — OFFICE VISIT (OUTPATIENT)
Dept: FAMILY MEDICINE | Facility: CLINIC | Age: 61
End: 2021-07-20
Payer: MEDICARE

## 2021-07-20 VITALS
RESPIRATION RATE: 20 BRPM | HEIGHT: 71 IN | OXYGEN SATURATION: 99 % | SYSTOLIC BLOOD PRESSURE: 160 MMHG | BODY MASS INDEX: 33.73 KG/M2 | TEMPERATURE: 99 F | WEIGHT: 240.94 LBS | DIASTOLIC BLOOD PRESSURE: 94 MMHG | HEART RATE: 82 BPM

## 2021-07-20 DIAGNOSIS — M25.551 BILATERAL HIP PAIN: ICD-10-CM

## 2021-07-20 DIAGNOSIS — I10 ESSENTIAL HYPERTENSION: ICD-10-CM

## 2021-07-20 DIAGNOSIS — M79.641 RIGHT HAND PAIN: ICD-10-CM

## 2021-07-20 DIAGNOSIS — E11.65 UNCONTROLLED TYPE 2 DIABETES MELLITUS WITH HYPERGLYCEMIA: ICD-10-CM

## 2021-07-20 DIAGNOSIS — E83.52 HYPERCALCEMIA: ICD-10-CM

## 2021-07-20 DIAGNOSIS — M25.552 BILATERAL HIP PAIN: ICD-10-CM
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
## 2021-07-20 LAB — PTH RELATED PROT SERPL-SCNC: <0.4 PMOL/L

## 2021-07-20 PROCEDURE — 3051F PR MOST RECENT HEMOGLOBIN A1C LEVEL 7.0 - < 8.0%: ICD-10-PCS | Mod: CPTII,S$GLB,, | Performed by: FAMILY MEDICINE

## 2021-07-20 PROCEDURE — 99214 OFFICE O/P EST MOD 30 MIN: CPT | Mod: S$GLB,,, | Performed by: FAMILY MEDICINE

## 2021-07-20 PROCEDURE — 99999 PR PBB SHADOW E&M-EST. PATIENT-LVL V: CPT | Mod: PBBFAC,,, | Performed by: FAMILY MEDICINE

## 2021-07-20 PROCEDURE — 3008F PR BODY MASS INDEX (BMI) DOCUMENTED: ICD-10-PCS | Mod: CPTII,S$GLB,, | Performed by: FAMILY MEDICINE

## 2021-07-20 PROCEDURE — 3077F SYST BP >= 140 MM HG: CPT | Mod: CPTII,S$GLB,, | Performed by: FAMILY MEDICINE

## 2021-07-20 PROCEDURE — 1125F AMNT PAIN NOTED PAIN PRSNT: CPT | Mod: CPTII,S$GLB,, | Performed by: FAMILY MEDICINE

## 2021-07-20 PROCEDURE — 1125F PR PAIN SEVERITY QUANTIFIED, PAIN PRESENT: ICD-10-PCS | Mod: CPTII,S$GLB,, | Performed by: FAMILY MEDICINE

## 2021-07-20 PROCEDURE — 3080F PR MOST RECENT DIASTOLIC BLOOD PRESSURE >= 90 MM HG: ICD-10-PCS | Mod: CPTII,S$GLB,, | Performed by: FAMILY MEDICINE

## 2021-07-20 PROCEDURE — 3080F DIAST BP >= 90 MM HG: CPT | Mod: CPTII,S$GLB,, | Performed by: FAMILY MEDICINE

## 2021-07-20 PROCEDURE — 99999 PR PBB SHADOW E&M-EST. PATIENT-LVL V: ICD-10-PCS | Mod: PBBFAC,,, | Performed by: FAMILY MEDICINE

## 2021-07-20 PROCEDURE — 3077F PR MOST RECENT SYSTOLIC BLOOD PRESSURE >= 140 MM HG: ICD-10-PCS | Mod: CPTII,S$GLB,, | Performed by: FAMILY MEDICINE

## 2021-07-20 PROCEDURE — 99214 PR OFFICE/OUTPT VISIT, EST, LEVL IV, 30-39 MIN: ICD-10-PCS | Mod: S$GLB,,, | Performed by: FAMILY MEDICINE

## 2021-07-20 PROCEDURE — 1159F PR MEDICATION LIST DOCUMENTED IN MEDICAL RECORD: ICD-10-PCS | Mod: CPTII,S$GLB,, | Performed by: FAMILY MEDICINE

## 2021-07-20 PROCEDURE — 3051F HG A1C>EQUAL 7.0%<8.0%: CPT | Mod: CPTII,S$GLB,, | Performed by: FAMILY MEDICINE

## 2021-07-20 PROCEDURE — 1159F MED LIST DOCD IN RCRD: CPT | Mod: CPTII,S$GLB,, | Performed by: FAMILY MEDICINE

## 2021-07-20 PROCEDURE — 3008F BODY MASS INDEX DOCD: CPT | Mod: CPTII,S$GLB,, | Performed by: FAMILY MEDICINE

## 2021-07-20 RX ORDER — INSULIN DETEMIR 100 [IU]/ML
35 INJECTION, SOLUTION SUBCUTANEOUS 2 TIMES DAILY
Qty: 63 ML | Refills: 1 | Status: SHIPPED | OUTPATIENT
Start: 2021-07-20 | End: 2021-07-20 | Stop reason: SDUPTHER

## 2021-07-20 RX ORDER — CARVEDILOL 25 MG/1
25 TABLET ORAL 2 TIMES DAILY WITH MEALS
Qty: 180 TABLET | Refills: 1 | Status: SHIPPED | OUTPATIENT
Start: 2021-07-20 | End: 2022-03-17

## 2021-07-20 RX ORDER — INSULIN DETEMIR 100 [IU]/ML
38 INJECTION, SOLUTION SUBCUTANEOUS 2 TIMES DAILY
Qty: 68.4 ML | Refills: 1 | Status: SHIPPED | OUTPATIENT
Start: 2021-07-20 | End: 2021-10-11

## 2021-07-28 ENCOUNTER — PATIENT OUTREACH (OUTPATIENT)
Dept: ADMINISTRATIVE | Facility: OTHER | Age: 61
End: 2021-07-28

## 2021-07-29 ENCOUNTER — TELEPHONE (OUTPATIENT)
Dept: FAMILY MEDICINE | Facility: CLINIC | Age: 61
End: 2021-07-29

## 2021-07-30 ENCOUNTER — TELEPHONE (OUTPATIENT)
Dept: FAMILY MEDICINE | Facility: CLINIC | Age: 61
End: 2021-07-30

## 2021-08-10 DIAGNOSIS — M79.641 PAIN OF RIGHT HAND: Primary | ICD-10-CM

## 2021-08-11 ENCOUNTER — OFFICE VISIT (OUTPATIENT)
Dept: ORTHOPEDICS | Facility: CLINIC | Age: 61
End: 2021-08-11
Payer: MEDICARE

## 2021-08-11 VITALS
OXYGEN SATURATION: 98 % | DIASTOLIC BLOOD PRESSURE: 80 MMHG | BODY MASS INDEX: 32.94 KG/M2 | HEIGHT: 71 IN | WEIGHT: 235.25 LBS | RESPIRATION RATE: 18 BRPM | SYSTOLIC BLOOD PRESSURE: 138 MMHG | HEART RATE: 74 BPM

## 2021-08-11 DIAGNOSIS — M79.641 PAIN OF RIGHT HAND: Primary | ICD-10-CM

## 2021-08-11 PROCEDURE — 3075F SYST BP GE 130 - 139MM HG: CPT | Mod: CPTII,S$GLB,, | Performed by: ORTHOPAEDIC SURGERY

## 2021-08-11 PROCEDURE — 1125F PR PAIN SEVERITY QUANTIFIED, PAIN PRESENT: ICD-10-PCS | Mod: CPTII,S$GLB,, | Performed by: ORTHOPAEDIC SURGERY

## 2021-08-11 PROCEDURE — 1160F PR REVIEW ALL MEDS BY PRESCRIBER/CLIN PHARMACIST DOCUMENTED: ICD-10-PCS | Mod: CPTII,S$GLB,, | Performed by: ORTHOPAEDIC SURGERY

## 2021-08-11 PROCEDURE — 3051F HG A1C>EQUAL 7.0%<8.0%: CPT | Mod: CPTII,S$GLB,, | Performed by: ORTHOPAEDIC SURGERY

## 2021-08-11 PROCEDURE — 99999 PR PBB SHADOW E&M-EST. PATIENT-LVL IV: CPT | Mod: PBBFAC,,, | Performed by: ORTHOPAEDIC SURGERY

## 2021-08-11 PROCEDURE — 3008F BODY MASS INDEX DOCD: CPT | Mod: CPTII,S$GLB,, | Performed by: ORTHOPAEDIC SURGERY

## 2021-08-11 PROCEDURE — 3079F PR MOST RECENT DIASTOLIC BLOOD PRESSURE 80-89 MM HG: ICD-10-PCS | Mod: CPTII,S$GLB,, | Performed by: ORTHOPAEDIC SURGERY

## 2021-08-11 PROCEDURE — 99213 OFFICE O/P EST LOW 20 MIN: CPT | Mod: S$GLB,,, | Performed by: ORTHOPAEDIC SURGERY

## 2021-08-11 PROCEDURE — 1160F RVW MEDS BY RX/DR IN RCRD: CPT | Mod: CPTII,S$GLB,, | Performed by: ORTHOPAEDIC SURGERY

## 2021-08-11 PROCEDURE — 3079F DIAST BP 80-89 MM HG: CPT | Mod: CPTII,S$GLB,, | Performed by: ORTHOPAEDIC SURGERY

## 2021-08-11 PROCEDURE — 1159F MED LIST DOCD IN RCRD: CPT | Mod: CPTII,S$GLB,, | Performed by: ORTHOPAEDIC SURGERY

## 2021-08-11 PROCEDURE — 1125F AMNT PAIN NOTED PAIN PRSNT: CPT | Mod: CPTII,S$GLB,, | Performed by: ORTHOPAEDIC SURGERY

## 2021-08-11 PROCEDURE — 3075F PR MOST RECENT SYSTOLIC BLOOD PRESS GE 130-139MM HG: ICD-10-PCS | Mod: CPTII,S$GLB,, | Performed by: ORTHOPAEDIC SURGERY

## 2021-08-11 PROCEDURE — 3051F PR MOST RECENT HEMOGLOBIN A1C LEVEL 7.0 - < 8.0%: ICD-10-PCS | Mod: CPTII,S$GLB,, | Performed by: ORTHOPAEDIC SURGERY

## 2021-08-11 PROCEDURE — 99213 PR OFFICE/OUTPT VISIT, EST, LEVL III, 20-29 MIN: ICD-10-PCS | Mod: S$GLB,,, | Performed by: ORTHOPAEDIC SURGERY

## 2021-08-11 PROCEDURE — 3008F PR BODY MASS INDEX (BMI) DOCUMENTED: ICD-10-PCS | Mod: CPTII,S$GLB,, | Performed by: ORTHOPAEDIC SURGERY

## 2021-08-11 PROCEDURE — 99999 PR PBB SHADOW E&M-EST. PATIENT-LVL IV: ICD-10-PCS | Mod: PBBFAC,,, | Performed by: ORTHOPAEDIC SURGERY

## 2021-08-11 PROCEDURE — 1159F PR MEDICATION LIST DOCUMENTED IN MEDICAL RECORD: ICD-10-PCS | Mod: CPTII,S$GLB,, | Performed by: ORTHOPAEDIC SURGERY

## 2021-08-12 ENCOUNTER — OFFICE VISIT (OUTPATIENT)
Dept: PODIATRY | Facility: CLINIC | Age: 61
End: 2021-08-12
Payer: MEDICARE

## 2021-08-12 VITALS — WEIGHT: 235.25 LBS | BODY MASS INDEX: 32.94 KG/M2 | HEIGHT: 71 IN

## 2021-08-12 DIAGNOSIS — B35.1 ONYCHOMYCOSIS DUE TO DERMATOPHYTE: ICD-10-CM

## 2021-08-12 DIAGNOSIS — E11.9 TYPE 2 DIABETES MELLITUS WITHOUT COMPLICATION, WITH LONG-TERM CURRENT USE OF INSULIN: Primary | ICD-10-CM

## 2021-08-12 DIAGNOSIS — Z79.4 TYPE 2 DIABETES MELLITUS WITHOUT COMPLICATION, WITH LONG-TERM CURRENT USE OF INSULIN: Primary | ICD-10-CM

## 2021-08-12 PROCEDURE — 1159F PR MEDICATION LIST DOCUMENTED IN MEDICAL RECORD: ICD-10-PCS | Mod: CPTII,S$GLB,, | Performed by: PODIATRIST

## 2021-08-12 PROCEDURE — 3051F PR MOST RECENT HEMOGLOBIN A1C LEVEL 7.0 - < 8.0%: ICD-10-PCS | Mod: CPTII,S$GLB,, | Performed by: PODIATRIST

## 2021-08-12 PROCEDURE — 1126F PR PAIN SEVERITY QUANTIFIED, NO PAIN PRESENT: ICD-10-PCS | Mod: CPTII,S$GLB,, | Performed by: PODIATRIST

## 2021-08-12 PROCEDURE — 3051F HG A1C>EQUAL 7.0%<8.0%: CPT | Mod: CPTII,S$GLB,, | Performed by: PODIATRIST

## 2021-08-12 PROCEDURE — 99213 OFFICE O/P EST LOW 20 MIN: CPT | Mod: S$GLB,,, | Performed by: PODIATRIST

## 2021-08-12 PROCEDURE — 99999 PR PBB SHADOW E&M-EST. PATIENT-LVL III: ICD-10-PCS | Mod: PBBFAC,,, | Performed by: PODIATRIST

## 2021-08-12 PROCEDURE — 1126F AMNT PAIN NOTED NONE PRSNT: CPT | Mod: CPTII,S$GLB,, | Performed by: PODIATRIST

## 2021-08-12 PROCEDURE — 99999 PR PBB SHADOW E&M-EST. PATIENT-LVL III: CPT | Mod: PBBFAC,,, | Performed by: PODIATRIST

## 2021-08-12 PROCEDURE — 3008F BODY MASS INDEX DOCD: CPT | Mod: CPTII,S$GLB,, | Performed by: PODIATRIST

## 2021-08-12 PROCEDURE — 1159F MED LIST DOCD IN RCRD: CPT | Mod: CPTII,S$GLB,, | Performed by: PODIATRIST

## 2021-08-12 PROCEDURE — 99213 PR OFFICE/OUTPT VISIT, EST, LEVL III, 20-29 MIN: ICD-10-PCS | Mod: S$GLB,,, | Performed by: PODIATRIST

## 2021-08-12 PROCEDURE — 3008F PR BODY MASS INDEX (BMI) DOCUMENTED: ICD-10-PCS | Mod: CPTII,S$GLB,, | Performed by: PODIATRIST

## 2021-08-12 RX ORDER — CICLOPIROX 80 MG/ML
SOLUTION TOPICAL NIGHTLY
Qty: 1 BOTTLE | Refills: 3 | Status: SHIPPED | OUTPATIENT
Start: 2021-08-12

## 2021-09-08 ENCOUNTER — TELEPHONE (OUTPATIENT)
Dept: FAMILY MEDICINE | Facility: CLINIC | Age: 61
End: 2021-09-08

## 2021-09-08 NOTE — TELEPHONE ENCOUNTER
Left message for patient to return call to reschedule appt 9/10/2021. PCP is out of office. Only doing virtual visit.

## 2021-09-22 ENCOUNTER — PATIENT OUTREACH (OUTPATIENT)
Dept: ADMINISTRATIVE | Facility: HOSPITAL | Age: 61
End: 2021-09-22
Payer: MEDICARE

## 2021-10-11 DIAGNOSIS — E11.65 UNCONTROLLED TYPE 2 DIABETES MELLITUS WITH HYPERGLYCEMIA: ICD-10-CM

## 2021-10-11 RX ORDER — INSULIN DETEMIR 100 [IU]/ML
38 INJECTION, SOLUTION SUBCUTANEOUS 2 TIMES DAILY
Qty: 75 ML | Refills: 0 | Status: SHIPPED | OUTPATIENT
Start: 2021-10-11 | End: 2022-02-16

## 2021-11-05 DIAGNOSIS — B35.0 TINEA CAPITIS: ICD-10-CM

## 2021-11-09 RX ORDER — KETOCONAZOLE 20 MG/ML
SHAMPOO, SUSPENSION TOPICAL
Qty: 120 ML | Refills: 0 | OUTPATIENT
Start: 2021-11-11

## 2021-11-09 RX ORDER — FLUOCINONIDE TOPICAL SOLUTION USP, 0.05% 0.5 MG/ML
SOLUTION TOPICAL
Qty: 60 ML | Refills: 1 | Status: SHIPPED | OUTPATIENT
Start: 2021-11-09 | End: 2022-05-26

## 2021-12-23 ENCOUNTER — PES CALL (OUTPATIENT)
Dept: ADMINISTRATIVE | Facility: CLINIC | Age: 61
End: 2021-12-23
Payer: MEDICARE

## 2022-01-05 ENCOUNTER — IMMUNIZATION (OUTPATIENT)
Dept: PHARMACY | Facility: CLINIC | Age: 62
End: 2022-01-05
Payer: MEDICARE

## 2022-01-05 DIAGNOSIS — Z23 NEED FOR VACCINATION: Primary | ICD-10-CM

## 2022-02-15 DIAGNOSIS — E11.65 UNCONTROLLED TYPE 2 DIABETES MELLITUS WITH HYPERGLYCEMIA: ICD-10-CM

## 2022-02-15 NOTE — TELEPHONE ENCOUNTER
No new care gaps identified.  Powered by [employer]. Reference number: [id number].   2/15/2022 11:22:46 AM CST

## 2022-02-15 NOTE — TELEPHONE ENCOUNTER
Encounter details require adjustment(s)/ updating by OR Staff  As of this time CDM and/or protocols did not populate or display  Adjustment(s) made to encounter provider and/or department  Will resend refill request encounter to P Centralized Refill Staff Pool.   [facility] [place]

## 2022-02-15 NOTE — TELEPHONE ENCOUNTER
----- Message from Sajnu Mooney sent at 2/15/2022 10:46 AM CST -----  Regarding: status of a refill  Type: Patient Call Back    Who called:Thierry     What is the request in detail: the patient is calling to check on the status of a refill for: LEVEMIR FLEXTOUCH U-100 INSULN 100 unit/mL (3 mL) InPn pen [Pharmacy Med     He would like it sent to : [facility]    Can the clinic reply by [facility]?no     Would the patient rather a call back or a response via [facility]? Call back     Best call back number:[phone]

## 2022-02-16 RX ORDER — INSULIN DETEMIR 100 [IU]/ML
INJECTION, SOLUTION SUBCUTANEOUS
Qty: 75 ML | Refills: 0 | Status: SHIPPED | OUTPATIENT
Start: 2022-02-16 | End: 2022-04-27

## 2022-02-28 ENCOUNTER — TELEPHONE (OUTPATIENT)
Dept: FAMILY MEDICINE | Facility: CLINIC | Age: 62
End: 2022-02-28
Payer: MEDICARE

## 2022-02-28 NOTE — TELEPHONE ENCOUNTER
----- Message from Merced Brown sent at 2/28/2022  8:29 AM CST -----  Regarding: Self/   [phone]  Type: Patient Call Back    Who called:  Patient    What is the request in detail:  Patient would like a call from staff, he has a knot behind his ear, on and in his stomach and he said it's painful.  He's asking for a call back please.  Thank you    Would the patient rather a call back or a response via [facility]?  Call back    Best call back number:  [phone]          Thank you

## 2022-03-09 ENCOUNTER — OFFICE VISIT (OUTPATIENT)
Dept: FAMILY MEDICINE | Facility: CLINIC | Age: 62
End: 2022-03-09
Payer: MEDICARE

## 2022-03-09 VITALS
TEMPERATURE: 99 F | WEIGHT: 234.56 LBS | OXYGEN SATURATION: 98 % | HEIGHT: 71 IN | BODY MASS INDEX: 32.84 KG/M2 | RESPIRATION RATE: 16 BRPM | HEART RATE: 80 BPM | SYSTOLIC BLOOD PRESSURE: 178 MMHG | DIASTOLIC BLOOD PRESSURE: 100 MMHG

## 2022-03-09 DIAGNOSIS — I25.10 CORONARY ARTERY DISEASE INVOLVING NATIVE CORONARY ARTERY OF NATIVE HEART WITHOUT ANGINA PECTORIS: ICD-10-CM

## 2022-03-09 DIAGNOSIS — Z79.4 TYPE 2 DIABETES MELLITUS WITH MICROALBUMINURIA, WITH LONG-TERM CURRENT USE OF INSULIN: ICD-10-CM

## 2022-03-09 DIAGNOSIS — I10 ESSENTIAL HYPERTENSION: ICD-10-CM

## 2022-03-09 DIAGNOSIS — E11.29 TYPE 2 DIABETES MELLITUS WITH MICROALBUMINURIA, WITH LONG-TERM CURRENT USE OF INSULIN: ICD-10-CM

## 2022-03-09 DIAGNOSIS — S30.1XXA CONTUSION OF ABDOMINAL WALL, INITIAL ENCOUNTER: ICD-10-CM

## 2022-03-09 DIAGNOSIS — R80.9 TYPE 2 DIABETES MELLITUS WITH MICROALBUMINURIA, WITH LONG-TERM CURRENT USE OF INSULIN: ICD-10-CM

## 2022-03-09 DIAGNOSIS — R21 RASH: Primary | ICD-10-CM

## 2022-03-09 DIAGNOSIS — L91.8 SKIN TAG: ICD-10-CM

## 2022-03-09 PROCEDURE — 4010F PR ACE/ARB THEARPY RXD/TAKEN: ICD-10-PCS | Mod: CPTII,S$GLB,, | Performed by: FAMILY MEDICINE

## 2022-03-09 PROCEDURE — 3077F PR MOST RECENT SYSTOLIC BLOOD PRESSURE >= 140 MM HG: ICD-10-PCS | Mod: CPTII,S$GLB,, | Performed by: FAMILY MEDICINE

## 2022-03-09 PROCEDURE — 1159F MED LIST DOCD IN RCRD: CPT | Mod: CPTII,S$GLB,, | Performed by: FAMILY MEDICINE

## 2022-03-09 PROCEDURE — 3080F PR MOST RECENT DIASTOLIC BLOOD PRESSURE >= 90 MM HG: ICD-10-PCS | Mod: CPTII,S$GLB,, | Performed by: FAMILY MEDICINE

## 2022-03-09 PROCEDURE — 3080F DIAST BP >= 90 MM HG: CPT | Mod: CPTII,S$GLB,, | Performed by: FAMILY MEDICINE

## 2022-03-09 PROCEDURE — 99999 PR PBB SHADOW E&M-EST. PATIENT-LVL V: ICD-10-PCS | Mod: PBBFAC,,, | Performed by: FAMILY MEDICINE

## 2022-03-09 PROCEDURE — 3051F PR MOST RECENT HEMOGLOBIN A1C LEVEL 7.0 - < 8.0%: ICD-10-PCS | Mod: CPTII,S$GLB,, | Performed by: FAMILY MEDICINE

## 2022-03-09 PROCEDURE — 99999 PR PBB SHADOW E&M-EST. PATIENT-LVL V: CPT | Mod: PBBFAC,,, | Performed by: FAMILY MEDICINE

## 2022-03-09 PROCEDURE — 99214 PR OFFICE/OUTPT VISIT, EST, LEVL IV, 30-39 MIN: ICD-10-PCS | Mod: S$GLB,,, | Performed by: FAMILY MEDICINE

## 2022-03-09 PROCEDURE — 3051F HG A1C>EQUAL 7.0%<8.0%: CPT | Mod: CPTII,S$GLB,, | Performed by: FAMILY MEDICINE

## 2022-03-09 PROCEDURE — 4010F ACE/ARB THERAPY RXD/TAKEN: CPT | Mod: CPTII,S$GLB,, | Performed by: FAMILY MEDICINE

## 2022-03-09 PROCEDURE — 99214 OFFICE O/P EST MOD 30 MIN: CPT | Mod: S$GLB,,, | Performed by: FAMILY MEDICINE

## 2022-03-09 PROCEDURE — 3008F BODY MASS INDEX DOCD: CPT | Mod: CPTII,S$GLB,, | Performed by: FAMILY MEDICINE

## 2022-03-09 PROCEDURE — 99215 OFFICE O/P EST HI 40 MIN: CPT | Mod: PBBFAC,PO | Performed by: FAMILY MEDICINE

## 2022-03-09 PROCEDURE — 99499 UNLISTED E&M SERVICE: CPT | Mod: HCNC,S$GLB,, | Performed by: FAMILY MEDICINE

## 2022-03-09 PROCEDURE — 99499 RISK ADDL DX/OHS AUDIT: ICD-10-PCS | Mod: HCNC,S$GLB,, | Performed by: FAMILY MEDICINE

## 2022-03-09 PROCEDURE — 3008F PR BODY MASS INDEX (BMI) DOCUMENTED: ICD-10-PCS | Mod: CPTII,S$GLB,, | Performed by: FAMILY MEDICINE

## 2022-03-09 PROCEDURE — 3077F SYST BP >= 140 MM HG: CPT | Mod: CPTII,S$GLB,, | Performed by: FAMILY MEDICINE

## 2022-03-09 PROCEDURE — 1159F PR MEDICATION LIST DOCUMENTED IN MEDICAL RECORD: ICD-10-PCS | Mod: CPTII,S$GLB,, | Performed by: FAMILY MEDICINE

## 2022-03-09 NOTE — PROGRESS NOTES
Health Maintenance Due   Topic Date Due    Shingles Vaccine (1 of 2) Hx chickenpox, notified can get it at the pharmacy      Influenza Vaccine (1) Pt will complete    Eye Exam  Pt completed    Hemoglobin A1c  Pt will complete    Diabetes Urine Screening  Pt will complete

## 2022-03-15 ENCOUNTER — TELEPHONE (OUTPATIENT)
Dept: FAMILY MEDICINE | Facility: CLINIC | Age: 62
End: 2022-03-15
Payer: MEDICARE

## 2022-03-15 ENCOUNTER — PATIENT OUTREACH (OUTPATIENT)
Dept: ADMINISTRATIVE | Facility: HOSPITAL | Age: 62
End: 2022-03-15
Payer: MEDICARE

## 2022-03-15 NOTE — TELEPHONE ENCOUNTER
----- Message from Cristina Moyer sent at 3/15/2022 12:34 PM CDT -----  Regarding: Blood Pressure Reading  Who Called: Thierry Ho          Who Left Message for Patient: Unsure         Does the patient know what this is regarding? Yes , Blood pressure reading         Best Call Back Number:[phone]        Additional Information: Patient states he received a call for a blood pressure reading

## 2022-03-15 NOTE — TELEPHONE ENCOUNTER
Pt states his home reading is 118/87; his home machine has not been calibrated with office; pt does have OV with Dr Jarquin next month

## 2022-03-21 ENCOUNTER — PATIENT OUTREACH (OUTPATIENT)
Dept: ADMINISTRATIVE | Facility: OTHER | Age: 62
End: 2022-03-21
Payer: MEDICARE

## 2022-03-22 NOTE — PROGRESS NOTES
Subjective:       Patient ID: Thierry Ho is a 61 y.o. male.    Chief Complaint: Abdominal Pain      HPI  61-year-old male presents for multiple issues.  Complains of rash.  Patient states he had trauma in his abdominal area.  Complains of pain.  States he has multiple skin tags if he would like to remove.    Blood pressure today is 178/100.  Review of Systems   Constitutional: Negative.    HENT: Negative.    Respiratory: Negative.    Cardiovascular: Negative.    Gastrointestinal: Negative.    Endocrine: Negative.    Genitourinary: Negative.    Musculoskeletal: Negative.    Neurological: Negative.    Psychiatric/Behavioral: Negative.           Past Medical History:   Diagnosis Date    Colon polyps     Diabetes mellitus     DM retinopathy     GERD (gastroesophageal reflux disease)     Hyperlipidemia     Hypertension     Myocardial infarction      Past Surgical History:   Procedure Laterality Date    APPENDECTOMY      BACK SURGERY  11/2017    COLONOSCOPY N/A 10/19/2018    Procedure: COLONOSCOPY;  Surgeon: Frantz Rasmussen MD;  Location: [facility] ENDO;  Service: Endoscopy;  Laterality: N/A;    COLONOSCOPY N/A 2/22/2021    Procedure: COLONOSCOPY;  Surgeon: Dann Mahmood MD;  Location: [facility] ENDO;  Service: Endoscopy;  Laterality: N/A;  covid test [place] 2/19, instructions mailed -ml    EXCISION OF SKIN N/A 6/25/2020    Procedure: EXCISION, SKIN;  Surgeon: Finesse Dumont MD;  Location: [facility] OR;  Service: General;  Laterality: N/A;  on back    FOOT SURGERY      INCISION AND DRAINAGE OF GROIN Right 6/25/2020    Procedure: INCISION AND DRAINAGE, INGUINAL REGION;  Surgeon: Finesse Dumont MD;  Location: [facility] OR;  Service: General;  Laterality: Right;  PT TO PREOP IN AM  PRE-OP BY RN 6-    OPEN REDUCTION AND INTERNAL FIXATION (ORIF) OF INJURY OF ANKLE Right 7/17/2019    Procedure: ORIF, ANKLE;  Surgeon: Raeann Steward MD;  Location: [facility] OR;  Service: Orthopedics;  Laterality: Right;   SARITA  CRISTOFER GALAN  [phone] TEXTED HIM @ 10:2 AM ON 7-11-19  SKELETAL  RN PREOP 7/9/19---- [facility] MORNING OF SURGERY PER DR FONTAINE     Family History   Problem Relation Age of Onset    Heart disease Mother     Breast cancer Mother     Diabetes Father     Cancer Sister     Cancer Brother      Social History     Socioeconomic History    Marital status:     Number of children: 5    Highest education level: GED or equivalent   Tobacco Use    Smoking status: Current Every Day Smoker     Packs/day: 0.25     Years: 40.00     Pack years: 10.00     Types: Cigarettes     Start date: 1/15/1981    Smokeless tobacco: Never Used   Substance and Sexual Activity    Alcohol use: Yes     Alcohol/week: 4.0 standard drinks     Types: 4 Cans of beer per week     Comment: social     Drug use: Yes     Types: Marijuana    Sexual activity: Not Currently     Partners: Female     Social Determinants of Health     Financial Resource Strain: Low Risk     Difficulty of Paying Living Expenses: Not hard at all   Food Insecurity: No Food Insecurity    Worried About Running Out of Food in the Last Year: Never true    Ran Out of Food in the Last Year: Never true   Transportation Needs: No Transportation Needs    Lack of Transportation (Medical): No    Lack of Transportation (Non-Medical): No   Physical Activity: Inactive    Days of Exercise per Week: 0 days    Minutes of Exercise per Session: 0 min   Stress: Stress Concern Present    Feeling of Stress : To some extent   Social Connections: Socially Isolated    Frequency of Communication with Friends and Family: Once a week    Frequency of Social Gatherings with Friends and Family: Once a week    Attends Restorationist Services: Never    Active Member of Clubs or Organizations: No    Attends Club or Organization Meetings: Never    Marital Status:    Housing Stability: Unknown    Unable to Pay for Housing in the Last Year: No    Unstable Housing in the Last Year:  "No       Current Outpatient Medications:     acetaminophen (TYLENOL) 500 MG tablet, Take 500 mg by mouth every 6 (six) hours as needed for Pain., Disp: , Rfl:     aspirin (ECOTRIN) 81 MG EC tablet, Take 81 mg by mouth once daily., Disp: , Rfl:     BD ULTRA-FINE FREDA PEN NEEDLE 32 gauge x 5/32" Ndle, USE AS DIRECTED ONE TIME DAILY, Disp: 100 each, Rfl: 11    BLOOD PRESSURE CUFF Misc, 1 kit by Misc.(Non-Drug; Combo Route) route once daily., Disp: 1 each, Rfl: 0    blood sugar diagnostic (TRUE METRIX GLUCOSE TEST STRIP) Strp, 1 strip by Misc.(Non-Drug; Combo Route) route 2 (two) times a day., Disp: 200 each, Rfl: 11    blood-glucose meter kit, 1 each by Other route 4 (four) times daily before meals and nightly., Disp: 1 each, Rfl: 0    ciclopirox (PENLAC) 8 % Soln, Apply topically nightly., Disp: 1 Bottle, Rfl: 3    fluocinonide (LIDEX) 0.05 % external solution, APPLY 1 ML TO THE SCALP AT BEDTIME, Disp: 60 mL, Rfl: 1    ibuprofen (ADVIL,MOTRIN) 600 MG tablet, Take 1 tablet (600 mg total) by mouth every 8 (eight) hours as needed for Pain., Disp: 20 tablet, Rfl: 0    LEVEMIR FLEXTOUCH U-100 INSULN 100 unit/mL (3 mL) InPn pen, INJECT 38 UNITS SUBCUTANEOUSLY TWICE DAILY, Disp: 75 mL, Rfl: 0    lisinopriL (PRINIVIL,ZESTRIL) 20 MG tablet, TAKE 1 TABLET (20 MG TOTAL) BY MOUTH ONCE DAILY., Disp: 90 tablet, Rfl: 3    metFORMIN (GLUCOPHAGE) 1000 MG tablet, Take 1 tablet (1,000 mg total) by mouth 2 (two) times daily with meals., Disp: 180 tablet, Rfl: 1    TRUEPLUS LANCETS 33 gauge Misc, Check glucose twice daily, Disp: 100 each, Rfl: 3    AFLURIA QD 2020-21,3YR UP,,PF, 60 mcg (15 mcg x 4)/0.5 mL Syrg, ADM 0.5ML IM UTD, Disp: , Rfl:     carvediloL (COREG) 25 MG tablet, TAKE 1 TABLET TWICE DAILY WITH MEALS, Disp: 180 tablet, Rfl: 1    fluocinolone (SYNALAR) 0.01 % external solution, APPLY TOPICALLY ONCE DAILY  FOR 10 DAYS, Disp: 60 mL, Rfl: 0    ketoconazole (NIZORAL) 2 % shampoo, APPLY TOPICALLY TWICE A WEEK., " "Disp: 120 mL, Rfl: 0    propofoL (DIPRIVAN) 10 mg/mL infusion, , Disp: , Rfl:     simvastatin (ZOCOR) 40 MG tablet, TAKE 1 TABLET (40 MG TOTAL) BY MOUTH EVERY EVENING., Disp: 90 tablet, Rfl: 1    SUPREP BOWEL PREP KIT 17.5-3.13-1.6 gram SolR, TAKE 177 ML BY MOUTH EVERY DAY FOR 2 DAYS, Disp: , Rfl:   No current facility-administered medications for this visit.    Facility-Administered Medications Ordered in Other Visits:     lactated ringers infusion, , Intravenous, Continuous, Violeta Vides MD, New Bag at 07/17/19 0906    lactated ringers infusion, , Intravenous, Continuous, Landry Peters MD    lidocaine (PF) 10 mg/ml (1%) injection 10 mg, 1 mL, Intradermal, Once, Violeta Vides MD    lidocaine (PF) 10 mg/ml (1%) injection 10 mg, 1 mL, Intradermal, Once, Landry Peters MD   Objective:      Vitals:    03/09/22 1501   BP: (!) 178/100   BP Location: Right arm   Patient Position: Sitting   BP Method: Large (Manual)   Pulse: 80   Resp: 16   Temp: 99.3 °F (37.4 °C)   TempSrc: Oral   SpO2: 98%   Weight: 106.4 kg (234 lb 9.1 oz)   Height: 5' 11" (1.803 m)       Physical Exam  Constitutional:       General: He is not in acute distress.  HENT:      Head: Normocephalic and atraumatic.   Eyes:      Conjunctiva/sclera: Conjunctivae normal.   Cardiovascular:      Rate and Rhythm: Normal rate and regular rhythm.      Heart sounds: Normal heart sounds. No murmur heard.    No friction rub. No gallop.   Pulmonary:      Effort: Pulmonary effort is normal.      Breath sounds: Normal breath sounds. No wheezing or rales.   Musculoskeletal:      Cervical back: Neck supple.   Skin:     General: Skin is warm and dry.      Findings: Lesion (multiple skin tags.) and rash present.   Neurological:      Mental Status: He is alert and oriented to person, place, and time.   Psychiatric:         Behavior: Behavior normal.         Thought Content: Thought content normal.         Judgment: Judgment normal.            Assessment:    "    1. Rash    2. Skin tag    3. Type 2 diabetes mellitus with microalbuminuria, with long-term current use of insulin    4. Essential hypertension    5. Contusion of abdominal wall, initial encounter    6. Coronary artery disease involving native coronary artery of native heart without angina pectoris        Plan:       Rash  -     Ambulatory referral/consult to Dermatology; Future; Expected date: 03/16/2022    Skin tag  -     Ambulatory referral/consult to Dermatology; Future; Expected date: 03/16/2022    Type 2 diabetes mellitus with microalbuminuria, with long-term current use of insulin  -     Hemoglobin A1C; Future; Expected date: 03/09/2022  -     Comprehensive Metabolic Panel; Future; Expected date: 03/09/2022    Essential hypertension    Contusion of abdominal wall, initial encounter  -     Cancel: Ambulatory referral/consult to General Surgery; Future; Expected date: 03/16/2022  -     Ambulatory referral/consult to General Surgery; Future; Expected date: 03/16/2022    Coronary artery disease involving native coronary artery of native heart without angina pectoris      Cont meds. F/u in 2 weeks for bp check.            Patient note was created using [employer].  Any errors in syntax or even information may not have been identified and edited on initial review prior to signing this note.

## 2022-03-23 ENCOUNTER — OFFICE VISIT (OUTPATIENT)
Dept: SURGERY | Facility: CLINIC | Age: 62
End: 2022-03-23
Payer: MEDICARE

## 2022-03-23 VITALS
WEIGHT: 234.56 LBS | DIASTOLIC BLOOD PRESSURE: 102 MMHG | HEIGHT: 71 IN | BODY MASS INDEX: 32.84 KG/M2 | SYSTOLIC BLOOD PRESSURE: 170 MMHG | HEART RATE: 77 BPM

## 2022-03-23 DIAGNOSIS — S30.1XXA CONTUSION OF ABDOMINAL WALL, INITIAL ENCOUNTER: ICD-10-CM

## 2022-03-23 PROCEDURE — 3008F PR BODY MASS INDEX (BMI) DOCUMENTED: ICD-10-PCS | Mod: CPTII,S$GLB,, | Performed by: SURGERY

## 2022-03-23 PROCEDURE — 99212 PR OFFICE/OUTPT VISIT, EST, LEVL II, 10-19 MIN: ICD-10-PCS | Mod: S$GLB,,, | Performed by: SURGERY

## 2022-03-23 PROCEDURE — 3080F DIAST BP >= 90 MM HG: CPT | Mod: CPTII,S$GLB,, | Performed by: SURGERY

## 2022-03-23 PROCEDURE — 1159F PR MEDICATION LIST DOCUMENTED IN MEDICAL RECORD: ICD-10-PCS | Mod: CPTII,S$GLB,, | Performed by: SURGERY

## 2022-03-23 PROCEDURE — 99212 OFFICE O/P EST SF 10 MIN: CPT | Mod: S$GLB,,, | Performed by: SURGERY

## 2022-03-23 PROCEDURE — 3077F SYST BP >= 140 MM HG: CPT | Mod: CPTII,S$GLB,, | Performed by: SURGERY

## 2022-03-23 PROCEDURE — 3077F PR MOST RECENT SYSTOLIC BLOOD PRESSURE >= 140 MM HG: ICD-10-PCS | Mod: CPTII,S$GLB,, | Performed by: SURGERY

## 2022-03-23 PROCEDURE — 4010F PR ACE/ARB THEARPY RXD/TAKEN: ICD-10-PCS | Mod: CPTII,S$GLB,, | Performed by: SURGERY

## 2022-03-23 PROCEDURE — 3008F BODY MASS INDEX DOCD: CPT | Mod: CPTII,S$GLB,, | Performed by: SURGERY

## 2022-03-23 PROCEDURE — 3080F PR MOST RECENT DIASTOLIC BLOOD PRESSURE >= 90 MM HG: ICD-10-PCS | Mod: CPTII,S$GLB,, | Performed by: SURGERY

## 2022-03-23 PROCEDURE — 99999 PR PBB SHADOW E&M-EST. PATIENT-LVL IV: ICD-10-PCS | Mod: PBBFAC,,, | Performed by: SURGERY

## 2022-03-23 PROCEDURE — 4010F ACE/ARB THERAPY RXD/TAKEN: CPT | Mod: CPTII,S$GLB,, | Performed by: SURGERY

## 2022-03-23 PROCEDURE — 99999 PR PBB SHADOW E&M-EST. PATIENT-LVL IV: CPT | Mod: PBBFAC,,, | Performed by: SURGERY

## 2022-03-23 PROCEDURE — 1159F MED LIST DOCD IN RCRD: CPT | Mod: CPTII,S$GLB,, | Performed by: SURGERY

## 2022-03-23 NOTE — PROGRESS NOTES
"60 y/o man with history of "abdominal lumps" although he reports spontaneous resolution and cannot locate them today. He does have a skin tag he wants excised.  No other c/o this am    PE: right lower abd skin tag.    Excised using lidocaine and scissors     Plan: f/u prn    "

## 2022-03-30 ENCOUNTER — LAB VISIT (OUTPATIENT)
Dept: LAB | Facility: HOSPITAL | Age: 62
End: 2022-03-30
Attending: FAMILY MEDICINE
Payer: MEDICARE

## 2022-03-30 DIAGNOSIS — Z79.4 TYPE 2 DIABETES MELLITUS WITH MICROALBUMINURIA, WITH LONG-TERM CURRENT USE OF INSULIN: ICD-10-CM

## 2022-03-30 DIAGNOSIS — E11.29 TYPE 2 DIABETES MELLITUS WITH MICROALBUMINURIA, WITH LONG-TERM CURRENT USE OF INSULIN: ICD-10-CM

## 2022-03-30 DIAGNOSIS — R80.9 TYPE 2 DIABETES MELLITUS WITH MICROALBUMINURIA, WITH LONG-TERM CURRENT USE OF INSULIN: ICD-10-CM

## 2022-03-30 LAB
ALBUMIN SERPL BCP-MCNC: 3.8 G/DL (ref 3.5–5.2)
ALP SERPL-CCNC: 67 U/L (ref 55–135)
ALT SERPL W/O P-5'-P-CCNC: 32 U/L (ref 10–44)
ANION GAP SERPL CALC-SCNC: 8 MMOL/L (ref 8–16)
AST SERPL-CCNC: 32 U/L (ref 10–40)
BILIRUB SERPL-MCNC: 0.2 MG/DL (ref 0.1–1)
BUN SERPL-MCNC: 27 MG/DL (ref 6–20)
CALCIUM SERPL-MCNC: 11 MG/DL (ref 8.7–10.5)
CHLORIDE SERPL-SCNC: 108 MMOL/L (ref 95–110)
CO2 SERPL-SCNC: 23 MMOL/L (ref 23–29)
CREAT SERPL-MCNC: 1.1 MG/DL (ref 0.5–1.4)
EST. GFR  (AFRICAN AMERICAN): >60 ML/MIN/1.73 M^2
EST. GFR  (NON AFRICAN AMERICAN): >60 ML/MIN/1.73 M^2
ESTIMATED AVG GLUCOSE: 160 MG/DL (ref 68–131)
GLUCOSE SERPL-MCNC: 108 MG/DL (ref 70–110)
HBA1C MFR BLD: 7.2 % (ref 4–5.6)
POTASSIUM SERPL-SCNC: 4.6 MMOL/L (ref 3.5–5.1)
PROT SERPL-MCNC: 7.3 G/DL (ref 6–8.4)
SODIUM SERPL-SCNC: 139 MMOL/L (ref 136–145)

## 2022-03-30 PROCEDURE — 36415 COLL VENOUS BLD VENIPUNCTURE: CPT | Mod: PO | Performed by: FAMILY MEDICINE

## 2022-03-30 PROCEDURE — 83036 HEMOGLOBIN GLYCOSYLATED A1C: CPT | Performed by: FAMILY MEDICINE

## 2022-03-30 PROCEDURE — 80053 COMPREHEN METABOLIC PANEL: CPT | Performed by: FAMILY MEDICINE

## 2022-04-26 DIAGNOSIS — E11.65 UNCONTROLLED TYPE 2 DIABETES MELLITUS WITH HYPERGLYCEMIA: ICD-10-CM

## 2022-04-27 RX ORDER — INSULIN DETEMIR 100 [IU]/ML
INJECTION, SOLUTION SUBCUTANEOUS
Qty: 75 ML | Refills: 1 | Status: SHIPPED | OUTPATIENT
Start: 2022-04-27 | End: 2022-09-28 | Stop reason: SDUPTHER

## 2022-04-27 NOTE — TELEPHONE ENCOUNTER
Refill Authorization Note   Thierry Ho  is requesting a refill authorization.  Brief Assessment and Rationale for Refill:  Approve     Medication Therapy Plan:       Medication Reconciliation Completed: No   Comments:     No Care Gaps recommended.     Note composed:11:06 AM 04/27/2022

## 2022-04-27 NOTE — TELEPHONE ENCOUNTER
No new care gaps identified.  Powered by [employer]. Reference number: [id number].   4/26/2022 7:04:59 PM CDT

## 2022-05-11 DIAGNOSIS — E11.9 TYPE 2 DIABETES MELLITUS WITHOUT COMPLICATION: ICD-10-CM

## 2022-05-25 ENCOUNTER — PES CALL (OUTPATIENT)
Dept: ADMINISTRATIVE | Facility: CLINIC | Age: 62
End: 2022-05-25
Payer: MEDICARE

## 2022-05-26 RX ORDER — FLUOCINONIDE TOPICAL SOLUTION USP, 0.05% 0.5 MG/ML
SOLUTION TOPICAL
Qty: 60 ML | Refills: 0 | Status: SHIPPED | OUTPATIENT
Start: 2022-05-26 | End: 2022-07-11 | Stop reason: SDUPTHER

## 2022-05-26 NOTE — TELEPHONE ENCOUNTER
No new care gaps identified.  [facility] Embedded Care Gaps. Reference number: [id number]. 5/26/2022   2:54:40 AM CDT

## 2022-05-26 NOTE — TELEPHONE ENCOUNTER
Refill Authorization Note   Thierry Ho  is requesting a refill authorization.  Brief Assessment and Rationale for Refill:  Approve     Medication Therapy Plan:       Medication Reconciliation Completed: No   Comments:     No Care Gaps recommended.     Note composed:9:32 AM 05/26/2022

## 2022-05-30 NOTE — PLAN OF CARE
Addended by: DEBBIE QUINN on: 5/30/2022 01:06 PM     Modules accepted: Orders     Questions encouraged and answered to decrease anxiety.  Post procedure education begun with patient.

## 2022-05-31 DIAGNOSIS — E11.65 UNCONTROLLED TYPE 2 DIABETES MELLITUS WITH HYPERGLYCEMIA: ICD-10-CM

## 2022-05-31 RX ORDER — PEN NEEDLE, DIABETIC 31 GX5/16"
NEEDLE, DISPOSABLE MISCELLANEOUS
Qty: 200 EACH | Refills: 3 | Status: SHIPPED | OUTPATIENT
Start: 2022-05-31 | End: 2023-09-01

## 2022-05-31 NOTE — TELEPHONE ENCOUNTER
No new care gaps identified.  [facility] Embedded Care Gaps. Reference number: [id number]. 5/31/2022   1:47:51 PM CDT

## 2022-05-31 NOTE — TELEPHONE ENCOUNTER
"----- Message from Kiley Jones sent at 5/31/2022  1:10 PM CDT -----  Type: RX Refill Request    Who Called: pt    Have you contacted your pharmacy:    Refill or New Rx:BD ULTRA-FINE FREDA PEN NEEDLE 32 gauge x 5/32" Ndle - needs pa    RX Name and Strength:    How is the patient currently taking it? (ex. 1XDay):    Is this a 30 day or 90 day RX:    Preferred Pharmacy with phone number:  [email or web address] - [facility] [address]  [address] [address]  Phone: [phone] Fax: [phone]        Local or Mail Order:    Ordering Provider:    Would the patient rather a call back or a response via [facility]? call    Best Call Back Number:[phone] (home)       Additional Information:         "

## 2022-05-31 NOTE — TELEPHONE ENCOUNTER
Spoke to pharmacist and was told pen needles state once daily but pt reports uses twice daily with levemir; they will need new prescription

## 2022-05-31 NOTE — TELEPHONE ENCOUNTER
Pt informed new RX with correct directions of twice daily was sent to [facility]

## 2022-06-07 ENCOUNTER — PATIENT OUTREACH (OUTPATIENT)
Dept: ADMINISTRATIVE | Facility: HOSPITAL | Age: 62
End: 2022-06-07
Payer: MEDICARE

## 2022-06-07 NOTE — PROGRESS NOTES
Called patient regarding blood pressure  Takes blood pressure daily-does not keep record  Next visit 6-28-22

## 2022-06-27 ENCOUNTER — TELEPHONE (OUTPATIENT)
Dept: FAMILY MEDICINE | Facility: CLINIC | Age: 62
End: 2022-06-27
Payer: MEDICARE

## 2022-06-27 LAB
LEFT EYE DM RETINOPATHY: POSITIVE
RIGHT EYE DM RETINOPATHY: POSITIVE

## 2022-06-28 ENCOUNTER — OFFICE VISIT (OUTPATIENT)
Dept: FAMILY MEDICINE | Facility: CLINIC | Age: 62
End: 2022-06-28
Payer: MEDICARE

## 2022-06-28 VITALS
OXYGEN SATURATION: 98 % | RESPIRATION RATE: 18 BRPM | TEMPERATURE: 98 F | WEIGHT: 240.5 LBS | DIASTOLIC BLOOD PRESSURE: 88 MMHG | HEIGHT: 71 IN | SYSTOLIC BLOOD PRESSURE: 138 MMHG | HEART RATE: 72 BPM | BODY MASS INDEX: 33.67 KG/M2

## 2022-06-28 DIAGNOSIS — N52.9 ERECTILE DYSFUNCTION, UNSPECIFIED ERECTILE DYSFUNCTION TYPE: ICD-10-CM

## 2022-06-28 DIAGNOSIS — G47.00 INSOMNIA, UNSPECIFIED TYPE: ICD-10-CM

## 2022-06-28 DIAGNOSIS — B35.0 TINEA CAPITIS: Primary | ICD-10-CM

## 2022-06-28 PROCEDURE — 4010F ACE/ARB THERAPY RXD/TAKEN: CPT | Mod: CPTII,S$GLB,, | Performed by: FAMILY MEDICINE

## 2022-06-28 PROCEDURE — 3079F DIAST BP 80-89 MM HG: CPT | Mod: CPTII,S$GLB,, | Performed by: FAMILY MEDICINE

## 2022-06-28 PROCEDURE — 99214 OFFICE O/P EST MOD 30 MIN: CPT | Mod: S$GLB,,, | Performed by: FAMILY MEDICINE

## 2022-06-28 PROCEDURE — 99999 PR PBB SHADOW E&M-EST. PATIENT-LVL V: ICD-10-PCS | Mod: PBBFAC,,, | Performed by: FAMILY MEDICINE

## 2022-06-28 PROCEDURE — 99999 PR PBB SHADOW E&M-EST. PATIENT-LVL V: CPT | Mod: PBBFAC,,, | Performed by: FAMILY MEDICINE

## 2022-06-28 PROCEDURE — 3051F PR MOST RECENT HEMOGLOBIN A1C LEVEL 7.0 - < 8.0%: ICD-10-PCS | Mod: CPTII,S$GLB,, | Performed by: FAMILY MEDICINE

## 2022-06-28 PROCEDURE — 1159F PR MEDICATION LIST DOCUMENTED IN MEDICAL RECORD: ICD-10-PCS | Mod: CPTII,S$GLB,, | Performed by: FAMILY MEDICINE

## 2022-06-28 PROCEDURE — 4010F PR ACE/ARB THEARPY RXD/TAKEN: ICD-10-PCS | Mod: CPTII,S$GLB,, | Performed by: FAMILY MEDICINE

## 2022-06-28 PROCEDURE — 3075F PR MOST RECENT SYSTOLIC BLOOD PRESS GE 130-139MM HG: ICD-10-PCS | Mod: CPTII,S$GLB,, | Performed by: FAMILY MEDICINE

## 2022-06-28 PROCEDURE — 3051F HG A1C>EQUAL 7.0%<8.0%: CPT | Mod: CPTII,S$GLB,, | Performed by: FAMILY MEDICINE

## 2022-06-28 PROCEDURE — 99214 PR OFFICE/OUTPT VISIT, EST, LEVL IV, 30-39 MIN: ICD-10-PCS | Mod: S$GLB,,, | Performed by: FAMILY MEDICINE

## 2022-06-28 PROCEDURE — 3008F PR BODY MASS INDEX (BMI) DOCUMENTED: ICD-10-PCS | Mod: CPTII,S$GLB,, | Performed by: FAMILY MEDICINE

## 2022-06-28 PROCEDURE — 3008F BODY MASS INDEX DOCD: CPT | Mod: CPTII,S$GLB,, | Performed by: FAMILY MEDICINE

## 2022-06-28 PROCEDURE — 3079F PR MOST RECENT DIASTOLIC BLOOD PRESSURE 80-89 MM HG: ICD-10-PCS | Mod: CPTII,S$GLB,, | Performed by: FAMILY MEDICINE

## 2022-06-28 PROCEDURE — 1159F MED LIST DOCD IN RCRD: CPT | Mod: CPTII,S$GLB,, | Performed by: FAMILY MEDICINE

## 2022-06-28 PROCEDURE — 3075F SYST BP GE 130 - 139MM HG: CPT | Mod: CPTII,S$GLB,, | Performed by: FAMILY MEDICINE

## 2022-06-28 RX ORDER — TRAZODONE HYDROCHLORIDE 50 MG/1
50 TABLET ORAL NIGHTLY PRN
Qty: 30 TABLET | Refills: 0 | Status: SHIPPED | OUTPATIENT
Start: 2022-06-28 | End: 2022-11-23

## 2022-06-28 RX ORDER — SILDENAFIL 50 MG/1
50 TABLET, FILM COATED ORAL DAILY PRN
Qty: 30 TABLET | Refills: 1 | Status: SHIPPED | OUTPATIENT
Start: 2022-06-28 | End: 2022-09-28 | Stop reason: SDUPTHER

## 2022-06-28 RX ORDER — KETOCONAZOLE 20 MG/G
CREAM TOPICAL DAILY
Qty: 60 G | Refills: 0 | Status: SHIPPED | OUTPATIENT
Start: 2022-06-28 | End: 2022-07-24

## 2022-06-28 RX ORDER — KETOCONAZOLE 20 MG/ML
SHAMPOO, SUSPENSION TOPICAL
Qty: 120 ML | Refills: 0 | Status: SHIPPED | OUTPATIENT
Start: 2022-06-30 | End: 2022-07-24

## 2022-06-28 NOTE — PROGRESS NOTES
Health Maintenance Due   Topic     Shingles Vaccine (1 of 2)  hx chicken pox. Notified pt can get vaccine at pharmacy    Diabetes Urine Screening  Consult pcp    COVID-19 Vaccine (3 - Booster for Adeline series)     Foot Exam  Consult pcp

## 2022-07-01 ENCOUNTER — PATIENT OUTREACH (OUTPATIENT)
Dept: ADMINISTRATIVE | Facility: HOSPITAL | Age: 62
End: 2022-07-01
Payer: MEDICARE

## 2022-07-01 NOTE — PROGRESS NOTES
Subjective:       Patient ID: Thierry Ho is a 62 y.o. male.    Chief Complaint: Follow-up      HPI  61 yo male presents for scalp pruritis. States he continues to have itching. Feels the shampoo has not help.    Review of Systems   Constitutional: Negative.    HENT: Negative.    Respiratory: Negative.    Cardiovascular: Negative.    Gastrointestinal: Negative.    Endocrine: Negative.    Genitourinary: Negative.    Musculoskeletal: Negative.    Skin: Positive for rash.   Neurological: Negative.    Psychiatric/Behavioral: Negative.           Past Medical History:   Diagnosis Date    Colon polyps     Diabetes mellitus     DM retinopathy     GERD (gastroesophageal reflux disease)     Hyperlipidemia     Hypertension     Myocardial infarction      Past Surgical History:   Procedure Laterality Date    APPENDECTOMY      BACK SURGERY  11/2017    COLONOSCOPY N/A 10/19/2018    Procedure: COLONOSCOPY;  Surgeon: Frantz Rasmussen MD;  Location: [facility] ENDO;  Service: Endoscopy;  Laterality: N/A;    COLONOSCOPY N/A 2/22/2021    Procedure: COLONOSCOPY;  Surgeon: Dann Mahmood MD;  Location: [facility] ENDO;  Service: Endoscopy;  Laterality: N/A;  covid test [place] 2/19, instructions mailed -ml    EXCISION OF SKIN N/A 6/25/2020    Procedure: EXCISION, SKIN;  Surgeon: Finesse Dumont MD;  Location: [facility] OR;  Service: General;  Laterality: N/A;  on back    FOOT SURGERY      INCISION AND DRAINAGE OF GROIN Right 6/25/2020    Procedure: INCISION AND DRAINAGE, INGUINAL REGION;  Surgeon: Finesse Dumont MD;  Location: [facility] OR;  Service: General;  Laterality: Right;  PT TO PREOP IN AM  PRE-OP BY RN 6-    OPEN REDUCTION AND INTERNAL FIXATION (ORIF) OF INJURY OF ANKLE Right 7/17/2019    Procedure: ORIF, ANKLE;  Surgeon: Raeann Steward MD;  Location: [facility] OR;  Service: Orthopedics;  Laterality: Right;  SARITA  CRISTOFER ADAMA  [phone] TEXTED HIM @ 10:2 AM ON 7-11-19  SKELETAL  RN PREOP 7/9/19---- CMP MORNING OF  SURGERY PER DR FONTAINE     Family History   Problem Relation Age of Onset    Heart disease Mother     Breast cancer Mother     Diabetes Father     Cancer Sister     Cancer Brother      Social History     Socioeconomic History    Marital status:     Number of children: 5    Highest education level: GED or equivalent   Tobacco Use    Smoking status: Current Every Day Smoker     Packs/day: 0.25     Years: 40.00     Pack years: 10.00     Types: Cigarettes     Start date: 1/15/1981    Smokeless tobacco: Never Used   Substance and Sexual Activity    Alcohol use: Yes     Alcohol/week: 4.0 standard drinks     Types: 4 Cans of beer per week     Comment: social     Drug use: Yes     Types: Marijuana    Sexual activity: Not Currently     Partners: Female     Social Determinants of Health     Financial Resource Strain: Low Risk     Difficulty of Paying Living Expenses: Not hard at all   Food Insecurity: No Food Insecurity    Worried About Running Out of Food in the Last Year: Never true    Ran Out of Food in the Last Year: Never true   Transportation Needs: No Transportation Needs    Lack of Transportation (Medical): No    Lack of Transportation (Non-Medical): No   Physical Activity: Inactive    Days of Exercise per Week: 0 days    Minutes of Exercise per Session: 0 min   Stress: Stress Concern Present    Feeling of Stress : To some extent   Social Connections: Socially Isolated    Frequency of Communication with Friends and Family: Once a week    Frequency of Social Gatherings with Friends and Family: Once a week    Attends Denominational Services: Never    Active Member of Clubs or Organizations: No    Attends Club or Organization Meetings: Never    Marital Status:    Housing Stability: Unknown    Unable to Pay for Housing in the Last Year: No    Unstable Housing in the Last Year: No       Current Outpatient Medications:     acetaminophen (TYLENOL) 500 MG tablet, Take 500 mg by mouth  "every 6 (six) hours as needed for Pain., Disp: , Rfl:     AFLURIA QD 2020-21,3YR UP,,PF, 60 mcg (15 mcg x 4)/0.5 mL Syrg, ADM 0.5ML IM UTD, Disp: , Rfl:     aspirin (ECOTRIN) 81 MG EC tablet, Take 81 mg by mouth once daily., Disp: , Rfl:     BD ULTRA-FINE FREDA PEN NEEDLE 32 gauge x 5/32" Ndle, USE AS DIRECTED 2 TIMES DAILY WITH LEVEMIR, Disp: 200 each, Rfl: 3    BLOOD PRESSURE CUFF Misc, 1 kit by Misc.(Non-Drug; Combo Route) route once daily., Disp: 1 each, Rfl: 0    blood sugar diagnostic (TRUE METRIX GLUCOSE TEST STRIP) Strp, 1 strip by Misc.(Non-Drug; Combo Route) route 2 (two) times a day., Disp: 200 each, Rfl: 11    blood-glucose meter kit, 1 each by Other route 4 (four) times daily before meals and nightly., Disp: 1 each, Rfl: 0    carvediloL (COREG) 25 MG tablet, TAKE 1 TABLET TWICE DAILY WITH MEALS, Disp: 180 tablet, Rfl: 1    ciclopirox (PENLAC) 8 % Soln, Apply topically nightly., Disp: 1 Bottle, Rfl: 3    fluocinonide (LIDEX) 0.05 % external solution, APPLY 1 ML TO THE SCALP AT BEDTIME, Disp: 60 mL, Rfl: 0    ibuprofen (ADVIL,MOTRIN) 600 MG tablet, Take 1 tablet (600 mg total) by mouth every 8 (eight) hours as needed for Pain., Disp: 20 tablet, Rfl: 0    ketoconazole (NIZORAL) 2 % shampoo, Apply topically twice a week., Disp: 120 mL, Rfl: 0    LEVEMIR FLEXTOUCH U-100 INSULN 100 unit/mL (3 mL) InPn pen, INJECT 38 UNITS SUBCUTANEOUSLY TWICE DAILY, Disp: 75 mL, Rfl: 1    lisinopriL (PRINIVIL,ZESTRIL) 20 MG tablet, TAKE 1 TABLET (20 MG TOTAL) BY MOUTH ONCE DAILY., Disp: 90 tablet, Rfl: 3    metFORMIN (GLUCOPHAGE) 1000 MG tablet, TAKE 1 TABLET TWICE DAILY WITH MEALS, Disp: 180 tablet, Rfl: 1    propofoL (DIPRIVAN) 10 mg/mL infusion, , Disp: , Rfl:     simvastatin (ZOCOR) 40 MG tablet, TAKE 1 TABLET (40 MG TOTAL) BY MOUTH EVERY EVENING., Disp: 90 tablet, Rfl: 1    SUPREP BOWEL PREP KIT 17.5-3.13-1.6 gram SolR, TAKE 177 ML BY MOUTH EVERY DAY FOR 2 DAYS, Disp: , Rfl:     TRUEPLUS LANCETS 33 gauge " "Misc, Check glucose twice daily, Disp: 100 each, Rfl: 3    fluocinolone (SYNALAR) 0.01 % external solution, APPLY TOPICALLY ONCE DAILY  FOR 10 DAYS, Disp: 60 mL, Rfl: 0    ketoconazole (NIZORAL) 2 % cream, Apply topically once daily., Disp: 60 g, Rfl: 0    sildenafiL (VIAGRA) 50 MG tablet, Take 1 tablet (50 mg total) by mouth daily as needed for Erectile Dysfunction., Disp: 30 tablet, Rfl: 1    traZODone (DESYREL) 50 MG tablet, Take 1 tablet (50 mg total) by mouth nightly as needed for Insomnia., Disp: 30 tablet, Rfl: 0  No current facility-administered medications for this visit.    Facility-Administered Medications Ordered in Other Visits:     lactated ringers infusion, , Intravenous, Continuous, Violeta Vides MD, New Bag at 07/17/19 0906    lactated ringers infusion, , Intravenous, Continuous, Landry Peters MD    lidocaine (PF) 10 mg/ml (1%) injection 10 mg, 1 mL, Intradermal, Once, Violeta Vides MD    lidocaine (PF) 10 mg/ml (1%) injection 10 mg, 1 mL, Intradermal, Once, Landry Peters MD   Objective:      Vitals:    06/28/22 1358 06/28/22 1404   BP: (!) 152/100 138/88   BP Location: Left arm Right arm   Patient Position: Sitting    BP Method: Small (Manual)    Pulse: 72    Resp: 18    Temp: 98.1 °F (36.7 °C)    TempSrc: Oral    SpO2: 98%    Weight: 109.1 kg (240 lb 8.4 oz)    Height: 5' 11" (1.803 m)        Physical Exam  Constitutional:       General: He is not in acute distress.     Appearance: He is not diaphoretic.   HENT:      Head: Normocephalic and atraumatic.   Eyes:      Conjunctiva/sclera: Conjunctivae normal.   Pulmonary:      Effort: Pulmonary effort is normal.   Musculoskeletal:      Cervical back: Neck supple.   Skin:     Findings: Rash (rash on scalp) present.   Neurological:      Mental Status: He is alert and oriented to person, place, and time.   Psychiatric:         Behavior: Behavior normal.         Thought Content: Thought content normal.         Judgment: Judgment " normal.            Assessment:       1. Tinea capitis    2. Erectile dysfunction, unspecified erectile dysfunction type    3. Insomnia, unspecified type        Plan:       Tinea capitis  -     ketoconazole (NIZORAL) 2 % cream; Apply topically once daily.  Dispense: 60 g; Refill: 0  -     ketoconazole (NIZORAL) 2 % shampoo; Apply topically twice a week.  Dispense: 120 mL; Refill: 0    Erectile dysfunction, unspecified erectile dysfunction type  -     sildenafiL (VIAGRA) 50 MG tablet; Take 1 tablet (50 mg total) by mouth daily as needed for Erectile Dysfunction.  Dispense: 30 tablet; Refill: 1    Insomnia, unspecified type  -     traZODone (DESYREL) 50 MG tablet; Take 1 tablet (50 mg total) by mouth nightly as needed for Insomnia.  Dispense: 30 tablet; Refill: 0    given cream. Advised pt to f/u w/ derm. Given meds for insomnia            Patient note was created using [employer].  Any errors in syntax or even information may not have been identified and edited on initial review prior to signing this note.

## 2022-07-11 RX ORDER — FLUOCINONIDE TOPICAL SOLUTION USP, 0.05% 0.5 MG/ML
SOLUTION TOPICAL
Qty: 60 ML | Refills: 0 | Status: SHIPPED | OUTPATIENT
Start: 2022-07-11

## 2022-07-11 NOTE — TELEPHONE ENCOUNTER
"Last Office Visit Info:   The patient's last visit with Sterling Jarquin MD was on 6/28/2022.    The patient's last visit in current department was on 6/28/2022.        Last CBC Results:   Lab Results   Component Value Date    WBC 9.13 06/25/2020    HGB 14.0 06/25/2020    HCT 41.6 06/25/2020     06/25/2020       Last CMP Results  Lab Results   Component Value Date     03/30/2022    K 4.6 03/30/2022     03/30/2022    CO2 23 03/30/2022    BUN 27 (H) 03/30/2022    CREATININE 1.1 03/30/2022    CALCIUM 11.0 (H) 03/30/2022    ALBUMIN 3.8 03/30/2022    AST 32 03/30/2022    ALT 32 03/30/2022       Last Lipids  Lab Results   Component Value Date    CHOL 108 (L) 07/14/2021    TRIG 119 07/14/2021    HDL 39 (L) 07/14/2021    LDLCALC 45.2 (L) 07/14/2021       Last A1C  Lab Results   Component Value Date    HGBA1C 7.2 (H) 03/30/2022       Last TSH  Lab Results   Component Value Date    TSH 2.423 06/04/2018             Current Med Refills  Medication List with Changes/Refills   Current Medications    ACETAMINOPHEN (TYLENOL) 500 MG TABLET    Take 500 mg by mouth every 6 (six) hours as needed for Pain.       Start Date: --        End Date: --    AFLURIA QD 2020-21,3YR UP,,PF, 60 MCG (15 MCG X 4)/0.5 ML SYRG    ADM 0.5ML IM UTD       Start Date: 9/18/2020 End Date: --    ASPIRIN (ECOTRIN) 81 MG EC TABLET    Take 81 mg by mouth once daily.       Start Date: --        End Date: --    BD ULTRA-FINE FREDA PEN NEEDLE 32 GAUGE X 5/32" NDLE    USE AS DIRECTED 2 TIMES DAILY WITH LEVEMIR       Start Date: 5/31/2022 End Date: --    BLOOD PRESSURE CUFF MISC    1 kit by Misc.(Non-Drug; Combo Route) route once daily.       Start Date: 7/27/2018 End Date: --    BLOOD SUGAR DIAGNOSTIC (TRUE METRIX GLUCOSE TEST STRIP) STRP    1 strip by Misc.(Non-Drug; Combo Route) route 2 (two) times a day.       Start Date: 8/11/2021 End Date: --    BLOOD-GLUCOSE METER KIT    1 each by Other route 4 (four) times daily before meals and nightly. "       Start Date: 3/13/2019 End Date: --    CARVEDILOL (COREG) 25 MG TABLET    TAKE 1 TABLET TWICE DAILY WITH MEALS       Start Date: 3/17/2022 End Date: --    CICLOPIROX (PENLAC) 8 % SOLN    Apply topically nightly.       Start Date: 8/12/2021 End Date: --    FLUOCINOLONE (SYNALAR) 0.01 % EXTERNAL SOLUTION    APPLY TOPICALLY ONCE DAILY  FOR 10 DAYS       Start Date: 6/16/2022 End Date: 6/26/2022    FLUOCINONIDE (LIDEX) 0.05 % EXTERNAL SOLUTION    APPLY 1 ML TO THE SCALP AT BEDTIME       Start Date: 5/26/2022 End Date: --    IBUPROFEN (ADVIL,MOTRIN) 600 MG TABLET    Take 1 tablet (600 mg total) by mouth every 8 (eight) hours as needed for Pain.       Start Date: 6/18/2020 End Date: --    KETOCONAZOLE (NIZORAL) 2 % CREAM    Apply topically once daily.       Start Date: 6/28/2022 End Date: --    KETOCONAZOLE (NIZORAL) 2 % SHAMPOO    Apply topically twice a week.       Start Date: 6/30/2022 End Date: --    LEVEMIR FLEXTOUCH U-100 INSULN 100 UNIT/ML (3 ML) INPN PEN    INJECT 38 UNITS SUBCUTANEOUSLY TWICE DAILY       Start Date: 4/27/2022 End Date: --    LISINOPRIL (PRINIVIL,ZESTRIL) 20 MG TABLET    TAKE 1 TABLET (20 MG TOTAL) BY MOUTH ONCE DAILY.       Start Date: 2/28/2022 End Date: --    METFORMIN (GLUCOPHAGE) 1000 MG TABLET    TAKE 1 TABLET TWICE DAILY WITH MEALS       Start Date: 5/26/2022 End Date: --    PROPOFOL (DIPRIVAN) 10 MG/ML INFUSION           Start Date: 2/22/2021 End Date: --    SILDENAFIL (VIAGRA) 50 MG TABLET    Take 1 tablet (50 mg total) by mouth daily as needed for Erectile Dysfunction.       Start Date: 6/28/2022 End Date: 6/28/2023    SIMVASTATIN (ZOCOR) 40 MG TABLET    TAKE 1 TABLET (40 MG TOTAL) BY MOUTH EVERY EVENING.       Start Date: 3/17/2022 End Date: --    SUPREP BOWEL PREP KIT 17.5-3.13-1.6 GRAM SOLR    TAKE 177 ML BY MOUTH EVERY DAY FOR 2 DAYS       Start Date: 1/7/2021  End Date: --    TRAZODONE (DESYREL) 50 MG TABLET    Take 1 tablet (50 mg total) by mouth nightly as needed for  Insomnia.       Start Date: 6/28/2022 End Date: 6/28/2023    TRUEPLUS LANCETS 33 GAUGE MISC    Check glucose twice daily       Start Date: 1/15/2021 End Date: --       Order(s) placed per written order guidelines:     Please advise.

## 2022-07-11 NOTE — TELEPHONE ENCOUNTER
No new care gaps identified.  [facility] Embedded Care Gaps. Reference number: [id number]. 7/11/2022   3:13:25 PM CDT

## 2022-07-18 ENCOUNTER — TELEPHONE (OUTPATIENT)
Dept: ADMINISTRATIVE | Facility: CLINIC | Age: 62
End: 2022-07-18
Payer: MEDICARE

## 2022-07-18 NOTE — TELEPHONE ENCOUNTER
Called pt, informed pt I was calling to remind pt of his in office EAWV on 7/19/22; clinic location provided to patient; pt confirmed appointment

## 2022-07-19 ENCOUNTER — LAB VISIT (OUTPATIENT)
Dept: LAB | Facility: HOSPITAL | Age: 62
End: 2022-07-19
Attending: FAMILY MEDICINE
Payer: MEDICARE

## 2022-07-19 ENCOUNTER — OFFICE VISIT (OUTPATIENT)
Dept: FAMILY MEDICINE | Facility: CLINIC | Age: 62
End: 2022-07-19
Payer: MEDICARE

## 2022-07-19 VITALS
OXYGEN SATURATION: 97 % | TEMPERATURE: 98 F | HEIGHT: 71 IN | HEART RATE: 74 BPM | SYSTOLIC BLOOD PRESSURE: 148 MMHG | RESPIRATION RATE: 17 BRPM | WEIGHT: 237.63 LBS | DIASTOLIC BLOOD PRESSURE: 88 MMHG | BODY MASS INDEX: 33.27 KG/M2

## 2022-07-19 DIAGNOSIS — Z99.89 DEPENDENCE ON OTHER ENABLING MACHINES AND DEVICES: ICD-10-CM

## 2022-07-19 DIAGNOSIS — E11.29 TYPE 2 DIABETES MELLITUS WITH MICROALBUMINURIA, WITH LONG-TERM CURRENT USE OF INSULIN: ICD-10-CM

## 2022-07-19 DIAGNOSIS — Z00.00 ENCOUNTER FOR PREVENTIVE HEALTH EXAMINATION: Primary | ICD-10-CM

## 2022-07-19 DIAGNOSIS — Z79.4 TYPE 2 DIABETES MELLITUS WITH MICROALBUMINURIA, WITH LONG-TERM CURRENT USE OF INSULIN: ICD-10-CM

## 2022-07-19 DIAGNOSIS — Z74.09 OTHER REDUCED MOBILITY: ICD-10-CM

## 2022-07-19 DIAGNOSIS — E11.9 TYPE 2 DIABETES MELLITUS WITHOUT COMPLICATION: ICD-10-CM

## 2022-07-19 DIAGNOSIS — R80.9 TYPE 2 DIABETES MELLITUS WITH MICROALBUMINURIA, WITH LONG-TERM CURRENT USE OF INSULIN: ICD-10-CM

## 2022-07-19 LAB
ALBUMIN/CREAT UR: 358.9 UG/MG (ref 0–30)
CREAT UR-MCNC: 231 MG/DL (ref 23–375)
MICROALBUMIN UR DL<=1MG/L-MCNC: 829 UG/ML

## 2022-07-19 PROCEDURE — 3051F PR MOST RECENT HEMOGLOBIN A1C LEVEL 7.0 - < 8.0%: ICD-10-PCS | Mod: CPTII,S$GLB,, | Performed by: PHYSICIAN ASSISTANT

## 2022-07-19 PROCEDURE — 3051F HG A1C>EQUAL 7.0%<8.0%: CPT | Mod: CPTII,S$GLB,, | Performed by: PHYSICIAN ASSISTANT

## 2022-07-19 PROCEDURE — 1160F RVW MEDS BY RX/DR IN RCRD: CPT | Mod: CPTII,S$GLB,, | Performed by: PHYSICIAN ASSISTANT

## 2022-07-19 PROCEDURE — G9919 SCRN ND POS ND PROV OF REC: HCPCS | Mod: CPTII,S$GLB,, | Performed by: PHYSICIAN ASSISTANT

## 2022-07-19 PROCEDURE — G9919 PR SCREENING AND POSITIVE: ICD-10-PCS | Mod: CPTII,S$GLB,, | Performed by: PHYSICIAN ASSISTANT

## 2022-07-19 PROCEDURE — 99999 PR PBB SHADOW E&M-EST. PATIENT-LVL V: ICD-10-PCS | Mod: PBBFAC,,, | Performed by: PHYSICIAN ASSISTANT

## 2022-07-19 PROCEDURE — 1159F MED LIST DOCD IN RCRD: CPT | Mod: CPTII,S$GLB,, | Performed by: PHYSICIAN ASSISTANT

## 2022-07-19 PROCEDURE — 82043 UR ALBUMIN QUANTITATIVE: CPT | Performed by: FAMILY MEDICINE

## 2022-07-19 PROCEDURE — 1159F PR MEDICATION LIST DOCUMENTED IN MEDICAL RECORD: ICD-10-PCS | Mod: CPTII,S$GLB,, | Performed by: PHYSICIAN ASSISTANT

## 2022-07-19 PROCEDURE — 3066F PR DOCUMENTATION OF TREATMENT FOR NEPHROPATHY: ICD-10-PCS | Mod: CPTII,S$GLB,, | Performed by: PHYSICIAN ASSISTANT

## 2022-07-19 PROCEDURE — 4010F ACE/ARB THERAPY RXD/TAKEN: CPT | Mod: CPTII,S$GLB,, | Performed by: PHYSICIAN ASSISTANT

## 2022-07-19 PROCEDURE — 3062F PR POS MACROALBUMINURIA RESULT DOCUMENTED/REVIEW: ICD-10-PCS | Mod: CPTII,S$GLB,, | Performed by: PHYSICIAN ASSISTANT

## 2022-07-19 PROCEDURE — 99999 PR PBB SHADOW E&M-EST. PATIENT-LVL V: CPT | Mod: PBBFAC,,, | Performed by: PHYSICIAN ASSISTANT

## 2022-07-19 PROCEDURE — 82570 ASSAY OF URINE CREATININE: CPT | Performed by: FAMILY MEDICINE

## 2022-07-19 PROCEDURE — 3008F BODY MASS INDEX DOCD: CPT | Mod: CPTII,S$GLB,, | Performed by: PHYSICIAN ASSISTANT

## 2022-07-19 PROCEDURE — 99499 RISK ADDL DX/OHS AUDIT: ICD-10-PCS | Mod: S$GLB,,, | Performed by: PHYSICIAN ASSISTANT

## 2022-07-19 PROCEDURE — 3066F NEPHROPATHY DOC TX: CPT | Mod: CPTII,S$GLB,, | Performed by: PHYSICIAN ASSISTANT

## 2022-07-19 PROCEDURE — 99499 UNLISTED E&M SERVICE: CPT | Mod: S$GLB,,, | Performed by: PHYSICIAN ASSISTANT

## 2022-07-19 PROCEDURE — 3008F PR BODY MASS INDEX (BMI) DOCUMENTED: ICD-10-PCS | Mod: CPTII,S$GLB,, | Performed by: PHYSICIAN ASSISTANT

## 2022-07-19 PROCEDURE — 1160F PR REVIEW ALL MEDS BY PRESCRIBER/CLIN PHARMACIST DOCUMENTED: ICD-10-PCS | Mod: CPTII,S$GLB,, | Performed by: PHYSICIAN ASSISTANT

## 2022-07-19 PROCEDURE — G0439 PR MEDICARE ANNUAL WELLNESS SUBSEQUENT VISIT: ICD-10-PCS | Mod: S$GLB,,, | Performed by: PHYSICIAN ASSISTANT

## 2022-07-19 PROCEDURE — 4010F PR ACE/ARB THEARPY RXD/TAKEN: ICD-10-PCS | Mod: CPTII,S$GLB,, | Performed by: PHYSICIAN ASSISTANT

## 2022-07-19 PROCEDURE — G0439 PPPS, SUBSEQ VISIT: HCPCS | Mod: S$GLB,,, | Performed by: PHYSICIAN ASSISTANT

## 2022-07-19 PROCEDURE — 3062F POS MACROALBUMINURIA REV: CPT | Mod: CPTII,S$GLB,, | Performed by: PHYSICIAN ASSISTANT

## 2022-07-19 NOTE — PATIENT INSTRUCTIONS
Counseling and Referral of Other Preventative  (Italic type indicates deductible and co-insurance are waived)    Patient Name: Thierry Ho  Today's Date: 7/19/2022    Health Maintenance       Date Due Completion Date    Shingles Vaccine (1 of 2) Never done ---    Diabetes Urine Screening 02/10/2022 2/10/2021    COVID-19 Vaccine (3 - Booster for Adeline series) 05/05/2022 1/5/2022    Lipid Panel 07/14/2022 7/14/2021    Foot Exam 08/12/2022 8/12/2021 (Done)    Override on 8/12/2021: Done    Influenza Vaccine (1) 09/01/2022 9/18/2020    Hemoglobin A1c 09/30/2022 3/30/2022    Eye Exam 06/27/2023 6/27/2022    High Dose Statin 07/19/2023 7/19/2022    Aspirin/Antiplatelet Therapy 07/19/2023 7/19/2022    Colorectal Cancer Screening 02/22/2024 2/22/2021    TETANUS VACCINE 10/01/2025 10/1/2015    Pneumococcal Vaccines (Age 0-64) (3 - PPSV23 or PCV20) 04/28/2026 1/15/2018        No orders of the defined types were placed in this encounter.    The following information is provided to all patients.  This information is to help you find resources for any of the problems found today that may be affecting your health:                Living healthy guide: www.[facility].[place].gov      Understanding Diabetes: www.diabetes.org      Eating healthy: www.cdc.gov/healthyweight      CDC home safety checklist: www.cdc.gov/steadi/patient.html      Agency on Aging: www.goea.[place].[facility]      Alcoholics anonymous (AA): www.aa.org      Physical Activity: www.sridevi.nih.gov/gz2fyim      Tobacco use: www.quitwithusla.org     Counseling and Referral of Other Preventative  (Italic type indicates deductible and co-insurance are waived)    Patient Name: Thierry Ho  Today's Date: 7/19/2022    Health Maintenance       Date Due Completion Date    Shingles Vaccine (1 of 2) Never done ---    Diabetes Urine Screening 02/10/2022 2/10/2021    COVID-19 Vaccine (3 - Booster for Adeline series) 05/05/2022 1/5/2022    Lipid Panel 07/14/2022 7/14/2021    Foot  Exam 08/12/2022 8/12/2021 (Done)    Override on 8/12/2021: Done    Influenza Vaccine (1) 09/01/2022 9/18/2020    Hemoglobin A1c 09/30/2022 3/30/2022    Eye Exam 06/27/2023 6/27/2022    High Dose Statin 07/19/2023 7/19/2022    Aspirin/Antiplatelet Therapy 07/19/2023 7/19/2022    Colorectal Cancer Screening 02/22/2024 2/22/2021    TETANUS VACCINE 10/01/2025 10/1/2015    Pneumococcal Vaccines (Age 0-64) (3 - PPSV23 or PCV20) 04/28/2026 1/15/2018        No orders of the defined types were placed in this encounter.    The following information is provided to all patients.  This information is to help you find resources for any of the problems found today that may be affecting your health:                Living healthy guide: www.[facility].[place].[facility]      Understanding Diabetes: www.diabetes.org      Eating healthy: www.cdc.gov/healthyweight      [facility] home safety checklist: www.cdc.gov/steadi/patient.html      Agency on Aging: www.goea.[place].[facility]      Alcoholics anonymous (AA): www.aa.org      Physical Activity: www.sridevi.nih.gov/rh0tdvg      Tobacco use: www.quitwithusla.org

## 2022-07-19 NOTE — PROGRESS NOTES
"  Thierry Ho presented for a  Medicare AWV and comprehensive Health Risk Assessment today. The following components were reviewed and updated:    · Medical history  · Family History  · Social history  · Allergies and Current Medications  · Health Risk Assessment  · Health Maintenance  · Care Team     Patient screened moderate and/or high risk for one or more social determinants of health (SDOH). Patient connected to community resources through the ED Navigator.      ** See Completed Assessments for Annual Wellness Visit within the encounter summary.**         The following assessments were completed:  · Living Situation  · CAGE  · Depression Screening  · Timed Get Up and Go  · Whisper Test  · Cognitive Function Screening  · Nutrition Screening  · ADL Screening  · PAQ Screening        Vitals:    07/19/22 1035   BP: (!) 148/88   BP Location: Left arm   Patient Position: Sitting   BP Method: Large (Manual)   Pulse: 74   Resp: 17   Temp: 98.4 °F (36.9 °C)   TempSrc: Oral   SpO2: 97%   Weight: 107.8 kg (237 lb 10.5 oz)   Height: 5' 11" (1.803 m)     Body mass index is 33.15 kg/m².  Physical Exam          Diagnoses and health risks identified today and associated recommendations/orders:    1. Encounter for preventive health examination  Provided Thierry with a 5-10 year written screening schedule and personal prevention plan. Recommendations were developed using the USPSTF age appropriate recommendations. Education, counseling, and referrals were provided as needed. After Visit Summary printed and given to patient which includes a list of additional screenings\tests needed.    2. Dependence on other enabling machines and devices    3. Other reduced mobility    4. Type 2 diabetes mellitus with microalbuminuria, with long-term current use of insulin  Followed by pcp  - Lipid panel; Future  - Hemoglobin A1C; Future  - Basic Metabolic Panel; Future  F/u in september          No follow-ups on file.    Pippa Carter PA-C  I " offered to discuss advanced care planning, including how to pick a person who would make decisions for you if you were unable to make them for yourself, called a health care power of , and what kind of decisions you might make such as use of life sustaining treatments such as ventilators and tube feeding when faced with a life limiting illness recorded on a living will that they will need to know. (How you want to be cared for as you near the end of your natural life)     X Patient is interested in learning more about how to make advanced directives.  I provided them paperwork and offered to discuss this with them.

## 2022-07-19 NOTE — PROGRESS NOTES
Health Maintenance Due   Topic Date Due    Shingles Vaccine (1 of 2) Not cover in clinic , advise to go to the pharmacy     Diabetes Urine Screening  Schedule at these time    COVID-19 Vaccine (3 - Booster for Adeline series) Discuss with pcp    Lipid Panel  Schedule at these time     Foot Exam  Discuss with pcp

## 2022-08-18 ENCOUNTER — PATIENT OUTREACH (OUTPATIENT)
Dept: ADMINISTRATIVE | Facility: HOSPITAL | Age: 62
End: 2022-08-18
Payer: MEDICARE

## 2022-08-29 ENCOUNTER — CLINICAL SUPPORT (OUTPATIENT)
Dept: FAMILY MEDICINE | Facility: CLINIC | Age: 62
End: 2022-08-29
Payer: MEDICARE

## 2022-08-29 DIAGNOSIS — I10 ESSENTIAL HYPERTENSION: Primary | ICD-10-CM

## 2022-08-29 PROCEDURE — 99499 NO LOS: ICD-10-PCS | Mod: S$GLB,,, | Performed by: FAMILY MEDICINE

## 2022-08-29 PROCEDURE — 99499 UNLISTED E&M SERVICE: CPT | Mod: S$GLB,,, | Performed by: FAMILY MEDICINE

## 2022-08-29 NOTE — PROGRESS NOTES
"Thierry Ho 62 y.o. male is here today for Blood Pressure check.   History of HTN yes.    Review of patient's allergies indicates:   Allergen Reactions    Trulicity [dulaglutide]      Creatinine   Date Value Ref Range Status   03/30/2022 1.1 0.5 - 1.4 mg/dL Final     Sodium   Date Value Ref Range Status   03/30/2022 139 136 - 145 mmol/L Final     Potassium   Date Value Ref Range Status   03/30/2022 4.6 3.5 - 5.1 mmol/L Final   ]  Patient verifies taking blood pressure medications on a regular basis at the same time of the day.     Current Outpatient Medications:     acetaminophen (TYLENOL) 500 MG tablet, Take 500 mg by mouth every 6 (six) hours as needed for Pain., Disp: , Rfl:     AFLURIA QD 2020-21,3YR UP,,PF, 60 mcg (15 mcg x 4)/0.5 mL Syrg, ADM 0.5ML IM UTD, Disp: , Rfl:     aspirin (ECOTRIN) 81 MG EC tablet, Take 81 mg by mouth once daily., Disp: , Rfl:     BD ULTRA-FINE FREDA PEN NEEDLE 32 gauge x 5/32" Ndle, USE AS DIRECTED 2 TIMES DAILY WITH LEVEMIR, Disp: 200 each, Rfl: 3    BLOOD PRESSURE CUFF Misc, 1 kit by Misc.(Non-Drug; Combo Route) route once daily., Disp: 1 each, Rfl: 0    blood sugar diagnostic (TRUE METRIX GLUCOSE TEST STRIP) Strp, 1 strip by Misc.(Non-Drug; Combo Route) route 2 (two) times a day., Disp: 200 each, Rfl: 11    blood-glucose meter kit, 1 each by Other route 4 (four) times daily before meals and nightly., Disp: 1 each, Rfl: 0    carvediloL (COREG) 25 MG tablet, TAKE 1 TABLET TWICE DAILY WITH MEALS, Disp: 180 tablet, Rfl: 3    ciclopirox (PENLAC) 8 % Soln, Apply topically nightly. (Patient not taking: Reported on 7/19/2022), Disp: 1 Bottle, Rfl: 3    fluocinolone (SYNALAR) 0.01 % external solution, APPLY TOPICALLY ONCE DAILY  FOR 10 DAYS, Disp: 60 mL, Rfl: 0    fluocinonide (LIDEX) 0.05 % external solution, Topical prn (Patient not taking: Reported on 7/19/2022), Disp: 60 mL, Rfl: 0    ibuprofen (ADVIL,MOTRIN) 600 MG tablet, Take 1 tablet (600 mg total) by mouth every 8 (eight) hours as " needed for Pain. (Patient not taking: Reported on 7/19/2022), Disp: 20 tablet, Rfl: 0    ketoconazole (NIZORAL) 2 % cream, APPLY TOPICALLY ONCE DAILY., Disp: 60 g, Rfl: 0    ketoconazole (NIZORAL) 2 % shampoo, APPLY TOPICALLY TWICE A WEEK., Disp: 120 mL, Rfl: 0    LEVEMIR FLEXTOUCH U-100 INSULN 100 unit/mL (3 mL) InPn pen, INJECT 38 UNITS SUBCUTANEOUSLY TWICE DAILY, Disp: 75 mL, Rfl: 1    lisinopriL (PRINIVIL,ZESTRIL) 20 MG tablet, TAKE 1 TABLET (20 MG TOTAL) BY MOUTH ONCE DAILY., Disp: 90 tablet, Rfl: 3    metFORMIN (GLUCOPHAGE) 1000 MG tablet, TAKE 1 TABLET TWICE DAILY WITH MEALS, Disp: 180 tablet, Rfl: 1    propofoL (DIPRIVAN) 10 mg/mL infusion, , Disp: , Rfl:     sildenafiL (VIAGRA) 50 MG tablet, Take 1 tablet (50 mg total) by mouth daily as needed for Erectile Dysfunction., Disp: 30 tablet, Rfl: 1    simvastatin (ZOCOR) 40 MG tablet, TAKE 1 TABLET EVERY EVENING, Disp: 90 tablet, Rfl: 0    SUPREP BOWEL PREP KIT 17.5-3.13-1.6 gram SolR, TAKE 177 ML BY MOUTH EVERY DAY FOR 2 DAYS, Disp: , Rfl:     traZODone (DESYREL) 50 MG tablet, Take 1 tablet (50 mg total) by mouth nightly as needed for Insomnia., Disp: 30 tablet, Rfl: 0    TRUEPLUS LANCETS 33 gauge Misc, Check glucose twice daily, Disp: 100 each, Rfl: 3  No current facility-administered medications for this visit.    Facility-Administered Medications Ordered in Other Visits:     lactated ringers infusion, , Intravenous, Continuous, Violeta Vides MD, New Bag at 07/17/19 0906    lactated ringers infusion, , Intravenous, Continuous, Landry Peters MD    lidocaine (PF) 10 mg/ml (1%) injection 10 mg, 1 mL, Intradermal, Once, Violeta Vides MD    lidocaine (PF) 10 mg/ml (1%) injection 10 mg, 1 mL, Intradermal, Once, Landry Peters MD  Does patient have record of home blood pressure readings no. Readings have been averaging  - no readings.   Last dose of blood pressure medication was taken at 8/29/2022@5:30 am.   Patient is symptomatic.   Complains of  headache, almost instantly after eating a meal.    160/98 right arm, sitting, manual   Patient stated he did not know he was to take Lisinopril.  Since he knows that he has not been taking a medication that he should have been taking, patient thinks that is probably why he has been having the headaches.  Please be advised.       Dr. Jarquin informed of nurse visit.  Patient advised to take lisinopril as prescribed along with other medications and to return in two weeks for bp check.  Patient advised that he can take Tylenol - on med card- for headaches.  Patient verbalized understanding, scheduled appointment for 9/12/2022.

## 2022-09-12 ENCOUNTER — TELEPHONE (OUTPATIENT)
Dept: FAMILY MEDICINE | Facility: CLINIC | Age: 62
End: 2022-09-12

## 2022-09-12 ENCOUNTER — CLINICAL SUPPORT (OUTPATIENT)
Dept: FAMILY MEDICINE | Facility: CLINIC | Age: 62
End: 2022-09-12
Payer: MEDICARE

## 2022-09-12 VITALS — HEART RATE: 77 BPM | DIASTOLIC BLOOD PRESSURE: 94 MMHG | SYSTOLIC BLOOD PRESSURE: 156 MMHG

## 2022-09-12 DIAGNOSIS — I10 ESSENTIAL HYPERTENSION: ICD-10-CM

## 2022-09-12 DIAGNOSIS — I10 ESSENTIAL HYPERTENSION: Primary | ICD-10-CM

## 2022-09-12 PROCEDURE — 99999 PR PBB SHADOW E&M-EST. PATIENT-LVL I: CPT | Mod: PBBFAC,,,

## 2022-09-12 PROCEDURE — 99999 PR PBB SHADOW E&M-EST. PATIENT-LVL I: ICD-10-PCS | Mod: PBBFAC,,,

## 2022-09-12 NOTE — TELEPHONE ENCOUNTER
Pt came in for BP check with a second reading of 156/94. Pt added a second dose of Lisinopril at night himself.

## 2022-09-12 NOTE — PROGRESS NOTES
Type: Blood Pressure Issues     What is your blood pressure reading?:   Do you take medications? Yes   Did you take meds today?: yes  Any recent changes/adjustments to medications?: yes   When did you take blood pressure? Not taken  Approximate time: 6:30 am  Does patient have any other readings?   Any symptoms: None   Additional information:  Pt starting taking a second dose of Lisinopril at night.

## 2022-09-16 RX ORDER — LISINOPRIL 40 MG/1
40 TABLET ORAL DAILY
Qty: 90 TABLET | Refills: 1 | Status: SHIPPED | OUTPATIENT
Start: 2022-09-16 | End: 2023-03-17 | Stop reason: SDUPTHER

## 2022-09-26 ENCOUNTER — TELEPHONE (OUTPATIENT)
Dept: FAMILY MEDICINE | Facility: CLINIC | Age: 62
End: 2022-09-26
Payer: MEDICARE

## 2022-09-26 ENCOUNTER — LAB VISIT (OUTPATIENT)
Dept: LAB | Facility: HOSPITAL | Age: 62
End: 2022-09-26
Attending: PHYSICIAN ASSISTANT
Payer: MEDICARE

## 2022-09-26 DIAGNOSIS — Z79.4 TYPE 2 DIABETES MELLITUS WITH MICROALBUMINURIA, WITH LONG-TERM CURRENT USE OF INSULIN: ICD-10-CM

## 2022-09-26 DIAGNOSIS — E11.29 TYPE 2 DIABETES MELLITUS WITH MICROALBUMINURIA, WITH LONG-TERM CURRENT USE OF INSULIN: ICD-10-CM

## 2022-09-26 DIAGNOSIS — R80.9 TYPE 2 DIABETES MELLITUS WITH MICROALBUMINURIA, WITH LONG-TERM CURRENT USE OF INSULIN: ICD-10-CM

## 2022-09-26 LAB
ANION GAP SERPL CALC-SCNC: 7 MMOL/L (ref 8–16)
BUN SERPL-MCNC: 27 MG/DL (ref 8–23)
CALCIUM SERPL-MCNC: 12.4 MG/DL (ref 8.7–10.5)
CHLORIDE SERPL-SCNC: 107 MMOL/L (ref 95–110)
CHOLEST SERPL-MCNC: 128 MG/DL (ref 120–199)
CHOLEST/HDLC SERPL: 2.8 {RATIO} (ref 2–5)
CO2 SERPL-SCNC: 26 MMOL/L (ref 23–29)
CREAT SERPL-MCNC: 1.1 MG/DL (ref 0.5–1.4)
EST. GFR  (NO RACE VARIABLE): >60 ML/MIN/1.73 M^2
ESTIMATED AVG GLUCOSE: 134 MG/DL (ref 68–131)
GLUCOSE SERPL-MCNC: 156 MG/DL (ref 70–110)
HBA1C MFR BLD: 6.3 % (ref 4–5.6)
HDLC SERPL-MCNC: 45 MG/DL (ref 40–75)
HDLC SERPL: 35.2 % (ref 20–50)
LDLC SERPL CALC-MCNC: 61 MG/DL (ref 63–159)
NONHDLC SERPL-MCNC: 83 MG/DL
POTASSIUM SERPL-SCNC: 4.5 MMOL/L (ref 3.5–5.1)
SODIUM SERPL-SCNC: 140 MMOL/L (ref 136–145)
TRIGL SERPL-MCNC: 110 MG/DL (ref 30–150)

## 2022-09-26 PROCEDURE — 80048 BASIC METABOLIC PNL TOTAL CA: CPT | Performed by: PHYSICIAN ASSISTANT

## 2022-09-26 PROCEDURE — 80061 LIPID PANEL: CPT | Performed by: PHYSICIAN ASSISTANT

## 2022-09-26 PROCEDURE — 36415 COLL VENOUS BLD VENIPUNCTURE: CPT | Mod: PO | Performed by: PHYSICIAN ASSISTANT

## 2022-09-26 PROCEDURE — 83036 HEMOGLOBIN GLYCOSYLATED A1C: CPT | Performed by: PHYSICIAN ASSISTANT

## 2022-09-26 NOTE — TELEPHONE ENCOUNTER
Received call from lab regarding critical calcium level. Noted in chart that patient has had elevated calcium for quite some time.   Unable to reach patient by phone - left  message for patient to seek immediate medical care if he is having severe constipation or mental confusion. Otherwise he should be sure to keep his appointment with his PCP in 2 days and discuss further steps.

## 2022-09-28 ENCOUNTER — LAB VISIT (OUTPATIENT)
Dept: LAB | Facility: HOSPITAL | Age: 62
End: 2022-09-28
Attending: FAMILY MEDICINE
Payer: MEDICARE

## 2022-09-28 ENCOUNTER — OFFICE VISIT (OUTPATIENT)
Dept: FAMILY MEDICINE | Facility: CLINIC | Age: 62
End: 2022-09-28
Payer: MEDICARE

## 2022-09-28 DIAGNOSIS — E83.52 HYPERCALCEMIA: ICD-10-CM

## 2022-09-28 DIAGNOSIS — Z23 INFLUENZA VACCINE NEEDED: ICD-10-CM

## 2022-09-28 DIAGNOSIS — N52.9 ERECTILE DYSFUNCTION, UNSPECIFIED ERECTILE DYSFUNCTION TYPE: ICD-10-CM

## 2022-09-28 DIAGNOSIS — E11.9 TYPE 2 DIABETES MELLITUS WITHOUT COMPLICATION, WITHOUT LONG-TERM CURRENT USE OF INSULIN: ICD-10-CM

## 2022-09-28 DIAGNOSIS — I10 ESSENTIAL HYPERTENSION: Primary | ICD-10-CM

## 2022-09-28 PROCEDURE — 99214 PR OFFICE/OUTPT VISIT, EST, LEVL IV, 30-39 MIN: ICD-10-PCS | Mod: S$GLB,,, | Performed by: FAMILY MEDICINE

## 2022-09-28 PROCEDURE — 99214 OFFICE O/P EST MOD 30 MIN: CPT | Mod: S$GLB,,, | Performed by: FAMILY MEDICINE

## 2022-09-28 PROCEDURE — 3044F PR MOST RECENT HEMOGLOBIN A1C LEVEL <7.0%: ICD-10-PCS | Mod: CPTII,S$GLB,, | Performed by: FAMILY MEDICINE

## 2022-09-28 PROCEDURE — 99999 PR PBB SHADOW E&M-EST. PATIENT-LVL IV: CPT | Mod: PBBFAC,,, | Performed by: FAMILY MEDICINE

## 2022-09-28 PROCEDURE — 3079F DIAST BP 80-89 MM HG: CPT | Mod: CPTII,S$GLB,, | Performed by: FAMILY MEDICINE

## 2022-09-28 PROCEDURE — 99999 PR PBB SHADOW E&M-EST. PATIENT-LVL IV: ICD-10-PCS | Mod: PBBFAC,,, | Performed by: FAMILY MEDICINE

## 2022-09-28 PROCEDURE — 80048 BASIC METABOLIC PNL TOTAL CA: CPT | Performed by: FAMILY MEDICINE

## 2022-09-28 PROCEDURE — 3062F POS MACROALBUMINURIA REV: CPT | Mod: CPTII,S$GLB,, | Performed by: FAMILY MEDICINE

## 2022-09-28 PROCEDURE — 3075F SYST BP GE 130 - 139MM HG: CPT | Mod: CPTII,S$GLB,, | Performed by: FAMILY MEDICINE

## 2022-09-28 PROCEDURE — 3079F PR MOST RECENT DIASTOLIC BLOOD PRESSURE 80-89 MM HG: ICD-10-PCS | Mod: CPTII,S$GLB,, | Performed by: FAMILY MEDICINE

## 2022-09-28 PROCEDURE — 4010F ACE/ARB THERAPY RXD/TAKEN: CPT | Mod: CPTII,S$GLB,, | Performed by: FAMILY MEDICINE

## 2022-09-28 PROCEDURE — 3062F PR POS MACROALBUMINURIA RESULT DOCUMENTED/REVIEW: ICD-10-PCS | Mod: CPTII,S$GLB,, | Performed by: FAMILY MEDICINE

## 2022-09-28 PROCEDURE — 1159F MED LIST DOCD IN RCRD: CPT | Mod: CPTII,S$GLB,, | Performed by: FAMILY MEDICINE

## 2022-09-28 PROCEDURE — 1159F PR MEDICATION LIST DOCUMENTED IN MEDICAL RECORD: ICD-10-PCS | Mod: CPTII,S$GLB,, | Performed by: FAMILY MEDICINE

## 2022-09-28 PROCEDURE — 4010F PR ACE/ARB THEARPY RXD/TAKEN: ICD-10-PCS | Mod: CPTII,S$GLB,, | Performed by: FAMILY MEDICINE

## 2022-09-28 PROCEDURE — 3008F PR BODY MASS INDEX (BMI) DOCUMENTED: ICD-10-PCS | Mod: CPTII,S$GLB,, | Performed by: FAMILY MEDICINE

## 2022-09-28 PROCEDURE — 36415 COLL VENOUS BLD VENIPUNCTURE: CPT | Mod: PO | Performed by: FAMILY MEDICINE

## 2022-09-28 PROCEDURE — 83970 ASSAY OF PARATHORMONE: CPT | Performed by: FAMILY MEDICINE

## 2022-09-28 PROCEDURE — 3066F NEPHROPATHY DOC TX: CPT | Mod: CPTII,S$GLB,, | Performed by: FAMILY MEDICINE

## 2022-09-28 PROCEDURE — 3008F BODY MASS INDEX DOCD: CPT | Mod: CPTII,S$GLB,, | Performed by: FAMILY MEDICINE

## 2022-09-28 PROCEDURE — 3044F HG A1C LEVEL LT 7.0%: CPT | Mod: CPTII,S$GLB,, | Performed by: FAMILY MEDICINE

## 2022-09-28 PROCEDURE — 3066F PR DOCUMENTATION OF TREATMENT FOR NEPHROPATHY: ICD-10-PCS | Mod: CPTII,S$GLB,, | Performed by: FAMILY MEDICINE

## 2022-09-28 PROCEDURE — 3075F PR MOST RECENT SYSTOLIC BLOOD PRESS GE 130-139MM HG: ICD-10-PCS | Mod: CPTII,S$GLB,, | Performed by: FAMILY MEDICINE

## 2022-09-28 RX ORDER — HYDRALAZINE HYDROCHLORIDE 25 MG/1
25 TABLET, FILM COATED ORAL EVERY 12 HOURS
Qty: 180 TABLET | Refills: 1 | Status: SHIPPED | OUTPATIENT
Start: 2022-09-28 | End: 2023-01-10 | Stop reason: SDUPTHER

## 2022-09-28 RX ORDER — SILDENAFIL 50 MG/1
50 TABLET, FILM COATED ORAL DAILY PRN
Qty: 30 TABLET | Refills: 1 | Status: SHIPPED | OUTPATIENT
Start: 2022-09-28 | End: 2023-12-28 | Stop reason: SDUPTHER

## 2022-09-28 RX ORDER — INSULIN DETEMIR 100 [IU]/ML
40 INJECTION, SOLUTION SUBCUTANEOUS 2 TIMES DAILY
Qty: 72 ML | Refills: 1 | Status: SHIPPED | OUTPATIENT
Start: 2022-09-28 | End: 2022-09-28 | Stop reason: SDUPTHER

## 2022-09-28 RX ORDER — INSULIN DETEMIR 100 [IU]/ML
40 INJECTION, SOLUTION SUBCUTANEOUS 2 TIMES DAILY
Qty: 72 ML | Refills: 1 | Status: SHIPPED | OUTPATIENT
Start: 2022-09-28 | End: 2023-03-13 | Stop reason: SDUPTHER

## 2022-09-28 RX ORDER — INSULIN PUMP SYRINGE, 3 ML
1 EACH MISCELLANEOUS
Qty: 1 EACH | Refills: 0 | Status: SHIPPED | OUTPATIENT
Start: 2022-09-28

## 2022-09-28 RX ORDER — LANCETS 33 GAUGE
EACH MISCELLANEOUS
Qty: 100 EACH | Refills: 3 | Status: SHIPPED | OUTPATIENT
Start: 2022-09-28

## 2022-09-28 NOTE — PROGRESS NOTES
Health Maintenance Due   Topic     Shingles Vaccine (1 of 2)  hx chicken pox. Notified pt can get vaccine at pharmacy      COVID-19 Vaccine (3 - Booster for Adeline series)     Foot Exam  Consult pcp      Influenza Vaccine (1) Pt agree to get today

## 2022-09-29 LAB
ANION GAP SERPL CALC-SCNC: 6 MMOL/L (ref 8–16)
BUN SERPL-MCNC: 22 MG/DL (ref 8–23)
CALCIUM SERPL-MCNC: 12 MG/DL (ref 8.7–10.5)
CHLORIDE SERPL-SCNC: 110 MMOL/L (ref 95–110)
CO2 SERPL-SCNC: 25 MMOL/L (ref 23–29)
CREAT SERPL-MCNC: 0.9 MG/DL (ref 0.5–1.4)
EST. GFR  (NO RACE VARIABLE): >60 ML/MIN/1.73 M^2
GLUCOSE SERPL-MCNC: 119 MG/DL (ref 70–110)
POTASSIUM SERPL-SCNC: 4.6 MMOL/L (ref 3.5–5.1)
PTH-INTACT SERPL-MCNC: 90.9 PG/ML (ref 9–77)
SODIUM SERPL-SCNC: 141 MMOL/L (ref 136–145)

## 2022-09-30 VITALS
SYSTOLIC BLOOD PRESSURE: 138 MMHG | TEMPERATURE: 99 F | WEIGHT: 237.44 LBS | OXYGEN SATURATION: 99 % | HEART RATE: 74 BPM | RESPIRATION RATE: 18 BRPM | BODY MASS INDEX: 33.24 KG/M2 | DIASTOLIC BLOOD PRESSURE: 88 MMHG | HEIGHT: 71 IN

## 2022-10-01 NOTE — PROGRESS NOTES
Subjective:       Patient ID: Thierry Ho is a 62 y.o. male.    Chief Complaint: Follow-up      Follow-up    63 yo male presents for f/u. Had labs prior to exam. Labs show ca of 12.4. pt complains of leg pain. States he takes all of his meds. A1c greatly improved.    Review of Systems   Constitutional: Negative.    HENT: Negative.     Respiratory: Negative.     Cardiovascular: Negative.    Gastrointestinal: Negative.    Endocrine: Negative.    Genitourinary: Negative.    Musculoskeletal: Negative.    Neurological: Negative.    Psychiatric/Behavioral: Negative.          Past Medical History:   Diagnosis Date    Colon polyps     Diabetes mellitus     Diabetes with neurologic complications     DM retinopathy     GERD (gastroesophageal reflux disease)     Hyperlipidemia     Hypertension     Myocardial infarction      Past Surgical History:   Procedure Laterality Date    APPENDECTOMY      BACK SURGERY  11/2017    COLONOSCOPY N/A 10/19/2018    Procedure: COLONOSCOPY;  Surgeon: Frantz Rasmussen MD;  Location: [facility] ENDO;  Service: Endoscopy;  Laterality: N/A;    COLONOSCOPY N/A 2/22/2021    Procedure: COLONOSCOPY;  Surgeon: Dann Mahmood MD;  Location: [facility] ENDO;  Service: Endoscopy;  Laterality: N/A;  covid test [place] 2/19, instructions mailed -ml    EXCISION OF SKIN N/A 6/25/2020    Procedure: EXCISION, SKIN;  Surgeon: Finesse Dumont MD;  Location: [facility] OR;  Service: General;  Laterality: N/A;  on back    FOOT SURGERY      INCISION AND DRAINAGE OF GROIN Right 6/25/2020    Procedure: INCISION AND DRAINAGE, INGUINAL REGION;  Surgeon: Finesse Dumont MD;  Location: [facility] OR;  Service: General;  Laterality: Right;  PT TO PREOP IN AM  PRE-OP BY RN 6-    OPEN REDUCTION AND INTERNAL FIXATION (ORIF) OF INJURY OF ANKLE Right 7/17/2019    Procedure: ORIF, ANKLE;  Surgeon: Raeann Steward MD;  Location: [facility] OR;  Service: Orthopedics;  Laterality: Right;  SARITA GALAN  [phone] TEXTED HIM @ 10:2 AM ON  7-11-19  SKELETAL  RN PREOP 7/9/19---- CMP MORNING OF SURGERY PER DR FONTAINE     Family History   Problem Relation Age of Onset    Heart disease Mother     Breast cancer Mother     Diabetes Father     Cancer Sister     Cancer Brother      Social History     Socioeconomic History    Marital status:     Number of children: 5    Highest education level: GED or equivalent   Tobacco Use    Smoking status: Some Days     Packs/day: 0.25     Years: 40.00     Pack years: 10.00     Types: Cigarettes     Start date: 1/15/1981    Smokeless tobacco: Never   Substance and Sexual Activity    Alcohol use: Yes     Alcohol/week: 4.0 standard drinks     Types: 4 Cans of beer per week     Comment: social     Drug use: Yes     Types: Marijuana    Sexual activity: Not Currently     Partners: Female     Social Determinants of Health     Financial Resource Strain: Low Risk     Difficulty of Paying Living Expenses: Not hard at all   Food Insecurity: No Food Insecurity    Worried About Running Out of Food in the Last Year: Never true    Ran Out of Food in the Last Year: Never true   Transportation Needs: No Transportation Needs    Lack of Transportation (Medical): No    Lack of Transportation (Non-Medical): No   Physical Activity: Inactive    Days of Exercise per Week: 0 days    Minutes of Exercise per Session: 0 min   Stress: No Stress Concern Present    Feeling of Stress : Not at all   Social Connections: Socially Isolated    Frequency of Communication with Friends and Family: More than three times a week    Frequency of Social Gatherings with Friends and Family: More than three times a week    Attends Islam Services: Never    Active Member of Clubs or Organizations: No    Attends Club or Organization Meetings: Never    Marital Status:    Housing Stability: Unknown    Unable to Pay for Housing in the Last Year: No    Unstable Housing in the Last Year: No       Current Outpatient Medications:     acetaminophen (TYLENOL)  "500 MG tablet, Take 500 mg by mouth every 6 (six) hours as needed for Pain., Disp: , Rfl:     aspirin (ECOTRIN) 81 MG EC tablet, Take 81 mg by mouth once daily., Disp: , Rfl:     BD ULTRA-FINE FREDA PEN NEEDLE 32 gauge x 5/32" Ndle, USE AS DIRECTED 2 TIMES DAILY WITH LEVEMIR, Disp: 200 each, Rfl: 3    BLOOD PRESSURE CUFF Misc, 1 kit by Misc.(Non-Drug; Combo Route) route once daily., Disp: 1 each, Rfl: 0    carvediloL (COREG) 25 MG tablet, TAKE 1 TABLET TWICE DAILY WITH MEALS, Disp: 180 tablet, Rfl: 3    ketoconazole (NIZORAL) 2 % cream, APPLY TOPICALLY ONCE DAILY., Disp: 60 g, Rfl: 0    ketoconazole (NIZORAL) 2 % shampoo, APPLY TOPICALLY TWICE A WEEK., Disp: 120 mL, Rfl: 0    lisinopriL (PRINIVIL,ZESTRIL) 40 MG tablet, Take 1 tablet (40 mg total) by mouth once daily., Disp: 90 tablet, Rfl: 1    metFORMIN (GLUCOPHAGE) 1000 MG tablet, TAKE 1 TABLET TWICE DAILY WITH MEALS, Disp: 180 tablet, Rfl: 1    simvastatin (ZOCOR) 40 MG tablet, TAKE 1 TABLET EVERY EVENING, Disp: 90 tablet, Rfl: 0    traZODone (DESYREL) 50 MG tablet, Take 1 tablet (50 mg total) by mouth nightly as needed for Insomnia., Disp: 30 tablet, Rfl: 0    blood sugar diagnostic (TRUE METRIX GLUCOSE TEST STRIP) Strp, 1 strip by Misc.(Non-Drug; Combo Route) route 2 (two) times a day., Disp: 200 each, Rfl: 11    blood-glucose meter kit, 1 each by Other route 4 (four) times daily before meals and nightly., Disp: 1 each, Rfl: 0    ciclopirox (PENLAC) 8 % Soln, Apply topically nightly., Disp: 1 Bottle, Rfl: 3    fluocinolone (SYNALAR) 0.01 % external solution, APPLY TOPICALLY ONCE DAILY  FOR 10 DAYS, Disp: 60 mL, Rfl: 0    fluocinonide (LIDEX) 0.05 % external solution, Topical prn, Disp: 60 mL, Rfl: 0    hydrALAZINE (APRESOLINE) 25 MG tablet, Take 1 tablet (25 mg total) by mouth every 12 (twelve) hours., Disp: 180 tablet, Rfl: 1    ibuprofen (ADVIL,MOTRIN) 600 MG tablet, Take 1 tablet (600 mg total) by mouth every 8 (eight) hours as needed for Pain., Disp: 20 " "tablet, Rfl: 0    insulin detemir U-100 (LEVEMIR FLEXTOUCH U-100 INSULN) 100 unit/mL (3 mL) InPn pen, Inject 40 Units into the skin 2 (two) times daily., Disp: 72 mL, Rfl: 1    sildenafiL (VIAGRA) 50 MG tablet, Take 1 tablet (50 mg total) by mouth daily as needed for Erectile Dysfunction., Disp: 30 tablet, Rfl: 1    TRUEPLUS LANCETS 33 gauge Misc, Check glucose twice daily, Disp: 100 each, Rfl: 3   Objective:      Vitals:    09/28/22 1404   BP: 138/88   BP Location: Right arm   Patient Position: Sitting   BP Method: Small (Manual)   Pulse: 74   Resp: 18   Temp: 98.9 °F (37.2 °C)   TempSrc: Oral   SpO2: 99%   Weight: 107.7 kg (237 lb 7 oz)   Height: 5' 11" (1.803 m)       Physical Exam  Constitutional:       General: He is not in acute distress.     Appearance: He is not diaphoretic.   HENT:      Head: Normocephalic and atraumatic.   Eyes:      Conjunctiva/sclera: Conjunctivae normal.   Pulmonary:      Effort: Pulmonary effort is normal.   Musculoskeletal:         General: Normal range of motion.      Cervical back: Neck supple.   Skin:     Findings: No rash.   Neurological:      Mental Status: He is alert and oriented to person, place, and time.   Psychiatric:         Behavior: Behavior normal.         Thought Content: Thought content normal.         Judgment: Judgment normal.          Assessment:       1. Essential hypertension    2. Type 2 diabetes mellitus without complication, without long-term current use of insulin    3. Erectile dysfunction, unspecified erectile dysfunction type    4. Hypercalcemia    5. Influenza vaccine needed          Plan:       Essential hypertension  -     hydrALAZINE (APRESOLINE) 25 MG tablet; Take 1 tablet (25 mg total) by mouth every 12 (twelve) hours.  Dispense: 180 tablet; Refill: 1    Type 2 diabetes mellitus without complication, without long-term current use of insulin  -     blood-glucose meter kit; 1 each by Other route 4 (four) times daily before meals and nightly.  Dispense: " 1 each; Refill: 0  -     blood sugar diagnostic (TRUE METRIX GLUCOSE TEST STRIP) Strp; 1 strip by Misc.(Non-Drug; Combo Route) route 2 (two) times a day.  Dispense: 200 each; Refill: 11  -     TRUEPLUS LANCETS [facility] gauge [facility]; Check glucose twice daily  Dispense: 100 each; Refill: 3  -     insulin detemir U-100 (LEVEMIR FLEXTOUCH U-100 INSULN) 100 unit/mL (3 mL) InPn pen; Inject 40 Units into the skin 2 (two) times daily.  Dispense: 72 mL; Refill: 1    Erectile dysfunction, unspecified erectile dysfunction type  -     sildenafiL (VIAGRA) 50 MG tablet; Take 1 tablet (50 mg total) by mouth daily as needed for Erectile Dysfunction.  Dispense: 30 tablet; Refill: 1    Hypercalcemia  -     BASIC METABOLIC PANEL; Future; Expected date: 09/28/2022  -     PTH, intact; Future; Expected date: 09/28/2022  -     Ambulatory referral/consult to Nephrology; Future; Expected date: 10/05/2022    Influenza vaccine needed  -     Influenza - Quadrivalent *Preferred* (6 months+) (PF)    Other orders  -     Discontinue: insulin detemir U-100 (LEVEMIR FLEXTOUCH U-100 INSULN) 100 unit/mL (3 mL) InPn pen; Inject 40 Units into the skin 2 (two) times daily.  Dispense: 72 mL; Refill: 1    Borderline bp w/ elevated bp at home. Will start hydralazine. Advised hydration for hyperca. Pt was referred to nephro for assistance. A1c improved. F/u in 3 months.        Future Appointments   Date Time Provider Department Center   10/11/2022  1:45 PM Sixto Chandra MD [facility]   12/28/2022  1:00 PM Sterling Jarquin MD [facility] [place]       Patient note was created using Boxfish.  Any errors in syntax or even information may not have been identified and edited on initial review prior to signing this note.

## 2022-10-11 ENCOUNTER — OFFICE VISIT (OUTPATIENT)
Dept: DERMATOLOGY | Facility: CLINIC | Age: 62
End: 2022-10-11
Payer: MEDICARE

## 2022-10-11 DIAGNOSIS — L29.9 SCALP ITCH: Primary | ICD-10-CM

## 2022-10-11 DIAGNOSIS — L91.8 SKIN TAG: ICD-10-CM

## 2022-10-11 DIAGNOSIS — L28.2 PRURIGO: ICD-10-CM

## 2022-10-11 DIAGNOSIS — L81.9 HYPERPIGMENTATION: ICD-10-CM

## 2022-10-11 PROCEDURE — 1160F RVW MEDS BY RX/DR IN RCRD: CPT | Mod: CPTII,S$GLB,, | Performed by: DERMATOLOGY

## 2022-10-11 PROCEDURE — 3066F NEPHROPATHY DOC TX: CPT | Mod: CPTII,S$GLB,, | Performed by: DERMATOLOGY

## 2022-10-11 PROCEDURE — 4010F ACE/ARB THERAPY RXD/TAKEN: CPT | Mod: CPTII,S$GLB,, | Performed by: DERMATOLOGY

## 2022-10-11 PROCEDURE — 4010F PR ACE/ARB THEARPY RXD/TAKEN: ICD-10-PCS | Mod: CPTII,S$GLB,, | Performed by: DERMATOLOGY

## 2022-10-11 PROCEDURE — 3044F PR MOST RECENT HEMOGLOBIN A1C LEVEL <7.0%: ICD-10-PCS | Mod: CPTII,S$GLB,, | Performed by: DERMATOLOGY

## 2022-10-11 PROCEDURE — 99999 PR PBB SHADOW E&M-EST. PATIENT-LVL IV: CPT | Mod: PBBFAC,,, | Performed by: DERMATOLOGY

## 2022-10-11 PROCEDURE — 3066F PR DOCUMENTATION OF TREATMENT FOR NEPHROPATHY: ICD-10-PCS | Mod: CPTII,S$GLB,, | Performed by: DERMATOLOGY

## 2022-10-11 PROCEDURE — 1159F PR MEDICATION LIST DOCUMENTED IN MEDICAL RECORD: ICD-10-PCS | Mod: CPTII,S$GLB,, | Performed by: DERMATOLOGY

## 2022-10-11 PROCEDURE — 3044F HG A1C LEVEL LT 7.0%: CPT | Mod: CPTII,S$GLB,, | Performed by: DERMATOLOGY

## 2022-10-11 PROCEDURE — 99999 PR PBB SHADOW E&M-EST. PATIENT-LVL IV: ICD-10-PCS | Mod: PBBFAC,,, | Performed by: DERMATOLOGY

## 2022-10-11 PROCEDURE — 1159F MED LIST DOCD IN RCRD: CPT | Mod: CPTII,S$GLB,, | Performed by: DERMATOLOGY

## 2022-10-11 PROCEDURE — 99204 PR OFFICE/OUTPT VISIT, NEW, LEVL IV, 45-59 MIN: ICD-10-PCS | Mod: S$GLB,,, | Performed by: DERMATOLOGY

## 2022-10-11 PROCEDURE — 1160F PR REVIEW ALL MEDS BY PRESCRIBER/CLIN PHARMACIST DOCUMENTED: ICD-10-PCS | Mod: CPTII,S$GLB,, | Performed by: DERMATOLOGY

## 2022-10-11 PROCEDURE — 3062F PR POS MACROALBUMINURIA RESULT DOCUMENTED/REVIEW: ICD-10-PCS | Mod: CPTII,S$GLB,, | Performed by: DERMATOLOGY

## 2022-10-11 PROCEDURE — 99204 OFFICE O/P NEW MOD 45 MIN: CPT | Mod: S$GLB,,, | Performed by: DERMATOLOGY

## 2022-10-11 PROCEDURE — 3062F POS MACROALBUMINURIA REV: CPT | Mod: CPTII,S$GLB,, | Performed by: DERMATOLOGY

## 2022-10-11 NOTE — PATIENT INSTRUCTIONS
Patient to start 3% crude coal tar shampoo to be used as scalp soaks for at least 3 minutes, longer if possible, per their regular shampooing schedule.  Can also be applied as directed to other parts of the body.     Avoid hot water     Fluocinonide solution as needed for itch.     Inflammatory condition made worse by smoking, alcohol, and stress.

## 2022-10-11 NOTE — PROGRESS NOTES
Subjective:       Patient ID:  Thierry Ho is a 62 y.o. male who presents for   Chief Complaint   Patient presents with    Rash     Scalp, [id number], itchy [place] ketoconazole and lidex solution      Rash - Initial  Affected locations: scalp  Duration: 6 years  Signs / symptoms: itching  Severity: mild to moderate  Timing: constant      Review of Systems   Constitutional: Negative.    HENT: Negative.     Respiratory: Negative.     Musculoskeletal: Negative.    Skin:  Positive for rash.      Objective:    Physical Exam   Constitutional: He appears well-developed and well-nourished.   Neurological: He is alert and oriented to person, place, and time.   Psychiatric: He has a normal mood and affect.   Skin:                   Diagram Legend     Erythematous scaling macule/papule c/w actinic keratosis       Vascular papule c/w angioma      Pigmented verrucoid papule/plaque c/w seborrheic keratosis      Yellow umbilicated papule c/w sebaceous hyperplasia      Irregularly shaped tan macule c/w lentigo     1-2 mm smooth white papules consistent with Milia      Movable subcutaneous cyst with punctum c/w epidermal inclusion cyst      Subcutaneous movable cyst c/w pilar cyst      Firm pink to brown papule c/w dermatofibroma      Pedunculated fleshy papule(s) c/w skin tag(s)      Evenly pigmented macule c/w junctional nevus     Mildly variegated pigmented, slightly irregular-bordered macule c/w mildly atypical nevus      Flesh colored to evenly pigmented papule c/w intradermal nevus       Pink pearly papule/plaque c/w basal cell carcinoma      Erythematous hyperkeratotic cursted plaque c/w SCC      Surgical scar with no sign of skin cancer recurrence      Open and closed comedones      Inflammatory papules and pustules      Verrucoid papule consistent consistent with wart     Erythematous eczematous patches and plaques     Dystrophic onycholytic nail with subungual debris c/w onychomycosis     Umbilicated papule     Erythematous-base heme-crusted tan verrucoid plaque consistent with inflamed seborrheic keratosis     Erythematous Silvery Scaling Plaque c/w Psoriasis     See annotation        Assessment / Plan:        Scalp itch  Discussed with patient the etiology and pathogenesis of the disease or skin lesion(s) and possible treatments and aggravators.    Reviewed with patient different treatment options and associated risks.  Use pt's lidex sol bid prn focally.  Previous [facility] labs and or records and notes reviewed and considered for their impact on our clinical decision making today.  Patient to start 2-5% crude coal tar shampoo to be used as scalp soaks for at least 3 minutes, longer if possible, per their regular shampooing schedule.  Can also be applied as directed to other parts of the body.  Proper application of medications and or care for affected area(s) and condition(s) reviewed.    Prurigo  Discussed with patient the importance of not picking at the skin due to risks of infection, non healing, and scarring.  Discussed habit substitution with gadgets, widgets, rosary beads, jewelry, keys, et cetera.    Skin tag  Discussed with patient the benign nature of these lesions and that no treatment is indicated.  Patient can also consider folk treatment of string strangulation at home.  Chronic nature of this condition discussed with patient.    Hyperpigmentation  Discussed with the patient the risk of color scars, erythema, or hyperpigmentation that could take months to resolve.           Follow up if symptoms worsen or fail to improve.

## 2022-11-18 DIAGNOSIS — G47.00 INSOMNIA, UNSPECIFIED TYPE: ICD-10-CM

## 2022-11-18 NOTE — TELEPHONE ENCOUNTER
No new care gaps identified.  [facility] Embedded Care Gaps. Reference number: [id number]. 11/18/2022   10:55:22 AM CST

## 2022-11-18 NOTE — TELEPHONE ENCOUNTER
Refill Routing Note   Medication(s) are not appropriate for processing by [facility] for the following reason(s):      - Medication is a new start (<3 months)    [facility] action(s):  Defer          Medication reconciliation completed: No     Appointments  past 12m or future 3m with PCP    Date Provider   Last Visit   9/28/2022 Sterling Jarquin MD   Next Visit   12/28/2022 Sterling Jarquin MD   ED visits in past 90 days: 0        Note composed:5:47 PM 11/18/2022

## 2022-11-23 RX ORDER — TRAZODONE HYDROCHLORIDE 50 MG/1
TABLET ORAL
Qty: 30 TABLET | Refills: 0 | Status: SHIPPED | OUTPATIENT
Start: 2022-11-23 | End: 2023-12-28

## 2023-01-10 DIAGNOSIS — I10 ESSENTIAL HYPERTENSION: ICD-10-CM

## 2023-01-10 DIAGNOSIS — E78.49 OTHER HYPERLIPIDEMIA: ICD-10-CM

## 2023-01-10 RX ORDER — SIMVASTATIN 40 MG/1
40 TABLET, FILM COATED ORAL NIGHTLY
Qty: 90 TABLET | Refills: 0 | Status: SHIPPED | OUTPATIENT
Start: 2023-01-10 | End: 2023-03-25

## 2023-01-10 RX ORDER — HYDRALAZINE HYDROCHLORIDE 25 MG/1
25 TABLET, FILM COATED ORAL EVERY 12 HOURS
Qty: 180 TABLET | Refills: 1 | Status: SHIPPED | OUTPATIENT
Start: 2023-01-10 | End: 2023-09-01

## 2023-01-10 NOTE — TELEPHONE ENCOUNTER
No new care gaps identified.  [facility] Embedded Care Gaps. Reference number: [id number]. 1/10/2023   3:06:26 PM CST

## 2023-01-10 NOTE — TELEPHONE ENCOUNTER
----- Message from Alexander Davies sent at 1/10/2023  2:36 PM CST -----  Regarding: Refill (2)  Contact: BELLE FELDMAN [[id number]]  Type:  RX Refill Request    Who Called: BELLE FELDMAN [[id number]]    Refill or New Rx: Refill    RX Name and Strength:     Medication #1  simvastatin (ZOCOR) 40 MG tablet (only has 5 days left)    Medication #2  hydrALAZINE (APRESOLINE) 25 MG tablet    How is the patient currently taking it? (ex. 1XDay):    Is this a 30 day or 90 day RX:    Preferred Pharmacy with phone number:[facility] - [place], [place] - [address]    Would the patient rather a call back or a response via [facility]? Call    Best Call Back Number:[phone]    Additional Information:

## 2023-01-26 ENCOUNTER — TELEPHONE (OUTPATIENT)
Dept: FAMILY MEDICINE | Facility: CLINIC | Age: 63
End: 2023-01-26
Payer: MEDICARE

## 2023-01-26 NOTE — TELEPHONE ENCOUNTER
Spoke with pt to confirm 1/26 appt with Dr. Mortensen. Pt requested to reschedule with Dr. Jarquin in a month. Pt scheduled for 3/22 with Dr. Jarquin.

## 2023-02-15 ENCOUNTER — PES CALL (OUTPATIENT)
Dept: ADMINISTRATIVE | Facility: CLINIC | Age: 63
End: 2023-02-15
Payer: MEDICARE

## 2023-03-13 DIAGNOSIS — E11.9 TYPE 2 DIABETES MELLITUS WITHOUT COMPLICATION, WITHOUT LONG-TERM CURRENT USE OF INSULIN: ICD-10-CM

## 2023-03-13 NOTE — TELEPHONE ENCOUNTER
----- Message from Denisse Ephraim sent at 3/13/2023 12:54 PM CDT -----  Regarding: self [phone]  Type: RX Refill Request    Who Called:  self     Have you contacted your pharmacy: yes    Refill or New Rx: refill     RX Name and Strength:insulin detemir U-100 (LEVEMIR FLEXTOUCH U-100 INSULN) 100 unit/mL (3 mL) InPn pen    Preferred Pharmacy with phone number:  [facility] - [place], [place] - [address]  [address] [address]  Phone: [phone] Fax: [phone]    Local or Mail Order: local     Would the patient rather a call back or a response via [facility]? Call back     Best Call Back Number: [phone]      Asked for a callback to let him know provider filled RX.

## 2023-03-13 NOTE — TELEPHONE ENCOUNTER
"Last Office Visit Info:   The patient's last visit with Sterling Jarquin MD was on 9/28/2022.    The patient's last visit in current department was on 9/28/2022.        Last CBC Results:   Lab Results   Component Value Date    WBC 9.13 06/25/2020    HGB 14.0 06/25/2020    HCT 41.6 06/25/2020     06/25/2020       Last CMP Results  Lab Results   Component Value Date     09/28/2022    K 4.6 09/28/2022     09/28/2022    CO2 25 09/28/2022    BUN 22 09/28/2022    CREATININE 0.9 09/28/2022    CALCIUM 12.0 (H) 09/28/2022    ALBUMIN 3.8 03/30/2022    AST 32 03/30/2022    ALT 32 03/30/2022       Last Lipids  Lab Results   Component Value Date    CHOL 128 09/26/2022    TRIG 110 09/26/2022    HDL 45 09/26/2022    LDLCALC 61.0 (L) 09/26/2022       Last A1C  Lab Results   Component Value Date    HGBA1C 6.3 (H) 09/26/2022       Last TSH  Lab Results   Component Value Date    TSH 2.423 06/04/2018             Current Med Refills  Medication List with Changes/Refills   Current Medications    ACETAMINOPHEN (TYLENOL) 500 MG TABLET    Take 500 mg by mouth every 6 (six) hours as needed for Pain.       Start Date: --        End Date: --    ASPIRIN (ECOTRIN) 81 MG EC TABLET    Take 81 mg by mouth once daily.       Start Date: --        End Date: --    BD ULTRA-FINE FREDA PEN NEEDLE 32 GAUGE X 5/32" NDLE    USE AS DIRECTED 2 TIMES DAILY WITH LEVEMIR       Start Date: 5/31/2022 End Date: --    BLOOD PRESSURE CUFF MISC    1 kit by Misc.(Non-Drug; Combo Route) route once daily.       Start Date: 7/27/2018 End Date: --    BLOOD SUGAR DIAGNOSTIC (TRUE METRIX GLUCOSE TEST STRIP) STRP    1 strip by Misc.(Non-Drug; Combo Route) route 2 (two) times a day.       Start Date: 9/28/2022 End Date: --    BLOOD-GLUCOSE METER KIT    1 each by Other route 4 (four) times daily before meals and nightly.       Start Date: 9/28/2022 End Date: --    CARVEDILOL (COREG) 25 MG TABLET    TAKE 1 TABLET TWICE DAILY WITH MEALS       Start Date: " 8/8/2022  End Date: --    CICLOPIROX (PENLAC) 8 % SOLN    Apply topically nightly.       Start Date: 8/12/2021 End Date: --    FLUOCINOLONE (SYNALAR) 0.01 % EXTERNAL SOLUTION    APPLY TOPICALLY ONCE DAILY  FOR 10 DAYS       Start Date: 6/16/2022 End Date: 6/26/2022    FLUOCINONIDE (LIDEX) 0.05 % EXTERNAL SOLUTION    Topical prn       Start Date: 7/11/2022 End Date: --    HYDRALAZINE (APRESOLINE) 25 MG TABLET    Take 1 tablet (25 mg total) by mouth every 12 (twelve) hours.       Start Date: 1/10/2023 End Date: 7/9/2023    IBUPROFEN (ADVIL,MOTRIN) 600 MG TABLET    Take 1 tablet (600 mg total) by mouth every 8 (eight) hours as needed for Pain.       Start Date: 6/18/2020 End Date: --    INSULIN DETEMIR U-100 (LEVEMIR FLEXTOUCH U-100 INSULN) 100 UNIT/ML (3 ML) INPN PEN    Inject 40 Units into the skin 2 (two) times daily.       Start Date: 9/28/2022 End Date: 3/27/2023    KETOCONAZOLE (NIZORAL) 2 % CREAM    APPLY TOPICALLY ONCE DAILY.       Start Date: 7/24/2022 End Date: --    KETOCONAZOLE (NIZORAL) 2 % SHAMPOO    APPLY TOPICALLY TWICE A WEEK.       Start Date: 7/25/2022 End Date: --    LISINOPRIL (PRINIVIL,ZESTRIL) 40 MG TABLET    Take 1 tablet (40 mg total) by mouth once daily.       Start Date: 9/16/2022 End Date: --    METFORMIN (GLUCOPHAGE) 1000 MG TABLET    TAKE 1 TABLET TWICE DAILY WITH MEALS       Start Date: 5/26/2022 End Date: --    SILDENAFIL (VIAGRA) 50 MG TABLET    Take 1 tablet (50 mg total) by mouth daily as needed for Erectile Dysfunction.       Start Date: 9/28/2022 End Date: 9/28/2023    SIMVASTATIN (ZOCOR) 40 MG TABLET    Take 1 tablet (40 mg total) by mouth every evening.       Start Date: 1/10/2023 End Date: --    TRAZODONE (DESYREL) 50 MG TABLET    TAKE 1 TABLET NIGHTLY AS NEEDED FOR INSOMNIA.       Start Date: 11/23/2022End Date: --    TRUEPLUS LANCETS 33 GAUGE MISC    Check glucose twice daily       Start Date: 9/28/2022 End Date: --       Order(s) placed per written order guidelines:  none    Please advise.

## 2023-03-13 NOTE — TELEPHONE ENCOUNTER
No new care gaps identified.  [facility] Embedded Care Gaps. Reference number: [id number]. 3/13/2023   1:09:07 PM CDT

## 2023-03-15 ENCOUNTER — TELEPHONE (OUTPATIENT)
Dept: ADMINISTRATIVE | Facility: CLINIC | Age: 63
End: 2023-03-15
Payer: MEDICARE

## 2023-03-15 NOTE — TELEPHONE ENCOUNTER
Called pt, informed pt I was calling to remind pt of his in office EAWV on 3/17/23; clinic location provided to patient; pt confirmed appointment

## 2023-03-16 RX ORDER — INSULIN DETEMIR 100 [IU]/ML
40 INJECTION, SOLUTION SUBCUTANEOUS 2 TIMES DAILY
Qty: 72 ML | Refills: 1 | Status: SHIPPED | OUTPATIENT
Start: 2023-03-16 | End: 2023-09-12

## 2023-03-17 ENCOUNTER — OFFICE VISIT (OUTPATIENT)
Dept: FAMILY MEDICINE | Facility: CLINIC | Age: 63
End: 2023-03-17
Payer: MEDICARE

## 2023-03-17 ENCOUNTER — LAB VISIT (OUTPATIENT)
Dept: LAB | Facility: HOSPITAL | Age: 63
End: 2023-03-17
Attending: EMERGENCY MEDICINE
Payer: MEDICARE

## 2023-03-17 VITALS
HEIGHT: 71 IN | BODY MASS INDEX: 34.54 KG/M2 | TEMPERATURE: 98 F | DIASTOLIC BLOOD PRESSURE: 74 MMHG | RESPIRATION RATE: 17 BRPM | WEIGHT: 246.69 LBS | SYSTOLIC BLOOD PRESSURE: 138 MMHG | HEART RATE: 81 BPM | OXYGEN SATURATION: 97 %

## 2023-03-17 DIAGNOSIS — E11.29 TYPE 2 DIABETES MELLITUS WITH MICROALBUMINURIA, WITH LONG-TERM CURRENT USE OF INSULIN: ICD-10-CM

## 2023-03-17 DIAGNOSIS — Z00.00 ENCOUNTER FOR PREVENTIVE HEALTH EXAMINATION: Primary | ICD-10-CM

## 2023-03-17 DIAGNOSIS — R80.9 TYPE 2 DIABETES MELLITUS WITH MICROALBUMINURIA, WITH LONG-TERM CURRENT USE OF INSULIN: ICD-10-CM

## 2023-03-17 DIAGNOSIS — Z74.09 OTHER REDUCED MOBILITY: ICD-10-CM

## 2023-03-17 DIAGNOSIS — R26.9 ABNORMALITY OF GAIT AND MOBILITY: ICD-10-CM

## 2023-03-17 DIAGNOSIS — Z79.4 TYPE 2 DIABETES MELLITUS WITH MICROALBUMINURIA, WITH LONG-TERM CURRENT USE OF INSULIN: ICD-10-CM

## 2023-03-17 DIAGNOSIS — E11.9 TYPE 2 DIABETES MELLITUS WITHOUT COMPLICATION, WITHOUT LONG-TERM CURRENT USE OF INSULIN: ICD-10-CM

## 2023-03-17 DIAGNOSIS — R07.9 CHEST PAIN, UNSPECIFIED TYPE: ICD-10-CM

## 2023-03-17 DIAGNOSIS — I10 ESSENTIAL HYPERTENSION: ICD-10-CM

## 2023-03-17 LAB
ALBUMIN SERPL BCP-MCNC: 3.5 G/DL (ref 3.5–5.2)
ALP SERPL-CCNC: 77 U/L (ref 55–135)
ALT SERPL W/O P-5'-P-CCNC: 44 U/L (ref 10–44)
ANION GAP SERPL CALC-SCNC: 9 MMOL/L (ref 8–16)
AST SERPL-CCNC: 33 U/L (ref 10–40)
BILIRUB SERPL-MCNC: 0.3 MG/DL (ref 0.1–1)
BUN SERPL-MCNC: 25 MG/DL (ref 8–23)
CALCIUM SERPL-MCNC: 11.7 MG/DL (ref 8.7–10.5)
CHLORIDE SERPL-SCNC: 106 MMOL/L (ref 95–110)
CO2 SERPL-SCNC: 24 MMOL/L (ref 23–29)
CREAT SERPL-MCNC: 1.2 MG/DL (ref 0.5–1.4)
EST. GFR  (NO RACE VARIABLE): >60 ML/MIN/1.73 M^2
ESTIMATED AVG GLUCOSE: 166 MG/DL (ref 68–131)
GLUCOSE SERPL-MCNC: 127 MG/DL (ref 70–110)
HBA1C MFR BLD: 7.4 % (ref 4–5.6)
POTASSIUM SERPL-SCNC: 5.2 MMOL/L (ref 3.5–5.1)
PROT SERPL-MCNC: 7.1 G/DL (ref 6–8.4)
SODIUM SERPL-SCNC: 139 MMOL/L (ref 136–145)

## 2023-03-17 PROCEDURE — 1160F RVW MEDS BY RX/DR IN RCRD: CPT | Mod: HCNC,CPTII,S$GLB, | Performed by: PHYSICIAN ASSISTANT

## 2023-03-17 PROCEDURE — 1160F PR REVIEW ALL MEDS BY PRESCRIBER/CLIN PHARMACIST DOCUMENTED: ICD-10-PCS | Mod: HCNC,CPTII,S$GLB, | Performed by: PHYSICIAN ASSISTANT

## 2023-03-17 PROCEDURE — 80053 COMPREHEN METABOLIC PANEL: CPT | Mod: HCNC | Performed by: PHYSICIAN ASSISTANT

## 2023-03-17 PROCEDURE — 93010 EKG 12-LEAD: ICD-10-PCS | Mod: HCNC,S$GLB,, | Performed by: INTERNAL MEDICINE

## 2023-03-17 PROCEDURE — 3075F PR MOST RECENT SYSTOLIC BLOOD PRESS GE 130-139MM HG: ICD-10-PCS | Mod: HCNC,CPTII,S$GLB, | Performed by: PHYSICIAN ASSISTANT

## 2023-03-17 PROCEDURE — 3078F DIAST BP <80 MM HG: CPT | Mod: HCNC,CPTII,S$GLB, | Performed by: PHYSICIAN ASSISTANT

## 2023-03-17 PROCEDURE — 93005 EKG 12-LEAD: ICD-10-PCS | Mod: HCNC,S$GLB,, | Performed by: PHYSICIAN ASSISTANT

## 2023-03-17 PROCEDURE — 36415 COLL VENOUS BLD VENIPUNCTURE: CPT | Mod: HCNC,PO | Performed by: PHYSICIAN ASSISTANT

## 2023-03-17 PROCEDURE — 99999 PR PBB SHADOW E&M-EST. PATIENT-LVL V: ICD-10-PCS | Mod: PBBFAC,HCNC,, | Performed by: PHYSICIAN ASSISTANT

## 2023-03-17 PROCEDURE — 99999 PR PBB SHADOW E&M-EST. PATIENT-LVL V: CPT | Mod: PBBFAC,HCNC,, | Performed by: PHYSICIAN ASSISTANT

## 2023-03-17 PROCEDURE — 1159F MED LIST DOCD IN RCRD: CPT | Mod: HCNC,CPTII,S$GLB, | Performed by: PHYSICIAN ASSISTANT

## 2023-03-17 PROCEDURE — 83036 HEMOGLOBIN GLYCOSYLATED A1C: CPT | Mod: HCNC | Performed by: PHYSICIAN ASSISTANT

## 2023-03-17 PROCEDURE — G9919 PR SCREENING AND POSITIVE: ICD-10-PCS | Mod: HCNC,CPTII,S$GLB, | Performed by: PHYSICIAN ASSISTANT

## 2023-03-17 PROCEDURE — G0439 PR MEDICARE ANNUAL WELLNESS SUBSEQUENT VISIT: ICD-10-PCS | Mod: HCNC,S$GLB,, | Performed by: PHYSICIAN ASSISTANT

## 2023-03-17 PROCEDURE — G9919 SCRN ND POS ND PROV OF REC: HCPCS | Mod: HCNC,CPTII,S$GLB, | Performed by: PHYSICIAN ASSISTANT

## 2023-03-17 PROCEDURE — 3078F PR MOST RECENT DIASTOLIC BLOOD PRESSURE < 80 MM HG: ICD-10-PCS | Mod: HCNC,CPTII,S$GLB, | Performed by: PHYSICIAN ASSISTANT

## 2023-03-17 PROCEDURE — 93010 ELECTROCARDIOGRAM REPORT: CPT | Mod: HCNC,S$GLB,, | Performed by: INTERNAL MEDICINE

## 2023-03-17 PROCEDURE — 3075F SYST BP GE 130 - 139MM HG: CPT | Mod: HCNC,CPTII,S$GLB, | Performed by: PHYSICIAN ASSISTANT

## 2023-03-17 PROCEDURE — G0439 PPPS, SUBSEQ VISIT: HCPCS | Mod: HCNC,S$GLB,, | Performed by: PHYSICIAN ASSISTANT

## 2023-03-17 PROCEDURE — 3008F BODY MASS INDEX DOCD: CPT | Mod: HCNC,CPTII,S$GLB, | Performed by: PHYSICIAN ASSISTANT

## 2023-03-17 PROCEDURE — 3008F PR BODY MASS INDEX (BMI) DOCUMENTED: ICD-10-PCS | Mod: HCNC,CPTII,S$GLB, | Performed by: PHYSICIAN ASSISTANT

## 2023-03-17 PROCEDURE — 1159F PR MEDICATION LIST DOCUMENTED IN MEDICAL RECORD: ICD-10-PCS | Mod: HCNC,CPTII,S$GLB, | Performed by: PHYSICIAN ASSISTANT

## 2023-03-17 PROCEDURE — 93005 ELECTROCARDIOGRAM TRACING: CPT | Mod: HCNC,S$GLB,, | Performed by: PHYSICIAN ASSISTANT

## 2023-03-17 RX ORDER — LISINOPRIL 40 MG/1
40 TABLET ORAL DAILY
Qty: 90 TABLET | Refills: 1 | Status: SHIPPED | OUTPATIENT
Start: 2023-03-17 | End: 2023-11-09 | Stop reason: SDUPTHER

## 2023-03-17 NOTE — TELEPHONE ENCOUNTER
No new care gaps identified.  [facility] Embedded Care Gaps. Reference number: [id number]. 3/17/2023   11:26:14 AM NIELST

## 2023-03-17 NOTE — PROGRESS NOTES
"  Thierry Ho presented for a  Medicare AWV and comprehensive Health Risk Assessment today. The following components were reviewed and updated:    Medical history  Family History  Social history  Allergies and Current Medications  Health Risk Assessment  Health Maintenance  Care Team     Patient screened moderate and/or high risk for one or more social determinants of health (SDOH). Patient connected to community resources through the ED Navigator.      ** See Completed Assessments for Annual Wellness Visit within the encounter summary.**         The following assessments were completed:  Living Situation  CAGE  Depression Screening  Timed Get Up and Go  Whisper Test  Cognitive Function Screening  Nutrition Screening  ADL Screening  PAQ Screening        Vitals:    03/17/23 0921   BP: 138/74   BP Location: Left arm   Patient Position: Sitting   BP Method: Large (Manual)   Pulse: 81   Resp: 17   Temp: 97.8 °F (36.6 °C)   TempSrc: Oral   SpO2: 97%   Weight: 111.9 kg (246 lb 11.1 oz)   Height: 5' 11" (1.803 m)     Body mass index is 34.41 kg/m².  Physical Exam          Diagnoses and health risks identified today and associated recommendations/orders:    1. Encounter for preventive health examination  Provided Thierry with a 5-10 year written screening schedule and personal prevention plan. Recommendations were developed using the USPSTF age appropriate recommendations. Education, counseling, and referrals were provided as needed. After Visit Summary printed and given to patient which includes a list of additional screenings\tests needed.    2. Chest pain, unspecified type  Pt states having sharp left sided chest pain off and on past week. Last episode this morning. Lasts 15 seconds and resolves. No associated SOB, nausea, or diaphoresis. Has nitro but has not taken it. BP normal. EKG abnormal advised ER. Pt states he is feeling better, refuses ER at this time. He states he will go to ER if reoccurs. Has f/u next week " with PCP.  - IN OFFICE EKG 12-LEAD (to Muse)    3. Type 2 diabetes mellitus without complication, without long-term current use of insulin  Labs today to assess  - Comprehensive Metabolic Panel; Future  - Hemoglobin A1C; Future  - POCT Glucose, Hand-Held Device    4. Abnormality of gait and mobility  Uses cane    5. Other reduced mobility  Uses cane    6. Type 2 diabetes mellitus with microalbuminuria, with long-term current use of insulin  Continue lisinopril     Review for Opioid Screening: Patient does not have rx for Opioids.    Review for Substance Use Disorders: Patient does not use substance.      No follow-ups on file.    Pippa Carter PA-C  I offered to discuss advanced care planning, including how to pick a person who would make decisions for you if you were unable to make them for yourself, called a health care power of , and what kind of decisions you might make such as use of life sustaining treatments such as ventilators and tube feeding when faced with a life limiting illness recorded on a living will that they will need to know. (How you want to be cared for as you near the end of your natural life)     X Patient is interested in learning more about how to make advanced directives.  I provided them paperwork and offered to discuss this with them.

## 2023-03-17 NOTE — PATIENT INSTRUCTIONS
Counseling and Referral of Other Preventative  (Italic type indicates deductible and co-insurance are waived)    Patient Name: Thierry Ho  Today's Date: 3/17/2023    Health Maintenance       Date Due Completion Date    Foot Exam 08/12/2022 8/12/2021 (Done)    Override on 8/12/2021: Done    Influenza Vaccine (1) 09/01/2022 9/18/2020    Hemoglobin A1c 03/26/2023 9/26/2022    Diabetes Urine Screening 07/19/2023 7/19/2022    Lipid Panel 09/26/2023 9/26/2022    Eye Exam 10/19/2023 10/19/2022    High Dose Statin 01/10/2024 1/10/2023    Colorectal Cancer Screening 02/22/2024 2/22/2021    TETANUS VACCINE 10/01/2025 10/1/2015    Pneumococcal Vaccines (Age 0-64) (3 - PPSV23 if available, else PCV20) 04/28/2026 1/15/2018        Orders Placed This Encounter   Procedures    Comprehensive Metabolic Panel    Hemoglobin A1C    IN OFFICE EKG 12-LEAD (to Denzel)       The following information is provided to all patients.  This information is to help you find resources for any of the problems found today that may be affecting your health:                Living healthy guide: www.[facility].[place].gov      Understanding Diabetes: www.diabetes.org      Eating healthy: www.cdc.gov/healthyweight      CDC home safety checklist: www.cdc.gov/steadi/patient.html      Agency on Aging: www.goea.[place].[facility]      Alcoholics anonymous (AA): www.aa.org      Physical Activity: www.sridevi.nih.gov/py7ttsb      Tobacco use: www.quitwithusla.org

## 2023-03-25 DIAGNOSIS — E78.49 OTHER HYPERLIPIDEMIA: ICD-10-CM

## 2023-03-25 RX ORDER — SIMVASTATIN 40 MG/1
TABLET, FILM COATED ORAL
Qty: 90 TABLET | Refills: 1 | Status: SHIPPED | OUTPATIENT
Start: 2023-03-25 | End: 2023-11-02

## 2023-03-25 NOTE — TELEPHONE ENCOUNTER
Refill Decision Note   Thierry Ho  is requesting a refill authorization.  Brief Assessment and Rationale for Refill:  Approve     Medication Therapy Plan:       Medication Reconciliation Completed: No   Comments:     No Care Gaps recommended.     Note composed:3:09 PM 03/25/2023

## 2023-03-25 NOTE — TELEPHONE ENCOUNTER
No new care gaps identified.  [facility] Embedded Care Gaps. Reference number: [id number]. 3/25/2023   3:03:07 AM NIELST

## 2023-07-18 ENCOUNTER — HOSPITAL ENCOUNTER (EMERGENCY)
Facility: HOSPITAL | Age: 63
Discharge: HOME | End: 2023-07-18
Attending: EMERGENCY MEDICINE
Payer: MEDICARE

## 2023-07-18 VITALS
BODY MASS INDEX: 33.6 KG/M2 | HEART RATE: 70 BPM | OXYGEN SATURATION: 100 % | WEIGHT: 240 LBS | DIASTOLIC BLOOD PRESSURE: 88 MMHG | HEIGHT: 71 IN | RESPIRATION RATE: 20 BRPM | SYSTOLIC BLOOD PRESSURE: 170 MMHG | TEMPERATURE: 98 F

## 2023-07-18 DIAGNOSIS — Z87.898 HISTORY OF SYNCOPE: ICD-10-CM

## 2023-07-18 DIAGNOSIS — M79.606 CHRONIC PAIN OF LOWER EXTREMITY, UNSPECIFIED LATERALITY: ICD-10-CM

## 2023-07-18 DIAGNOSIS — R07.89 CHEST PAIN, NON-CARDIAC: Primary | ICD-10-CM

## 2023-07-18 DIAGNOSIS — G89.29 CHRONIC PAIN OF LOWER EXTREMITY, UNSPECIFIED LATERALITY: ICD-10-CM

## 2023-07-18 DIAGNOSIS — R07.9 CHEST PAIN: ICD-10-CM

## 2023-07-18 LAB
ALBUMIN SERPL BCP-MCNC: 3.6 G/DL (ref 3.5–5.2)
ALP SERPL-CCNC: 77 U/L (ref 55–135)
ALT SERPL W/O P-5'-P-CCNC: 30 U/L (ref 10–44)
ANION GAP SERPL CALC-SCNC: 6 MMOL/L (ref 8–16)
AST SERPL-CCNC: 26 U/L (ref 10–40)
BASOPHILS # BLD AUTO: 0.03 K/UL (ref 0–0.2)
BASOPHILS NFR BLD: 0.3 % (ref 0–1.9)
BILIRUB SERPL-MCNC: 0.3 MG/DL (ref 0.1–1)
BNP SERPL-MCNC: 124 PG/ML (ref 0–99)
BUN SERPL-MCNC: 16 MG/DL (ref 8–23)
CALCIUM SERPL-MCNC: 11.7 MG/DL (ref 8.7–10.5)
CHLORIDE SERPL-SCNC: 107 MMOL/L (ref 95–110)
CO2 SERPL-SCNC: 27 MMOL/L (ref 23–29)
CREAT SERPL-MCNC: 0.9 MG/DL (ref 0.5–1.4)
DIFFERENTIAL METHOD: ABNORMAL
EOSINOPHIL # BLD AUTO: 0.2 K/UL (ref 0–0.5)
EOSINOPHIL NFR BLD: 1.7 % (ref 0–8)
ERYTHROCYTE [DISTWIDTH] IN BLOOD BY AUTOMATED COUNT: 14.6 % (ref 11.5–14.5)
EST. GFR  (NO RACE VARIABLE): >60 ML/MIN/1.73 M^2
GLUCOSE SERPL-MCNC: 96 MG/DL (ref 70–110)
HCT VFR BLD AUTO: 40 % (ref 40–54)
HGB BLD-MCNC: 13.6 G/DL (ref 14–18)
IMM GRANULOCYTES # BLD AUTO: 0.02 K/UL (ref 0–0.04)
IMM GRANULOCYTES NFR BLD AUTO: 0.2 % (ref 0–0.5)
INR PPP: 1 (ref 0.8–1.2)
LYMPHOCYTES # BLD AUTO: 1.8 K/UL (ref 1–4.8)
LYMPHOCYTES NFR BLD: 20.8 % (ref 18–48)
MCH RBC QN AUTO: 31.6 PG (ref 27–31)
MCHC RBC AUTO-ENTMCNC: 34 G/DL (ref 32–36)
MCV RBC AUTO: 93 FL (ref 82–98)
MONOCYTES # BLD AUTO: 0.7 K/UL (ref 0.3–1)
MONOCYTES NFR BLD: 7.3 % (ref 4–15)
NEUTROPHILS # BLD AUTO: 6.2 K/UL (ref 1.8–7.7)
NEUTROPHILS NFR BLD: 69.7 % (ref 38–73)
NRBC BLD-RTO: 0 /100 WBC
PLATELET # BLD AUTO: 261 K/UL (ref 150–450)
PMV BLD AUTO: 10.2 FL (ref 9.2–12.9)
POTASSIUM SERPL-SCNC: 4.7 MMOL/L (ref 3.5–5.1)
PROT SERPL-MCNC: 6.7 G/DL (ref 6–8.4)
PROTHROMBIN TIME: 10.4 SEC (ref 9–12.5)
RBC # BLD AUTO: 4.3 M/UL (ref 4.6–6.2)
SODIUM SERPL-SCNC: 140 MMOL/L (ref 136–145)
TROPONIN I SERPL DL<=0.01 NG/ML-MCNC: 0.02 NG/ML (ref 0–0.03)
WBC # BLD AUTO: 8.85 K/UL (ref 3.9–12.7)

## 2023-07-18 PROCEDURE — 84484 ASSAY OF TROPONIN QUANT: CPT | Mod: HCNC | Performed by: EMERGENCY MEDICINE

## 2023-07-18 PROCEDURE — 85610 PROTHROMBIN TIME: CPT | Mod: HCNC | Performed by: EMERGENCY MEDICINE

## 2023-07-18 PROCEDURE — 93010 ELECTROCARDIOGRAM REPORT: CPT | Mod: HCNC,,, | Performed by: INTERNAL MEDICINE

## 2023-07-18 PROCEDURE — 83880 ASSAY OF NATRIURETIC PEPTIDE: CPT | Mod: HCNC | Performed by: EMERGENCY MEDICINE

## 2023-07-18 PROCEDURE — 85025 COMPLETE CBC W/AUTO DIFF WBC: CPT | Mod: HCNC | Performed by: EMERGENCY MEDICINE

## 2023-07-18 PROCEDURE — 93005 ELECTROCARDIOGRAM TRACING: CPT | Mod: HCNC

## 2023-07-18 PROCEDURE — 99285 EMERGENCY DEPT VISIT HI MDM: CPT | Mod: 25,HCNC

## 2023-07-18 PROCEDURE — 93010 EKG 12-LEAD: ICD-10-PCS | Mod: HCNC,,, | Performed by: INTERNAL MEDICINE

## 2023-07-18 PROCEDURE — 80053 COMPREHEN METABOLIC PANEL: CPT | Mod: HCNC | Performed by: EMERGENCY MEDICINE

## 2023-07-18 RX ORDER — IBUPROFEN 600 MG/1
600 TABLET ORAL EVERY 6 HOURS PRN
Qty: 20 TABLET | Refills: 0 | Status: SHIPPED | OUTPATIENT
Start: 2023-07-18

## 2023-07-18 NOTE — DISCHARGE INSTRUCTIONS
Please follow-up with the cardiologist above this week.  Please return immediately if you get worse or if new problems develop.  Advil as directed.  You may also follow-up with your primary care doctor.  Continue usual medicines.  Rest.  May also use Tylenol for pain.

## 2023-07-18 NOTE — ED PROVIDER NOTES
Encounter Date: 7/18/2023    SCRIBE #1 NOTE: I, Karen Moy, am scribing for, and in the presence of,  Sixto Jason MD. I have scribed the following portions of the note - Other sections scribed: HPI, ROS.     History     Chief Complaint   Patient presents with    Chest Pain     Pt reports intermittent L sided chest pain x 1 week. Reports taking aspirin and nitro with relief. Denies taking any today. Reports SOB. Hx MI.      A 63 year old male with a PMHx of DM, GERD, HLD, HTN, and Myocardial Infarction presents to the ED for chief complaint of sharp, stabbing intermittent and radiating murmur chest pain onset 5 days ago. Patient reports chest pain is mostly left sided. Patient also reports SOB.  Patient further reports that he wanted to call 911 but he passed out in bed and that his pain is not sharp when breathing or with movement only when pressure is applied. Patient endorses taking aspirin and nitro with relief. Patient further endorses that for 8 months he has had chronic bilateral paresthesia in his legs. No other alleviating or exacerbating factors. Patient denies cough, fever, chills, abdominal pain, nausea, vomiting, diarrhea, dysuria, headaches, congestion, sore throat, eye pain, ear pain, rash, or other associated symptoms. He is allergic to Trulicity.     The history is provided by the patient. No  was used.   Review of patient's allergies indicates:   Allergen Reactions    Trulicity [dulaglutide]      Past Medical History:   Diagnosis Date    Colon polyps     Diabetes mellitus     Diabetes with neurologic complications     DM retinopathy     GERD (gastroesophageal reflux disease)     Hyperlipidemia     Hypertension     Myocardial infarction      Past Surgical History:   Procedure Laterality Date    APPENDECTOMY      BACK SURGERY  11/2017    COLONOSCOPY N/A 10/19/2018    Procedure: COLONOSCOPY;  Surgeon: Frantz Rasmussen MD;  [facility]: [facility];  Service: Endoscopy;   Laterality: N/A;    COLONOSCOPY N/A 2/22/2021    Procedure: COLONOSCOPY;  Surgeon: Dann Mahmood MD;  Location: [facility] ENDO;  Service: Endoscopy;  Laterality: N/A;  covid test [place] 2/19, instructions mailed -ml    EXCISION OF SKIN N/A 6/25/2020    Procedure: EXCISION, SKIN;  Surgeon: Finesse Dumont MD;  Location: [facility] OR;  Service: General;  Laterality: N/A;  on back    FOOT SURGERY      INCISION AND DRAINAGE OF GROIN Right 6/25/2020    Procedure: INCISION AND DRAINAGE, INGUINAL REGION;  Surgeon: Finesse Dumont MD;  Location: [facility] OR;  Service: General;  Laterality: Right;  PT TO PREOP IN AM  PRE-OP BY RN 6-    OPEN REDUCTION AND INTERNAL FIXATION (ORIF) OF INJURY OF ANKLE Right 7/17/2019    Procedure: ORIF, ANKLE;  Surgeon: Raeann Steward MD;  Location: [facility] OR;  Service: Orthopedics;  Laterality: Right;  SARITA CLEVELAND GALAN  [phone] TEXTED HIM @ 10:2 AM ON 7-11-19  SKELETAL  RN PREOP 7/9/19---- [facility] MORNING OF SURGERY PER DR STEWARD     Family History   Problem Relation Age of Onset    Heart disease Mother     Breast cancer Mother     Diabetes Father     Cancer Sister     Cancer Brother      Social History     Tobacco Use    Smoking status: Former     Packs/day: 0.25     Years: 40.00     Pack years: 10.00     Types: Cigarettes     Start date: 1/15/1981     Quit date: 7/16/2023    Smokeless tobacco: Never   Substance Use Topics    Alcohol use: Yes     Alcohol/week: 4.0 standard drinks     Types: 4 Cans of beer per week     Comment: social     Drug use: Not Currently     Types: Marijuana     Comment: quit marijuana     Review of Systems   Constitutional:  Negative for chills, diaphoresis and fever.   HENT:  Negative for congestion, ear pain and sore throat.    Eyes:  Negative for pain and visual disturbance.   Respiratory:  Positive for shortness of breath. Negative for cough.    Cardiovascular:  Positive for chest pain (Left Side).   Gastrointestinal:  Negative for abdominal pain, diarrhea,  nausea and vomiting.   Genitourinary:  Negative for dysuria.   Musculoskeletal:  Negative for myalgias.   Skin:  Negative for rash.   Neurological:  Negative for headaches.        (+) Chronic Bilateral Paresthesia in legs     Physical Exam     Initial Vitals [07/18/23 0921]   BP Pulse Resp Temp SpO2   (!) 151/94 80 16 97.8 °F (36.6 °C) 99 %      MAP       --         Physical Exam  The patient was examined specifically for the following:   General:No significant distress, Good color, Warm and dry. Head and neck:Scalp atraumatic, Neck supple. Neurological:Appropriate conversation, Gross motor deficits. Eyes:Conjugate gaze, Clear corneas. ENT: No epistaxis. Cardiac: Regular rate and rhythm, Grossly normal heart tones. Pulmonary: Wheezing, Rales. Gastrointestinal: Abdominal tenderness, Abdominal distention. Musculoskeletal: Extremity deformity, Apparent pain with range of motion of the joints. Skin: Rash.   The findings on examination were normal except for the following:  Patient has exquisite point tenderness in the left inframammary chest.  Lungs are clear.  The heart tones are normal.  The abdomen is soft.  I feel no hernias or masses or lumps.  There is no significant tenderness of the lower extremities.  The patient has brisk dorsalis pedis pulses bilaterally.  Lower extremity neurologic examination is normal.   ED Course   Procedures  Labs Reviewed   B-TYPE NATRIURETIC PEPTIDE - Abnormal; Notable for the following components:       Result Value     (*)     All other components within normal limits   CBC W/ AUTO DIFFERENTIAL - Abnormal; Notable for the following components:    RBC 4.30 (*)     Hemoglobin 13.6 (*)     MCH 31.6 (*)     RDW 14.6 (*)     All other components within normal limits   COMPREHENSIVE METABOLIC PANEL - Abnormal; Notable for the following components:    Calcium 11.7 (*)     Anion Gap 6 (*)     All other components within normal limits   TROPONIN I   PROTIME-INR     EKG Readings:  (Independently Interpreted)   This patient is in a normal sinus rhythm with a heart rate of 80.  There are nonspecific T-wave changes.  There are nonspecific ST segment changes.  There is no definite evidence of acute myocardial infarction or malignant arrhythmia.     Imaging Results              X-Ray Chest AP Portable (Final result)  Result time 07/18/23 10:32:27      Final result by Augustus Borja MD (07/18/23 10:32:27)                   Impression:      No acute process.      Electronically signed by: Augustus Borja MD  Date:    07/18/2023  Time:    10:32               Narrative:    EXAMINATION:  XR CHEST AP PORTABLE    CLINICAL HISTORY:  chest pain.    TECHNIQUE:  Single frontal view of the chest was performed.    COMPARISON:  07/09/2019.    FINDINGS:  Monitoring EKG leads are present.  The trachea is unremarkable.  The cardiomediastinal silhouette is within normal limits.  There is no evidence of free air beneath the hemidiaphragms.  There are no pleural effusions.  There is no evidence of a pneumothorax.  There is no evidence of pneumomediastinum.  No airspace opacity is present.  There are degenerative changes in the osseous structures.                                     Medical decision making:  This patient presents emergency room with a left-sided chest pain.  It is left inframammary.  The pain is not worse with deep breathing or movement but the area is markedly tender.  The patient reports that the pain sometimes moves to the right side.  It is a sharp stabbing pain.  This is not sound cardiac.  The patient has had this for days.  His troponin is negative.  Chemistries are essentially unremarkable.  Chest x-ray fails to reveal pneumothorax pneumonia pleural effusion.  I considered pulmonary embolus but the patient has no significant leg tenderness or swelling.  He has some pain just above his knees bilaterally.  But no tenderness or swelling he has good pulses in his feet.  I doubt vascular insufficiency  of the lower extremities.  The patient has Wells score 0.  He has slight exertional dyspnea.  I believe this is likely related to deconditioning.  This is a noncardiac syndrome.  There are no electrolyte abnormalities or evidence of hepatic or renal failure to complicate his presentation.  He has a very mild anemia with a hemoglobin of 13.6.  This is not significant.  I doubt acute myocardial infarction.  I will refer to Cardiology for further evaluation and treatment.  The patient complains of little knots all over his abdomen.  The physical examination is negative.       Medications - No data to display     Additional MDM:     Well's Criteria Score:  -Clinical symptoms of DVT (leg swelling, pain with palpation) = 0.0  -Other diagnosis less likely than pulmonary embolism =            0.0  -Heart Rate >100 =   0.0  -Immobilization (= or > than 3 days) or surgery in the previous 4 weeks = 0.0  -Previous DVT/PE = 0.0  -Hemoptysis =          0.0  -Malignancy =           0.0  Well's Probability Score =    0         Scribe Attestation:   Scribe #1: I performed the above scribed service and the documentation accurately describes the services I performed. I attest to the accuracy of the note.                 I personally performed the services described in this documentation.  All medical record  entries made by the scribe are at my direction and in my presence.  Signed, Dr. Jason  Clinical Impression:   Final diagnoses:  [R07.9] Chest pain  [R07.89] Chest pain, non-cardiac (Primary)  [M79.606, G89.29] Chronic pain of lower extremity, unspecified laterality  [Z87.898] History of syncope        ED Disposition Condition    Discharge Stable          ED Prescriptions       Medication Sig Dispense Start Date End Date Auth. Provider    ibuprofen (ADVIL,MOTRIN) 600 MG tablet Take 1 tablet (600 mg total) by mouth every 6 (six) hours as needed for Pain. 20 tablet 7/18/2023 -- Sixto Jason MD          Follow-up Information        Follow up With Specialties Details Why Contact Info    David Ibrahim MD Cardiology, Interventional Cardiology In 3 days  [address] [facility] [address]  [address]  [facility] [address]  [phone]      Sterling Jarquin MD Family Medicine In 1 week  [address] [place] [address]  [phone]               Sixto Jason MD  07/18/23 0428

## 2023-08-31 DIAGNOSIS — E11.9 TYPE 2 DIABETES MELLITUS WITHOUT COMPLICATION, WITHOUT LONG-TERM CURRENT USE OF INSULIN: ICD-10-CM

## 2023-08-31 DIAGNOSIS — E11.65 UNCONTROLLED TYPE 2 DIABETES MELLITUS WITH HYPERGLYCEMIA: ICD-10-CM

## 2023-08-31 DIAGNOSIS — I10 ESSENTIAL HYPERTENSION: ICD-10-CM

## 2023-08-31 NOTE — TELEPHONE ENCOUNTER
----- Message from Jose Antonio Miguel sent at 8/31/2023 11:35 AM CDT -----  Type: RX Refill Request    Who Called: Sellf    Have you contacted your pharmacy:No    Refill or New Rx:Refill    RX Name and Strength:insulin detemir U-100 (LEVEMIR FLEXTOUCH U-100 INSULN) 100 unit/mL (3 mL) InPn pen, metFORMIN (GLUCOPHAGE) 1000 MG tablet    Preferred Pharmacy with phone number:[facility] - [facility] [address]   Phone:  [phone]  Fax:  [phone]          Local or Mail Order:Mail    Would the patient rather a call back or a response via [facility]? Call    Best Call Back Number:.[phone] (home)     Additional Information: Please contact pharmacy for there refill prescriptions

## 2023-08-31 NOTE — TELEPHONE ENCOUNTER
No care due was identified.  [facility] Embedded Care Due Messages. Reference number: [id number].   8/31/2023 12:57:32 PM CDT

## 2023-08-31 NOTE — TELEPHONE ENCOUNTER
"Last Office Visit Info:   The patient's last visit with Sterling Jarquin MD was on 9/28/2022.    The patient's last visit in current department was on 3/17/2023.        Last CBC Results:   Lab Results   Component Value Date    WBC 8.85 07/18/2023    HGB 13.6 (L) 07/18/2023    HCT 40.0 07/18/2023     07/18/2023       Last CMP Results  Lab Results   Component Value Date     07/18/2023    K 4.7 07/18/2023     07/18/2023    CO2 27 07/18/2023    BUN 16 07/18/2023    CREATININE 0.9 07/18/2023    CALCIUM 11.7 (H) 07/18/2023    ALBUMIN 3.6 07/18/2023    AST 26 07/18/2023    ALT 30 07/18/2023       Last Lipids  Lab Results   Component Value Date    CHOL 128 09/26/2022    TRIG 110 09/26/2022    HDL 45 09/26/2022    LDLCALC 61.0 (L) 09/26/2022       Last A1C  Lab Results   Component Value Date    HGBA1C 7.4 (H) 03/17/2023       Last TSH  Lab Results   Component Value Date    TSH 2.423 06/04/2018             Current Med Refills  Medication List with Changes/Refills   Current Medications    ACETAMINOPHEN (TYLENOL) 500 MG TABLET    Take 500 mg by mouth every 6 (six) hours as needed for Pain.       Start Date: --        End Date: --    ASPIRIN (ECOTRIN) 81 MG EC TABLET    Take 81 mg by mouth once daily.       Start Date: --        End Date: --    BD ULTRA-FINE FREDA PEN NEEDLE 32 GAUGE X 5/32" NDLE    USE AS DIRECTED 2 TIMES DAILY WITH LEVEMIR       Start Date: 5/31/2022 End Date: --    BLOOD PRESSURE CUFF MISC    1 kit by Misc.(Non-Drug; Combo Route) route once daily.       Start Date: 7/27/2018 End Date: --    BLOOD SUGAR DIAGNOSTIC (TRUE METRIX GLUCOSE TEST STRIP) STRP    1 strip by Misc.(Non-Drug; Combo Route) route 2 (two) times a day.       Start Date: 9/28/2022 End Date: --    BLOOD-GLUCOSE METER KIT    1 each by Other route 4 (four) times daily before meals and nightly.       Start Date: 9/28/2022 End Date: --    CARVEDILOL (COREG) 25 MG TABLET    TAKE 1 TABLET TWICE DAILY WITH MEALS       Start Date: " 8/8/2022  End Date: --    CICLOPIROX (PENLAC) 8 % SOLN    Apply topically nightly.       Start Date: 8/12/2021 End Date: --    FLUOCINOLONE (SYNALAR) 0.01 % EXTERNAL SOLUTION    APPLY TOPICALLY ONCE DAILY  FOR 10 DAYS       Start Date: 6/16/2022 End Date: 3/17/2023    FLUOCINONIDE (LIDEX) 0.05 % EXTERNAL SOLUTION    Topical prn       Start Date: 7/11/2022 End Date: --    HYDRALAZINE (APRESOLINE) 25 MG TABLET    Take 1 tablet (25 mg total) by mouth every 12 (twelve) hours.       Start Date: 1/10/2023 End Date: 7/9/2023    IBUPROFEN (ADVIL,MOTRIN) 600 MG TABLET    Take 1 tablet (600 mg total) by mouth every 6 (six) hours as needed for Pain.       Start Date: 7/18/2023 End Date: --    INSULIN DETEMIR U-100 (LEVEMIR FLEXTOUCH U-100 INSULN) 100 UNIT/ML (3 ML) INPN PEN    Inject 40 Units into the skin 2 (two) times daily.       Start Date: 3/16/2023 End Date: 9/12/2023    KETOCONAZOLE (NIZORAL) 2 % CREAM    APPLY TOPICALLY ONCE DAILY.       Start Date: 7/24/2022 End Date: --    KETOCONAZOLE (NIZORAL) 2 % SHAMPOO    APPLY TOPICALLY TWICE A WEEK.       Start Date: 7/25/2022 End Date: --    LEVEMIR FLEXPEN 100 UNIT/ML (3 ML) INPN PEN    INJECT 40 UNITS INTO THE SKIN 2 (TWO) TIMES DAILY.       Start Date: 3/13/2023 End Date: --    LISINOPRIL (PRINIVIL,ZESTRIL) 40 MG TABLET    Take 1 tablet (40 mg total) by mouth once daily.       Start Date: 3/17/2023 End Date: --    METFORMIN (GLUCOPHAGE) 1000 MG TABLET    TAKE 1 TABLET TWICE DAILY WITH MEALS       Start Date: 5/26/2022 End Date: --    SILDENAFIL (VIAGRA) 50 MG TABLET    Take 1 tablet (50 mg total) by mouth daily as needed for Erectile Dysfunction.       Start Date: 9/28/2022 End Date: 9/28/2023    SIMVASTATIN (ZOCOR) 40 MG TABLET    TAKE 1 TABLET EVERY EVENING       Start Date: 3/25/2023 End Date: --    TRAZODONE (DESYREL) 50 MG TABLET    TAKE 1 TABLET NIGHTLY AS NEEDED FOR INSOMNIA.       Start Date: 11/23/2022End Date: --    TRUEPLUS LANCETS 33 GAUGE MISC    Check glucose  twice daily       Start Date: 9/28/2022 End Date: --       Order(s) placed per written order guidelines: none    Please advise.

## 2023-08-31 NOTE — TELEPHONE ENCOUNTER
No care due was identified.  [facility] Embedded Care Due Messages. Reference number: [id number].   8/31/2023 12:33:20 PM CDT

## 2023-09-01 DIAGNOSIS — E11.65 UNCONTROLLED TYPE 2 DIABETES MELLITUS WITH HYPERGLYCEMIA: ICD-10-CM

## 2023-09-01 RX ORDER — PEN NEEDLE, DIABETIC 30 GX3/16"
NEEDLE, DISPOSABLE MISCELLANEOUS
Refills: 0 | OUTPATIENT
Start: 2023-09-01

## 2023-09-01 RX ORDER — INSULIN DETEMIR 100 [IU]/ML
INJECTION, SOLUTION SUBCUTANEOUS
Qty: 72 ML | Refills: 0 | Status: SHIPPED | OUTPATIENT
Start: 2023-09-01 | End: 2023-12-28

## 2023-09-01 RX ORDER — CARVEDILOL 25 MG/1
TABLET ORAL
Qty: 180 TABLET | Refills: 3 | Status: SHIPPED | OUTPATIENT
Start: 2023-09-01 | End: 2023-12-28 | Stop reason: SDUPTHER

## 2023-09-01 RX ORDER — METFORMIN HYDROCHLORIDE 1000 MG/1
1000 TABLET ORAL 2 TIMES DAILY WITH MEALS
Qty: 180 TABLET | Refills: 1 | Status: SHIPPED | OUTPATIENT
Start: 2023-09-01 | End: 2023-12-28 | Stop reason: SDUPTHER

## 2023-09-01 RX ORDER — METFORMIN HYDROCHLORIDE 1000 MG/1
TABLET ORAL
Refills: 0 | OUTPATIENT
Start: 2023-09-01

## 2023-09-01 RX ORDER — PEN NEEDLE, DIABETIC 31 GX5/16"
NEEDLE, DISPOSABLE MISCELLANEOUS
Qty: 200 EACH | Refills: 3 | Status: SHIPPED | OUTPATIENT
Start: 2023-09-01

## 2023-09-01 RX ORDER — HYDRALAZINE HYDROCHLORIDE 25 MG/1
25 TABLET, FILM COATED ORAL EVERY 12 HOURS
Qty: 180 TABLET | Refills: 1 | Status: SHIPPED | OUTPATIENT
Start: 2023-09-01 | End: 2023-12-28 | Stop reason: SDUPTHER

## 2023-09-01 NOTE — TELEPHONE ENCOUNTER
Patient informed of refill approval and need for office visit.  Patient verbalized understanding, has appointment scheduled for 10/10/2023.

## 2023-09-27 DIAGNOSIS — E11.9 TYPE 2 DIABETES MELLITUS WITHOUT COMPLICATION: ICD-10-CM

## 2023-10-02 ENCOUNTER — PATIENT OUTREACH (OUTPATIENT)
Dept: ADMINISTRATIVE | Facility: HOSPITAL | Age: 63
End: 2023-10-02
Payer: MEDICARE

## 2023-10-02 NOTE — PROGRESS NOTES
Population Health Chart Review & Patient Outreach Details:     Reason for Outreach Encounter:     [x]  Non-Compliant Report   []  Payor Report ([employer], PHN, BCBS, MSSP, MCIP, UHC, etc.)   []  Pre-Visit Chart Review     Updates Requested / Reviewed:     [x]  Care Everywhere    []     []  External Sources ([facility], [facility], DIS, etc.)   []  Care Team Updated    Patient Outreach Method:    []  Telephone Outreach Completed   [] Successful   [] Left Voicemail   [] Unable to Contact (wrong number, no voicemail)  []  [facility] Outreach Sent  [x]  Letter Outreach Mailed  []  Fax Sent for External Records  []  External Records Upload    Health Maintenance Topics Addressed and Outreach Outcomes / Actions Taken:        []      Breast Cancer Screening []  Mammo Scheduled      []  External Records Requested     []  Added Reminder to Complete to Upcoming Primary Care Appt Notes     []  Patient Declined     []  Patient Will Call Back to Schedule     []  Patient Will Schedule with External Provider / Order Routed if Applicable             []       Cervical Cancer Screening []  Pap Scheduled      []  External Records Requested     []  Added Reminder to Complete to Upcoming Primary Care Appt Notes     []  Patient Declined     []  Patient Will Call Back to Schedule     []  Patient Will Schedule with External Provider               []          Colorectal Cancer Screening []  Colonoscopy Case Request or Referral Placed     []  External Records Requested     []  Added Reminder to Complete to Upcoming Primary Care Appt Notes     []  Patient Declined     []  Patient Will Call Back to Schedule     []  Patient Will Schedule with External Provider     []  Fit Kit Mailed (add the SmartPhrase under additional notes)     []  Reminded Patient to Complete Home Test             []      Diabetic Eye Exam []  Eye Camera Scheduled or Optometry Referral Placed     []  External Records Requested     []  Added Reminder to Complete to  Upcoming Primary Care Appt Notes     []  Patient Declined     []  Patient Will Call Back to Schedule     []  Patient Will Schedule with External Provider             []      Blood Pressure Control []  Primary Care Follow Up Visit Scheduled     []  Remote Blood Pressure Reading Captured     []  Added Reminder to Complete to Upcoming Primary Care Appt Notes     []  Patient Declined     []  Patient Will Call Back / Patient Will Send Portal Message with Reading     []  Patient Will Call Back to Schedule Provider Visit             [x]       HbA1c & Other Labs []  Lab Appt Scheduled for Due Labs     []  Primary Care Follow Up Visit Scheduled      []  Reminded Patient to Complete Home Test     []  Added Reminder to Complete to Upcoming Primary Care Appt Notes     []  Patient Declined     []  Patient Will Call Back to Schedule     []  Patient Will Schedule with External Provider / Order Routed if Applicable           []    Schedule Primary Care Appt []  Primary Care Appt Scheduled     []  Patient Declined     []  Patient Will Call Back to Schedule     []  Pt Established with External Provider & Updated Care Team             []      Medication Adherence []  Primary Care Appointment Scheduled     []  Added Reminder to Upcoming Primary Care Appt Notes     []  Patient Reminded to  Prescription     []  Patient Declined, Provider Notified if Needed     []  Sent Provider Message to Review and/or Add Exclusion to Problem List             []      Osteoporosis Screening []  DXA Appointment Scheduled     []  External Records Requested     []  Added Reminder to Complete to Upcoming Primary Care Appt Notes     []  Patient Declined     []  Patient Will Call Back to Schedule     []  Patient Will Schedule with External Provider / Order Routed if Applicable     Additional Care Coordinator Notes:         Further Action Needed If Patient Returns Outreach:

## 2023-10-04 DIAGNOSIS — E11.9 TYPE 2 DIABETES MELLITUS WITHOUT COMPLICATION: ICD-10-CM

## 2023-10-09 ENCOUNTER — PATIENT OUTREACH (OUTPATIENT)
Dept: ADMINISTRATIVE | Facility: HOSPITAL | Age: 63
End: 2023-10-09
Payer: MEDICARE

## 2023-10-09 NOTE — PROGRESS NOTES
Population Health Chart Review & Patient Outreach Details:     Reason for Outreach Encounter:     [x]  Non-Compliant Report   []  Payor Report (Humana, PHN, BCBS, MSSP, MCIP, UHC, etc.)   []  Pre-Visit Chart Review     Updates Requested / Reviewed:     [x]  Care Everywhere    []     []  External Sources ([facility], [facility], DIS, etc.)   []  Care Team Updated    Patient Outreach Method:    []  Telephone Outreach Completed   [] Successful   [] Left Voicemail   [] Unable to Contact (wrong number, no voicemail)  []  [facility] Outreach Sent  [x]  Letter Outreach Mailed  []  Fax Sent for External Records  []  External Records Upload    Health Maintenance Topics Addressed and Outreach Outcomes / Actions Taken:        []      Breast Cancer Screening []  Mammo Scheduled      []  External Records Requested     []  Added Reminder to Complete to Upcoming Primary Care Appt Notes     []  Patient Declined     []  Patient Will Call Back to Schedule     []  Patient Will Schedule with External Provider / Order Routed if Applicable             []       Cervical Cancer Screening []  Pap Scheduled      []  External Records Requested     []  Added Reminder to Complete to Upcoming Primary Care Appt Notes     []  Patient Declined     []  Patient Will Call Back to Schedule     []  Patient Will Schedule with External Provider               []          Colorectal Cancer Screening []  Colonoscopy Case Request or Referral Placed     []  External Records Requested     []  Added Reminder to Complete to Upcoming Primary Care Appt Notes     []  Patient Declined     []  Patient Will Call Back to Schedule     []  Patient Will Schedule with External Provider     []  Fit Kit Mailed (add the SmartPhrase under additional notes)     []  Reminded Patient to Complete Home Test             [x]      Diabetic Eye Exam []  Eye Camera Scheduled or Optometry Referral Placed     []  External Records Requested     []  Added Reminder to Complete to  Upcoming Primary Care Appt Notes     []  Patient Declined     []  Patient Will Call Back to Schedule     []  Patient Will Schedule with External Provider             []      Blood Pressure Control []  Primary Care Follow Up Visit Scheduled     []  Remote Blood Pressure Reading Captured     []  Added Reminder to Complete to Upcoming Primary Care Appt Notes     []  Patient Declined     []  Patient Will Call Back / Patient Will Send Portal Message with Reading     []  Patient Will Call Back to Schedule Provider Visit             []       HbA1c & Other Labs []  Lab Appt Scheduled for Due Labs     []  Primary Care Follow Up Visit Scheduled      []  Reminded Patient to Complete Home Test     []  Added Reminder to Complete to Upcoming Primary Care Appt Notes     []  Patient Declined     []  Patient Will Call Back to Schedule     []  Patient Will Schedule with External Provider / Order Routed if Applicable           []    Schedule Primary Care Appt []  Primary Care Appt Scheduled     []  Patient Declined     []  Patient Will Call Back to Schedule     []  Pt Established with External Provider & Updated Care Team             []      Medication Adherence []  Primary Care Appointment Scheduled     []  Added Reminder to Upcoming Primary Care Appt Notes     []  Patient Reminded to  Prescription     []  Patient Declined, Provider Notified if Needed     []  Sent Provider Message to Review and/or Add Exclusion to Problem List             []      Osteoporosis Screening []  DXA Appointment Scheduled     []  External Records Requested     []  Added Reminder to Complete to Upcoming Primary Care Appt Notes     []  Patient Declined     []  Patient Will Call Back to Schedule     []  Patient Will Schedule with External Provider / Order Routed if Applicable     Additional Care Coordinator Notes:         Further Action Needed If Patient Returns Outreach:

## 2023-10-12 ENCOUNTER — TELEPHONE (OUTPATIENT)
Dept: FAMILY MEDICINE | Facility: CLINIC | Age: 63
End: 2023-10-12
Payer: MEDICARE

## 2023-10-12 NOTE — TELEPHONE ENCOUNTER
----- Message from Kodak Bailey sent at 10/12/2023 11:07 AM CDT -----  Regarding: Thierry  Type: Patient Callback     Who called: Thierry     What is the request in detail: Pt stated that his last 3 appointments the ride service has been late and they are the reason why he missed his appointment. Patient is requesting a new ride service. Please contact the patient as soon as possible.     Can the clinic reply by [facility]? No     Would the patient rather a call back or a response via [facility]? Callback     Best call back number: [phone]      Additional Information:

## 2023-10-12 NOTE — TELEPHONE ENCOUNTER
----- Message from Evelia Bullock sent at 10/12/2023 11:53 AM CDT -----  .Type:  Patient Returning Call    Who Called: Self     Who Left Message for Patient: Sara    Does the patient know what this is regarding?: Yes     Would the patient rather a call back or a response via [facility]? Call Back     Best Call Back Number: [phone] (home)       Additional Information:

## 2023-10-12 NOTE — TELEPHONE ENCOUNTER
Pt just wanted to let provider know he has missed his last 3 appointments due to transportation not coming on time; he did call his insurance and discussed with them; I scheduled appt for next month

## 2023-11-01 DIAGNOSIS — E78.49 OTHER HYPERLIPIDEMIA: ICD-10-CM

## 2023-11-01 NOTE — TELEPHONE ENCOUNTER
Care Due:                  Date            Visit Type   Department     Provider  --------------------------------------------------------------------------------                                EP -                              PRIMARY      [facility]  Last Visit: 09-      CARE ([facility])   MEDICINE       [facility]  [place]                              EP -                              PRIMARY      [facility]  Next Visit: 11-      CARE ([facility])   MEDICINE       [facility]                                                            Last  Test          Frequency    Reason                     Performed    Due Date  --------------------------------------------------------------------------------    HBA1C.......  6 months...  insulin, metFORMIN.......  03- 09-    Lipid Panel.  12 months..  simvastatin..............  09- 09-    [employer] Embedded Care Due Messages. Reference number: [id number].   11/01/2023 2:27:11 AM CDT

## 2023-11-02 RX ORDER — SIMVASTATIN 40 MG/1
TABLET, FILM COATED ORAL
Qty: 90 TABLET | Refills: 10 | Status: SHIPPED | OUTPATIENT
Start: 2023-11-02 | End: 2023-12-28 | Stop reason: SDUPTHER

## 2023-11-07 DIAGNOSIS — I10 ESSENTIAL HYPERTENSION: ICD-10-CM

## 2023-11-08 NOTE — TELEPHONE ENCOUNTER
No care due was identified.  [facility] Embedded Care Due Messages. Reference number: [id number].   11/07/2023 11:56:08 PM CST

## 2023-11-09 RX ORDER — LISINOPRIL 40 MG/1
40 TABLET ORAL
Qty: 90 TABLET | Refills: 0 | Status: SHIPPED | OUTPATIENT
Start: 2023-11-09 | End: 2023-12-28 | Stop reason: SDUPTHER

## 2023-12-07 ENCOUNTER — TELEPHONE (OUTPATIENT)
Dept: FAMILY MEDICINE | Facility: CLINIC | Age: 63
End: 2023-12-07
Payer: MEDICARE

## 2023-12-07 NOTE — TELEPHONE ENCOUNTER
Spoke to pt and informed him provider out of office on 12/29, can move appt up to 12/22; pt will call transportation to see if able to change to 12/22 and call office back

## 2023-12-19 ENCOUNTER — PATIENT OUTREACH (OUTPATIENT)
Dept: ADMINISTRATIVE | Facility: HOSPITAL | Age: 63
End: 2023-12-19
Payer: MEDICARE

## 2023-12-19 NOTE — PROGRESS NOTES
Population Health Chart Review & Patient Outreach Details   Payor report. -- [employer] would like recent reading of blood pressure end of year. --Need remote home blood pressure reading. -- upcoming appointment document to do 2nd reading of elevated during visit.     Further Action Needed If Patient Returns Outreach:            Updates Requested / Reviewed:     [x]  Care Everywhere    []     []  External Sources ([facility], [facility], DIS, etc.)    [] [facility]   [] [facility]   [] Other:    []  Care Team Updated   []  Removed  or Duplicate Orders   []  Immunization Reconciliation Completed / Queried    [] [place]   [] [place]   [] [place]   [] [place]      Health Maintenance Topics Addressed and Outreach Outcomes / Actions Taken:             Breast Cancer Screening []  Mammogram Order Placed    []  Mammogram Screening Scheduled    []  External Records Requested & Care Team Updated if Applicable    []  External Records Uploaded & Care Team Updated if Applicable    []  Pt Declined Scheduling Mammogram    []  Pt Will Schedule with External Provider / Order Routed & Care Team Updated if Applicable              Cervical Cancer Screening []  Pap Smear Scheduled in Primary Care or OBGYN    []  External Records Requested & Care Team Updated if Applicable       []  External Records Uploaded, Care Team Updated, & History Updated if Applicable    []  Patient Declined Scheduling Pap Smear    []  Patient Will Schedule with External Provider & Care Team Updated if Applicable                  Colorectal Cancer Screening []  Colonoscopy Case Request / Referral / Home Test Order Placed    []  External Records Requested & Care Team Updated if Applicable    []  External Records Uploaded, Care Team Updated, & History Updated if Applicable    []  Patient Declined Completing Colon Cancer Screening    []  Patient Will Schedule with External Provider & Care Team Updated if Applicable    []  Fit Kit Mailed (add the SmartPhrase  under additional notes)    []  Reminded Patient to Complete Home Test                Diabetic Eye Exam []  Eye Exam Screening Order Placed    []  Eye Camera Scheduled or Optometry/Ophthalmology Referral Placed    []  External Records Requested & Care Team Updated if Applicable    []  External Records Uploaded, Care Team Updated, & History Updated if Applicable    []  Patient Declined Scheduling Eye Exam    []  Patient Will Schedule with External Provider & Care Team Updated if Applicable             Blood Pressure Control []  Primary Care Follow Up Visit Scheduled     []  Remote Blood Pressure Reading Captured    []  Patient Declined Remote Reading or Scheduling Appt - Escalated to PCP    []  Patient Will Call Back or Send Portal Message with Reading                 HbA1c & Other Labs []  Overdue Lab(s) Ordered    []  Overdue Lab(s) Scheduled    []  External Records Uploaded & Care Team Updated if Applicable    []  Primary Care Follow Up Visit Scheduled     []  Reminded Patient to Complete A1c Home Test    []  Patient Declined Scheduling Labs or Will Call Back to Schedule    []  Patient Will Schedule with External Provider / Order Routed, & Care Team Updated if Applicable           Primary Care Appointment []  Primary Care Appt Scheduled    []  Patient Declined Scheduling or Will Call Back to Schedule    []  Pt Established with External Provider, Updated Care Team, & Informed Pt to Notify Payor if Applicable           Medication Adherence /    Statin Use []  Primary Care Appointment Scheduled    []  Patient Reminded to  Prescription    []  Patient Declined, Provider Notified if Needed    []  Sent Provider Message to Review to Evaluate Pt for Statin, Add Exclusion Dx Codes, Document   Exclusion in Problem List, Change Statin Intensity Level to Moderate or High Intensity if Applicable                Osteoporosis Screening []  Dexa Order Placed    []  Dexa Appointment Scheduled    []  External Records Requested  & Care Team Updated    []  External Records Uploaded, Care Team Updated, & History Updated if Applicable    []  Patient Declined Scheduling Dexa or Will Call Back to Schedule    []  Patient Will Schedule with External Provider / Order Routed & Care Team Updated if Applicable       Additional Notes:

## 2023-12-28 ENCOUNTER — LAB VISIT (OUTPATIENT)
Dept: LAB | Facility: HOSPITAL | Age: 63
End: 2023-12-28
Attending: FAMILY MEDICINE
Payer: MEDICARE

## 2023-12-28 ENCOUNTER — OFFICE VISIT (OUTPATIENT)
Dept: FAMILY MEDICINE | Facility: CLINIC | Age: 63
End: 2023-12-28
Payer: MEDICARE

## 2023-12-28 VITALS
OXYGEN SATURATION: 97 % | TEMPERATURE: 99 F | RESPIRATION RATE: 16 BRPM | HEIGHT: 71 IN | HEART RATE: 79 BPM | BODY MASS INDEX: 34.54 KG/M2 | DIASTOLIC BLOOD PRESSURE: 74 MMHG | WEIGHT: 246.69 LBS | SYSTOLIC BLOOD PRESSURE: 128 MMHG

## 2023-12-28 DIAGNOSIS — G47.00 INSOMNIA, UNSPECIFIED TYPE: ICD-10-CM

## 2023-12-28 DIAGNOSIS — E11.9 TYPE 2 DIABETES MELLITUS WITHOUT COMPLICATION, WITHOUT LONG-TERM CURRENT USE OF INSULIN: ICD-10-CM

## 2023-12-28 DIAGNOSIS — E21.3 HYPERPARATHYROIDISM: ICD-10-CM

## 2023-12-28 DIAGNOSIS — K21.9 GASTROESOPHAGEAL REFLUX DISEASE, UNSPECIFIED WHETHER ESOPHAGITIS PRESENT: ICD-10-CM

## 2023-12-28 DIAGNOSIS — E83.52 HYPERCALCEMIA: ICD-10-CM

## 2023-12-28 DIAGNOSIS — E11.9 TYPE 2 DIABETES MELLITUS WITHOUT COMPLICATION, WITHOUT LONG-TERM CURRENT USE OF INSULIN: Primary | ICD-10-CM

## 2023-12-28 DIAGNOSIS — N52.9 ERECTILE DYSFUNCTION, UNSPECIFIED ERECTILE DYSFUNCTION TYPE: ICD-10-CM

## 2023-12-28 DIAGNOSIS — R29.898 BILATERAL LEG WEAKNESS: ICD-10-CM

## 2023-12-28 DIAGNOSIS — E78.49 OTHER HYPERLIPIDEMIA: ICD-10-CM

## 2023-12-28 DIAGNOSIS — I10 ESSENTIAL HYPERTENSION: ICD-10-CM

## 2023-12-28 DIAGNOSIS — M54.16 LUMBAR RADICULOPATHY: ICD-10-CM

## 2023-12-28 LAB
ALBUMIN SERPL BCP-MCNC: 3.4 G/DL (ref 3.5–5.2)
ALP SERPL-CCNC: 70 U/L (ref 55–135)
ALT SERPL W/O P-5'-P-CCNC: 32 U/L (ref 10–44)
ANION GAP SERPL CALC-SCNC: 6 MMOL/L (ref 8–16)
AST SERPL-CCNC: 35 U/L (ref 10–40)
BASOPHILS # BLD AUTO: 0.04 K/UL (ref 0–0.2)
BASOPHILS NFR BLD: 0.4 % (ref 0–1.9)
BILIRUB SERPL-MCNC: 0.2 MG/DL (ref 0.1–1)
BUN SERPL-MCNC: 24 MG/DL (ref 8–23)
CALCIUM SERPL-MCNC: 11 MG/DL (ref 8.7–10.5)
CHLORIDE SERPL-SCNC: 111 MMOL/L (ref 95–110)
CHOLEST SERPL-MCNC: 116 MG/DL (ref 120–199)
CHOLEST/HDLC SERPL: 3.2 {RATIO} (ref 2–5)
CO2 SERPL-SCNC: 24 MMOL/L (ref 23–29)
CREAT SERPL-MCNC: 1.2 MG/DL (ref 0.5–1.4)
DIFFERENTIAL METHOD BLD: ABNORMAL
EOSINOPHIL # BLD AUTO: 0.2 K/UL (ref 0–0.5)
EOSINOPHIL NFR BLD: 2 % (ref 0–8)
ERYTHROCYTE [DISTWIDTH] IN BLOOD BY AUTOMATED COUNT: 14.4 % (ref 11.5–14.5)
EST. GFR  (NO RACE VARIABLE): >60 ML/MIN/1.73 M^2
ESTIMATED AVG GLUCOSE: 148 MG/DL (ref 68–131)
GLUCOSE SERPL-MCNC: 89 MG/DL (ref 70–110)
HBA1C MFR BLD: 6.8 % (ref 4–5.6)
HCT VFR BLD AUTO: 40.3 % (ref 40–54)
HDLC SERPL-MCNC: 36 MG/DL (ref 40–75)
HDLC SERPL: 31 % (ref 20–50)
HGB BLD-MCNC: 13.5 G/DL (ref 14–18)
IMM GRANULOCYTES # BLD AUTO: 0.03 K/UL (ref 0–0.04)
IMM GRANULOCYTES NFR BLD AUTO: 0.3 % (ref 0–0.5)
LDLC SERPL CALC-MCNC: 57.2 MG/DL (ref 63–159)
LYMPHOCYTES # BLD AUTO: 2.3 K/UL (ref 1–4.8)
LYMPHOCYTES NFR BLD: 21.4 % (ref 18–48)
MCH RBC QN AUTO: 31.8 PG (ref 27–31)
MCHC RBC AUTO-ENTMCNC: 33.5 G/DL (ref 32–36)
MCV RBC AUTO: 95 FL (ref 82–98)
MONOCYTES # BLD AUTO: 1 K/UL (ref 0.3–1)
MONOCYTES NFR BLD: 9.1 % (ref 4–15)
NEUTROPHILS # BLD AUTO: 7.2 K/UL (ref 1.8–7.7)
NEUTROPHILS NFR BLD: 66.8 % (ref 38–73)
NONHDLC SERPL-MCNC: 80 MG/DL
NRBC BLD-RTO: 0 /100 WBC
PLATELET # BLD AUTO: 265 K/UL (ref 150–450)
PMV BLD AUTO: 11.2 FL (ref 9.2–12.9)
POTASSIUM SERPL-SCNC: 4.7 MMOL/L (ref 3.5–5.1)
PROT SERPL-MCNC: 6.8 G/DL (ref 6–8.4)
RBC # BLD AUTO: 4.25 M/UL (ref 4.6–6.2)
SODIUM SERPL-SCNC: 141 MMOL/L (ref 136–145)
TRIGL SERPL-MCNC: 114 MG/DL (ref 30–150)
TSH SERPL DL<=0.005 MIU/L-ACNC: 1.97 UIU/ML (ref 0.4–4)
WBC # BLD AUTO: 10.78 K/UL (ref 3.9–12.7)

## 2023-12-28 PROCEDURE — 99214 PR OFFICE/OUTPT VISIT, EST, LEVL IV, 30-39 MIN: ICD-10-PCS | Mod: HCNC,S$GLB,, | Performed by: FAMILY MEDICINE

## 2023-12-28 PROCEDURE — 99999 PR PBB SHADOW E&M-EST. PATIENT-LVL IV: ICD-10-PCS | Mod: PBBFAC,HCNC,, | Performed by: FAMILY MEDICINE

## 2023-12-28 PROCEDURE — 80053 COMPREHEN METABOLIC PANEL: CPT | Mod: HCNC | Performed by: FAMILY MEDICINE

## 2023-12-28 PROCEDURE — 3074F PR MOST RECENT SYSTOLIC BLOOD PRESSURE < 130 MM HG: ICD-10-PCS | Mod: HCNC,CPTII,S$GLB, | Performed by: FAMILY MEDICINE

## 2023-12-28 PROCEDURE — 3078F DIAST BP <80 MM HG: CPT | Mod: HCNC,CPTII,S$GLB, | Performed by: FAMILY MEDICINE

## 2023-12-28 PROCEDURE — 3078F PR MOST RECENT DIASTOLIC BLOOD PRESSURE < 80 MM HG: ICD-10-PCS | Mod: HCNC,CPTII,S$GLB, | Performed by: FAMILY MEDICINE

## 2023-12-28 PROCEDURE — 99214 OFFICE O/P EST MOD 30 MIN: CPT | Mod: HCNC,S$GLB,, | Performed by: FAMILY MEDICINE

## 2023-12-28 PROCEDURE — 3074F SYST BP LT 130 MM HG: CPT | Mod: HCNC,CPTII,S$GLB, | Performed by: FAMILY MEDICINE

## 2023-12-28 PROCEDURE — 4010F ACE/ARB THERAPY RXD/TAKEN: CPT | Mod: HCNC,CPTII,S$GLB, | Performed by: FAMILY MEDICINE

## 2023-12-28 PROCEDURE — 3051F PR MOST RECENT HEMOGLOBIN A1C LEVEL 7.0 - < 8.0%: ICD-10-PCS | Mod: HCNC,CPTII,S$GLB, | Performed by: FAMILY MEDICINE

## 2023-12-28 PROCEDURE — 99999 PR PBB SHADOW E&M-EST. PATIENT-LVL IV: CPT | Mod: PBBFAC,HCNC,, | Performed by: FAMILY MEDICINE

## 2023-12-28 PROCEDURE — 3008F PR BODY MASS INDEX (BMI) DOCUMENTED: ICD-10-PCS | Mod: HCNC,CPTII,S$GLB, | Performed by: FAMILY MEDICINE

## 2023-12-28 PROCEDURE — 36415 COLL VENOUS BLD VENIPUNCTURE: CPT | Mod: HCNC,PO | Performed by: FAMILY MEDICINE

## 2023-12-28 PROCEDURE — 4010F PR ACE/ARB THEARPY RXD/TAKEN: ICD-10-PCS | Mod: HCNC,CPTII,S$GLB, | Performed by: FAMILY MEDICINE

## 2023-12-28 PROCEDURE — 80061 LIPID PANEL: CPT | Mod: HCNC | Performed by: FAMILY MEDICINE

## 2023-12-28 PROCEDURE — 1159F MED LIST DOCD IN RCRD: CPT | Mod: HCNC,CPTII,S$GLB, | Performed by: FAMILY MEDICINE

## 2023-12-28 PROCEDURE — 84443 ASSAY THYROID STIM HORMONE: CPT | Mod: HCNC | Performed by: FAMILY MEDICINE

## 2023-12-28 PROCEDURE — 85025 COMPLETE CBC W/AUTO DIFF WBC: CPT | Mod: HCNC | Performed by: FAMILY MEDICINE

## 2023-12-28 PROCEDURE — 3008F BODY MASS INDEX DOCD: CPT | Mod: HCNC,CPTII,S$GLB, | Performed by: FAMILY MEDICINE

## 2023-12-28 PROCEDURE — 3051F HG A1C>EQUAL 7.0%<8.0%: CPT | Mod: HCNC,CPTII,S$GLB, | Performed by: FAMILY MEDICINE

## 2023-12-28 PROCEDURE — 83036 HEMOGLOBIN GLYCOSYLATED A1C: CPT | Mod: HCNC | Performed by: FAMILY MEDICINE

## 2023-12-28 PROCEDURE — 82652 VIT D 1 25-DIHYDROXY: CPT | Mod: HCNC | Performed by: FAMILY MEDICINE

## 2023-12-28 PROCEDURE — 1159F PR MEDICATION LIST DOCUMENTED IN MEDICAL RECORD: ICD-10-PCS | Mod: HCNC,CPTII,S$GLB, | Performed by: FAMILY MEDICINE

## 2023-12-28 RX ORDER — SILDENAFIL 100 MG/1
100 TABLET, FILM COATED ORAL DAILY PRN
Qty: 30 TABLET | Refills: 5 | Status: SHIPPED | OUTPATIENT
Start: 2023-12-28 | End: 2024-12-27

## 2023-12-28 RX ORDER — HYDRALAZINE HYDROCHLORIDE 25 MG/1
25 TABLET, FILM COATED ORAL EVERY 12 HOURS
Qty: 180 TABLET | Refills: 3 | Status: SHIPPED | OUTPATIENT
Start: 2023-12-28

## 2023-12-28 RX ORDER — METFORMIN HYDROCHLORIDE 1000 MG/1
1000 TABLET ORAL 2 TIMES DAILY WITH MEALS
Qty: 180 TABLET | Refills: 3 | Status: SHIPPED | OUTPATIENT
Start: 2023-12-28

## 2023-12-28 RX ORDER — CARVEDILOL 25 MG/1
25 TABLET ORAL 2 TIMES DAILY WITH MEALS
Qty: 180 TABLET | Refills: 3 | Status: SHIPPED | OUTPATIENT
Start: 2023-12-28

## 2023-12-28 RX ORDER — LISINOPRIL 40 MG/1
40 TABLET ORAL DAILY
Qty: 90 TABLET | Refills: 3 | Status: SHIPPED | OUTPATIENT
Start: 2023-12-28

## 2023-12-28 RX ORDER — OMEPRAZOLE 20 MG/1
20 CAPSULE, DELAYED RELEASE ORAL DAILY
Qty: 90 CAPSULE | Refills: 1 | Status: SHIPPED | OUTPATIENT
Start: 2023-12-28 | End: 2024-06-25

## 2023-12-28 RX ORDER — INSULIN GLARGINE 100 [IU]/ML
40 INJECTION, SOLUTION SUBCUTANEOUS 2 TIMES DAILY
Qty: 72 ML | Refills: 3 | Status: SHIPPED | OUTPATIENT
Start: 2023-12-28 | End: 2024-12-27

## 2023-12-28 RX ORDER — AMITRIPTYLINE HYDROCHLORIDE 25 MG/1
25 TABLET, FILM COATED ORAL NIGHTLY PRN
Qty: 90 TABLET | Refills: 1 | Status: SHIPPED | OUTPATIENT
Start: 2023-12-28 | End: 2024-12-27

## 2023-12-28 RX ORDER — SIMVASTATIN 40 MG/1
40 TABLET, FILM COATED ORAL NIGHTLY
Qty: 90 TABLET | Refills: 3 | Status: SHIPPED | OUTPATIENT
Start: 2023-12-28

## 2023-12-28 NOTE — PROGRESS NOTES
Subjective:       Patient ID: Thierry Ho is a 63 y.o. male.    Chief Complaint: Follow-up      Follow-up      63-year-old male presents for diabetes follow-up.  Patient states he is occasional leg weakness which causes him to fall.  Patient is unsure when it occurs.  Denies any back pain.  Denies any recent trauma.    Review of Systems   Constitutional: Negative.    HENT: Negative.     Respiratory: Negative.     Cardiovascular: Negative.    Gastrointestinal: Negative.    Endocrine: Negative.    Genitourinary: Negative.    Musculoskeletal: Negative.    Neurological: Negative.    Psychiatric/Behavioral: Negative.            Past Medical History:   Diagnosis Date    Colon polyps     Diabetes mellitus     Diabetes with neurologic complications     DM retinopathy     GERD (gastroesophageal reflux disease)     Hyperlipidemia     Hypertension     Myocardial infarction      Past Surgical History:   Procedure Laterality Date    APPENDECTOMY      BACK SURGERY  11/2017    COLONOSCOPY N/A 10/19/2018    Procedure: COLONOSCOPY;  Surgeon: Frantz Rasmussen MD;  Location: [facility] ENDO;  Service: Endoscopy;  Laterality: N/A;    COLONOSCOPY N/A 2/22/2021    Procedure: COLONOSCOPY;  Surgeon: Dann Mahmood MD;  Location: [facility] ENDO;  Service: Endoscopy;  Laterality: N/A;  covid test [place] 2/19, instructions mailed -ml    EXCISION OF SKIN N/A 6/25/2020    Procedure: EXCISION, SKIN;  Surgeon: Finesse Dumont MD;  Location: [facility] OR;  Service: General;  Laterality: N/A;  on back    FOOT SURGERY      INCISION AND DRAINAGE OF GROIN Right 6/25/2020    Procedure: INCISION AND DRAINAGE, INGUINAL REGION;  Surgeon: Finesse Dumont MD;  Location: [facility] OR;  Service: General;  Laterality: Right;  PT TO PREOP IN AM  PRE-OP BY RN 6-    OPEN REDUCTION AND INTERNAL FIXATION (ORIF) OF INJURY OF ANKLE Right 7/17/2019    Procedure: ORIF, ANKLE;  Surgeon: Raeann Steward MD;  Location: [facility] OR;  Service: Orthopedics;  Laterality: Right;   SARITA  CRISTOFER GALAN  [phone] TEXTED HIM @ 10:2 AM ON 19  SKELETAL  RN PREOP 19---- [facility] MORNING OF SURGERY PER DR FONTAINE     Family History   Problem Relation Age of Onset    Heart disease Mother     Breast cancer Mother     Diabetes Father     Cancer Sister     Cancer Brother      Social History     Socioeconomic History    Marital status:     Number of children: 5    Highest education level: GED or equivalent   Tobacco Use    Smoking status: Former     Current packs/day: 0.00     Average packs/day: 0.2 packs/day for 42.5 years (10.6 ttl pk-yrs)     Types: Cigarettes     Start date: 1/15/1981     Quit date: 2023     Years since quittin.4    Smokeless tobacco: Never   Substance and Sexual Activity    Alcohol use: Yes     Alcohol/week: 4.0 standard drinks of alcohol     Types: 4 Cans of beer per week     Comment: social     Drug use: Not Currently     Types: Marijuana     Comment: quit marijuana    Sexual activity: Not Currently     Partners: Female     Social Determinants of Health     Financial Resource Strain: Low Risk  (3/17/2023)    Overall Financial Resource Strain (CARDIA)     Difficulty of Paying Living Expenses: Not hard at all   Food Insecurity: No Food Insecurity (3/17/2023)    Hunger Vital Sign     Worried About Running Out of Food in the Last Year: Never true     Ran Out of Food in the Last Year: Never true   Transportation Needs: No Transportation Needs (3/17/2023)    PRAPARE - Transportation     Lack of Transportation (Medical): No     Lack of Transportation (Non-Medical): No   Physical Activity: Inactive (3/17/2023)    Exercise Vital Sign     Days of Exercise per Week: 0 days     Minutes of Exercise per Session: 0 min   Stress: No Stress Concern Present (3/17/2023)    [employer] [place] [employer] - Occupational Stress Questionnaire     Feeling of Stress : Not at all   Social Connections: Socially Isolated (3/17/2023)    Social Connection and Isolation Panel  "[NHANES]     Frequency of Communication with Friends and Family: More than three times a week     Frequency of Social Gatherings with Friends and Family: Once a week     Attends Synagogue Services: Never     Active Member of Clubs or Organizations: No     Marital Status:    Housing Stability: Unknown (3/17/2023)    Housing Stability Vital Sign     Unable to Pay for Housing in the Last Year: No     Unstable Housing in the Last Year: No       Current Outpatient Medications:     acetaminophen (TYLENOL) 500 MG tablet, Take 500 mg by mouth every 6 (six) hours as needed for Pain., Disp: , Rfl:     aspirin (ECOTRIN) 81 MG EC tablet, Take 81 mg by mouth once daily., Disp: , Rfl:     BD ULTRA-FINE FREDA PEN NEEDLE 32 gauge x 5/32" Ndle, USE AS DIRECTED 2 TIMES DAILY WITH LEVEMIR, Disp: 200 each, Rfl: 3    BLOOD PRESSURE CUFF Misc, 1 kit by Misc.(Non-Drug; Combo Route) route once daily., Disp: 1 each, Rfl: 0    blood sugar diagnostic (TRUE METRIX GLUCOSE TEST STRIP) Strp, 1 strip by Misc.(Non-Drug; Combo Route) route 2 (two) times a day., Disp: 200 each, Rfl: 11    blood-glucose meter kit, 1 each by Other route 4 (four) times daily before meals and nightly., Disp: 1 each, Rfl: 0    ciclopirox (PENLAC) 8 % Soln, Apply topically nightly., Disp: 1 Bottle, Rfl: 3    fluocinolone (SYNALAR) 0.01 % external solution, APPLY TOPICALLY ONCE DAILY  FOR 10 DAYS, Disp: 60 mL, Rfl: 0    fluocinonide (LIDEX) 0.05 % external solution, Topical prn, Disp: 60 mL, Rfl: 0    ibuprofen (ADVIL,MOTRIN) 600 MG tablet, Take 1 tablet (600 mg total) by mouth every 6 (six) hours as needed for Pain., Disp: 20 tablet, Rfl: 0    ketoconazole (NIZORAL) 2 % cream, APPLY TOPICALLY ONCE DAILY., Disp: 60 g, Rfl: 0    ketoconazole (NIZORAL) 2 % shampoo, APPLY TOPICALLY TWICE A WEEK., Disp: 120 mL, Rfl: 0    TRUEPLUS LANCETS 33 gauge Misc, Check glucose twice daily, Disp: 100 each, Rfl: 3    amitriptyline (ELAVIL) 25 MG tablet, Take 1 tablet (25 mg total) " "by mouth nightly as needed for Insomnia., Disp: 90 tablet, Rfl: 1    carvediloL (COREG) 25 MG tablet, Take 1 tablet (25 mg total) by mouth 2 (two) times daily with meals., Disp: 180 tablet, Rfl: 3    hydrALAZINE (APRESOLINE) 25 MG tablet, Take 1 tablet (25 mg total) by mouth every 12 (twelve) hours., Disp: 180 tablet, Rfl: 3    insulin (LANTUS SOLOSTAR U-100 INSULIN) glargine 100 units/mL SubQ pen, Inject 40 Units into the skin 2 (two) times a day., Disp: 72 mL, Rfl: 3    lisinopriL (PRINIVIL,ZESTRIL) 40 MG tablet, Take 1 tablet (40 mg total) by mouth once daily., Disp: 90 tablet, Rfl: 3    metFORMIN (GLUCOPHAGE) 1000 MG tablet, Take 1 tablet (1,000 mg total) by mouth 2 (two) times daily with meals., Disp: 180 tablet, Rfl: 3    omeprazole (PRILOSEC) 20 MG capsule, Take 1 capsule (20 mg total) by mouth once daily., Disp: 90 capsule, Rfl: 1    sildenafiL (VIAGRA) 100 MG tablet, Take 1 tablet (100 mg total) by mouth daily as needed for Erectile Dysfunction., Disp: 30 tablet, Rfl: 5    simvastatin (ZOCOR) 40 MG tablet, Take 1 tablet (40 mg total) by mouth every evening., Disp: 90 tablet, Rfl: 3   Objective:      Vitals:    12/28/23 0735   BP: 128/74   BP Location: Right arm   Patient Position: Sitting   BP Method: Large (Manual)   Pulse: 79   Resp: 16   Temp: 98.5 °F (36.9 °C)   TempSrc: Oral   SpO2: 97%   Weight: 111.9 kg (246 lb 11.1 oz)   Height: 5' 11" (1.803 m)       Physical Exam  Constitutional:       General: He is not in acute distress.     Appearance: He is not diaphoretic.   HENT:      Head: Normocephalic and atraumatic.   Eyes:      Conjunctiva/sclera: Conjunctivae normal.   Pulmonary:      Effort: Pulmonary effort is normal.   Musculoskeletal:      Cervical back: Neck supple.   Skin:     Findings: No rash.   Neurological:      Mental Status: He is alert and oriented to person, place, and time.   Psychiatric:         Behavior: Behavior normal.         Thought Content: Thought content normal.         " Judgment: Judgment normal.            Assessment:       1. Type 2 diabetes mellitus without complication, without long-term current use of insulin    2. Lumbar radiculopathy    3. Bilateral leg weakness    4. Erectile dysfunction, unspecified erectile dysfunction type    5. Essential hypertension    6. Other hyperlipidemia    7. Insomnia, unspecified type    8. Hypercalcemia    9. Hyperparathyroidism    10. Gastroesophageal reflux disease, unspecified whether esophagitis present        Plan:       Type 2 diabetes mellitus without complication, without long-term current use of insulin  -     insulin (LANTUS SOLOSTAR U-100 INSULIN) glargine 100 units/mL SubQ pen; Inject 40 Units into the skin 2 (two) times a day.  Dispense: 72 mL; Refill: 3  -     Comprehensive Metabolic Panel; Future; Expected date: 12/28/2023  -     Hemoglobin A1C; Future; Expected date: 12/28/2023  -     metFORMIN (GLUCOPHAGE) 1000 MG tablet; Take 1 tablet (1,000 mg total) by mouth 2 (two) times daily with meals.  Dispense: 180 tablet; Refill: 3    Lumbar radiculopathy  -     Ambulatory referral/consult to Orthopedics; Future; Expected date: 01/04/2024  -     amitriptyline (ELAVIL) 25 MG tablet; Take 1 tablet (25 mg total) by mouth nightly as needed for Insomnia.  Dispense: 90 tablet; Refill: 1    Bilateral leg weakness  -     Ambulatory referral/consult to Orthopedics; Future; Expected date: 01/04/2024  -     amitriptyline (ELAVIL) 25 MG tablet; Take 1 tablet (25 mg total) by mouth nightly as needed for Insomnia.  Dispense: 90 tablet; Refill: 1    Erectile dysfunction, unspecified erectile dysfunction type  -     sildenafiL (VIAGRA) 100 MG tablet; Take 1 tablet (100 mg total) by mouth daily as needed for Erectile Dysfunction.  Dispense: 30 tablet; Refill: 5    Essential hypertension  -     CBC Auto Differential; Future; Expected date: 12/28/2023  -     TSH; Future; Expected date: 12/28/2023  -     Lipid Panel; Future; Expected date: 12/28/2023  -      hydrALAZINE (APRESOLINE) 25 MG tablet; Take 1 tablet (25 mg total) by mouth every 12 (twelve) hours.  Dispense: 180 tablet; Refill: 3  -     carvediloL (COREG) 25 MG tablet; Take 1 tablet (25 mg total) by mouth 2 (two) times daily with meals.  Dispense: 180 tablet; Refill: 3  -     lisinopriL (PRINIVIL,ZESTRIL) 40 MG tablet; Take 1 tablet (40 mg total) by mouth once daily.  Dispense: 90 tablet; Refill: 3    Other hyperlipidemia  -     simvastatin (ZOCOR) 40 MG tablet; Take 1 tablet (40 mg total) by mouth every evening.  Dispense: 90 tablet; Refill: 3    Insomnia, unspecified type    Hypercalcemia  -     Calcitriol; Future; Expected date: 12/28/2023    Hyperparathyroidism  -     Calcitriol; Future; Expected date: 12/28/2023    Gastroesophageal reflux disease, unspecified whether esophagitis present  -     omeprazole (PRILOSEC) 20 MG capsule; Take 1 capsule (20 mg total) by mouth once daily.  Dispense: 90 capsule; Refill: 1      Follow-up labs.  Changed insulin due to insurance coverage.  Refilled other meds.  Advised patient follow with ortho for leg weakness.  Patient states he stopped his calcium use but noticed his vitamin-D comes with calcium as well.  Advised hydration.        Future Appointments   Date Time Provider Department Center   4/2/2024  8:40 AM Sterling Jarquin MD [facility] [place]       Patient note was created using [employer].  Any errors in syntax or even information may not have been identified and edited on initial review prior to signing this note.

## 2023-12-28 NOTE — PROGRESS NOTES
Existing IV not in suitable position, will attempt new access Health Maintenance Due   Topic     Foot Exam      Diabetes Urine Screening      Hemoglobin A1c      Lipid Panel      Eye Exam      Colorectal Cancer Screening

## 2024-01-01 LAB — 1,25(OH)2D3 SERPL-MCNC: 33 PG/ML (ref 20–79)

## 2024-01-30 DIAGNOSIS — M54.50 LOW BACK PAIN, UNSPECIFIED BACK PAIN LATERALITY, UNSPECIFIED CHRONICITY, UNSPECIFIED WHETHER SCIATICA PRESENT: Primary | ICD-10-CM

## 2024-02-01 ENCOUNTER — HOSPITAL ENCOUNTER (OUTPATIENT)
Dept: RADIOLOGY | Facility: HOSPITAL | Age: 64
Discharge: HOME | End: 2024-02-01
Attending: ORTHOPAEDIC SURGERY
Payer: MEDICARE

## 2024-02-01 ENCOUNTER — OFFICE VISIT (OUTPATIENT)
Dept: ORTHOPEDICS | Facility: CLINIC | Age: 64
End: 2024-02-01
Payer: MEDICARE

## 2024-02-01 VITALS — WEIGHT: 242.5 LBS | HEIGHT: 71 IN | BODY MASS INDEX: 33.95 KG/M2

## 2024-02-01 DIAGNOSIS — M54.9 DORSALGIA, UNSPECIFIED: ICD-10-CM

## 2024-02-01 DIAGNOSIS — M47.816 LUMBAR SPONDYLOSIS: ICD-10-CM

## 2024-02-01 DIAGNOSIS — M54.50 LOW BACK PAIN, UNSPECIFIED BACK PAIN LATERALITY, UNSPECIFIED CHRONICITY, UNSPECIFIED WHETHER SCIATICA PRESENT: ICD-10-CM

## 2024-02-01 DIAGNOSIS — M54.16 LUMBAR RADICULOPATHY: ICD-10-CM

## 2024-02-01 DIAGNOSIS — M51.36 DDD (DEGENERATIVE DISC DISEASE), LUMBAR: Primary | ICD-10-CM

## 2024-02-01 DIAGNOSIS — M54.50 LOW BACK PAIN, UNSPECIFIED BACK PAIN LATERALITY, UNSPECIFIED CHRONICITY, UNSPECIFIED WHETHER SCIATICA PRESENT: Primary | ICD-10-CM

## 2024-02-01 DIAGNOSIS — R29.898 BILATERAL LEG WEAKNESS: ICD-10-CM

## 2024-02-01 PROCEDURE — 1159F MED LIST DOCD IN RCRD: CPT | Mod: HCNC,CPTII,S$GLB, | Performed by: ORTHOPAEDIC SURGERY

## 2024-02-01 PROCEDURE — 3008F BODY MASS INDEX DOCD: CPT | Mod: HCNC,CPTII,S$GLB, | Performed by: ORTHOPAEDIC SURGERY

## 2024-02-01 PROCEDURE — 72110 X-RAY EXAM L-2 SPINE 4/>VWS: CPT | Mod: 26,HCNC,, | Performed by: INTERNAL MEDICINE

## 2024-02-01 PROCEDURE — 4010F ACE/ARB THERAPY RXD/TAKEN: CPT | Mod: HCNC,CPTII,S$GLB, | Performed by: ORTHOPAEDIC SURGERY

## 2024-02-01 PROCEDURE — 99214 OFFICE O/P EST MOD 30 MIN: CPT | Mod: HCNC,S$GLB,, | Performed by: ORTHOPAEDIC SURGERY

## 2024-02-01 PROCEDURE — 72110 X-RAY EXAM L-2 SPINE 4/>VWS: CPT | Mod: TC,HCNC

## 2024-02-01 PROCEDURE — 99999 PR PBB SHADOW E&M-EST. PATIENT-LVL V: CPT | Mod: PBBFAC,HCNC,, | Performed by: ORTHOPAEDIC SURGERY

## 2024-02-01 PROCEDURE — 1160F RVW MEDS BY RX/DR IN RCRD: CPT | Mod: HCNC,CPTII,S$GLB, | Performed by: ORTHOPAEDIC SURGERY

## 2024-02-01 RX ORDER — GABAPENTIN 300 MG/1
300 CAPSULE ORAL 3 TIMES DAILY
Qty: 60 CAPSULE | Refills: 1 | Status: SHIPPED | OUTPATIENT
Start: 2024-02-01 | End: 2024-04-26

## 2024-02-01 RX ORDER — METHOCARBAMOL 500 MG/1
500 TABLET, FILM COATED ORAL 3 TIMES DAILY
Qty: 60 TABLET | Refills: 1 | Status: SHIPPED | OUTPATIENT
Start: 2024-02-01

## 2024-02-02 NOTE — PROGRESS NOTES
DATE: 2/2/2024  PATIENT: Thierry Ho    Attending Physician: Gideon Barfield M.D.    CHIEF COMPLAINT: LBP and BLE pain    HISTORY:  Thierry Ho is a 63 y.o. male presents for initial evaluation of low back and b/l leg pain (Back - 5, Leg - 5). The pain has been present for 1 year. The patient describes the pain as dull and it radiates down BLE to the toes. Patient cannot walk longer than 1 block before having to sit down. He gets relief by leaning forward on a shopping cart. He is currently using a cane.  The pain is worse with activity and improved by rest. There is no associated numbness and tingling. There is BLE subjective weakness. Prior treatments have included meds (tylenol), but no PT, LEW or surgery.    The Patient denies myelopathic symptoms such as handwriting changes or difficulty with buttons/coins/keys. Denies perineal paresthesias, bowel/bladder dysfunction.    The patient smokes 1/4 ppd for 30 years; he has DM (HA1C of 6.8). e does not endorse IVDU. The patient is not on any blood thinners and does not take chronic narcotics. He is retired; he used to work at [employer].    PAST MEDICAL/SURGICAL HISTORY:  Past Medical History:   Diagnosis Date    Colon polyps     Diabetes mellitus     Diabetes with neurologic complications     DM retinopathy     GERD (gastroesophageal reflux disease)     Hyperlipidemia     Hypertension     Myocardial infarction      Past Surgical History:   Procedure Laterality Date    APPENDECTOMY      BACK SURGERY  11/2017    COLONOSCOPY N/A 10/19/2018    Procedure: COLONOSCOPY;  Surgeon: Frantz Rasmussen MD;  Location: [facility] ENDO;  Service: Endoscopy;  Laterality: N/A;    COLONOSCOPY N/A 2/22/2021    Procedure: COLONOSCOPY;  Surgeon: Dann Mahmood MD;  Location: [facility] ENDO;  Service: Endoscopy;  Laterality: N/A;  covid test [place] 2/19, instructions mailed -ml    EXCISION OF SKIN N/A 6/25/2020    Procedure: EXCISION, SKIN;  Surgeon: Finesse Dumont MD;  Location: [facility] OR;   "Service: General;  Laterality: N/A;  on back    FOOT SURGERY      INCISION AND DRAINAGE OF GROIN Right 6/25/2020    Procedure: INCISION AND DRAINAGE, INGUINAL REGION;  Surgeon: Finesse Dumont MD;  Location: [facility] OR;  Service: General;  Laterality: Right;  PT TO PREOP IN AM  PRE-OP BY RN 6-    OPEN REDUCTION AND INTERNAL FIXATION (ORIF) OF INJURY OF ANKLE Right 7/17/2019    Procedure: ORIF, ANKLE;  Surgeon: Raeann Steward MD;  Location: [facility] OR;  Service: Orthopedics;  Laterality: Right;  SARITA GALAN  [phone] TEXTED HIM @ 10:2 AM ON 7-11-19  SKELETAL  RN PREOP 7/9/19---- CMP MORNING OF SURGERY PER DR STEWARD       Current Medications:   Current Outpatient Medications:     acetaminophen (TYLENOL) 500 MG tablet, Take 500 mg by mouth every 6 (six) hours as needed for Pain., Disp: , Rfl:     amitriptyline (ELAVIL) 25 MG tablet, Take 1 tablet (25 mg total) by mouth nightly as needed for Insomnia., Disp: 90 tablet, Rfl: 1    aspirin (ECOTRIN) 81 MG EC tablet, Take 81 mg by mouth once daily., Disp: , Rfl:     BD ULTRA-FINE FREDA PEN NEEDLE 32 gauge x 5/32" Ndle, USE AS DIRECTED 2 TIMES DAILY WITH LEVEMIR, Disp: 200 each, Rfl: 3    BLOOD PRESSURE CUFF Misc, 1 kit by Misc.(Non-Drug; Combo Route) route once daily., Disp: 1 each, Rfl: 0    blood sugar diagnostic (TRUE METRIX GLUCOSE TEST STRIP) Strp, 1 strip by Misc.(Non-Drug; Combo Route) route 2 (two) times a day., Disp: 200 each, Rfl: 11    blood-glucose meter kit, 1 each by Other route 4 (four) times daily before meals and nightly., Disp: 1 each, Rfl: 0    carvediloL (COREG) 25 MG tablet, Take 1 tablet (25 mg total) by mouth 2 (two) times daily with meals., Disp: 180 tablet, Rfl: 3    ciclopirox (PENLAC) 8 % Soln, Apply topically nightly., Disp: 1 Bottle, Rfl: 3    fluocinonide (LIDEX) 0.05 % external solution, Topical prn, Disp: 60 mL, Rfl: 0    hydrALAZINE (APRESOLINE) 25 MG tablet, Take 1 tablet (25 mg total) by mouth every 12 (twelve) hours., Disp: " 180 tablet, Rfl: 3    ibuprofen (ADVIL,MOTRIN) 600 MG tablet, Take 1 tablet (600 mg total) by mouth every 6 (six) hours as needed for Pain., Disp: 20 tablet, Rfl: 0    insulin (LANTUS SOLOSTAR U-100 INSULIN) glargine 100 units/mL SubQ pen, Inject 40 Units into the skin 2 (two) times a day., Disp: 72 mL, Rfl: 3    ketoconazole (NIZORAL) 2 % cream, APPLY TOPICALLY ONCE DAILY., Disp: 60 g, Rfl: 0    ketoconazole (NIZORAL) 2 % shampoo, APPLY TOPICALLY TWICE A WEEK., Disp: 120 mL, Rfl: 0    lisinopriL (PRINIVIL,ZESTRIL) 40 MG tablet, Take 1 tablet (40 mg total) by mouth once daily., Disp: 90 tablet, Rfl: 3    metFORMIN (GLUCOPHAGE) 1000 MG tablet, Take 1 tablet (1,000 mg total) by mouth 2 (two) times daily with meals., Disp: 180 tablet, Rfl: 3    omeprazole (PRILOSEC) 20 MG capsule, Take 1 capsule (20 mg total) by mouth once daily., Disp: 90 capsule, Rfl: 1    sildenafiL (VIAGRA) 100 MG tablet, Take 1 tablet (100 mg total) by mouth daily as needed for Erectile Dysfunction., Disp: 30 tablet, Rfl: 5    simvastatin (ZOCOR) 40 MG tablet, Take 1 tablet (40 mg total) by mouth every evening., Disp: 90 tablet, Rfl: 3    TRUEPLUS LANCETS 33 gauge Misc, Check glucose twice daily, Disp: 100 each, Rfl: 3    fluocinolone (SYNALAR) 0.01 % external solution, APPLY TOPICALLY ONCE DAILY  FOR 10 DAYS, Disp: 60 mL, Rfl: 0    gabapentin (NEURONTIN) 300 MG capsule, Take 1 capsule (300 mg total) by mouth 3 (three) times daily., Disp: 60 capsule, Rfl: 1    methocarbamoL (ROBAXIN) 500 MG Tab, Take 1 tablet (500 mg total) by mouth 3 (three) times daily., Disp: 60 tablet, Rfl: 1    Social History:   Social History     Socioeconomic History    Marital status:     Number of children: 5    Highest education level: GED or equivalent   Tobacco Use    Smoking status: Former     Current packs/day: 0.00     Average packs/day: 0.2 packs/day for 42.5 years (10.6 ttl pk-yrs)     Types: Cigarettes     Start date: 1/15/1981     Quit date: 7/16/2023      Years since quittin.5    Smokeless tobacco: Never   Substance and Sexual Activity    Alcohol use: Yes     Alcohol/week: 4.0 standard drinks of alcohol     Types: 4 Cans of beer per week     Comment: social     Drug use: Not Currently     Types: Marijuana     Comment: quit marijuana    Sexual activity: Not Currently     Partners: Female     Social Determinants of Health     Financial Resource Strain: Low Risk  (3/17/2023)    Overall Financial Resource Strain (CARDIA)     Difficulty of Paying Living Expenses: Not hard at all   Food Insecurity: No Food Insecurity (3/17/2023)    Hunger Vital Sign     Worried About Running Out of Food in the Last Year: Never true     Ran Out of Food in the Last Year: Never true   Transportation Needs: No Transportation Needs (3/17/2023)    PRAPARE - Transportation     Lack of Transportation (Medical): No     Lack of Transportation (Non-Medical): No   Physical Activity: Inactive (3/17/2023)    Exercise Vital Sign     Days of Exercise per Week: 0 days     Minutes of Exercise per Session: 0 min   Stress: No Stress Concern Present (3/17/2023)    [employer] - Occupational Stress Questionnaire     Feeling of Stress : Not at all   Social Connections: Socially Isolated (3/17/2023)    Social Connection and Isolation Panel [NHANES]     Frequency of Communication with Friends and Family: More than three times a week     Frequency of Social Gatherings with Friends and Family: Once a week     Attends Episcopal Services: Never     Active Member of Clubs or Organizations: No     Marital Status:    Housing Stability: Unknown (3/17/2023)    Housing Stability Vital Sign     Unable to Pay for Housing in the Last Year: No     Unstable Housing in the Last Year: No       REVIEW OF SYSTEMS:  Constitution: Negative. Negative for chills, fever and night sweats.   Cardiovascular: Negative for chest pain and syncope.   Respiratory: Negative for cough and shortness of  "breath.   Gastrointestinal: See HPI. Negative for nausea/vomiting. Negative for abdominal pain.  Genitourinary: See HPI. Negative for discoloration or dysuria.  Hematologic/Lymphatic: negative for bleeding/clotting disorders.   Musculoskeletal: Negative for falls and muscle weakness.   Neurological: See HPI. no history of seizures. no history of cranial surgery or shunts.  Neurological: See HPI. No seizures.   Endocrine: Negative for polydipsia, polyphagia and polyuria.   Allergic/Immunologic: Negative for hives and persistent infections.     EXAM:  Ht 5' 11" (1.803 m)   Wt 110 kg (242 lb 8 oz)   BMI 33.82 kg/m²     PHYSICAL EXAMINATION:    General: The patient is a 63 y.o. male in no apparent distress, the patient is orientatied to person, place and time.  Psych: Normal mood and affect  HEENT: Vision grossly intact, hearing intact to the spoken word.  Lungs: Respirations unlabored.  Gait: Normal station and gait, no difficulty with toe or heel walk.   Skin: Dorsal lumbar skin negative for rashes, lesions, hairy patches and surgical scars. There is lower lumbar tenderness to palpation.  Range of motion: Lumbar range of motion is acceptable.  Spinal Balance: Global saggital and coronal spinal balance acceptable, no significant for scoliosis and kyphosis.  Musculoskeletal: No pain with the range of motion of the bilateral hips. No trochanteric tenderness to palpation.  Vascular: Bilateral lower extremities warm and well perfused, Dorsalis pedis pulses 2+ bilaterally.  Neurological: Normal strength and tone in all major motor groups in the bilateral lower extremities except for 4/5 in b/l HF. Normal sensation to light touch in the L2-S1 dermatomes bilaterally.  Deep tendon reflexes symmetric 2+ in the bilateral lower extremities.  Negative Babinski bilaterally. Straight leg raise negative bilaterally.    IMAGING:   Today I independently reviewed the following images and my interpretations are as follows:    AP, Lat " and Flex/Ex  upright L-spine demonstrate spondylosis and DDD without listhesis.      Body mass index is 33.82 kg/m².  Hemoglobin A1C   Date Value Ref Range Status   12/28/2023 6.8 (H) 4.0 - 5.6 % Final     Comment:     ADA Screening Guidelines:  5.7-6.4%  Consistent with prediabetes  >or=6.5%  Consistent with diabetes    High levels of fetal hemoglobin interfere with the HbA1C  assay. Heterozygous hemoglobin variants (HbS, HgC, etc)do  not significantly interfere with this assay.   However, presence of multiple variants may affect accuracy.     03/17/2023 7.4 (H) 4.0 - 5.6 % Final     Comment:     ADA Screening Guidelines:  5.7-6.4%  Consistent with prediabetes  >or=6.5%  Consistent with diabetes    High levels of fetal hemoglobin interfere with the HbA1C  assay. Heterozygous hemoglobin variants (HbS, HgC, etc)do  not significantly interfere with this assay.   However, presence of multiple variants may affect accuracy.     09/26/2022 6.3 (H) 4.0 - 5.6 % Final     Comment:     ADA Screening Guidelines:  5.7-6.4%  Consistent with prediabetes  >or=6.5%  Consistent with diabetes    High levels of fetal hemoglobin interfere with the HbA1C  assay. Heterozygous hemoglobin variants (HbS, HgC, etc)do  not significantly interfere with this assay.   However, presence of multiple variants may affect accuracy.         ASSESSMENT/PLAN:    Thierry was seen today for low-back pain and leg pain.    Diagnoses and all orders for this visit:    DDD (degenerative disc disease), lumbar  -     methocarbamoL (ROBAXIN) 500 MG Tab; Take 1 tablet (500 mg total) by mouth 3 (three) times daily.  -     gabapentin (NEURONTIN) 300 MG capsule; Take 1 capsule (300 mg total) by mouth 3 (three) times daily.  -     Ambulatory referral/consult to Physical/Occupational Therapy; Future    Lumbar radiculopathy  -     Ambulatory referral/consult to Orthopedics    Bilateral leg weakness  -     Ambulatory referral/consult to Orthopedics    Lumbar  spondylosis    Dorsalgia, unspecified  -     MRI Lumbar Spine Without Contrast; Future      Follow up in about 4 weeks (around 2/29/2024).    Patient has lumbar spondylosis and symptoms consistent with neurogenic claudication. I discussed the natural history of their diagnoses as well as surgical and nonsurgical treatment options. I educated the patient on the importance of core/back strengthening, correct posture, bending/lifting ergonomics, and low-impact aerobic exercises (walking, elliptical, and aquatherapy). I prescribed robaxin and gabapentin. Continue medications. I will refer the patient to PT for core/back strengthening. I ordered lumbar MRI to evaluate stenosis. Patient will follow up in 4 weeks for MRI review.    Gideon Barfield MD  Orthopaedic Spine Surgeon  Department of Orthopaedic Surgery  [phone]

## 2024-03-04 DIAGNOSIS — M54.16 LUMBAR RADICULOPATHY, CHRONIC: Primary | ICD-10-CM

## 2024-03-04 NOTE — PROGRESS NOTES
Spoke with pt virtually. Pt was last seen 2/1/24 and continues to have low back and bilateral leg pain. Pt has tried home exercises and gabapentin and robaxin for 8 weeks with worsening of pain. Pain is 8/10. I provided the patient with a home exercise program. It is the [employer] spine conditioning program. Exercises include head rolls, kneeling back extension, sitting rotation stretch, modified seated side straddle, knee to chest, bird dog, plank, modified seated plank, hip bridges, abdominal bracing, and abdominal crunch. Pt completed each exercise daily for one hour with worsening of pain. Will obtain MRI to further evaluate and call with results.

## 2024-03-14 ENCOUNTER — HOSPITAL ENCOUNTER (OUTPATIENT)
Dept: RADIOLOGY | Facility: HOSPITAL | Age: 64
Discharge: HOME | End: 2024-03-14
Attending: ORTHOPAEDIC SURGERY
Payer: MEDICARE

## 2024-03-14 DIAGNOSIS — M54.16 LUMBAR RADICULOPATHY, CHRONIC: ICD-10-CM

## 2024-03-14 PROCEDURE — 72148 MRI LUMBAR SPINE W/O DYE: CPT | Mod: 26,HCNC,, | Performed by: RADIOLOGY

## 2024-03-14 PROCEDURE — 72148 MRI LUMBAR SPINE W/O DYE: CPT | Mod: TC,HCNC

## 2024-04-17 ENCOUNTER — TELEPHONE (OUTPATIENT)
Dept: ORTHOPEDICS | Facility: CLINIC | Age: 64
End: 2024-04-17
Payer: MEDICARE

## 2024-04-17 NOTE — TELEPHONE ENCOUNTER
Called patient to inform him of new appointment date and time from 4/25 to 4/26 at 9:30am with Tammy. Patient verbalized understanding

## 2024-04-22 NOTE — H&P (VIEW-ONLY)
DATE: 4/22/2024  PATIENT: Thierry Ho    Attending Physician: Gideon Barfield MD    HISTORY:  Thierry Ho is a 63 y.o. male who returns to me today for follow up.  He was last seen by Dr Barfield on 2/1/24.  Today he is doing well but notes he continues to have low back and b/l leg pain (Back - 5, Leg - 5). The pain has been present for 1 year. The patient describes the pain as dull and it radiates down BLE to the toes. Patient cannot walk longer than 1 block before having to sit down. He gets relief by leaning forward on a shopping cart. He is currently using a cane.  The pain is worse with activity and improved by rest. There is no associated numbness and tingling. There is BLE subjective weakness. Prior treatments have included meds (tylenol) and home exercises for 8 weeks in the last 6 months, but no ESIs or surgery    The Patient denies myelopathic symptoms such as handwriting changes or difficulty with buttons/coins/keys. Denies perineal paresthesias, bowel/bladder dysfunction.    PMH/PSH/FamHx/SocHx:  Unchanged from prior visit    ROS:  REVIEW OF SYSTEMS:  Constitution: Negative. Negative for chills, fever and night sweats.   HENT: Negative for congestion and headaches.    Eyes: Negative for blurred vision, left vision loss and right vision loss.   Cardiovascular: Negative for chest pain and syncope.   Respiratory: Negative for cough and shortness of breath.    Endocrine: Negative for polydipsia, polyphagia and polyuria.   Hematologic/Lymphatic: Negative for bleeding problem. Does not bruise/bleed easily.   Skin: Negative for dry skin, itching and rash.   Musculoskeletal: Negative for falls and muscle weakness.   Gastrointestinal: Negative for abdominal pain and bowel incontinence.   Allergic/Immunologic: Negative for hives and persistent infections.  Genitourinary: Negative for urinary retention/incontinence and nocturia.   Neurological: negative for disturbances in coordination, no myelopathic symptoms such as  handwriting changes or difficulty with buttons, coins, keys or small objects. No loss of balance and seizures.   Psychiatric/Behavioral: Negative for depression. The patient does not have insomnia.   Denies perineal paresthesias, bowel or bladder incontinence    EXAM:  There were no vitals taken for this visit.    My physical examination was notable for the following findings:     Musculoskeletal and neuro exam stable      IMAGING:    Today I personally re- reviewed AP, Lat and Flex/Ex  upright L-spine that demonstrate spondylosis and DDD without listhesis.     MRI lumbar demonstrates mild-to-moderate effacement of the thecal sac at L4-S1. Mild-to-moderate neural foraminal narrowing noted at L3-S1.     There is no height or weight on file to calculate BMI.    Hemoglobin A1C   Date Value Ref Range Status   12/28/2023 6.8 (H) 4.0 - 5.6 % Final     Comment:     ADA Screening Guidelines:  5.7-6.4%  Consistent with prediabetes  >or=6.5%  Consistent with diabetes    High levels of fetal hemoglobin interfere with the HbA1C  assay. Heterozygous hemoglobin variants (HbS, HgC, etc)do  not significantly interfere with this assay.   However, presence of multiple variants may affect accuracy.     03/17/2023 7.4 (H) 4.0 - 5.6 % Final     Comment:     ADA Screening Guidelines:  5.7-6.4%  Consistent with prediabetes  >or=6.5%  Consistent with diabetes    High levels of fetal hemoglobin interfere with the HbA1C  assay. Heterozygous hemoglobin variants (HbS, HgC, etc)do  not significantly interfere with this assay.   However, presence of multiple variants may affect accuracy.     09/26/2022 6.3 (H) 4.0 - 5.6 % Final     Comment:     ADA Screening Guidelines:  5.7-6.4%  Consistent with prediabetes  >or=6.5%  Consistent with diabetes    High levels of fetal hemoglobin interfere with the HbA1C  assay. Heterozygous hemoglobin variants (HbS, HgC, etc)do  not significantly interfere with this assay.   However, presence of multiple variants  may affect accuracy.           ASSESSMENT/PLAN:    There are no diagnoses linked to this encounter.    Today we discussed at length all of the different treatment options including anti-inflammatories, acetaminophen, rest, ice, heat, physical therapy including strengthening and stretching exercises, home exercises, ROM, aerobic conditioning, aqua therapy, other modalities including ultrasound, massage, and dry needling, epidural steroid injections and finally surgical intervention.      Pt presents with chronic low back pain and radiculopathy. Failure of conservative rx. Will order IL L4-5 LEW with pain management. Will send medrol dose pack, lyrica, and flexeril to pharmacy. Pt will fu if pain persists.

## 2024-04-26 ENCOUNTER — OFFICE VISIT (OUTPATIENT)
Dept: ORTHOPEDICS | Facility: CLINIC | Age: 64
End: 2024-04-26
Payer: MEDICARE

## 2024-04-26 VITALS — BODY MASS INDEX: 33.28 KG/M2 | WEIGHT: 237.75 LBS | HEIGHT: 71 IN

## 2024-04-26 DIAGNOSIS — M54.16 LUMBAR RADICULOPATHY, CHRONIC: Primary | ICD-10-CM

## 2024-04-26 PROCEDURE — 99213 OFFICE O/P EST LOW 20 MIN: CPT | Mod: HCNC,S$GLB,, | Performed by: ORTHOPAEDIC SURGERY

## 2024-04-26 PROCEDURE — 1159F MED LIST DOCD IN RCRD: CPT | Mod: HCNC,CPTII,S$GLB, | Performed by: ORTHOPAEDIC SURGERY

## 2024-04-26 PROCEDURE — 3008F BODY MASS INDEX DOCD: CPT | Mod: HCNC,CPTII,S$GLB, | Performed by: ORTHOPAEDIC SURGERY

## 2024-04-26 PROCEDURE — 99999 PR PBB SHADOW E&M-EST. PATIENT-LVL III: CPT | Mod: PBBFAC,HCNC,, | Performed by: ORTHOPAEDIC SURGERY

## 2024-04-26 PROCEDURE — 4010F ACE/ARB THERAPY RXD/TAKEN: CPT | Mod: HCNC,CPTII,S$GLB, | Performed by: ORTHOPAEDIC SURGERY

## 2024-04-26 RX ORDER — CYCLOBENZAPRINE HCL 5 MG
5 TABLET ORAL 3 TIMES DAILY PRN
Qty: 90 TABLET | Refills: 0 | Status: SHIPPED | OUTPATIENT
Start: 2024-04-26 | End: 2024-04-30 | Stop reason: SDUPTHER

## 2024-04-26 RX ORDER — PREGABALIN 100 MG/1
100 CAPSULE ORAL 2 TIMES DAILY
Qty: 60 CAPSULE | Refills: 5 | Status: SHIPPED | OUTPATIENT
Start: 2024-04-26 | End: 2024-04-30 | Stop reason: SDUPTHER

## 2024-04-26 RX ORDER — METHYLPREDNISOLONE 4 MG/1
TABLET ORAL
Qty: 1 EACH | Refills: 0 | Status: SHIPPED | OUTPATIENT
Start: 2024-04-26 | End: 2024-04-30 | Stop reason: SDUPTHER

## 2024-04-29 ENCOUNTER — TELEPHONE (OUTPATIENT)
Dept: PAIN MEDICINE | Facility: CLINIC | Age: 64
End: 2024-04-29
Payer: MEDICARE

## 2024-04-29 DIAGNOSIS — M54.16 LUMBAR RADICULOPATHY: ICD-10-CM

## 2024-04-29 DIAGNOSIS — M51.36 DDD (DEGENERATIVE DISC DISEASE), LUMBAR: Primary | ICD-10-CM

## 2024-04-29 NOTE — TELEPHONE ENCOUNTER
----- Message from David Stringer Jr., MD sent at 2024 11:43 AM CDT -----  Regarding: RE: Order for BELLE FELDMAN  OK to schedule for L4-5 ILESI. Thanks.  ----- Message -----  From: Aimee Flores RN  Sent: 2024  11:31 AM CDT  To: David Stringer Jr., MD  Subject: FW: Order for BELLE FELDMAN                         ----- Message -----  From: Tammy Tony PA-C  Sent: 2024  11:00 AM CDT  To: [facility] Schedulers  Subject: Order for BELLE FELDMAN                             Patient Name: BELLE FELDMAN([id number])  Sex: Male  : 1960      PCP: TIBURCIO BATISTA    Center: [facility]     Level of Service:02558     MT OFFICE/OUTPT VISIT, EST, LEVL III, 20-29 MIN    Types of orders made on 2024: Medications, Procedure Request    Order Date:2024  Ordering User:PHIL TONY [[id number]]  Encounter Provider:Tammy Tony PA-C [[id number]]  Authorizing Provider: Tammy Tony PA-C [[id number]]  Supervising Provider:REESE SPAIN [[id number]]  Type of Supervision:Supervision Required  Department:[facility][[id number]]    Common Order Information  Procedure -> Epidural Injection (specify level) Cmt: IL L4-5    Order Specific Information  Order: Procedure Order to Pain   Management [Custom: [id number]]  Order #:          [id number]: 1 FUTURE    Priority: Routine  Class: Clinic Performed    Future Order Information      Expires on:2025            Expected by:2024                   Associated Diagnoses      M54.16 Lumbar radiculopathy, chronic      Facility Name: -> [facility]           Priority: Routine  Class: Clinic Performed    Future Order Infor  mation      Expires on:2025            Expected by:2024                   Associated Diagnoses      M54.16 Lumbar radiculopathy, chronic      Procedure -> Epidural Injection (specify level) Cmt: IL L4-5        Facility Name: -> [facility]

## 2024-04-30 ENCOUNTER — TELEPHONE (OUTPATIENT)
Dept: PAIN MEDICINE | Facility: CLINIC | Age: 64
End: 2024-04-30
Payer: MEDICARE

## 2024-04-30 RX ORDER — PREGABALIN 100 MG/1
100 CAPSULE ORAL 2 TIMES DAILY
Qty: 60 CAPSULE | Refills: 5 | Status: SHIPPED | OUTPATIENT
Start: 2024-04-30 | End: 2024-10-29

## 2024-04-30 RX ORDER — CYCLOBENZAPRINE HCL 5 MG
5 TABLET ORAL 3 TIMES DAILY PRN
Qty: 90 TABLET | Refills: 0 | Status: SHIPPED | OUTPATIENT
Start: 2024-04-30 | End: 2024-05-30

## 2024-04-30 RX ORDER — METHYLPREDNISOLONE 4 MG/1
TABLET ORAL
Qty: 1 EACH | Refills: 0 | Status: SHIPPED | OUTPATIENT
Start: 2024-04-30 | End: 2024-05-21

## 2024-04-30 NOTE — TELEPHONE ENCOUNTER
Mr. Ho would like to schedule the L4-5 LEW ref: Chavo on 5/10/24- providers updated.    Reviewed pre-procedure instructions with him, as well as provided arrival time of 12:00p for 5/10/24.  All questions answered- verbalized understanding.    Clearance to hold ASA x5 days sent to PCP. If cleared, last dose will be 5/4/24, he will begin holding starting Sunday.

## 2024-05-06 ENCOUNTER — TELEPHONE (OUTPATIENT)
Dept: PAIN MEDICINE | Facility: CLINIC | Age: 64
End: 2024-05-06
Payer: MEDICARE

## 2024-05-06 NOTE — TELEPHONE ENCOUNTER
----- Message from Sterling Jarquin MD sent at 5/1/2024  4:52 PM CDT -----  Regarding: RE: Clearance to hold ASA  Yes that's fine  ----- Message -----  From: Sara Mendez LPN  Sent: 4/30/2024   3:03 PM CDT  To: Sterling Jarquin MD  Subject: FW: Clearance to hold ASA                        Please see message below regarding holding asa  ----- Message -----  From: Aimee Flores, RN  Sent: 4/30/2024   2:34 PM CDT  To: [facility] Staff  Subject: Clearance to hold ASA                            Dr. Jarquin,    Mr. Ho is scheduled to have a L4-5 LEW on 5/8/24.  Dr. Stringer is requesting that the patient stop taking ASA five days prior to this procedure.  Please indicate whether or not he is able to stop taking the medication listed above.  Feel free to contact the clinic with any questions or concerns you may have.      Thank you in advance for your time and expertise,    HOUSTON Singleton, RN

## 2024-05-10 ENCOUNTER — HOSPITAL ENCOUNTER (OUTPATIENT)
Facility: HOSPITAL | Age: 64
Discharge: HOME | End: 2024-05-10
Attending: PAIN MEDICINE | Admitting: PAIN MEDICINE
Payer: MEDICARE

## 2024-05-10 VITALS
DIASTOLIC BLOOD PRESSURE: 97 MMHG | TEMPERATURE: 99 F | OXYGEN SATURATION: 99 % | SYSTOLIC BLOOD PRESSURE: 184 MMHG | HEART RATE: 75 BPM | RESPIRATION RATE: 18 BRPM

## 2024-05-10 DIAGNOSIS — M54.16 LUMBAR RADICULOPATHY: Primary | ICD-10-CM

## 2024-05-10 LAB — POCT GLUCOSE: 82 MG/DL (ref 70–110)

## 2024-05-10 PROCEDURE — 25500020 PHARM REV CODE 255: Mod: HCNC | Performed by: PAIN MEDICINE

## 2024-05-10 PROCEDURE — 62323 NJX INTERLAMINAR LMBR/SAC: CPT | Mod: HCNC | Performed by: PAIN MEDICINE

## 2024-05-10 PROCEDURE — 62323 NJX INTERLAMINAR LMBR/SAC: CPT | Mod: HCNC,,, | Performed by: PAIN MEDICINE

## 2024-05-10 PROCEDURE — 63600175 PHARM REV CODE 636 W HCPCS: Mod: HCNC | Performed by: PAIN MEDICINE

## 2024-05-10 PROCEDURE — 25000003 PHARM REV CODE 250: Mod: HCNC | Performed by: PAIN MEDICINE

## 2024-05-10 RX ORDER — LIDOCAINE HYDROCHLORIDE 10 MG/ML
1 INJECTION, SOLUTION EPIDURAL; INFILTRATION; INTRACAUDAL; PERINEURAL ONCE
Status: COMPLETED | OUTPATIENT
Start: 2024-05-10 | End: 2024-05-10

## 2024-05-10 RX ORDER — LIDOCAINE HYDROCHLORIDE 10 MG/ML
20 INJECTION INFILTRATION; PERINEURAL ONCE
Status: COMPLETED | OUTPATIENT
Start: 2024-05-10 | End: 2024-05-10

## 2024-05-10 RX ORDER — DEXAMETHASONE SODIUM PHOSPHATE 10 MG/ML
INJECTION INTRAMUSCULAR; INTRAVENOUS
Status: DISCONTINUED
Start: 2024-05-10 | End: 2024-05-10 | Stop reason: HOSPADM

## 2024-05-10 RX ORDER — LIDOCAINE HYDROCHLORIDE 10 MG/ML
INJECTION INFILTRATION; PERINEURAL
Status: DISCONTINUED
Start: 2024-05-10 | End: 2024-05-10 | Stop reason: HOSPADM

## 2024-05-10 RX ORDER — DEXAMETHASONE SODIUM PHOSPHATE 10 MG/ML
10 INJECTION INTRAMUSCULAR; INTRAVENOUS ONCE
Status: COMPLETED | OUTPATIENT
Start: 2024-05-10 | End: 2024-05-10

## 2024-05-10 RX ORDER — ALPRAZOLAM 0.5 MG/1
1 TABLET, ORALLY DISINTEGRATING ORAL ONCE AS NEEDED
Status: DISCONTINUED | OUTPATIENT
Start: 2024-05-10 | End: 2024-05-10 | Stop reason: HOSPADM

## 2024-05-10 RX ORDER — LIDOCAINE HYDROCHLORIDE 10 MG/ML
INJECTION, SOLUTION EPIDURAL; INFILTRATION; INTRACAUDAL; PERINEURAL
Status: DISCONTINUED
Start: 2024-05-10 | End: 2024-05-10 | Stop reason: HOSPADM

## 2024-05-10 NOTE — OP NOTE
Lumbar Interlaminar Epidural Steroid Injection under Fluoroscopic Guidance.  Time-out taken to identify patient and procedure side prior to starting the procedure.   I attest that I have reviewed the patient's home medications prior to the procedure and no contraindication have been identified. I  re-evaluated the patient after the patient was positioned for the procedure in the procedure room immediately before the procedural time-out. The vital signs are current and represent the current state of the patient which has not significantly changed since the preprocedure assessment.                                                                   Date of Service: 05/10/2024    PCP: Sterling Jarquin MD    Referring Physician:      Procedure:  L4-5 Interlaminar epidural steroid injection under fluoroscopic guidance.    Reasons for procedure: DDD (degenerative disc disease), lumbar [M51.36]  Lumbar radiculopathy [M54.16]     Physician: David Stringer MD    ASSISTANTS: None    Medications injected: Preservative-free dexamethasone 10mg with 2mL of sterile Xylocaine-MPF 1% and 2ml of sterile preservative-free normal saline.    Local anesthetic injected:    Xylocaine 1% 3ml.     Sedation Medications: None    Estimated blood loss:  none.    Complications:  none.    Technique:  With the patient laying in a prone position, the area was prepped and draped in the usual sterile fashion using ChloraPrep and a fenestrated drape.  Local anesthetic was given using a 27-gauge needle by raising a wheal and going down to the hub of the needle.  A 3.5 inch 20-gauge Touhy needle was introduced under fluoroscopic guidance.  It met the lamina of the posterior element. The needle was then hinged above the lamina.  Loss of resistance technique was employed while advancing the needle.  Once in the desired position, contrast dye Omnipaque was injected to confirm placement and there was no vascular runoff.  Digital subtraction was employed  to confirm that there was no vascular runoff.  The medication was then injected slowly.  The patient tolerated the procedure well.      Pain before the procedure: 10/10    Pain after the procedure: 0/10    The patient was monitored after the procedure.   They were given post-procedure and discharge instructions to follow at home.  The patient was discharged in a stable condition.

## 2024-05-10 NOTE — DISCHARGE SUMMARY
[facility] - Pain Management  Discharge Note  Short Stay    Procedure(s) (LRB):  L4-5 Epidural Steroid Injection (ref: Chavo) (N/A)      OUTCOME: Patient tolerated treatment/procedure well without complication and is now ready for discharge.    DISPOSITION: Home or Self Care    FINAL DIAGNOSIS:  <principal problem not specified>    FOLLOWUP: In clinic    DISCHARGE INSTRUCTIONS:  No discharge procedures on file.       Discharge Diagnosis:DDD (degenerative disc disease), lumbar [M51.36]  Lumbar radiculopathy [M54.16]  Condition on Discharge: Stable.  Diet on Discharge: Same as before.  Activity: as per instruction sheet.  Discharge to: Home with a responsible adult.  Follow up: as per Discharge instructions

## 2024-05-10 NOTE — PLAN OF CARE
Patient tolerated procedure well. Patient reports 2/10 pain after procedure. Assisted patient up for first time. Gait steady. All discharge instructions given.

## 2024-05-10 NOTE — DISCHARGE INSTRUCTIONS

## 2024-06-28 ENCOUNTER — PATIENT OUTREACH (OUTPATIENT)
Dept: ADMINISTRATIVE | Facility: HOSPITAL | Age: 64
End: 2024-06-28
Payer: MEDICARE

## 2024-06-28 ENCOUNTER — OFFICE VISIT (OUTPATIENT)
Dept: FAMILY MEDICINE | Facility: CLINIC | Age: 64
End: 2024-06-28
Payer: MEDICARE

## 2024-06-28 ENCOUNTER — LAB VISIT (OUTPATIENT)
Dept: LAB | Facility: HOSPITAL | Age: 64
End: 2024-06-28
Attending: INTERNAL MEDICINE
Payer: MEDICARE

## 2024-06-28 VITALS
HEIGHT: 71 IN | WEIGHT: 236.31 LBS | BODY MASS INDEX: 33.08 KG/M2 | OXYGEN SATURATION: 97 % | DIASTOLIC BLOOD PRESSURE: 86 MMHG | SYSTOLIC BLOOD PRESSURE: 172 MMHG | HEART RATE: 74 BPM | TEMPERATURE: 98 F | RESPIRATION RATE: 18 BRPM

## 2024-06-28 DIAGNOSIS — Z12.12 ENCOUNTER FOR COLORECTAL CANCER SCREENING: Primary | ICD-10-CM

## 2024-06-28 DIAGNOSIS — N52.9 ERECTILE DYSFUNCTION, UNSPECIFIED ERECTILE DYSFUNCTION TYPE: ICD-10-CM

## 2024-06-28 DIAGNOSIS — R10.84 GENERALIZED ABDOMINAL PAIN: Primary | ICD-10-CM

## 2024-06-28 DIAGNOSIS — I10 ESSENTIAL HYPERTENSION: ICD-10-CM

## 2024-06-28 DIAGNOSIS — R10.84 GENERALIZED ABDOMINAL PAIN: ICD-10-CM

## 2024-06-28 DIAGNOSIS — E11.8 DIABETES MELLITUS TYPE 2 WITH COMPLICATIONS: ICD-10-CM

## 2024-06-28 DIAGNOSIS — Z12.11 ENCOUNTER FOR COLORECTAL CANCER SCREENING: Primary | ICD-10-CM

## 2024-06-28 LAB
ALBUMIN SERPL BCP-MCNC: 3.2 G/DL (ref 3.5–5.2)
ALP SERPL-CCNC: 75 U/L (ref 55–135)
ALT SERPL W/O P-5'-P-CCNC: 31 U/L (ref 10–44)
ANION GAP SERPL CALC-SCNC: 9 MMOL/L (ref 8–16)
AST SERPL-CCNC: 26 U/L (ref 10–40)
BASOPHILS # BLD AUTO: 0.04 K/UL (ref 0–0.2)
BASOPHILS NFR BLD: 0.5 % (ref 0–1.9)
BILIRUB SERPL-MCNC: 0.2 MG/DL (ref 0.1–1)
BUN SERPL-MCNC: 26 MG/DL (ref 8–23)
CALCIUM SERPL-MCNC: 11 MG/DL (ref 8.7–10.5)
CHLORIDE SERPL-SCNC: 111 MMOL/L (ref 95–110)
CO2 SERPL-SCNC: 22 MMOL/L (ref 23–29)
CREAT SERPL-MCNC: 1.2 MG/DL (ref 0.5–1.4)
DIFFERENTIAL METHOD BLD: ABNORMAL
EOSINOPHIL # BLD AUTO: 0.2 K/UL (ref 0–0.5)
EOSINOPHIL NFR BLD: 2.3 % (ref 0–8)
ERYTHROCYTE [DISTWIDTH] IN BLOOD BY AUTOMATED COUNT: 15 % (ref 11.5–14.5)
EST. GFR  (NO RACE VARIABLE): >60 ML/MIN/1.73 M^2
ESTIMATED AVG GLUCOSE: 143 MG/DL (ref 68–131)
GLUCOSE SERPL-MCNC: 186 MG/DL (ref 70–110)
HBA1C MFR BLD: 6.6 % (ref 4–5.6)
HCT VFR BLD AUTO: 36.2 % (ref 40–54)
HGB BLD-MCNC: 12.7 G/DL (ref 14–18)
IMM GRANULOCYTES # BLD AUTO: 0.03 K/UL (ref 0–0.04)
IMM GRANULOCYTES NFR BLD AUTO: 0.3 % (ref 0–0.5)
LYMPHOCYTES # BLD AUTO: 2 K/UL (ref 1–4.8)
LYMPHOCYTES NFR BLD: 22.9 % (ref 18–48)
MCH RBC QN AUTO: 33.2 PG (ref 27–31)
MCHC RBC AUTO-ENTMCNC: 35.1 G/DL (ref 32–36)
MCV RBC AUTO: 95 FL (ref 82–98)
MONOCYTES # BLD AUTO: 0.9 K/UL (ref 0.3–1)
MONOCYTES NFR BLD: 10.3 % (ref 4–15)
NEUTROPHILS # BLD AUTO: 5.6 K/UL (ref 1.8–7.7)
NEUTROPHILS NFR BLD: 63.7 % (ref 38–73)
NRBC BLD-RTO: 0 /100 WBC
PLATELET # BLD AUTO: 266 K/UL (ref 150–450)
PMV BLD AUTO: 11.2 FL (ref 9.2–12.9)
POTASSIUM SERPL-SCNC: 4.4 MMOL/L (ref 3.5–5.1)
PROT SERPL-MCNC: 6.7 G/DL (ref 6–8.4)
RBC # BLD AUTO: 3.83 M/UL (ref 4.6–6.2)
SODIUM SERPL-SCNC: 142 MMOL/L (ref 136–145)
TSH SERPL DL<=0.005 MIU/L-ACNC: 1.56 UIU/ML (ref 0.4–4)
WBC # BLD AUTO: 8.84 K/UL (ref 3.9–12.7)

## 2024-06-28 PROCEDURE — 99999 PR PBB SHADOW E&M-EST. PATIENT-LVL V: CPT | Mod: PBBFAC,HCNC,, | Performed by: INTERNAL MEDICINE

## 2024-06-28 PROCEDURE — 80053 COMPREHEN METABOLIC PANEL: CPT | Mod: HCNC | Performed by: INTERNAL MEDICINE

## 2024-06-28 PROCEDURE — 36415 COLL VENOUS BLD VENIPUNCTURE: CPT | Mod: HCNC,PO | Performed by: INTERNAL MEDICINE

## 2024-06-28 PROCEDURE — 83036 HEMOGLOBIN GLYCOSYLATED A1C: CPT | Mod: HCNC | Performed by: INTERNAL MEDICINE

## 2024-06-28 PROCEDURE — 84443 ASSAY THYROID STIM HORMONE: CPT | Mod: HCNC | Performed by: INTERNAL MEDICINE

## 2024-06-28 PROCEDURE — 85025 COMPLETE CBC W/AUTO DIFF WBC: CPT | Mod: HCNC | Performed by: INTERNAL MEDICINE

## 2024-06-28 RX ORDER — SILDENAFIL 100 MG/1
100 TABLET, FILM COATED ORAL DAILY PRN
Qty: 30 TABLET | Refills: 5 | Status: SHIPPED | OUTPATIENT
Start: 2024-06-28 | End: 2025-06-28

## 2024-06-28 NOTE — PROGRESS NOTES
Subjective:       Patient ID: Thierry Ho is a pleasant 64 y.o. Black or  male patient    Chief Complaint: f (Knots in stomach in pain )      Patient is a new pt to me but established patient from Dr. Jarquin.    HPI:     Patient with past medical history as per list of problems below coming today for what is stated to be establishing care with a new PCP, however was not made aware of the fact that I will not be part of the [facility] system anymore either.    He is not a very good historian, reports that he had back injections by Dr. Stringer that helped only for 3 days.  The main issue he would like to address today is abdominal pain that is stated to have been present for about 2 years, on and off, can be right or left-sided, no relation with bowel movements, difficult for him to describe it.  He has increased abdominal girth.    Patient Active Problem List   Diagnosis    Other hyperlipidemia    Gastroesophageal reflux disease without esophagitis    Essential hypertension    History of coronary angiogram    Type 2 diabetes mellitus with microalbuminuria, with long-term current use of insulin    Coronary artery disease involving native coronary artery of native heart without angina pectoris    Closed fracture of distal end of right fibula with delayed healing    Tobacco abuse    Non morbid obesity, unspecified obesity type         PAST MEDICAL HISTORY  Past Medical History:   Diagnosis Date    Colon polyps     Diabetes mellitus     Diabetes with neurologic complications     DM retinopathy     GERD (gastroesophageal reflux disease)     Hyperlipidemia     Hypertension     Myocardial infarction         PAST SURGICAL HISTORY:  Past Surgical History:   Procedure Laterality Date    APPENDECTOMY      BACK SURGERY  11/2017    COLONOSCOPY N/A 10/19/2018    Procedure: COLONOSCOPY;  Surgeon: Frantz Rasmussen MD;  Location: [facility];  Service: Endoscopy;  Laterality: N/A;    COLONOSCOPY N/A 2/22/2021     Procedure: COLONOSCOPY;  Surgeon: Dann Mahmood MD;  Location: [facility] ENDO;  Service: Endoscopy;  Laterality: N/A;  covid test [place] 2/19, instructions mailed -ml    EPIDURAL STEROID INJECTION INTO LUMBAR SPINE N/A 5/10/2024    Procedure: L4-5 Epidural Steroid Injection (ref: Tony);  Surgeon: David Stringer Jr., MD;  Location: [facility] PAIN MANAGEMENT;  Service: Pain Management;  Laterality: N/A;  @1200  ASA last 5/4  DM  Needs Consent    EXCISION OF SKIN N/A 6/25/2020    Procedure: EXCISION, SKIN;  Surgeon: Finesse Dumont MD;  Location: [facility] OR;  Service: General;  Laterality: N/A;  on back    FOOT SURGERY      INCISION AND DRAINAGE OF GROIN Right 6/25/2020    Procedure: INCISION AND DRAINAGE, INGUINAL REGION;  Surgeon: Finesse Dumont MD;  Location: [facility] OR;  Service: General;  Laterality: Right;  PT TO PREOP IN AM  PRE-OP BY RN 6-    OPEN REDUCTION AND INTERNAL FIXATION (ORIF) OF INJURY OF ANKLE Right 7/17/2019    Procedure: ORIF, ANKLE;  Surgeon: Raeann Steward MD;  Location: [facility] OR;  Service: Orthopedics;  Laterality: Right;  SARITA GALAN  [phone] TEXTED HIM @ 10:2 AM ON 7-11-19  SKELETAL  RN PREOP 7/9/19---- CMP MORNING OF SURGERY PER DR STEWARD        SOCIAL HISTORY:   reports that he quit smoking about a year ago. His smoking use included cigarettes. He started smoking about 43 years ago. He has a 10.6 pack-year smoking history. He has never used smokeless tobacco. He reports current alcohol use of about 4.0 standard drinks of alcohol per week. He reports that he does not currently use drugs after having used the following drugs: Marijuana.     FAMILY HISTORY:  Family History   Problem Relation Name Age of Onset    Heart disease Mother      Breast cancer Mother      Diabetes Father      Cancer Sister 4     Cancer Brother 2         ALLERGIES:   Review of patient's allergies indicates:   Allergen Reactions    Trulicity [dulaglutide]        MEDICATIONS:    Current  "Outpatient Medications:     acetaminophen (TYLENOL) 500 MG tablet, Take 500 mg by mouth every 6 (six) hours as needed for Pain., Disp: , Rfl:     amitriptyline (ELAVIL) 25 MG tablet, Take 1 tablet (25 mg total) by mouth nightly as needed for Insomnia., Disp: 90 tablet, Rfl: 1    aspirin (ECOTRIN) 81 MG EC tablet, Take 81 mg by mouth once daily., Disp: , Rfl:     BD ULTRA-FINE FREDA PEN NEEDLE 32 gauge x 5/32" Ndle, USE AS DIRECTED 2 TIMES DAILY WITH LEVEMIR, Disp: 200 each, Rfl: 3    BLOOD PRESSURE CUFF Misc, 1 kit by Misc.(Non-Drug; Combo Route) route once daily., Disp: 1 each, Rfl: 0    blood sugar diagnostic (TRUE METRIX GLUCOSE TEST STRIP) Strp, 1 strip by Misc.(Non-Drug; Combo Route) route 2 (two) times a day., Disp: 200 each, Rfl: 11    blood-glucose meter kit, 1 each by Other route 4 (four) times daily before meals and nightly., Disp: 1 each, Rfl: 0    carvediloL (COREG) 25 MG tablet, Take 1 tablet (25 mg total) by mouth 2 (two) times daily with meals., Disp: 180 tablet, Rfl: 3    fluocinonide (LIDEX) 0.05 % external solution, Topical prn, Disp: 60 mL, Rfl: 0    ibuprofen (ADVIL,MOTRIN) 600 MG tablet, Take 1 tablet (600 mg total) by mouth every 6 (six) hours as needed for Pain., Disp: 20 tablet, Rfl: 0    insulin (LANTUS SOLOSTAR U-100 INSULIN) glargine 100 units/mL SubQ pen, Inject 40 Units into the skin 2 (two) times a day., Disp: 72 mL, Rfl: 3    lisinopriL (PRINIVIL,ZESTRIL) 40 MG tablet, Take 1 tablet (40 mg total) by mouth once daily., Disp: 90 tablet, Rfl: 3    metFORMIN (GLUCOPHAGE) 1000 MG tablet, Take 1 tablet (1,000 mg total) by mouth 2 (two) times daily with meals., Disp: 180 tablet, Rfl: 3    methocarbamoL (ROBAXIN) 500 MG Tab, Take 1 tablet (500 mg total) by mouth 3 (three) times daily., Disp: 60 tablet, Rfl: 1    pregabalin (LYRICA) 100 MG capsule, Take 1 capsule (100 mg total) by mouth 2 (two) times daily., Disp: 60 capsule, Rfl: 5    simvastatin (ZOCOR) 40 MG tablet, Take 1 tablet (40 mg " "total) by mouth every evening., Disp: 90 tablet, Rfl: 3    TRUEPLUS LANCETS 33 gauge Misc, Check glucose twice daily, Disp: 100 each, Rfl: 3    ciclopirox (PENLAC) 8 % Soln, Apply topically nightly., Disp: 1 Bottle, Rfl: 3    fluocinolone (SYNALAR) 0.01 % external solution, APPLY TOPICALLY ONCE DAILY  FOR 10 DAYS, Disp: 60 mL, Rfl: 0    hydrALAZINE (APRESOLINE) 25 MG tablet, Take 1 tablet (25 mg total) by mouth every 12 (twelve) hours., Disp: 180 tablet, Rfl: 3    ketoconazole (NIZORAL) 2 % cream, APPLY TOPICALLY ONCE DAILY. (Patient not taking: Reported on 6/28/2024), Disp: 60 g, Rfl: 0    ketoconazole (NIZORAL) 2 % shampoo, APPLY TOPICALLY TWICE A WEEK., Disp: 120 mL, Rfl: 0    omeprazole (PRILOSEC) 20 MG capsule, Take 1 capsule (20 mg total) by mouth once daily., Disp: 90 capsule, Rfl: 1    sildenafiL (VIAGRA) 100 MG tablet, Take 1 tablet (100 mg total) by mouth daily as needed for Erectile Dysfunction., Disp: 30 tablet, Rfl: 5    Review of Systems   Constitutional: Negative.    HENT: Negative.     Respiratory: Negative.     Cardiovascular: Negative.    Gastrointestinal:  Positive for abdominal pain (bilateral abdominal pain).   Endocrine: Negative.    Genitourinary: Negative.    Musculoskeletal: Negative.    Neurological: Negative.    Psychiatric/Behavioral: Negative.         Objective:      Physical Exam  Vitals reviewed.   Constitutional:       Appearance: Normal appearance.   Abdominal:      General: Bowel sounds are normal.          Comments: Increased abdominal girth   Musculoskeletal:      Comments: Ambulates with difficulties   Neurological:      Mental Status: He is alert.         Vitals:    06/28/24 0955   BP: (!) 172/86   BP Location: Left arm   Patient Position: Lying   BP Method: Large (Manual)   Pulse: 74   Resp: 18   Temp: 98.1 °F (36.7 °C)   TempSrc: Oral   SpO2: 97%   Weight: 107.2 kg (236 lb 5.3 oz)   Height: 5' 11" (1.803 m)     Body mass index is 32.96 kg/m².    RESULTS: Reviewed labs from " last 12 months    Last Lab Results:     Lab Results   Component Value Date    WBC 10.78 12/28/2023    HGB 13.5 (L) 12/28/2023    HCT 40.3 12/28/2023     12/28/2023     12/28/2023    K 4.7 12/28/2023     (H) 12/28/2023    CO2 24 12/28/2023    BUN 24 (H) 12/28/2023    CREATININE 1.2 12/28/2023    CALCIUM 11.0 (H) 12/28/2023    ALBUMIN 3.4 (L) 12/28/2023    AST 35 12/28/2023    ALT 32 12/28/2023    CHOL 116 (L) 12/28/2023    TRIG 114 12/28/2023    HDL 36 (L) 12/28/2023    LDLCALC 57.2 (L) 12/28/2023    HGBA1C 6.8 (H) 12/28/2023    TSH 1.971 12/28/2023       Assessment & Plan:       1. Generalized abdominal pain  -     Comprehensive Metabolic Panel; Future; Expected date: 06/28/2024  -     CT Abdomen Without Contrast; Future; Expected date: 06/28/2024    See HPI, difficult for pt to describe this, at PE, has some superficial masses, electively painful to palpation, may be lipomas, difficult exam due to increase of abdominal girth. Out of safety, will do blood work and order CT.    2. Essential hypertension  -     CBC Auto Differential; Future  -     TSH; Future; Expected date: 06/28/2024    BP not at goal, will need to f-up with new PCP.    3. Diabetes mellitus type 2 with complications  -     Hemoglobin A1C; Future; Expected date: 06/28/2024    Most recent A1C 6.8 %.    4. Erectile dysfunction, unspecified erectile dysfunction type  -     sildenafiL (VIAGRA) 100 MG tablet; Take 1 tablet (100 mg total) by mouth daily as needed for Erectile Dysfunction.  Dispense: 30 tablet; Refill: 5       Follow up in about 3 months (around 9/28/2024) for f-up .    This note was created by combination of typed  and M-Modal dictation.  Transcription errors may be present.  If there are any questions, please contact me.

## 2024-06-28 NOTE — PROGRESS NOTES
Population Health Chart Review & Patient Outreach Details      Additional [facility] Health Notes:      MA gap report non complaint lisinopril and simvastatin. Need confirm if still taking, need to  Rx at pharmacy - due for urine and colonoscopy.  Patient stated he recently receive Rx refill for medication above. Urine screening schedule at next CT appointment at hospital.  PAT cscope scheduled.         Updates Requested / Reviewed:      Updated Care Coordination Note, Care Everywhere, and Care Team Updated         Health Maintenance Topics Overdue:      VBHM Score: 4     Colon Cancer Screening  Urine Screening  Foot Exam  Uncontrolled BP                       Health Maintenance Topic(s) Outreach Outcomes & Actions Taken:    Lab(s) - Outreach Outcomes & Actions Taken  : Overdue Lab(s) Scheduled    Colorectal Cancer Screening - Outreach Outcomes & Actions Taken  : Colonoscopy Case Request / Referral / Home Test Order Placed and PAT scheduled.    Medication Adherence / Statins - Outreach Outcomes & Actions Taken  : confirm still taking medication and recently receive refill.

## 2024-06-28 NOTE — PROGRESS NOTES
Health Maintenance Due   Topic     Foot Exam      Diabetes Urine Screening      Colorectal Cancer Screening      Hemoglobin A1c

## 2024-07-01 ENCOUNTER — TELEPHONE (OUTPATIENT)
Dept: FAMILY MEDICINE | Facility: CLINIC | Age: 64
End: 2024-07-01
Payer: MEDICARE

## 2024-07-01 NOTE — TELEPHONE ENCOUNTER
----- Message from Марина Galicia MD sent at 6/28/2024  5:54 PM CDT -----  Patient was scheduled in 2 months, however appointment to be in 3 months with a another provider.  Patient is a form that I am leaving [facility].  I need to keep the present openings for my current patients, thanks.

## 2024-07-03 ENCOUNTER — TELEPHONE (OUTPATIENT)
Dept: FAMILY MEDICINE | Facility: CLINIC | Age: 64
End: 2024-07-03
Payer: MEDICARE

## 2024-07-03 NOTE — LETTER
July 10, 2024      [facility]  [address] [place] [address]  Phone: [phone]  Fax: [phone]       Patient: Thierry Ho   YOB: 1960  Date of Visit: 07/10/2024    To Whom It May Concern:    Please excuse Mr. Thierry Ho from jury duty due to his medical conditions. If you have any questions or concerns, or if I can be of further assistance, please do not hesitate to contact me.    Sincerely,        Sterling Jarquin MD

## 2024-07-03 NOTE — TELEPHONE ENCOUNTER
----- Message from Brit Felton sent at 7/3/2024 10:11 AM CDT -----  Regarding: self    Type: Patient Call Back     Who called:self     What is the request in detail:pt is asking for a letter to excuse him from jury duty on 8/4/2024 due to his disabilities. Pt is asking the letter be faxed to [phone]. Pt is requesting a call when the letter is faxed     Can the clinic reply by [facility]? No     Would the patient rather a call back or a response via [facility]? Call back     Best call back number: [phone]       Additional Information:     Thank you.

## 2024-07-05 ENCOUNTER — TELEPHONE (OUTPATIENT)
Dept: FAMILY MEDICINE | Facility: CLINIC | Age: 64
End: 2024-07-05
Payer: MEDICARE

## 2024-07-05 NOTE — TELEPHONE ENCOUNTER
----- Message from Dianne Rucker sent at 7/5/2024 10:24 AM CDT -----  Name of Who is Calling: BELLE FELDMAN [[id number]]          What is the request in detail: Pt is requesting a call back regarding jury duty form that was sent to office. Please assist.           Can the clinic reply by [facility]: No          What [facility]  [phone]

## 2024-07-08 ENCOUNTER — TELEPHONE (OUTPATIENT)
Dept: FAMILY MEDICINE | Facility: CLINIC | Age: 64
End: 2024-07-08
Payer: MEDICARE

## 2024-07-08 NOTE — TELEPHONE ENCOUNTER
----- Message from Evelia Bullock sent at 7/8/2024  1:46 PM CDT -----  .Type: Patient Call Back    Who called: Self     What is the request in detail: Requesting a letter stating he can't serve on Jury Duty. Ask that the nurse give him a call     Can the clinic reply by [facility]? No     Would the patient rather a call back or a response via [facility]? [facility]     Best call back number: .[phone] (home)       Additional Information:

## 2024-07-08 NOTE — TELEPHONE ENCOUNTER
----- Message from Perfecto Whitehead MA sent at 7/8/2024  7:29 AM CDT -----  Type:  Patient Returning Call    Who Called: Self    Who Left Message for Patient:  Sara Mendez LPN    Does the patient know what this is regarding?:yes, regarding his jury  letter .. Pt. States he can't walk far due to health concerns & can't make it..    Would the patient rather a call back or a response via [facility]? Yes, call    Best Call Back Number: [phone] (home)

## 2024-07-09 ENCOUNTER — HOSPITAL ENCOUNTER (OUTPATIENT)
Dept: RADIOLOGY | Facility: HOSPITAL | Age: 64
Discharge: HOME | End: 2024-07-09
Attending: INTERNAL MEDICINE
Payer: MEDICARE

## 2024-07-09 DIAGNOSIS — R10.84 GENERALIZED ABDOMINAL PAIN: ICD-10-CM

## 2024-07-09 PROCEDURE — 25500020 PHARM REV CODE 255: Mod: HCNC | Performed by: INTERNAL MEDICINE

## 2024-07-09 PROCEDURE — 74150 CT ABDOMEN W/O CONTRAST: CPT | Mod: TC,HCNC

## 2024-07-09 PROCEDURE — 74150 CT ABDOMEN W/O CONTRAST: CPT | Mod: 26,HCNC,, | Performed by: RADIOLOGY

## 2024-07-09 RX ADMIN — IOHEXOL 15 ML: 300 INJECTION, SOLUTION INTRAVENOUS at 01:07

## 2024-07-10 ENCOUNTER — TELEPHONE (OUTPATIENT)
Dept: FAMILY MEDICINE | Facility: CLINIC | Age: 64
End: 2024-07-10
Payer: MEDICARE

## 2024-07-10 DIAGNOSIS — E21.3 HYPERPARATHYROIDISM: Primary | ICD-10-CM

## 2024-07-10 DIAGNOSIS — E83.52 HYPERCALCEMIA: ICD-10-CM

## 2024-07-10 NOTE — TELEPHONE ENCOUNTER
----- Message from Марина Galicia MD sent at 6/28/2024  5:56 PM CDT -----  Seen only once today, pt informed that I am leaving too. Ca still elevated? Reported abdominal pain x 2 years, see my notes, CT ordered.  He is on my schedule in 2 months while I ask for him to be seen in 3 months by Macie or other provider.   As you know him way more than I know him are you fine with reviewing his blood work meanwhile?    Thanks  ----- Message -----  From: [facility]  Sent: 6/28/2024   4:30 PM CDT  To: Марина Galicia MD

## 2024-07-10 NOTE — TELEPHONE ENCOUNTER
----- Message from Марина Galicia MD sent at 7/9/2024  4:04 PM CDT -----  Please contact the patient and let him know that there is no major finding to explain his abdominal pain. He can go on f-up with Dr. Jarquin. Thanks!

## 2024-07-10 NOTE — TELEPHONE ENCOUNTER
Jackie called to schedule her 2 wk post op with Dr. Rangel.     Dr. Pardo scheduled is booked for the next 3 weeks.     Please advise how Dr. Rangel would like this post op scheduled.     Notes:    Procedure completed 2/23/2021. LEFT CAROTID ENDARTERECTOMY WITH ELECTROENCEPHALOGRAM.    Becca BURGESS   Routing to Vascular Surgery triage   Pt was referred to nephro but does not seem like he followed up. Pth was slightly elevated previously but had hypercalcemia when it was normal. Vit d is in normal range as well. Will place referral again to nephro and to endo.

## 2024-07-16 ENCOUNTER — TELEPHONE (OUTPATIENT)
Dept: PAIN MEDICINE | Facility: CLINIC | Age: 64
End: 2024-07-16
Payer: MEDICARE

## 2024-07-16 NOTE — TELEPHONE ENCOUNTER
LVM for pt to return the phone to get scheduled.        ----- Message from Jenny Coats sent at 7/16/2024  3:23 PM CDT -----  Name of Who is Calling:BELLE FELDMAN [[id number]]           What is the request in detail:PT had a surgery with you and he said he can hardy walk now and having other issues as well he wants a call back and he needs an appt I tried to open your calendar to get him one and it would not open please assist PT          Can the clinic reply by [facility]: No             What Number to Call Back if not in [facility]:[phone]

## 2024-07-17 ENCOUNTER — TELEPHONE (OUTPATIENT)
Dept: FAMILY MEDICINE | Facility: CLINIC | Age: 64
End: 2024-07-17
Payer: MEDICARE

## 2024-07-17 DIAGNOSIS — M51.36 DDD (DEGENERATIVE DISC DISEASE), LUMBAR: ICD-10-CM

## 2024-07-17 RX ORDER — GABAPENTIN 300 MG/1
300 CAPSULE ORAL 3 TIMES DAILY
Qty: 60 CAPSULE | Refills: 1 | Status: SHIPPED | OUTPATIENT
Start: 2024-07-17

## 2024-07-17 NOTE — TELEPHONE ENCOUNTER
----- Message from Sandie Rosen sent at 7/16/2024  3:11 PM CDT -----  Regarding: P/t advice  Type: Patient Call Back    Who called:    What is the request in detail: p/t is calling to get a follow up appt. Also, returning call to Yoel. Please call to assist.     Can the clinic reply by [facility]? No     Would the patient rather a call back or a response via [facility]?     Best call back number: [phone] (home)       Additional Information:

## 2024-07-17 NOTE — TELEPHONE ENCOUNTER
LM informing pt of note from Dr guardado regarding abdominal pain and referrals from Dr suazo (both have been scheduled); he can call back to schedule f/u with Dr Suazo

## 2024-07-22 ENCOUNTER — TELEPHONE (OUTPATIENT)
Dept: FAMILY MEDICINE | Facility: CLINIC | Age: 64
End: 2024-07-22
Payer: MEDICARE

## 2024-07-29 ENCOUNTER — TELEPHONE (OUTPATIENT)
Dept: FAMILY MEDICINE | Facility: CLINIC | Age: 64
End: 2024-07-29
Payer: MEDICARE

## 2024-08-28 ENCOUNTER — PATIENT OUTREACH (OUTPATIENT)
Dept: ADMINISTRATIVE | Facility: HOSPITAL | Age: 64
End: 2024-08-28
Payer: MEDICARE

## 2024-08-28 DIAGNOSIS — E11.65 UNCONTROLLED TYPE 2 DIABETES MELLITUS WITH HYPERGLYCEMIA: ICD-10-CM

## 2024-08-28 RX ORDER — PEN NEEDLE, DIABETIC 31 GX5/16"
NEEDLE, DISPOSABLE MISCELLANEOUS
Qty: 200 EACH | Refills: 3 | Status: SHIPPED | OUTPATIENT
Start: 2024-08-28

## 2024-08-28 NOTE — TELEPHONE ENCOUNTER
Refill Decision Note   Thierry Ho  is requesting a refill authorization.  Brief Assessment and Rationale for Refill:  Approve     Medication Therapy Plan:        Comments:     Note composed:10:08 AM 08/28/2024

## 2024-08-28 NOTE — PROGRESS NOTES
Population Health Chart Review & Patient Outreach Details      Additional Pop Health Notes:      DUE FOR COLON SCREENING AND URINE SCREENING. IF ALREADY DONE, NEED TO GET RECORDS INFORMATION.  PAT SCHEDULE 9/17/24 AND URINE SCREENING SCHEDULE AT NEXT OFFICE VISIT.          Updates Requested / Reviewed:      Updated Care Coordination Note and Care Everywhere         Health Maintenance Topics Overdue:      VBHM Score: 2     Foot Exam  Uncontrolled BP                       Health Maintenance Topic(s) Outreach Outcomes & Actions Taken:    Lab(s) - Outreach Outcomes & Actions Taken  : Overdue Lab(s) Scheduled

## 2024-08-28 NOTE — TELEPHONE ENCOUNTER
No care due was identified.  [facility] Embedded Care Due Messages. Reference number: [id number].   8/28/2024 1:44:58 AM CDT

## 2024-09-04 DIAGNOSIS — R29.898 BILATERAL LEG WEAKNESS: ICD-10-CM

## 2024-09-04 DIAGNOSIS — K21.9 GASTROESOPHAGEAL REFLUX DISEASE, UNSPECIFIED WHETHER ESOPHAGITIS PRESENT: ICD-10-CM

## 2024-09-04 DIAGNOSIS — M54.16 LUMBAR RADICULOPATHY: ICD-10-CM

## 2024-09-04 RX ORDER — OMEPRAZOLE 20 MG/1
20 CAPSULE, DELAYED RELEASE ORAL DAILY
Qty: 90 CAPSULE | Refills: 1 | Status: SHIPPED | OUTPATIENT
Start: 2024-09-04

## 2024-09-04 RX ORDER — CYCLOBENZAPRINE HCL 5 MG
5 TABLET ORAL DAILY PRN
Qty: 90 TABLET | Refills: 11 | Status: SHIPPED | OUTPATIENT
Start: 2024-09-04

## 2024-09-04 RX ORDER — AMITRIPTYLINE HYDROCHLORIDE 25 MG/1
25 TABLET, FILM COATED ORAL NIGHTLY PRN
Qty: 90 TABLET | Refills: 1 | Status: SHIPPED | OUTPATIENT
Start: 2024-09-04

## 2024-09-04 NOTE — TELEPHONE ENCOUNTER
No care due was identified.  [facility] Embedded Care Due Messages. Reference number: [id number].   9/04/2024 3:37:14 PM CDT

## 2024-09-04 NOTE — TELEPHONE ENCOUNTER
Refill Decision Note   Thierry Ho  is requesting a refill authorization.  Brief Assessment and Rationale for Refill:  Approve     Medication Therapy Plan:        Pharmacist review requested: Yes   Extended chart review required: Yes   Comments:     Note composed:4:11 PM 09/04/2024

## 2024-09-04 NOTE — TELEPHONE ENCOUNTER
Refill Routing Note   Medication(s) are not appropriate for processing by [facility] for the following reason(s):        Drug-disease interaction: amitriptyline and Coronary artery disease involving native coronary artery of native heart without angina pectoris     [facility] action(s):  Defer  Approve      Medication Therapy Plan:       Pharmacist review requested: Yes     Appointments  past 12m or future 3m with PCP    Date Provider   Last Visit   12/28/2023 Sterling Jarquin MD   Next Visit   Visit date not found Sterling Jarquin MD   ED visits in past 90 days: 0        Note composed:3:44 PM 09/04/2024

## 2024-09-17 ENCOUNTER — TELEPHONE (OUTPATIENT)
Dept: ENDOSCOPY | Facility: HOSPITAL | Age: 64
End: 2024-09-17

## 2024-09-17 NOTE — TELEPHONE ENCOUNTER
Attempted to contact patient to schedule colonoscopy. The patient did not answer the call. Left voice message requesting a call back at [phone] to get procedure scheduled.

## 2024-10-02 ENCOUNTER — PATIENT OUTREACH (OUTPATIENT)
Dept: ADMINISTRATIVE | Facility: HOSPITAL | Age: 64
End: 2024-10-02
Payer: MEDICARE

## 2024-10-02 ENCOUNTER — OFFICE VISIT (OUTPATIENT)
Dept: FAMILY MEDICINE | Facility: CLINIC | Age: 64
End: 2024-10-02
Payer: MEDICARE

## 2024-10-02 ENCOUNTER — TELEPHONE (OUTPATIENT)
Dept: ADMINISTRATIVE | Facility: HOSPITAL | Age: 64
End: 2024-10-02
Payer: MEDICARE

## 2024-10-02 VITALS
WEIGHT: 248.25 LBS | OXYGEN SATURATION: 99 % | DIASTOLIC BLOOD PRESSURE: 82 MMHG | TEMPERATURE: 98 F | SYSTOLIC BLOOD PRESSURE: 138 MMHG | HEART RATE: 75 BPM | BODY MASS INDEX: 34.62 KG/M2

## 2024-10-02 DIAGNOSIS — N52.8 OTHER MALE ERECTILE DYSFUNCTION: ICD-10-CM

## 2024-10-02 DIAGNOSIS — I10 ESSENTIAL HYPERTENSION: ICD-10-CM

## 2024-10-02 DIAGNOSIS — M54.16 LUMBAR RADICULOPATHY: Primary | ICD-10-CM

## 2024-10-02 DIAGNOSIS — Z23 INFLUENZA VACCINE NEEDED: ICD-10-CM

## 2024-10-02 DIAGNOSIS — E11.65 UNCONTROLLED TYPE 2 DIABETES MELLITUS WITH HYPERGLYCEMIA: ICD-10-CM

## 2024-10-02 DIAGNOSIS — E21.3 HYPERPARATHYROIDISM: ICD-10-CM

## 2024-10-02 PROBLEM — E11.29 TYPE 2 DIABETES MELLITUS WITH MICROALBUMINURIA, WITH LONG-TERM CURRENT USE OF INSULIN: Status: RESOLVED | Noted: 2018-07-27 | Resolved: 2024-10-02

## 2024-10-02 PROBLEM — Z79.4 TYPE 2 DIABETES MELLITUS WITH MICROALBUMINURIA, WITH LONG-TERM CURRENT USE OF INSULIN: Status: RESOLVED | Noted: 2018-07-27 | Resolved: 2024-10-02

## 2024-10-02 PROBLEM — R80.9 TYPE 2 DIABETES MELLITUS WITH MICROALBUMINURIA, WITH LONG-TERM CURRENT USE OF INSULIN: Status: RESOLVED | Noted: 2018-07-27 | Resolved: 2024-10-02

## 2024-10-02 PROCEDURE — 90656 IIV3 VACC NO PRSV 0.5 ML IM: CPT | Mod: HCNC,S$GLB,, | Performed by: INTERNAL MEDICINE

## 2024-10-02 PROCEDURE — G0008 ADMIN INFLUENZA VIRUS VAC: HCPCS | Mod: HCNC,S$GLB,, | Performed by: INTERNAL MEDICINE

## 2024-10-02 PROCEDURE — 99999 PR PBB SHADOW E&M-EST. PATIENT-LVL V: CPT | Mod: PBBFAC,HCNC,, | Performed by: INTERNAL MEDICINE

## 2024-10-02 PROCEDURE — 3079F DIAST BP 80-89 MM HG: CPT | Mod: HCNC,CPTII,S$GLB, | Performed by: INTERNAL MEDICINE

## 2024-10-02 PROCEDURE — 3075F SYST BP GE 130 - 139MM HG: CPT | Mod: HCNC,CPTII,S$GLB, | Performed by: INTERNAL MEDICINE

## 2024-10-02 PROCEDURE — 1159F MED LIST DOCD IN RCRD: CPT | Mod: HCNC,CPTII,S$GLB, | Performed by: INTERNAL MEDICINE

## 2024-10-02 PROCEDURE — 4010F ACE/ARB THERAPY RXD/TAKEN: CPT | Mod: HCNC,CPTII,S$GLB, | Performed by: INTERNAL MEDICINE

## 2024-10-02 PROCEDURE — 3008F BODY MASS INDEX DOCD: CPT | Mod: HCNC,CPTII,S$GLB, | Performed by: INTERNAL MEDICINE

## 2024-10-02 PROCEDURE — 1160F RVW MEDS BY RX/DR IN RCRD: CPT | Mod: HCNC,CPTII,S$GLB, | Performed by: INTERNAL MEDICINE

## 2024-10-02 PROCEDURE — 3044F HG A1C LEVEL LT 7.0%: CPT | Mod: HCNC,CPTII,S$GLB, | Performed by: INTERNAL MEDICINE

## 2024-10-02 PROCEDURE — 99214 OFFICE O/P EST MOD 30 MIN: CPT | Mod: HCNC,S$GLB,, | Performed by: INTERNAL MEDICINE

## 2024-10-02 RX ORDER — CYCLOBENZAPRINE HCL 5 MG
5 TABLET ORAL NIGHTLY PRN
Qty: 90 TABLET | Refills: 11 | Status: SHIPPED | OUTPATIENT
Start: 2024-10-02

## 2024-10-02 RX ORDER — SILDENAFIL 100 MG/1
100 TABLET, FILM COATED ORAL DAILY PRN
Qty: 30 TABLET | Refills: 5 | Status: SHIPPED | OUTPATIENT
Start: 2024-10-02 | End: 2025-10-02

## 2024-10-02 RX ORDER — TIRZEPATIDE 2.5 MG/.5ML
2.5 INJECTION, SOLUTION SUBCUTANEOUS
Qty: 2 ML | Refills: 2 | Status: SHIPPED | OUTPATIENT
Start: 2024-10-02 | End: 2024-12-31

## 2024-10-02 RX ORDER — PREGABALIN 100 MG/1
100 CAPSULE ORAL 2 TIMES DAILY
Qty: 180 CAPSULE | Refills: 0 | Status: SHIPPED | OUTPATIENT
Start: 2024-10-02 | End: 2024-12-31

## 2024-10-02 NOTE — PROGRESS NOTES
Population Health Chart Review & Patient Outreach Details      Additional [facility] Health Notes:      [place] [facility]          Updates Requested / Reviewed:      Updated Care Coordination Note, Care Everywhere, and Care Team Updated         Health Maintenance Topics Overdue:      VB Score: 2     Colon Cancer Screening  Urine Screening                       Health Maintenance Topic(s) Outreach Outcomes & Actions Taken:    Eye Exam - Outreach Outcomes & Actions Taken  : External Records Requested & Care Team Updated if Applicable

## 2024-10-02 NOTE — ASSESSMENT & PLAN NOTE
Chronic stable   Lab Results   Component Value Date    PTH 90.9 (H) 09/28/2022    CALCIUM 11.0 (H) 06/28/2024     - seeing endo on 10/07

## 2024-10-02 NOTE — PROGRESS NOTES
Health Maintenance Due   Topic     Foot Exam  Consult pcp    Diabetes Urine Screening  Consult pcp    Colorectal Cancer Screening  Pending order    Influenza Vaccine (1) Pt agree to get today    COVID-19 Vaccine (6 - 2024-25 season) Not offered at this office    Eye Exam  Consult pcp       Flu vaccine given. Left deltoid. Patient tolerated well.

## 2024-10-02 NOTE — ASSESSMENT & PLAN NOTE
Lab Results   Component Value Date    HGBA1C 6.6 (H) 06/28/2024    HGBA1C 6.8 (H) 12/28/2023    HGBA1C 7.4 (H) 03/17/2023   - Chronic, improving    - Continue metformin and lantus   - start Mounhaim, counseled on SE  - Enrolled in digital medicine program for BGL checks  - Ordered microalbumin/Cr

## 2024-10-02 NOTE — ASSESSMENT & PLAN NOTE
Chronic, unresolved. Refer to HPI   - advised pt to follow up with pain mgmt   - restart lyrica  - counseled on lower back stretches   - flexeril at night

## 2024-10-02 NOTE — PROGRESS NOTES
"Chief Complaint: Follow-up (Back pain, pt states that they had an operation and only felt relief for two days, pt hasn't followed up with doctor that did procedure), Establish Care, and fall (Patient had a fall last week, right hand still swollen from trying to catch themselves)      Thierry Ho  is a 64 y.o. year old patient who presents today for establishment of care.    Back pain: S/p L4-L5 LEW on 5/10/24 with Dr. Stringer. Patient reports only 2 days of symptomatic relief following that procedure but has not had a f/u appt since May. Patient reports constant, significant pain from his back that does not improve with ibuprofen. Endorses weakness and occasional shooting pains in legs bilaterally. Denies changes in bowel or bladder function. Denies numbness or tingling. He has been ambulating with a cane for assistance for the past 3 years. Patient reports his legs "giving out" 10 times in the past 6 months.     Patient had a fall last week, braced the fall with his right hand and now reports persistent swelling in the hand from the incident. Denies pain, loss of function or range of motion in the hand. Denies hitting his head or LOC.     HTN: Patient takes BP medication as prescribed. Home BP cuff is broken and he needs another one. Denies chest pain, palpitations,or headaches.     DM: On metformin and lantus 40 units bid. Denies adverse effects or hypoglycemic episodes.      Patient has an ophthalmology appt tomorrow.     Past Medical History:   Diagnosis Date    Colon polyps     Diabetes mellitus     Diabetes with neurologic complications     DM retinopathy     GERD (gastroesophageal reflux disease)     Hyperlipidemia     Hypertension     Myocardial infarction        Past Surgical History:   Procedure Laterality Date    APPENDECTOMY      BACK SURGERY  11/2017    COLONOSCOPY N/A 10/19/2018    Procedure: COLONOSCOPY;  Surgeon: Frantz Rasmussen MD;  Location: [facility];  Service: Endoscopy;  Laterality: N/A;    " COLONOSCOPY N/A 2021    Procedure: COLONOSCOPY;  Surgeon: Dann Mahmood MD;  Location: [facility] ENDO;  Service: Endoscopy;  Laterality: N/A;  covid test [place] , instructions mailed -ml    EPIDURAL STEROID INJECTION INTO LUMBAR SPINE N/A 5/10/2024    Procedure: L4-5 Epidural Steroid Injection (ref: Tony);  Surgeon: David Stringer Jr., MD;  Location: [facility] PAIN MANAGEMENT;  Service: Pain Management;  Laterality: N/A;  @1200  ASA last   DM  Needs Consent    EXCISION OF SKIN N/A 2020    Procedure: EXCISION, SKIN;  Surgeon: Finesse Dumont MD;  Location: [facility] OR;  Service: General;  Laterality: N/A;  on back    FOOT SURGERY      INCISION AND DRAINAGE OF GROIN Right 2020    Procedure: INCISION AND DRAINAGE, INGUINAL REGION;  Surgeon: Finesse Dumont MD;  Location: [facility] OR;  Service: General;  Laterality: Right;  PT TO PREOP IN AM  PRE-OP BY RN 2020    OPEN REDUCTION AND INTERNAL FIXATION (ORIF) OF INJURY OF ANKLE Right 2019    Procedure: ORIF, ANKLE;  Surgeon: Raeann Steward MD;  Location: [facility] OR;  Service: Orthopedics;  Laterality: Right;  SARITA GALAN  [phone] TEXTED HIM @ 10:2 AM ON 19  SKELETAL  RN PREOP 19---- CMP MORNING OF SURGERY PER DR STEWARD        Family History   Problem Relation Name Age of Onset    Heart disease Mother      Breast cancer Mother      Diabetes Father      Cancer Sister 4     Cancer Brother 2         Social History     Socioeconomic History    Marital status:     Number of children: 5    Highest education level: GED or equivalent   Tobacco Use    Smoking status: Former     Current packs/day: 0.00     Average packs/day: 0.2 packs/day for 42.5 years (10.6 ttl pk-yrs)     Types: Cigarettes     Start date: 1/15/1981     Quit date: 2023     Years since quittin.2    Smokeless tobacco: Never   Substance and Sexual Activity    Alcohol use: Yes     Alcohol/week: 4.0 standard drinks of alcohol     Types: 4 Cans of  "beer per week     Comment: social     Drug use: Not Currently     Types: Marijuana     Comment: quit marijuana    Sexual activity: Not Currently     Partners: Female     Social Drivers of Health     Financial Resource Strain: Low Risk  (3/17/2023)    Overall Financial Resource Strain (CARDIA)     Difficulty of Paying Living Expenses: Not hard at all   Food Insecurity: No Food Insecurity (3/17/2023)    Hunger Vital Sign     Worried About Running Out of Food in the Last Year: Never true     Ran Out of Food in the Last Year: Never true   Transportation Needs: No Transportation Needs (3/17/2023)    PRAPARE - Transportation     Lack of Transportation (Medical): No     Lack of Transportation (Non-Medical): No   Physical Activity: Inactive (3/17/2023)    Exercise Vital Sign     Days of Exercise per Week: 0 days     Minutes of Exercise per Session: 0 min   Stress: No Stress Concern Present (3/17/2023)    [place] [employer] - Occupational Stress Questionnaire     Feeling of Stress : Not at all   Housing Stability: Unknown (3/17/2023)    Housing Stability Vital Sign     Unable to Pay for Housing in the Last Year: No     Unstable Housing in the Last Year: No         Current Outpatient Medications:     acetaminophen (TYLENOL) 500 MG tablet, Take 500 mg by mouth every 6 (six) hours as needed for Pain., Disp: , Rfl:     amitriptyline (ELAVIL) 25 MG tablet, Take 1 tablet (25 mg total) by mouth nightly as needed for Insomnia., Disp: 90 tablet, Rfl: 1    aspirin (ECOTRIN) 81 MG EC tablet, Take 81 mg by mouth once daily., Disp: , Rfl:     BD ULTRA-FINE FREDA PEN NEEDLE 32 gauge x 5/32" Ndle, USE AS DIRECTED 2 TIMES DAILY WITH LEVEMIR, Disp: 200 each, Rfl: 3    BLOOD PRESSURE CUFF Misc, 1 kit by Misc.(Non-Drug; Combo Route) route once daily., Disp: 1 each, Rfl: 0    blood sugar diagnostic (TRUE METRIX GLUCOSE TEST STRIP) Strp, 1 strip by Misc.(Non-Drug; Combo Route) route 2 (two) times a day., Disp: 200 each, Rfl: " 11    blood-glucose meter kit, 1 each by Other route 4 (four) times daily before meals and nightly., Disp: 1 each, Rfl: 0    carvediloL (COREG) 25 MG tablet, Take 1 tablet (25 mg total) by mouth 2 (two) times daily with meals., Disp: 180 tablet, Rfl: 3    ciclopirox (PENLAC) 8 % Soln, Apply topically nightly., Disp: 1 Bottle, Rfl: 3    hydrALAZINE (APRESOLINE) 25 MG tablet, Take 1 tablet (25 mg total) by mouth every 12 (twelve) hours., Disp: 180 tablet, Rfl: 3    ibuprofen (ADVIL,MOTRIN) 600 MG tablet, Take 1 tablet (600 mg total) by mouth every 6 (six) hours as needed for Pain., Disp: 20 tablet, Rfl: 0    insulin (LANTUS SOLOSTAR U-100 INSULIN) glargine 100 units/mL SubQ pen, Inject 40 Units into the skin 2 (two) times a day., Disp: 72 mL, Rfl: 3    ketoconazole (NIZORAL) 2 % shampoo, APPLY TOPICALLY TWICE A WEEK., Disp: 120 mL, Rfl: 0    lisinopriL (PRINIVIL,ZESTRIL) 40 MG tablet, Take 1 tablet (40 mg total) by mouth once daily., Disp: 90 tablet, Rfl: 3    metFORMIN (GLUCOPHAGE) 1000 MG tablet, Take 1 tablet (1,000 mg total) by mouth 2 (two) times daily with meals., Disp: 180 tablet, Rfl: 3    omeprazole (PRILOSEC) 20 MG capsule, Take 1 capsule (20 mg total) by mouth once daily., Disp: 90 capsule, Rfl: 1    pregabalin (LYRICA) 100 MG capsule, Take 1 capsule (100 mg total) by mouth 2 (two) times daily., Disp: 180 capsule, Rfl: 0    simvastatin (ZOCOR) 40 MG tablet, Take 1 tablet (40 mg total) by mouth every evening., Disp: 90 tablet, Rfl: 3    TRUEPLUS LANCETS 33 gauge Misc, Check glucose twice daily, Disp: 100 each, Rfl: 3    cyclobenzaprine (FLEXERIL) 5 MG tablet, Take 1 tablet (5 mg total) by mouth nightly as needed for Muscle spasms., Disp: 90 tablet, Rfl: 11    fluocinolone (SYNALAR) 0.01 % external solution, APPLY TOPICALLY ONCE DAILY  FOR 10 DAYS, Disp: 60 mL, Rfl: 0    fluocinonide (LIDEX) 0.05 % external solution, Topical prn, Disp: 60 mL, Rfl: 0    sildenafiL (VIAGRA) 100 MG tablet, Take 1 tablet (100 mg  total) by mouth daily as needed for Erectile Dysfunction., Disp: 30 tablet, Rfl: 5    tirzepatide (MOUNJARO) 2.5 mg/0.5 mL PnIj, Inject 2.5 mg into the skin every 7 days., Disp: 2 mL, Rfl: 2  No current facility-administered medications for this visit.     Review of Systems   Constitutional:  Negative for chills and fever.   HENT:  Negative for congestion and sinus pain.    Eyes:  Negative for double vision.   Respiratory:  Negative for cough and shortness of breath.    Cardiovascular:  Negative for chest pain and palpitations.   Gastrointestinal:  Negative for abdominal pain, nausea and vomiting.   Genitourinary:  Negative for dysuria.   Musculoskeletal:  Positive for back pain.   Skin:  Negative for rash.   Neurological:  Negative for dizziness and headaches.   Endo/Heme/Allergies:  Negative for environmental allergies.   Psychiatric/Behavioral: Negative.          Objective:      Vitals:    10/02/24 1004   BP: 138/82   Pulse: 75   Temp: 98.3 °F (36.8 °C)       Physical Exam  Constitutional:       Appearance: Normal appearance.   HENT:      Head: Normocephalic and atraumatic.      Nose: No congestion or rhinorrhea.   Eyes:      Conjunctiva/sclera: Conjunctivae normal.   Cardiovascular:      Pulses: Normal pulses.      Heart sounds: Normal heart sounds.   Pulmonary:      Effort: Pulmonary effort is normal.      Breath sounds: Normal breath sounds.   Skin:     General: Skin is warm and dry.   Neurological:      Mental Status: He is alert.   Psychiatric:         Mood and Affect: Mood normal.         Behavior: Behavior normal.          Assessment:       64 y.o. male presents for establishment of care.   Plan:   1. Lumbar radiculopathy  Assessment & Plan:  Chronic, unresolved. Refer to HPI   - advised pt to follow up with pain mgmt   - restart lyrica  - counseled on lower back stretches   - flexeril at night     Orders:  -     pregabalin (LYRICA) 100 MG capsule; Take 1 capsule (100 mg total) by mouth 2 (two) times  daily.  Dispense: 180 capsule; Refill: 0  -     cyclobenzaprine (FLEXERIL) 5 MG tablet; Take 1 tablet (5 mg total) by mouth nightly as needed for Muscle spasms.  Dispense: 90 tablet; Refill: 11  -     Ambulatory referral/consult to Pain Clinic; Future; Expected date: 10/09/2024    2. Uncontrolled type 2 diabetes mellitus with hyperglycemia  -     Cancel: Diabetic Eye Screening Photo; Future  -     tirzepatide (MOUNJARO) 2.5 mg/0.5 mL PnIj; Inject 2.5 mg into the skin every 7 days.  Dispense: 2 mL; Refill: 2  -     Create [facility] Account  -     Diabetes Digital Medicine (DDMP) Enrollment Order  -     Microalbumin/Creatinine Ratio, Urine; Future; Expected date: 10/02/2024    3. Influenza vaccine needed  -     influenza (Flulaval, Fluzone, Fluarix) 45 mcg/0.5 mL IM vaccine (> or = 6 mo) 0.5 mL    4. Essential hypertension  Assessment & Plan:  BP Readings from Last 3 Encounters:   10/02/24 138/82   06/28/24 (!) 172/86   05/10/24 (!) 184/97   - Chronic, improving    - Enrolled patient in digital medicine program for home BP checks  - Continue hydralazine, coreg, lisinopril     Orders:  -     NURSING COMMUNICATION: Create [facility] Account  -     Hypertension Digital Medicine (HDMP) Enrollment Order    5. Hyperparathyroidism  Assessment & Plan:  Chronic stable   Lab Results   Component Value Date    PTH 90.9 (H) 09/28/2022    CALCIUM 11.0 (H) 06/28/2024     - seeing endo on 10/07      6. Other male erectile dysfunction  Assessment & Plan:  Chronic, controlled   - Refilled viagra    Orders:  -     sildenafiL (VIAGRA) 100 MG tablet; Take 1 tablet (100 mg total) by mouth daily as needed for Erectile Dysfunction.  Dispense: 30 tablet; Refill: 5        Follow up in about 2 months (around 12/2/2024) for f/up.

## 2024-10-02 NOTE — LETTER
We are seeing __________Thierry Ho, 1960_____________  in the clinic today at [facility].    ____Rhonda Krishnan MD__________ is their Primary Care Provider. When reviewing this patient's chart, they are overdue/due soon for a comprehensive diabetic eye exam. Please supply the following records and information:  Diabetic Eye Exam (within the last 24 months)    Date of Diabetic Eye Exam: __________________    Type of exam:    ____ Retinal Exam    ____ Dilated Eye Exam    Results of exam:  Diabetic Retinopathy (indicate which eye & [place] severity type)    ____ OD - Right Eye (mild, moderate, severe, proliferative)   ____ OS - Left Eye (mild, moderate, severe, proliferative)  Negative Diabetic Retinopathy    ____ OD - Right Eye  ____ OS - Left Eye  Macular Degeneration   NO:  ____ OD - Right Eye    ____ OS - Left Eye  YES:  ____ OD - Right Eye    ____ OS - Left Eye  Cataracts   NO:  ____ OD - Right Eye    ____ OS - Left Eye  YES:  ____ OD - Right Eye    ____ OS - Left Eye  Glaucoma  NO:  ____ OD - Right Eye    ____ OS - Left Eye  YES:  ____ OD - Right Eye    ____ OS - Left Eye    Additional Comments: _______________________________________________________________________  Performing Provider's Signature & Credentials      Please return this document with the office visit note to [facility] at [phone]  Facility: Dr. Malvin Ortiz Fax: [phone]   Date: 10/2/24 From: Laila   Thank you for taking the best care of our patients!  [facility]     In authorizing the release of the confidential information identified above, I hereby waive all restriction or privileges imposed by law and release [facility] and its affiliates and their staff from any restriction or privilege imposed by lay in connection with the disclosure or release of any professional record, observation, or communication. I do understand that the information that is  released may be subject to re-disclosure by the recipient and may no longer be protected. I understand that my treatment, payment enrollment or eligibility for benefits may not be conditioned on signing this authorization. This authorization may be revoked in writing at any time, except to the extent that [facility] and its affiliates have already acted in reliance on it. Letters to revoke this authorization should be addressed to [facility], Release of Information Department, [address] [place] [id number]. If previously revoked in writing, this authorization will terminate or  upon (state the specific date, event, or condition.

## 2024-10-02 NOTE — TELEPHONE ENCOUNTER
----- Message from Rhonda Krishnan MD sent at 10/2/2024 10:50 AM CDT -----  Regarding: eye exam  Went to see Dr. Malvin Ortiz at [facility] last week. Thanks!

## 2024-10-02 NOTE — ASSESSMENT & PLAN NOTE
BP Readings from Last 3 Encounters:   10/02/24 138/82   06/28/24 (!) 172/86   05/10/24 (!) 184/97   - Chronic, improving    - Enrolled patient in digital medicine program for home BP checks  - Continue hydralazine, coreg, lisinopril

## 2024-10-09 ENCOUNTER — TELEPHONE (OUTPATIENT)
Dept: NEPHROLOGY | Facility: CLINIC | Age: 64
End: 2024-10-09
Payer: MEDICARE

## 2024-10-09 DIAGNOSIS — N18.9 CHRONIC KIDNEY DISEASE, UNSPECIFIED CKD STAGE: ICD-10-CM

## 2024-10-09 DIAGNOSIS — E83.52 HYPERCALCEMIA: Primary | ICD-10-CM

## 2024-10-09 NOTE — TELEPHONE ENCOUNTER
I left a message for this patient in reference of labs needed prior to appointment with Dr. Coppola. Patient instructed to contact the office for information.

## 2024-10-10 ENCOUNTER — TELEPHONE (OUTPATIENT)
Dept: FAMILY MEDICINE | Facility: CLINIC | Age: 64
End: 2024-10-10
Payer: MEDICARE

## 2024-10-10 NOTE — TELEPHONE ENCOUNTER
----- Message from Tech Carlee sent at 10/10/2024 11:30 AM CDT -----  Regarding: Pharmacy call  .Type:  Pharmacy Calling to Clarify an RX    Name of Caller: Adriana     Pharmacy Name: .  [facility] - [place], [place] - [address]  [address] [address]  Phone: [phone] Fax: [phone]    Prescription Name: tirzepatide (MOUNJARO) 2.5 mg/0.5 mL PnIj    What do they need to clarify? Patient has an allergy to Trulicity, potential to cross sensitivity. Asking if it is okay to refill the medication      Best Call Back Number: [phone]    Additional Information:

## 2024-10-10 NOTE — TELEPHONE ENCOUNTER
Spoke to pharmacist, informed him provider and pt aware and would like to proceed with filling medication; he verbalized understandng

## 2024-10-11 ENCOUNTER — HOSPITAL ENCOUNTER (INPATIENT)
Facility: HOSPITAL | Age: 64
LOS: 2 days | Discharge: HOME | DRG: 638 | End: 2024-10-15
Attending: EMERGENCY MEDICINE | Admitting: STUDENT IN AN ORGANIZED HEALTH CARE EDUCATION/TRAINING PROGRAM
Payer: MEDICARE

## 2024-10-11 DIAGNOSIS — I10 HYPERTENSION: ICD-10-CM

## 2024-10-11 DIAGNOSIS — N17.9 AKI (ACUTE KIDNEY INJURY): ICD-10-CM

## 2024-10-11 DIAGNOSIS — E11.9 TYPE 2 DIABETES MELLITUS WITHOUT COMPLICATION, WITHOUT LONG-TERM CURRENT USE OF INSULIN: ICD-10-CM

## 2024-10-11 DIAGNOSIS — E11.65 UNCONTROLLED TYPE 2 DIABETES MELLITUS WITH HYPERGLYCEMIA: Primary | ICD-10-CM

## 2024-10-11 DIAGNOSIS — I16.1 HYPERTENSIVE EMERGENCY: ICD-10-CM

## 2024-10-11 DIAGNOSIS — R07.9 CHEST PAIN: ICD-10-CM

## 2024-10-11 DIAGNOSIS — R11.0 NAUSEA: ICD-10-CM

## 2024-10-11 DIAGNOSIS — F10.10 ALCOHOL ABUSE: ICD-10-CM

## 2024-10-11 LAB
ALBUMIN SERPL BCP-MCNC: 3.3 G/DL (ref 3.5–5.2)
ALP SERPL-CCNC: 100 U/L (ref 55–135)
ALT SERPL W/O P-5'-P-CCNC: 36 U/L (ref 10–44)
AMPHET+METHAMPHET UR QL: NEGATIVE
ANION GAP SERPL CALC-SCNC: 19 MMOL/L (ref 8–16)
AST SERPL-CCNC: 56 U/L (ref 10–40)
BACTERIA #/AREA URNS HPF: ABNORMAL /HPF
BARBITURATES UR QL SCN>200 NG/ML: NEGATIVE
BASOPHILS # BLD AUTO: 0.02 K/UL (ref 0–0.2)
BASOPHILS NFR BLD: 0.1 % (ref 0–1.9)
BENZODIAZ UR QL SCN>200 NG/ML: NEGATIVE
BILIRUB SERPL-MCNC: 0.4 MG/DL (ref 0.1–1)
BILIRUB UR QL STRIP: NEGATIVE
BNP SERPL-MCNC: 172 PG/ML (ref 0–99)
BUN SERPL-MCNC: 19 MG/DL (ref 8–23)
BZE UR QL SCN: ABNORMAL
CALCIUM SERPL-MCNC: 10.5 MG/DL (ref 8.7–10.5)
CANNABINOIDS UR QL SCN: NEGATIVE
CHLORIDE SERPL-SCNC: 104 MMOL/L (ref 95–110)
CK SERPL-CCNC: 983 U/L (ref 20–200)
CLARITY UR: CLEAR
CO2 SERPL-SCNC: 16 MMOL/L (ref 23–29)
COLOR UR: YELLOW
CREAT SERPL-MCNC: 1.1 MG/DL (ref 0.5–1.4)
CREAT UR-MCNC: 72.5 MG/DL (ref 23–375)
DIFFERENTIAL METHOD BLD: ABNORMAL
EOSINOPHIL # BLD AUTO: 0 K/UL (ref 0–0.5)
EOSINOPHIL NFR BLD: 0 % (ref 0–8)
ERYTHROCYTE [DISTWIDTH] IN BLOOD BY AUTOMATED COUNT: 14.9 % (ref 11.5–14.5)
EST. GFR  (NO RACE VARIABLE): >60 ML/MIN/1.73 M^2
ETHANOL SERPL-MCNC: 21 MG/DL
GLUCOSE SERPL-MCNC: 163 MG/DL (ref 70–110)
GLUCOSE UR QL STRIP: NEGATIVE
HCT VFR BLD AUTO: 41.4 % (ref 40–54)
HGB BLD-MCNC: 14.2 G/DL (ref 14–18)
HGB UR QL STRIP: ABNORMAL
HYALINE CASTS #/AREA URNS LPF: 3 /LPF
IMM GRANULOCYTES # BLD AUTO: 0.07 K/UL (ref 0–0.04)
IMM GRANULOCYTES NFR BLD AUTO: 0.5 % (ref 0–0.5)
KETONES UR QL STRIP: ABNORMAL
LACTATE SERPL-SCNC: 4 MMOL/L (ref 0.5–2.2)
LEUKOCYTE ESTERASE UR QL STRIP: NEGATIVE
LIPASE SERPL-CCNC: 35 U/L (ref 4–60)
LYMPHOCYTES # BLD AUTO: 1.1 K/UL (ref 1–4.8)
LYMPHOCYTES NFR BLD: 7.6 % (ref 18–48)
MAGNESIUM SERPL-MCNC: 1.4 MG/DL (ref 1.6–2.6)
MCH RBC QN AUTO: 31.9 PG (ref 27–31)
MCHC RBC AUTO-ENTMCNC: 34.3 G/DL (ref 32–36)
MCV RBC AUTO: 93 FL (ref 82–98)
METHADONE UR QL SCN>300 NG/ML: NEGATIVE
MICROSCOPIC COMMENT: ABNORMAL
MONOCYTES # BLD AUTO: 0.7 K/UL (ref 0.3–1)
MONOCYTES NFR BLD: 5.1 % (ref 4–15)
NEUTROPHILS # BLD AUTO: 12.3 K/UL (ref 1.8–7.7)
NEUTROPHILS NFR BLD: 86.7 % (ref 38–73)
NITRITE UR QL STRIP: NEGATIVE
NRBC BLD-RTO: 0 /100 WBC
OPIATES UR QL SCN: NEGATIVE
PCP UR QL SCN>25 NG/ML: NEGATIVE
PH UR STRIP: 6 [PH] (ref 5–8)
PLATELET # BLD AUTO: 237 K/UL (ref 150–450)
PMV BLD AUTO: 10.5 FL (ref 9.2–12.9)
POCT GLUCOSE: 108 MG/DL (ref 70–110)
POCT GLUCOSE: 109 MG/DL (ref 70–110)
POCT GLUCOSE: 243 MG/DL (ref 70–110)
POCT GLUCOSE: 82 MG/DL (ref 70–110)
POCT GLUCOSE: 91 MG/DL (ref 70–110)
POCT GLUCOSE: >500 MG/DL (ref 70–110)
POTASSIUM SERPL-SCNC: 3.5 MMOL/L (ref 3.5–5.1)
PROT SERPL-MCNC: 7.1 G/DL (ref 6–8.4)
PROT UR QL STRIP: ABNORMAL
RBC # BLD AUTO: 4.45 M/UL (ref 4.6–6.2)
RBC #/AREA URNS HPF: 11 /HPF (ref 0–4)
SODIUM SERPL-SCNC: 139 MMOL/L (ref 136–145)
SP GR UR STRIP: 1.02 (ref 1–1.03)
SQUAMOUS #/AREA URNS HPF: 1 /HPF
TOXICOLOGY INFORMATION: ABNORMAL
TROPONIN I SERPL DL<=0.01 NG/ML-MCNC: 0.03 NG/ML (ref 0–0.03)
URN SPEC COLLECT METH UR: ABNORMAL
UROBILINOGEN UR STRIP-ACNC: NEGATIVE EU/DL
WBC # BLD AUTO: 14.17 K/UL (ref 3.9–12.7)
WBC #/AREA URNS HPF: 2 /HPF (ref 0–5)

## 2024-10-11 PROCEDURE — 96376 TX/PRO/DX INJ SAME DRUG ADON: CPT | Mod: HCNC

## 2024-10-11 PROCEDURE — 96372 THER/PROPH/DIAG INJ SC/IM: CPT

## 2024-10-11 PROCEDURE — 96375 TX/PRO/DX INJ NEW DRUG ADDON: CPT | Mod: HCNC

## 2024-10-11 PROCEDURE — 82550 ASSAY OF CK (CPK): CPT | Mod: HCNC | Performed by: EMERGENCY MEDICINE

## 2024-10-11 PROCEDURE — G0378 HOSPITAL OBSERVATION PER HR: HCPCS | Mod: HCNC

## 2024-10-11 PROCEDURE — 83735 ASSAY OF MAGNESIUM: CPT | Mod: HCNC | Performed by: EMERGENCY MEDICINE

## 2024-10-11 PROCEDURE — 93005 ELECTROCARDIOGRAM TRACING: CPT | Mod: HCNC

## 2024-10-11 PROCEDURE — 83605 ASSAY OF LACTIC ACID: CPT | Mod: HCNC | Performed by: EMERGENCY MEDICINE

## 2024-10-11 PROCEDURE — 63600175 PHARM REV CODE 636 W HCPCS: Mod: HCNC

## 2024-10-11 PROCEDURE — 83690 ASSAY OF LIPASE: CPT | Mod: HCNC | Performed by: EMERGENCY MEDICINE

## 2024-10-11 PROCEDURE — 82077 ASSAY SPEC XCP UR&BREATH IA: CPT | Mod: HCNC | Performed by: EMERGENCY MEDICINE

## 2024-10-11 PROCEDURE — 82962 GLUCOSE BLOOD TEST: CPT | Mod: HCNC

## 2024-10-11 PROCEDURE — 96365 THER/PROPH/DIAG IV INF INIT: CPT | Mod: HCNC

## 2024-10-11 PROCEDURE — 25000003 PHARM REV CODE 250: Mod: HCNC

## 2024-10-11 PROCEDURE — 85025 COMPLETE CBC W/AUTO DIFF WBC: CPT | Mod: HCNC | Performed by: EMERGENCY MEDICINE

## 2024-10-11 PROCEDURE — 81000 URINALYSIS NONAUTO W/SCOPE: CPT | Mod: HCNC | Performed by: EMERGENCY MEDICINE

## 2024-10-11 PROCEDURE — 87040 BLOOD CULTURE FOR BACTERIA: CPT | Mod: 59,HCNC | Performed by: EMERGENCY MEDICINE

## 2024-10-11 PROCEDURE — 83880 ASSAY OF NATRIURETIC PEPTIDE: CPT | Mod: HCNC | Performed by: EMERGENCY MEDICINE

## 2024-10-11 PROCEDURE — 96375 TX/PRO/DX INJ NEW DRUG ADDON: CPT

## 2024-10-11 PROCEDURE — 63600175 PHARM REV CODE 636 W HCPCS: Mod: HCNC | Performed by: EMERGENCY MEDICINE

## 2024-10-11 PROCEDURE — 84484 ASSAY OF TROPONIN QUANT: CPT | Mod: HCNC | Performed by: EMERGENCY MEDICINE

## 2024-10-11 PROCEDURE — 93010 ELECTROCARDIOGRAM REPORT: CPT | Mod: HCNC,,, | Performed by: INTERNAL MEDICINE

## 2024-10-11 PROCEDURE — 80053 COMPREHEN METABOLIC PANEL: CPT | Mod: HCNC | Performed by: EMERGENCY MEDICINE

## 2024-10-11 PROCEDURE — 80307 DRUG TEST PRSMV CHEM ANLYZR: CPT | Mod: HCNC | Performed by: EMERGENCY MEDICINE

## 2024-10-11 PROCEDURE — 96374 THER/PROPH/DIAG INJ IV PUSH: CPT | Mod: 59

## 2024-10-11 PROCEDURE — 99285 EMERGENCY DEPT VISIT HI MDM: CPT | Mod: 25,HCNC

## 2024-10-11 PROCEDURE — 25500020 PHARM REV CODE 255: Mod: HCNC | Performed by: EMERGENCY MEDICINE

## 2024-10-11 RX ORDER — ACETAMINOPHEN 500 MG
1000 TABLET ORAL EVERY 6 HOURS PRN
Status: DISCONTINUED | OUTPATIENT
Start: 2024-10-11 | End: 2024-10-15 | Stop reason: HOSPADM

## 2024-10-11 RX ORDER — IBUPROFEN 200 MG
16 TABLET ORAL
Status: DISCONTINUED | OUTPATIENT
Start: 2024-10-11 | End: 2024-10-15 | Stop reason: HOSPADM

## 2024-10-11 RX ORDER — GLUCAGON 1 MG
1 KIT INJECTION
Status: DISCONTINUED | OUTPATIENT
Start: 2024-10-11 | End: 2024-10-15 | Stop reason: HOSPADM

## 2024-10-11 RX ORDER — SODIUM CHLORIDE 0.9 % (FLUSH) 0.9 %
10 SYRINGE (ML) INJECTION EVERY 12 HOURS PRN
Status: DISCONTINUED | OUTPATIENT
Start: 2024-10-11 | End: 2024-10-15 | Stop reason: HOSPADM

## 2024-10-11 RX ORDER — HYDROMORPHONE HYDROCHLORIDE 1 MG/ML
0.5 INJECTION, SOLUTION INTRAMUSCULAR; INTRAVENOUS; SUBCUTANEOUS EVERY 6 HOURS PRN
Status: DISCONTINUED | OUTPATIENT
Start: 2024-10-11 | End: 2024-10-15 | Stop reason: HOSPADM

## 2024-10-11 RX ORDER — NALOXONE HCL 0.4 MG/ML
0.02 VIAL (ML) INJECTION
Status: DISCONTINUED | OUTPATIENT
Start: 2024-10-11 | End: 2024-10-15 | Stop reason: HOSPADM

## 2024-10-11 RX ORDER — ONDANSETRON HYDROCHLORIDE 2 MG/ML
4 INJECTION, SOLUTION INTRAVENOUS EVERY 8 HOURS PRN
Status: DISCONTINUED | OUTPATIENT
Start: 2024-10-11 | End: 2024-10-15 | Stop reason: HOSPADM

## 2024-10-11 RX ORDER — HEPARIN SODIUM 5000 [USP'U]/ML
5000 INJECTION, SOLUTION INTRAVENOUS; SUBCUTANEOUS EVERY 8 HOURS
Status: DISCONTINUED | OUTPATIENT
Start: 2024-10-11 | End: 2024-10-15 | Stop reason: HOSPADM

## 2024-10-11 RX ORDER — ALUMINUM HYDROXIDE, MAGNESIUM HYDROXIDE, AND SIMETHICONE 1200; 120; 1200 MG/30ML; MG/30ML; MG/30ML
30 SUSPENSION ORAL 4 TIMES DAILY PRN
Status: DISCONTINUED | OUTPATIENT
Start: 2024-10-11 | End: 2024-10-15 | Stop reason: HOSPADM

## 2024-10-11 RX ORDER — MAGNESIUM SULFATE 1 G/100ML
1 INJECTION INTRAVENOUS
Status: COMPLETED | OUTPATIENT
Start: 2024-10-11 | End: 2024-10-11

## 2024-10-11 RX ORDER — ACETAMINOPHEN 325 MG/1
650 TABLET ORAL EVERY 4 HOURS PRN
Status: DISCONTINUED | OUTPATIENT
Start: 2024-10-11 | End: 2024-10-15 | Stop reason: HOSPADM

## 2024-10-11 RX ORDER — IBUPROFEN 200 MG
24 TABLET ORAL
Status: DISCONTINUED | OUTPATIENT
Start: 2024-10-11 | End: 2024-10-15 | Stop reason: HOSPADM

## 2024-10-11 RX ORDER — POLYETHYLENE GLYCOL 3350 17 G/17G
17 POWDER, FOR SOLUTION ORAL DAILY
Status: DISCONTINUED | OUTPATIENT
Start: 2024-10-12 | End: 2024-10-14

## 2024-10-11 RX ORDER — PROCHLORPERAZINE EDISYLATE 5 MG/ML
5 INJECTION INTRAMUSCULAR; INTRAVENOUS EVERY 6 HOURS PRN
Status: DISCONTINUED | OUTPATIENT
Start: 2024-10-11 | End: 2024-10-15 | Stop reason: HOSPADM

## 2024-10-11 RX ORDER — INSULIN ASPART 100 [IU]/ML
0-10 INJECTION, SOLUTION INTRAVENOUS; SUBCUTANEOUS
Status: DISCONTINUED | OUTPATIENT
Start: 2024-10-11 | End: 2024-10-15 | Stop reason: HOSPADM

## 2024-10-11 RX ORDER — HYDRALAZINE HYDROCHLORIDE 20 MG/ML
10 INJECTION INTRAMUSCULAR; INTRAVENOUS
Status: COMPLETED | OUTPATIENT
Start: 2024-10-11 | End: 2024-10-11

## 2024-10-11 RX ORDER — TALC
6 POWDER (GRAM) TOPICAL NIGHTLY PRN
Status: DISCONTINUED | OUTPATIENT
Start: 2024-10-11 | End: 2024-10-15 | Stop reason: HOSPADM

## 2024-10-11 RX ORDER — ACETAMINOPHEN 325 MG/1
650 TABLET ORAL EVERY 8 HOURS PRN
Status: DISCONTINUED | OUTPATIENT
Start: 2024-10-11 | End: 2024-10-15 | Stop reason: HOSPADM

## 2024-10-11 RX ORDER — OXYCODONE HYDROCHLORIDE 5 MG/1
5 TABLET ORAL EVERY 4 HOURS PRN
Status: DISCONTINUED | OUTPATIENT
Start: 2024-10-11 | End: 2024-10-15 | Stop reason: HOSPADM

## 2024-10-11 RX ORDER — ONDANSETRON HYDROCHLORIDE 2 MG/ML
4 INJECTION, SOLUTION INTRAVENOUS
Status: COMPLETED | OUTPATIENT
Start: 2024-10-11 | End: 2024-10-11

## 2024-10-11 RX ORDER — INSULIN GLARGINE 100 [IU]/ML
15 INJECTION, SOLUTION SUBCUTANEOUS DAILY
Status: DISCONTINUED | OUTPATIENT
Start: 2024-10-12 | End: 2024-10-15 | Stop reason: HOSPADM

## 2024-10-11 RX ORDER — DEXTROMETHORPHAN POLISTIREX 30 MG/5 ML
1 SUSPENSION, EXTENDED RELEASE 12 HR ORAL DAILY PRN
Status: DISCONTINUED | OUTPATIENT
Start: 2024-10-11 | End: 2024-10-15 | Stop reason: HOSPADM

## 2024-10-11 RX ADMIN — HEPARIN SODIUM 5000 UNITS: 5000 INJECTION INTRAVENOUS; SUBCUTANEOUS at 10:10

## 2024-10-11 RX ADMIN — HYDRALAZINE HYDROCHLORIDE 10 MG: 20 INJECTION INTRAMUSCULAR; INTRAVENOUS at 02:10

## 2024-10-11 RX ADMIN — HYDRALAZINE HYDROCHLORIDE 10 MG: 20 INJECTION INTRAMUSCULAR; INTRAVENOUS at 12:10

## 2024-10-11 RX ADMIN — IOHEXOL 100 ML: 350 INJECTION, SOLUTION INTRAVENOUS at 11:10

## 2024-10-11 RX ADMIN — ONDANSETRON 4 MG: 2 INJECTION INTRAMUSCULAR; INTRAVENOUS at 10:10

## 2024-10-11 RX ADMIN — Medication 6 MG: at 10:10

## 2024-10-11 RX ADMIN — FOLIC ACID: 5 INJECTION, SOLUTION INTRAMUSCULAR; INTRAVENOUS; SUBCUTANEOUS at 05:10

## 2024-10-11 RX ADMIN — ACETAMINOPHEN 1000 MG: 500 TABLET ORAL at 10:10

## 2024-10-11 RX ADMIN — ONDANSETRON 4 MG: 2 INJECTION INTRAMUSCULAR; INTRAVENOUS at 05:10

## 2024-10-11 RX ADMIN — MAGNESIUM SULFATE IN DEXTROSE 1 G: 10 INJECTION, SOLUTION INTRAVENOUS at 12:10

## 2024-10-11 NOTE — ED PROVIDER NOTES
Encounter Date: 10/11/2024    SCRIBE #1 NOTE: I, Karen Moy, am scribing for, and in the presence of,  Jen Nava MD. I have scribed the following portions of the note - Other sections scribed: HPI, ROS, PE.       History     Chief Complaint   Patient presents with    Hypoglycemia     Pt BIB EMS, c/o hypoglycemia. Pt's initial CBG was 31 mg/dL. Pt's last CBG with EMS is 159mg/dL after 55g of D50 through oral and IV route. Pt c/o malaise. Pt denies any CP, SOB, abd pain or n/v/d     Mr. Ho presents to the ER via EMS for hypoglycemia. He reports nausea since yesterday, leg swelling for several days, lower left abdominal pain, generalized weakness, and back pain. States he lives alone though his nephew checks in on him and was the one to activate EMS this morning. States he lad a trip and fall last week as well as a doctor's visit. States he was non-compliant with his daily medications this morning. Endorses appendectomy and back surgery 4-5 months ago. Additional history obtained from independent historian: EMS personnel, states the patient's initial CBG was 31 mg/dL; last CBG with EMS was 159mg/dL after administering 55g of D50 and glucose. No other alleviating or exacerbating factors. Denies chest pain, shortness of breath, or other associated symptoms. This is the extent of the patient's complaints in the ED. His PMHx is significant for DM, HLD, HTN, and MI. Trulicity drug allergy.     The history is provided by the patient and the EMS personnel. No  was used.     Review of patient's allergies indicates:   Allergen Reactions    Trulicity [dulaglutide] Nausea Only     dizzy     Past Medical History:   Diagnosis Date    Colon polyps     Diabetes mellitus     Diabetes with neurologic complications     DM retinopathy     GERD (gastroesophageal reflux disease)     Hyperlipidemia     Hypertension     Myocardial infarction      Past Surgical History:   Procedure Laterality Date     APPENDECTOMY      BACK SURGERY  2017    COLONOSCOPY N/A 10/19/2018    Procedure: COLONOSCOPY;  Surgeon: Frantz Rasmussen MD;  Location: [facility] ENDO;  Service: Endoscopy;  Laterality: N/A;    COLONOSCOPY N/A 2021    Procedure: COLONOSCOPY;  Surgeon: Dann Mahmood MD;  Location: [facility] ENDO;  Service: Endoscopy;  Laterality: N/A;  covid test [place] , instructions mailed -ml    EPIDURAL STEROID INJECTION INTO LUMBAR SPINE N/A 5/10/2024    Procedure: L4-5 Epidural Steroid Injection (ref: Tony);  Surgeon: David Stringer Jr., MD;  Location: [facility] PAIN MANAGEMENT;  Service: Pain Management;  Laterality: N/A;  @1200  ASA last   DM  Needs Consent    EXCISION OF SKIN N/A 2020    Procedure: EXCISION, SKIN;  Surgeon: Finesse Dumont MD;  Location: [facility] OR;  Service: General;  Laterality: N/A;  on back    FOOT SURGERY      INCISION AND DRAINAGE OF GROIN Right 2020    Procedure: INCISION AND DRAINAGE, INGUINAL REGION;  Surgeon: Finesse Dumont MD;  Location: [facility] OR;  Service: General;  Laterality: Right;  PT TO PREOP IN AM  PRE-OP BY RN 2020    OPEN REDUCTION AND INTERNAL FIXATION (ORIF) OF INJURY OF ANKLE Right 2019    Procedure: ORIF, ANKLE;  Surgeon: Raeann Steward MD;  Location: [facility] OR;  Service: Orthopedics;  Laterality: Right;  SARITA GALAN  [phone] TEXTED HIM @ 10:2 AM ON 19  SKELETAL  RN PREOP 19---- CMP MORNING OF SURGERY PER DR STEWARD     Family History   Problem Relation Name Age of Onset    Heart disease Mother      Breast cancer Mother      Diabetes Father      Cancer Sister 4     Cancer Brother 2      Social History     Tobacco Use    Smoking status: Former     Current packs/day: 0.00     Average packs/day: 0.2 packs/day for 42.5 years (10.6 ttl pk-yrs)     Types: Cigarettes     Start date: 1/15/1981     Quit date: 2023     Years since quittin.2    Smokeless tobacco: Never   Substance Use Topics    Alcohol use: Yes      Alcohol/week: 4.0 standard drinks of alcohol     Types: 4 Cans of beer per week     Comment: social     Drug use: Not Currently     Types: Marijuana     Comment: quit marijuana     Review of Systems   Respiratory:  Negative for shortness of breath.    Cardiovascular:  Positive for leg swelling. Negative for chest pain.   Gastrointestinal:  Positive for abdominal pain (lower left) and nausea.   Musculoskeletal:  Positive for back pain.   Neurological:  Positive for weakness (generalized).   All other systems reviewed and are negative.      Physical Exam     Initial Vitals [10/11/24 0907]   BP Pulse Resp Temp SpO2   (!) 203/98 88 16 98.8 °F (37.1 °C) 95 %      MAP       --         Physical Exam    Cardiovascular:  Normal rate, regular rhythm and normal heart sounds.           Pulmonary/Chest: Breath sounds normal.   Abdominal: Abdomen is soft and protuberant.   Moderately diffuse lower abdomen tenderness to palpation.    Musculoskeletal:      Comments: Symmetrical 4+ strength in upper bilateral extremities. 4/5 strength in bilateral lower extremities with a little more effort in the left. Trace edema to right lower extremity.        Neurological:   Oriented to person, place, and location. Not oriented to to time or month. Unsure of yesterday's events.         ED Course   Procedures  Labs Reviewed   CBC W/ AUTO DIFFERENTIAL - Abnormal       Result Value    WBC 14.17 (*)     RBC 4.45 (*)     Hemoglobin 14.2      Hematocrit 41.4      MCV 93      MCH 31.9 (*)     MCHC 34.3      RDW 14.9 (*)     Platelets 237      MPV 10.5      Immature Granulocytes 0.5      Gran # (ANC) 12.3 (*)     Immature Grans (Abs) 0.07 (*)     Lymph # 1.1      Mono # 0.7      Eos # 0.0      Baso # 0.02      nRBC 0      Gran % 86.7 (*)     Lymph % 7.6 (*)     Mono % 5.1      Eosinophil % 0.0      Basophil % 0.1      Differential Method Automated     COMPREHENSIVE METABOLIC PANEL - Abnormal    Sodium 139      Potassium 3.5      Chloride 104      CO2  16 (*)     Glucose 163 (*)     BUN 19      Creatinine 1.1      Calcium 10.5      Total Protein 7.1      Albumin 3.3 (*)     Total Bilirubin 0.4      Alkaline Phosphatase 100      AST 56 (*)     ALT 36      eGFR >60      Anion Gap 19 (*)    TROPONIN I - Abnormal    Troponin I 0.031 (*)    B-TYPE NATRIURETIC PEPTIDE - Abnormal     (*)    CK - Abnormal     (*)    URINALYSIS, REFLEX TO URINE CULTURE - Abnormal    Specimen UA Urine, Clean Catch      Color, UA Yellow      Appearance, UA Clear      pH, UA 6.0      Specific Gravity, UA 1.020      Protein, UA 3+ (*)     Glucose, UA Negative      Ketones, UA 1+ (*)     Bilirubin (UA) Negative      Occult Blood UA 3+ (*)     Nitrite, UA Negative      Urobilinogen, UA Negative      Leukocytes, UA Negative      Narrative:     Specimen Source->Urine   LACTIC ACID, PLASMA - Abnormal    Lactate (Lactic Acid) 4.0 (*)     Narrative:     Lactic Acid critical result(s) called and verbal readback obtained   from Dana Nicole RN.  by JJMARTHA 10/11/2024 10:35   DRUG SCREEN PANEL, URINE EMERGENCY - Abnormal    Benzodiazepines Negative      Methadone metabolites Negative      Cocaine (Metab.) Presumptive Positive (*)     Opiate Scrn, Ur Negative      Barbiturate Screen, Ur Negative      Amphetamine Screen, Ur Negative      THC Negative      Phencyclidine Negative      Creatinine, Urine 72.5      Toxicology Information SEE COMMENT      Narrative:     Specimen Source->Urine   ALCOHOL,MEDICAL (ETHANOL) - Abnormal    Alcohol, Serum 21 (*)    MAGNESIUM - Abnormal    Magnesium 1.4 (*)    URINALYSIS MICROSCOPIC - Abnormal    RBC, UA 11 (*)     WBC, UA 2      Bacteria Rare      Squam Epithel, UA 1      Hyaline Casts, UA 3 (*)     Microscopic Comment SEE COMMENT      Narrative:     Specimen Source->Urine   POCT GLUCOSE - Abnormal    POCT Glucose >500 (*)    POCT GLUCOSE - Abnormal    POCT Glucose 243 (*)    CULTURE, BLOOD   CULTURE, BLOOD   LIPASE    Lipase 35     POCT GLUCOSE    POCT  Glucose 108     POCT GLUCOSE    POCT Glucose 91     POCT GLUCOSE    POCT Glucose 109     POCT GLUCOSE MONITORING CONTINUOUS   POCT GLUCOSE MONITORING CONTINUOUS   POCT GLUCOSE MONITORING CONTINUOUS          Imaging Results              CT Head Without Contrast (Final result)  Result time 10/11/24 12:06:58      Final result by Finesse Mitchell MD (10/11/24 12:06:58)                   Impression:      1. Allowing for motion artifact, no convincing acute intracranial abnormalities noting sequela of chronic microvascular ischemic change and senescent change.      Electronically signed by: Finesse Mitchell MD  Date:    10/11/2024  Time:    12:06               Narrative:    EXAMINATION:  CT HEAD WITHOUT CONTRAST    CLINICAL HISTORY:  Mental status change, unknown cause;confusion, resolved hypoglycemia. nausea.;    TECHNIQUE:  Low dose axial images were obtained through the head.  Coronal and sagittal reformations were also performed. Contrast was not administered.    COMPARISON:  None.    FINDINGS:  There is motion artifact.    There is generalized cerebral volume loss.  There is hypoattenuation in a periventricular fashion, likely sequela of chronic microvascular ischemic change.  There is no evidence of acute major vascular territory infarct, hemorrhage, or mass.  There is no hydrocephalus.  There are no abnormal extra-axial fluid collections.  The paranasal sinuses and mastoid air cells are clear, and there is no evidence of calvarial fracture.  The visualized soft tissues are unremarkable.                                       CT Cervical Spine Without Contrast (Final result)  Result time 10/11/24 12:11:10      Final result by Finesse Mitchell MD (10/11/24 12:11:10)                   Impression:      1. No acute displaced fracture or dislocation of the cervical spine noting degenerative changes and additional findings above.      Electronically signed by: Finesse Mitchell MD  Date:    10/11/2024  Time:    12:11                Narrative:    EXAMINATION:  CT CERVICAL SPINE WITHOUT CONTRAST    CLINICAL HISTORY:  Polytrauma, blunt;unsure if fall. confusion, nausea, hypoglycemia, abd pain;    TECHNIQUE:  Low dose axial images, sagittal and coronal reformations were performed though the cervical spine.  Contrast was not administered.    COMPARISON:  None    FINDINGS:  There is motion artifact.  There is osteopenia.    The visualized portions of the lung apices are unremarkable.  The thyroid gland, parotid glands, and remaining visualized salivary glands are grossly unremarkable.  No tonsillar enlargement.  No significant cervical lymphadenopathy.  The posterior paraspinous and hypopharyngeal soft tissues are unremarkable.    Sagittal reformatted imaging demonstrates adequate alignment of the cervical spine without significant vertebral body height loss.  There is multilevel disc degenerative change and multilevel anterior marginal osteophytosis.  There is multilevel facet arthropathy.  No convincing acute displaced fracture or dislocation of the cervical spine.  Motion artifact limits evaluation for spondylitic changes.  There is multilevel moderate spinal canal stenosis and neuroforaminal narrowing.  The airway is patent.                                       CT Abdomen Pelvis With IV Contrast NO Oral Contrast (Final result)  Result time 10/11/24 11:36:46      Final result by Bud Corbin MD (10/11/24 11:36:46)                   Impression:      No etiology for abdominal pain identified.      Electronically signed by: Bud Corbin MD  Date:    10/11/2024  Time:    11:36               Narrative:    EXAMINATION:  CT ABDOMEN PELVIS WITH IV CONTRAST    CLINICAL HISTORY:  Abdominal pain, acute, nonlocalized;diffuse lower abd pain, nausea;    TECHNIQUE:  Low dose axial images, sagittal and coronal reformations were obtained from the lung bases to the pubic symphysis following the IV administration of 100 mL of Omnipaque 350 and the  oral administration of 30 mL of Omnipaque 350.    COMPARISON:  None.    FINDINGS:  Abdomen: No liver masses.  The gallbladder is unremarkable.  No biliary or pancreatic ductal dilatation.  Splenic size within normal limits.  Adrenal glands unremarkable.  Small left upper pole renal cyst noted.  No renal masses or hydronephrosis.  Abdominal aorta tapers normally.  Mild scattered calcific atherosclerosis.    Pelvis: Bladder is distended.  Prostate unremarkable.  No fluid collections.  No inguinal or pelvic lymphadenopathy.    Bowel/mesentery: The terminal ileum is unremarkable.  No dilated appendix visualized.  There is mild fat stranding in the mesentery.  No fluid collections.  No mesenteric lymphadenopathy.  Small hiatal hernia.    Bones: No marrow replacement process.  Degenerative changes of lumbar spine and hips noted.    Lung bases: Mild dependent interstitial thickening.                                       X-Ray Chest AP Portable (Final result)  Result time 10/11/24 10:14:50      Final result by Bud Corbin MD (10/11/24 10:14:50)                   Impression:      No acute findings.      Electronically signed by: Bud Corbin MD  Date:    10/11/2024  Time:    10:14               Narrative:    EXAMINATION:  XR CHEST AP PORTABLE    CLINICAL HISTORY:  Nausea    TECHNIQUE:  Single frontal view of the chest was performed.    COMPARISON:  07/18/2023    FINDINGS:  Enlarged cardiac silhouette.The lungs are grossly clear.  No pneumothorax.No pleural effusions.                                       Medications   ondansetron injection 4 mg (4 mg Intravenous Given 10/11/24 1032)   iohexoL (OMNIPAQUE 350) injection 100 mL (100 mLs Intravenous Given 10/11/24 1110)   magnesium sulfate in dextrose IVPB (premix) 1 g (0 g Intravenous Stopped 10/11/24 1340)   hydrALAZINE injection 10 mg (10 mg Intravenous Given 10/11/24 1235)   hydrALAZINE injection 10 mg (10 mg Intravenous Given 10/11/24 1413)     Medical Decision  Making  64-year-old with generalized malaise and fatigue, hypoglycemia this morning, with nausea, reports feeling bad for day but can not recall any events from yesterday.  Has some global confusion but able to answer most questions appropriately.  Mostly afocal neurologic exam with perhaps trace asymmetry versus decreased effort in the right leg compared to the left leg.  Accu-Chek in 200s in ED. differential diagnosis is broad includes but is not limited to neurologic event, cardiac, electrolyte, metabolic, infectious, inflammatory, sepsis and other etiologies.  Broad workup initiated.  Vital signs noted.  Will monitor closely and a rechecked.  Adjustment of bp pending CT and clinical picture. Jen Nava MD, 10/11/2024 9:37 AM    Checked on pt. States feeling a little better. Nausea resolved. About to go for CT. Asking for water, tolerating few ice chips. More alert. Asked about etoh. Doesn't recall drinking alcohol yesterday but does drink occasionally, not every day. Jen Nava MD, 10/11/2024 10:46 AM    Planned for admission for trend trop, replace mag, hydration for elevated cpk, monitoring for glucose. Pt feels better. Is hungry. Is lucid and linear but still doesn't recall yesterdays events and doesn't recall last time he drank or used cocaine. Discussed admission w HM who will assume care. Hydralazine given to lower bp.   Pt signed out to Dr. Phillips at change of shift with plan for admission, monitoring bp and glucose to determine disposition location.         Amount and/or Complexity of Data Reviewed  Independent Historian: EMS     Details: See HPI.   Labs: ordered. Decision-making details documented in ED Course.  Radiology: ordered. Decision-making details documented in ED Course.    Risk  Prescription drug management.            Scribe Attestation:   Scribe #1: I performed the above scribed service and the documentation accurately describes the services I performed. I attest to the accuracy of  the note.        ED Course as of 10/11/24 1609   Fri Oct 11, 2024   1315 I assumed care of this patient who has plans being admitted to observation for uncontrolled hypertension and altered mental status.  At this time his blood pressure is too high for admission to the floor and he is receiving IV hydralazine and I will reassess for improvement in blood pressure before the hospital team will admit the patient [MH]   1350 BP remains elevated after hydralazine: 220/89.  I have ordered a repeat dose of hydralazine [MH]   1440 /87 [MH]      ED Course User Index  [MH] Jacinto Phillips MD                           Clinical Impression:  Final diagnoses:  [I10] Hypertension  [R11.0] Nausea  [I16.1] Hypertensive emergency          ED Disposition Condition    Observation Fair          I, Jacinto Phillips, personally performed the services described in this documentation. All medical record entries made by the scribe were at my direction and in my presence. I have reviewed the chart and agree that the record reflects my personal performance and is accurate and complete.      DISCLAIMER: This note was prepared with [employer] voice recognition transcription software. Garbled syntax, mangled pronouns, and other bizarre constructions may be attributed to that software system.        Jacinto Phillips MD  10/11/24 3615

## 2024-10-11 NOTE — Clinical Note
Diagnosis: Hypertensive emergency [259796]   Future Attending Provider: DEMETRI PIERCE [[id number]]   Special Needs:: Fall Risk [15]

## 2024-10-11 NOTE — PLAN OF CARE
Case Management Assessment     PCP: Rhonda Krishnan  Pharmacy: [facility] and [facility] [place]    Patient Arrived From: home   Existing Help at Home: Nephew    Barriers to Discharge: none    Discharge Plan:    A. Home    B. Home     SW completed brief assessment and discussed discharge planning with patient at his bedside. Patient stated that his nephew Torys recently moved in with and he assists him when needed. Patient's nephew will provide transportation foe him to get home when discharge from the hospital.      10/11/24 5822   Discharge Planning   Assessment Type Discharge Planning Brief Assessment   Resource/Environmental Concerns none   Support Systems Children   Equipment Currently Used at Home wheelchair;cane, straight   Current Living Arrangements apartment   Patient/Family Anticipates Transition to home   Patient/Family Anticipated Services at Transition none   DME Needed Upon Discharge  none   Discharge Plan A Home with family   Discharge Plan B Home with family

## 2024-10-11 NOTE — ED TRIAGE NOTES
Pt BIB EMS for evaluation of hypoglycemia, nausea, and bilateral leg swelling. Pt c/o lower abdominal pain, and back pain and reports recent back surgery 3-4 months ago. Per EMS initial CBG was 31 mg/dl. Pt was given 55g of D50 and glucose was reassessed at 159 mg/dL. Pt's CBG is 243 mg upon arrival to the ED. Pt denies any CP, SOB, vomiting, or diarrhea. Pmhx of DM, HLD, HTN, and MI.

## 2024-10-12 PROBLEM — Z91.148 NONADHERENCE TO MEDICATION: Status: ACTIVE | Noted: 2024-10-12

## 2024-10-12 PROBLEM — F14.10 COCAINE ABUSE: Status: ACTIVE | Noted: 2024-10-12

## 2024-10-12 LAB
ALBUMIN SERPL BCP-MCNC: 2.9 G/DL (ref 3.5–5.2)
ALP SERPL-CCNC: 78 U/L (ref 55–135)
ALT SERPL W/O P-5'-P-CCNC: 26 U/L (ref 10–44)
ANION GAP SERPL CALC-SCNC: 11 MMOL/L (ref 8–16)
ANION GAP SERPL CALC-SCNC: 12 MMOL/L (ref 8–16)
AST SERPL-CCNC: 33 U/L (ref 10–40)
BASOPHILS # BLD AUTO: 0.03 K/UL (ref 0–0.2)
BASOPHILS NFR BLD: 0.3 % (ref 0–1.9)
BILIRUB SERPL-MCNC: 0.4 MG/DL (ref 0.1–1)
BUN SERPL-MCNC: 27 MG/DL (ref 8–23)
BUN SERPL-MCNC: 29 MG/DL (ref 8–23)
CALCIUM SERPL-MCNC: 10.2 MG/DL (ref 8.7–10.5)
CALCIUM SERPL-MCNC: 9.8 MG/DL (ref 8.7–10.5)
CHLORIDE SERPL-SCNC: 104 MMOL/L (ref 95–110)
CHLORIDE SERPL-SCNC: 105 MMOL/L (ref 95–110)
CO2 SERPL-SCNC: 22 MMOL/L (ref 23–29)
CO2 SERPL-SCNC: 24 MMOL/L (ref 23–29)
CREAT SERPL-MCNC: 1.5 MG/DL (ref 0.5–1.4)
CREAT SERPL-MCNC: 1.7 MG/DL (ref 0.5–1.4)
DIFFERENTIAL METHOD BLD: ABNORMAL
EOSINOPHIL # BLD AUTO: 0.1 K/UL (ref 0–0.5)
EOSINOPHIL NFR BLD: 1.2 % (ref 0–8)
ERYTHROCYTE [DISTWIDTH] IN BLOOD BY AUTOMATED COUNT: 15.3 % (ref 11.5–14.5)
EST. GFR  (NO RACE VARIABLE): 45 ML/MIN/1.73 M^2
EST. GFR  (NO RACE VARIABLE): 52 ML/MIN/1.73 M^2
FOLATE SERPL-MCNC: 10.2 NG/ML (ref 4–24)
GLUCOSE SERPL-MCNC: 111 MG/DL (ref 70–110)
GLUCOSE SERPL-MCNC: 69 MG/DL (ref 70–110)
HCT VFR BLD AUTO: 35.9 % (ref 40–54)
HGB BLD-MCNC: 12 G/DL (ref 14–18)
IMM GRANULOCYTES # BLD AUTO: 0.05 K/UL (ref 0–0.04)
IMM GRANULOCYTES NFR BLD AUTO: 0.5 % (ref 0–0.5)
LYMPHOCYTES # BLD AUTO: 2.4 K/UL (ref 1–4.8)
LYMPHOCYTES NFR BLD: 23.7 % (ref 18–48)
MAGNESIUM SERPL-MCNC: 1.9 MG/DL (ref 1.6–2.6)
MCH RBC QN AUTO: 31.3 PG (ref 27–31)
MCHC RBC AUTO-ENTMCNC: 33.4 G/DL (ref 32–36)
MCV RBC AUTO: 94 FL (ref 82–98)
MONOCYTES # BLD AUTO: 1.1 K/UL (ref 0.3–1)
MONOCYTES NFR BLD: 11 % (ref 4–15)
NEUTROPHILS # BLD AUTO: 6.5 K/UL (ref 1.8–7.7)
NEUTROPHILS NFR BLD: 63.3 % (ref 38–73)
NRBC BLD-RTO: 0 /100 WBC
OHS QRS DURATION: 92 MS
OHS QTC CALCULATION: 472 MS
PHOSPHATE SERPL-MCNC: 3.2 MG/DL (ref 2.7–4.5)
PLATELET # BLD AUTO: 210 K/UL (ref 150–450)
PMV BLD AUTO: 10.9 FL (ref 9.2–12.9)
POCT GLUCOSE: 114 MG/DL (ref 70–110)
POCT GLUCOSE: 135 MG/DL (ref 70–110)
POCT GLUCOSE: 230 MG/DL (ref 70–110)
POTASSIUM SERPL-SCNC: 3.4 MMOL/L (ref 3.5–5.1)
POTASSIUM SERPL-SCNC: 3.6 MMOL/L (ref 3.5–5.1)
PROT SERPL-MCNC: 5.9 G/DL (ref 6–8.4)
RBC # BLD AUTO: 3.84 M/UL (ref 4.6–6.2)
SODIUM SERPL-SCNC: 138 MMOL/L (ref 136–145)
SODIUM SERPL-SCNC: 140 MMOL/L (ref 136–145)
TROPONIN I SERPL DL<=0.01 NG/ML-MCNC: 0.05 NG/ML (ref 0–0.03)
TROPONIN I SERPL DL<=0.01 NG/ML-MCNC: 0.07 NG/ML (ref 0–0.03)
VIT B12 SERPL-MCNC: 659 PG/ML (ref 210–950)
WBC # BLD AUTO: 10.3 K/UL (ref 3.9–12.7)

## 2024-10-12 PROCEDURE — G0427 INPT/ED TELECONSULT70: HCPCS | Mod: HCNC,95,, | Performed by: PSYCHIATRY & NEUROLOGY

## 2024-10-12 PROCEDURE — 63600175 PHARM REV CODE 636 W HCPCS: Mod: HCNC

## 2024-10-12 PROCEDURE — 84484 ASSAY OF TROPONIN QUANT: CPT | Mod: HCNC

## 2024-10-12 PROCEDURE — 84484 ASSAY OF TROPONIN QUANT: CPT | Mod: 91,HCNC | Performed by: NURSE PRACTITIONER

## 2024-10-12 PROCEDURE — 96372 THER/PROPH/DIAG INJ SC/IM: CPT

## 2024-10-12 PROCEDURE — 25000003 PHARM REV CODE 250: Mod: HCNC | Performed by: NURSE PRACTITIONER

## 2024-10-12 PROCEDURE — 84100 ASSAY OF PHOSPHORUS: CPT | Mod: HCNC

## 2024-10-12 PROCEDURE — 83735 ASSAY OF MAGNESIUM: CPT | Mod: HCNC

## 2024-10-12 PROCEDURE — 82607 VITAMIN B-12: CPT | Mod: HCNC

## 2024-10-12 PROCEDURE — G0378 HOSPITAL OBSERVATION PER HR: HCPCS | Mod: HCNC

## 2024-10-12 PROCEDURE — 82746 ASSAY OF FOLIC ACID SERUM: CPT | Mod: HCNC

## 2024-10-12 PROCEDURE — 85025 COMPLETE CBC W/AUTO DIFF WBC: CPT | Mod: HCNC

## 2024-10-12 PROCEDURE — 25000003 PHARM REV CODE 250: Mod: HCNC

## 2024-10-12 PROCEDURE — 36415 COLL VENOUS BLD VENIPUNCTURE: CPT | Mod: HCNC,XB | Performed by: NURSE PRACTITIONER

## 2024-10-12 PROCEDURE — 80053 COMPREHEN METABOLIC PANEL: CPT | Mod: HCNC

## 2024-10-12 PROCEDURE — 83036 HEMOGLOBIN GLYCOSYLATED A1C: CPT | Mod: HCNC | Performed by: NURSE PRACTITIONER

## 2024-10-12 PROCEDURE — 96361 HYDRATE IV INFUSION ADD-ON: CPT

## 2024-10-12 PROCEDURE — 36415 COLL VENOUS BLD VENIPUNCTURE: CPT | Mod: HCNC

## 2024-10-12 PROCEDURE — 94761 N-INVAS EAR/PLS OXIMETRY MLT: CPT | Mod: HCNC

## 2024-10-12 PROCEDURE — 84425 ASSAY OF VITAMIN B-1: CPT | Mod: HCNC

## 2024-10-12 PROCEDURE — 80048 BASIC METABOLIC PNL TOTAL CA: CPT | Mod: HCNC | Performed by: NURSE PRACTITIONER

## 2024-10-12 RX ORDER — CARVEDILOL 12.5 MG/1
25 TABLET ORAL 2 TIMES DAILY WITH MEALS
Status: DISCONTINUED | OUTPATIENT
Start: 2024-10-12 | End: 2024-10-15 | Stop reason: HOSPADM

## 2024-10-12 RX ORDER — ATORVASTATIN CALCIUM 10 MG/1
20 TABLET, FILM COATED ORAL DAILY
Status: DISCONTINUED | OUTPATIENT
Start: 2024-10-12 | End: 2024-10-15 | Stop reason: HOSPADM

## 2024-10-12 RX ORDER — FOLIC ACID 1 MG/1
1 TABLET ORAL DAILY
Status: DISCONTINUED | OUTPATIENT
Start: 2024-10-12 | End: 2024-10-15 | Stop reason: HOSPADM

## 2024-10-12 RX ORDER — LORAZEPAM 2 MG/ML
2 INJECTION INTRAMUSCULAR
Status: DISCONTINUED | OUTPATIENT
Start: 2024-10-12 | End: 2024-10-15 | Stop reason: HOSPADM

## 2024-10-12 RX ORDER — HYDRALAZINE HYDROCHLORIDE 25 MG/1
25 TABLET, FILM COATED ORAL EVERY 12 HOURS
Status: DISCONTINUED | OUTPATIENT
Start: 2024-10-12 | End: 2024-10-15 | Stop reason: HOSPADM

## 2024-10-12 RX ORDER — PREGABALIN 50 MG/1
100 CAPSULE ORAL 2 TIMES DAILY
Status: DISCONTINUED | OUTPATIENT
Start: 2024-10-12 | End: 2024-10-15 | Stop reason: HOSPADM

## 2024-10-12 RX ORDER — POTASSIUM CHLORIDE 20 MEQ/1
40 TABLET, EXTENDED RELEASE ORAL ONCE
Status: COMPLETED | OUTPATIENT
Start: 2024-10-12 | End: 2024-10-12

## 2024-10-12 RX ORDER — AMITRIPTYLINE HYDROCHLORIDE 25 MG/1
25 TABLET, FILM COATED ORAL NIGHTLY PRN
Status: DISCONTINUED | OUTPATIENT
Start: 2024-10-12 | End: 2024-10-15 | Stop reason: HOSPADM

## 2024-10-12 RX ORDER — ASPIRIN 81 MG/1
81 TABLET ORAL DAILY
Status: DISCONTINUED | OUTPATIENT
Start: 2024-10-12 | End: 2024-10-15 | Stop reason: HOSPADM

## 2024-10-12 RX ORDER — CYCLOBENZAPRINE HCL 5 MG
5 TABLET ORAL NIGHTLY PRN
Status: DISCONTINUED | OUTPATIENT
Start: 2024-10-12 | End: 2024-10-15 | Stop reason: HOSPADM

## 2024-10-12 RX ORDER — LISINOPRIL 20 MG/1
40 TABLET ORAL DAILY
Status: DISCONTINUED | OUTPATIENT
Start: 2024-10-12 | End: 2024-10-15 | Stop reason: HOSPADM

## 2024-10-12 RX ORDER — LANOLIN ALCOHOL/MO/W.PET/CERES
100 CREAM (GRAM) TOPICAL DAILY
Status: DISCONTINUED | OUTPATIENT
Start: 2024-10-12 | End: 2024-10-15 | Stop reason: HOSPADM

## 2024-10-12 RX ADMIN — CARVEDILOL 25 MG: 12.5 TABLET, FILM COATED ORAL at 09:10

## 2024-10-12 RX ADMIN — HYDRALAZINE HYDROCHLORIDE 25 MG: 25 TABLET ORAL at 09:10

## 2024-10-12 RX ADMIN — PREGABALIN 100 MG: 50 CAPSULE ORAL at 09:10

## 2024-10-12 RX ADMIN — INSULIN ASPART 2 UNITS: 100 INJECTION, SOLUTION INTRAVENOUS; SUBCUTANEOUS at 09:10

## 2024-10-12 RX ADMIN — ATORVASTATIN CALCIUM 20 MG: 10 TABLET, FILM COATED ORAL at 09:10

## 2024-10-12 RX ADMIN — THERA TABS 1 TABLET: TAB at 12:10

## 2024-10-12 RX ADMIN — Medication 6 MG: at 09:10

## 2024-10-12 RX ADMIN — HEPARIN SODIUM 5000 UNITS: 5000 INJECTION INTRAVENOUS; SUBCUTANEOUS at 02:10

## 2024-10-12 RX ADMIN — POLYETHYLENE GLYCOL 3350 17 G: 17 POWDER, FOR SOLUTION ORAL at 09:10

## 2024-10-12 RX ADMIN — HYDRALAZINE HYDROCHLORIDE 25 MG: 25 TABLET ORAL at 01:10

## 2024-10-12 RX ADMIN — CARVEDILOL 25 MG: 12.5 TABLET, FILM COATED ORAL at 05:10

## 2024-10-12 RX ADMIN — SODIUM CHLORIDE 500 ML: 9 INJECTION, SOLUTION INTRAVENOUS at 12:10

## 2024-10-12 RX ADMIN — FOLIC ACID 1 MG: 1 TABLET ORAL at 12:10

## 2024-10-12 RX ADMIN — HEPARIN SODIUM 5000 UNITS: 5000 INJECTION INTRAVENOUS; SUBCUTANEOUS at 09:10

## 2024-10-12 RX ADMIN — HEPARIN SODIUM 5000 UNITS: 5000 INJECTION INTRAVENOUS; SUBCUTANEOUS at 05:10

## 2024-10-12 RX ADMIN — THIAMINE HCL TAB 100 MG 100 MG: 100 TAB at 12:10

## 2024-10-12 RX ADMIN — POTASSIUM CHLORIDE 40 MEQ: 1500 TABLET, EXTENDED RELEASE ORAL at 09:10

## 2024-10-12 RX ADMIN — LISINOPRIL 40 MG: 20 TABLET ORAL at 09:10

## 2024-10-12 RX ADMIN — ASPIRIN 81 MG: 81 TABLET, COATED ORAL at 09:10

## 2024-10-12 NOTE — ED NOTES
Received report from VINICIO Flores. Introduced self at bedside, pt resting comfortably. Pt is alert, calm, and oriented x4. NADN.

## 2024-10-12 NOTE — CONSULTS
[facility]  Psychiatry  Telepsychiatry Consult Note    Please see previous notes:    Patient agreeable to consultation via telepsychiatry.    Tele-Consultation from Psychiatry started: 10/12/2024 at 949 am  The chief complaint leading to psychiatric consultation is: substance abuse  This consultation was requested by Curt Rivera MD, the inpatient attending.  The location of the consulting psychiatrist is  [facility] .  The patient location is  [facility] TELEMETRY   The patient arrived at the ED at: yesterday    Also present with the patient at the time of the consultation: none    Patient Identification:   Thierry Ho is a 64 y.o. male.    Patient information was obtained from patient.  Patient presented voluntarily to the Emergency Department by ambulance where the patient received see Ambulance Run Sheet prior to arrival.       Hypoglycemia       Pt BIB EMS, c/o hypoglycemia. Pt's initial CBG was 31 mg/dL. Pt's last CBG with EMS is 159mg/dL after 55g of D50 through oral and IV route. Pt c/o malaise. Pt denies any CP, SOB, abd pain or n/v/d      HPI: Thierry Ho is a 64 y.o. male with cocaine and EtOH use, Insulin-dependent DM2 without complications, HTN, and HLD who presented to [facility] ED on 10/11/2024 for eval and treatment of AMS. Patient can remember very little over the last 24 hours. Per ED report, nephew called EMS with concern for hallucinations just prior to presentation. EMS discovered BG 33, gave an amp of D50 en route to [facility]. Patient was more alert with  on arrival, reports he takes his medication but is unsure about how much and when. BG soon dropped back down to 100 without any insulin given. Patient reports being shaken by the prospect of dying of hypoglycemia had his nephew not discovered him and called 911. Denies any symptoms or discomfort at the time of initial HM interview. Ct head, cspine, abd/pelvis acutely negative (was ttp in lower abd). abnl labs: wbc 14,  co2 16, lactate 4, mag 1.4, sgy991, , trop 0.03. CT H/S/A/P all acutely negative. ED therapy/stabilization measures: Home BP meds and IVF given. They were placed in observation under the care of [facility] for further evaluation and treatment.   [facility] [phone] INP for ALCOHOL WITHDRAWAL & PSYCH EVAL     Consults  Teleconsult Time Documentation  Subjective:     History of Present Illness:  Patient is a 64-year-old man admitted for hyperglycemia and elevated troponins in the context of medication noncompliance and alcohol and cocaine use.  He had a similar admission about 8 months ago he states.  He is Unsure how he was taking meds this week.  He definitely took meds monday or tuesday.  He is partially amnestic for the later part of the week when he went on an alcohol binge and used cocaine with a woman who came to visit.  He was smoking the cocaine.  He scores a 2 on the use disorder questions for cocaine and a 4 on a use disorder questions for alcohol.  Neither substances use daily.  There is no history of physical withdrawal from either substance.  He tends to use alcohol in binge approximately 2 times per month when he will buy a 5th plus a 6 pack of beer.  Once he drinks this much he will frequently go on to smoking cocaine.  He says there is a group of people in his [place] who are not a good influence and who he will tend to use with if he is going to use.  He states that at this point his goal is to be 100% sober from all substances.  He is  Minimizing of the issue i think in the sense that he says that he does not use that much her that often..Denies that he had made a resolution to quit after a suspected heart attack related to use 8 months ago and says this is the first time.  Did do substance abuse treatment greater than 10 years ago for cocaine.  He denies that he had a problem but conversely states that it was helpful.  Was clean for years until he gradually started  "using again.  He is aware that he has been hanging around with the wrong people and going to the wrong places.  He has never been psychiatrically hospitalized.  He does not feel depressed.  He does not feel overly anxious.  He is not having alcohol withdrawal symptoms such as headache nausea tremor or any perceptual disturbances.  He is fully oriented to it being Saturday in October and where he is in the reason for hospitalization.  His main plan is to go home and start staying home and not answering the phone or the door when he knows he has been contacted by people who use substances.  He is amenable to self referral to an intensive outpatient level of treatment to build up sobriety skills.       Depression/Mood: does have depression, no changes in sleep, anhedonia, energy, appetite, concentration, psychomotor agitation/retardation, SI, HI. +impulsivity, distractibility- Denies euphoria, increased energy, grandiosity, decreased sleep, hyper-religiousity, pleasure seeking.     Anxiety: no panic attacks, ruminative thoughts, obsession/compulsions     Trauma: no nightmares, flashbacks, avoidance, intrusive symptoms, dissociation/derealization     Cognition: no memory troubles, executive function concerns, "brain fog"     Psychosis: No hallucinations, delusions, paranoia, persecutory thoughts, difficulty with reality testing      Psychiatric History:   Previous Psychiatric Hospitalizations: No    Previous Medication Trials: No    Previous Suicide Attempts: no    History of Violence: no  History of Depression: no  History of Macarena: no  History of Auditory/Visual Hallucination no  History of Delusions: no  Outpatient psychiatrist (current & past): No    Substance Abuse History:  Tobacco:Yes  Alcohol: Yes  Illicit Substances:Yes  Detox/Rehab: Yes at [facility] for 5 days inpatient greater than 10 years ago    Legal History: Past charges/incarcerations: No     Family Psychiatric History:  Not asked      Social " "History:  Developmental/Childhood:Achieved all developmental milestones timely  *Education:High School Diploma  Employment Status/Finances: Retire    Relationship Status/Sexual Orientation: Single:     Children:  4  Housing Status:  Housed lives with nephew who helps with driving and appointments     history:  NO  Access to gun: NO  Hoahaoism: Not asked  Recreational activities: Time with family    Psychiatric Mental Status Exam:  Arousal: alert  Sensorium/Orientation: oriented to grossly intact  Behavior/Cooperation: normal, cooperative   Speech: normal tone, normal rate, normal pitch, normal volume  Language: grossly intact  Mood: " im good "   Affect: appropriate  Thought Process: normal and logical  Thought Content:   Auditory hallucinations: NO  Visual hallucinations: NO  Paranoia: NO  Delusions:  NO  Suicidal ideation: NO  Homicidal ideation: NO  Attention/Concentration:  intact  Memory:    Recent:  Intact   Remote: Intact   3/3 immediate,  /3 at 5 min  Fund of Knowledge: Aware of current events   Abstract reasoning:  Abstract my interview  Insight:  Minimizing of the intensity of substance use disorder but aware of it and help seeking  Judgment: behavior is adequate to circumstances      Past Medical History:   Past Medical History:   Diagnosis Date    Colon polyps     Diabetes mellitus     Diabetes with neurologic complications     DM retinopathy     GERD (gastroesophageal reflux disease)     Hyperlipidemia     Hypertension     Myocardial infarction       Laboratory Data:   Labs Reviewed   CBC W/ AUTO DIFFERENTIAL - Abnormal       Result Value    WBC 14.17 (*)     RBC 4.45 (*)     Hemoglobin 14.2      Hematocrit 41.4      MCV 93      MCH 31.9 (*)     MCHC 34.3      RDW 14.9 (*)     Platelets 237      MPV 10.5      Immature Granulocytes 0.5      Gran # (ANC) 12.3 (*)     Immature Grans (Abs) 0.07 (*)     Lymph # 1.1      Mono # 0.7      Eos # 0.0      Baso # 0.02      nRBC 0      Gran % 86.7 (*)     " Lymph % 7.6 (*)     Mono % 5.1      Eosinophil % 0.0      Basophil % 0.1      Differential Method Automated     COMPREHENSIVE METABOLIC PANEL - Abnormal    Sodium 139      Potassium 3.5      Chloride 104      CO2 16 (*)     Glucose 163 (*)     BUN 19      Creatinine 1.1      Calcium 10.5      Total Protein 7.1      Albumin 3.3 (*)     Total Bilirubin 0.4      Alkaline Phosphatase 100      AST 56 (*)     ALT 36      eGFR >60      Anion Gap 19 (*)    TROPONIN I - Abnormal    Troponin I 0.031 (*)    B-TYPE NATRIURETIC PEPTIDE - Abnormal     (*)    CK - Abnormal     (*)    URINALYSIS, REFLEX TO URINE CULTURE - Abnormal    Specimen UA Urine, Clean Catch      Color, UA Yellow      Appearance, UA Clear      pH, UA 6.0      Specific Gravity, UA 1.020      Protein, UA 3+ (*)     Glucose, UA Negative      Ketones, UA 1+ (*)     Bilirubin (UA) Negative      Occult Blood UA 3+ (*)     Nitrite, UA Negative      Urobilinogen, UA Negative      Leukocytes, UA Negative      Narrative:     Specimen Source->Urine   LACTIC ACID, PLASMA - Abnormal    Lactate (Lactic Acid) 4.0 (*)     Narrative:     Lactic Acid critical result(s) called and verbal readback obtained   from Dana Nicole RN.  by JJP3 10/11/2024 10:35   DRUG SCREEN PANEL, URINE EMERGENCY - Abnormal    Benzodiazepines Negative      Methadone metabolites Negative      Cocaine (Metab.) Presumptive Positive (*)     Opiate Scrn, Ur Negative      Barbiturate Screen, Ur Negative      Amphetamine Screen, Ur Negative      THC Negative      Phencyclidine Negative      Creatinine, Urine 72.5      Toxicology Information SEE COMMENT      Narrative:     Specimen Source->Urine   ALCOHOL,MEDICAL (ETHANOL) - Abnormal    Alcohol, Serum 21 (*)    MAGNESIUM - Abnormal    Magnesium 1.4 (*)    URINALYSIS MICROSCOPIC - Abnormal    RBC, UA 11 (*)     WBC, UA 2      Bacteria Rare      Squam Epithel, UA 1      Hyaline Casts, UA 3 (*)     Microscopic Comment SEE COMMENT      Narrative:      Specimen Source->Urine   POCT GLUCOSE - Abnormal    POCT Glucose >500 (*)    POCT GLUCOSE - Abnormal    POCT Glucose 243 (*)    CULTURE, BLOOD    Blood Culture, Routine No Growth to date     CULTURE, BLOOD    Blood Culture, Routine No Growth to date     LIPASE    Lipase 35     VITAMIN B12   FOLATE   POCT GLUCOSE    POCT Glucose 108     POCT GLUCOSE    POCT Glucose 91     POCT GLUCOSE    POCT Glucose 109     POCT GLUCOSE MONITORING CONTINUOUS   POCT GLUCOSE MONITORING CONTINUOUS   POCT GLUCOSE MONITORING CONTINUOUS          Allergies:    Review of patient's allergies indicates:   Allergen Reactions    Trulicity [dulaglutide] Nausea Only     dizzy       Medications in ER:   Medications   sodium chloride 0.9% flush 10 mL (has no administration in time range)   melatonin tablet 6 mg (6 mg Oral Given 10/11/24 2220)   ondansetron injection 4 mg (4 mg Intravenous Given 10/11/24 1750)   prochlorperazine injection Soln 5 mg (has no administration in time range)   polyethylene glycol packet 17 g (has no administration in time range)   mineral oil enema 1 enema (has no administration in time range)   acetaminophen tablet 650 mg (has no administration in time range)   aluminum-magnesium hydroxide-simethicone 200-200-20 mg/5 mL suspension 30 mL (has no administration in time range)   acetaminophen tablet 650 mg (has no administration in time range)   naloxone 0.4 mg/mL injection 0.02 mg (has no administration in time range)   glucose chewable tablet 16 g (has no administration in time range)   glucose chewable tablet 24 g (has no administration in time range)   glucagon (human recombinant) injection 1 mg (has no administration in time range)   heparin (porcine) injection 5,000 Units (5,000 Units Subcutaneous Given 10/12/24 2773)   insulin aspart U-100 pen 0-10 Units (has no administration in time range)   insulin glargine U-100 (Lantus) pen 15 Units (has no administration in time range)   oxyCODONE immediate release tablet 5  mg (has no administration in time range)   acetaminophen tablet 1,000 mg (1,000 mg Oral Given 10/11/24 2220)   HYDROmorphone injection 0.5 mg (has no administration in time range)   amitriptyline tablet 25 mg (has no administration in time range)   aspirin EC tablet 81 mg (has no administration in time range)   carvediloL tablet 25 mg (has no administration in time range)   cyclobenzaprine tablet 5 mg (has no administration in time range)   hydrALAZINE tablet 25 mg (25 mg Oral Given 10/12/24 0146)   lisinopriL tablet 40 mg (has no administration in time range)   pregabalin capsule 100 mg (has no administration in time range)   atorvastatin tablet 20 mg (has no administration in time range)   potassium chloride SA CR tablet 40 mEq (has no administration in time range)   ondansetron injection 4 mg (4 mg Intravenous Given 10/11/24 1032)   iohexoL (OMNIPAQUE 350) injection 100 mL (100 mLs Intravenous Given 10/11/24 1110)   magnesium sulfate in dextrose IVPB (premix) 1 g (0 g Intravenous Stopped 10/11/24 1340)   hydrALAZINE injection 10 mg (10 mg Intravenous Given 10/11/24 1235)   hydrALAZINE injection 10 mg (10 mg Intravenous Given 10/11/24 1413)   0.9% NaCl 1,000 mL with mvi, (ADULT) no.4 with vit K 3,300 unit- 150 mcg/10 mL 10 mL, thiamine 100 mg, folic acid 1 mg infusion ( Intravenous New Bag 10/11/24 1728)       Medications at home:  None for psych    No new subjective & objective note has been filed under this hospital service since the last note was generated.      Assessment - Diagnosis - Goals:     Diagnosis/Impression:  Cocaine use disorder mild and alcohol use disorder moderate.  Maybe minimizing maybe more substantial.  Safe.  Unlikely to require medication for alcohol withdrawal.  Not require inpatient level of psychiatric care.  Because of the medical consequences of his use disorders this is a significant problem and intensive outpatient level of treatment is recommended.  The programs will likely want him  to self refer.      Rec:   Multivitamin  Please provide referrals listed below to the  and encouraged him to share them with family perhaps his nephew and possibly the programs would accept a referral from  as well but I am unsure on that point it is best if the  calls himself.       AA ([phone]), NA ([phone])    National Suicide Prevention Lifeline- Call 1-387.723.8876 Available 24 hours everyday   [facility] [phone]; Crisis Line [phone] - Call for options A-F:   Intensive Outpatient Treatment/ Day programs    [facility], please contact [phone] or [phone] to speak with an admissions counselor.   [facility], please contact    [facility] (Methadone Maintenance)    [address] [place] [address], [phone]   [address] [place] [address] [phone]   [facility], [address] [place] [address], [phone]   [facility] [phone]   [place] [facility] please contact [phone]   [facility], [address], 4th floor [place] [address] Phone: [phone]    [place] Office: [address] [address], [phone]   [place] Office: [facility] access [facility] [facility], [address], [place] [address], [phone]   [place] Office: [address] [place] [address] [phone]        Plan of Care communicated to: Tejas Rodriguez MD and WB team listed in messages    Time with patient: 60 min      More than 50% of the time was spent counseling/coordinating care    Consulting clinician was informed of the encounter and consult note.    Consultation ended: 10/12/2024 at 1116 am    Norma Ashraf MD   Psychiatry  [facility]

## 2024-10-12 NOTE — H&P
[facility]  History & Physical    Patient Name: Thierry Ho  MRN: [id number]  Patient Class: OP- Observation  Admission Date: 10/11/2024  Attending Physician: Tejas Rodriguez MD   Primary Care Provider: Rhonda Krishnan MD         Patient information was obtained from patient, past medical records, and ER records.     Subjective:     Principal Problem:Uncontrolled type 2 diabetes mellitus with hyperglycemia    Chief Complaint:   Chief Complaint   Patient presents with    Hypoglycemia     Pt BIB EMS, c/o hypoglycemia. Pt's initial CBG was 31 mg/dL. Pt's last CBG with EMS is 159mg/dL after 55g of D50 through oral and IV route. Pt c/o malaise. Pt denies any CP, SOB, abd pain or n/v/d        HPI: Thierry Ho is a 64 y.o. male with  cocaine and EtOH use,  Insulin-dependent DM2 without complications, HTN, and HLD who presented to [facility] ED on 10/11/2024 for eval and treatment of AMS.  Patient can remember very little over the last 24 hours.  Per ED report, nephew called EMS with concern for hallucinations just prior to presentation.  EMS discovered BG 33, gave an amp of D50 en route to [facility].  Patient was more alert with  on arrival, reports he takes his medication but is unsure about how much and when.  BG soon dropped back down to 100 without any insulin given.  Patient reports being shaken by the prospect of dying of hypoglycemia had his nephew not discovered him and called 911.  Denies any symptoms or discomfort at the time of initial HM interview.  Ct head, cspine, abd/pelvis acutely negative (was ttp in lower abd). abnl labs: wbc 14, co2 16, lactate 4, mag 1.4, egz987, , trop 0.03.  CT H/S/A/P all acutely negative.  ED therapy/stabilization measures:  Home BP meds and IVF given.  They were placed in observation under the care of [facility] for further evaluation and treatment.       Past Medical History:   Diagnosis Date    Colon polyps      Diabetes mellitus     Diabetes with neurologic complications     DM retinopathy     GERD (gastroesophageal reflux disease)     Hyperlipidemia     Hypertension     Myocardial infarction        Past Surgical History:   Procedure Laterality Date    APPENDECTOMY      BACK SURGERY  11/2017    COLONOSCOPY N/A 10/19/2018    Procedure: COLONOSCOPY;  Surgeon: Frantz Rasmussen MD;  Location: [facility] ENDO;  Service: Endoscopy;  Laterality: N/A;    COLONOSCOPY N/A 2/22/2021    Procedure: COLONOSCOPY;  Surgeon: Dann Mahmood MD;  Location: [facility] ENDO;  Service: Endoscopy;  Laterality: N/A;  covid test [place] 2/19, instructions mailed -ml    EPIDURAL STEROID INJECTION INTO LUMBAR SPINE N/A 5/10/2024    Procedure: L4-5 Epidural Steroid Injection (ref: Tony);  Surgeon: David Stringer Jr., MD;  Location: [facility] PAIN MANAGEMENT;  Service: Pain Management;  Laterality: N/A;  @1200  ASA last 5/4  DM  Needs Consent    EXCISION OF SKIN N/A 6/25/2020    Procedure: EXCISION, SKIN;  Surgeon: Finesse Dumont MD;  Location: [facility] OR;  Service: General;  Laterality: N/A;  on back    FOOT SURGERY      INCISION AND DRAINAGE OF GROIN Right 6/25/2020    Procedure: INCISION AND DRAINAGE, INGUINAL REGION;  Surgeon: Finesse Dumont MD;  Location: [facility] OR;  Service: General;  Laterality: Right;  PT TO PREOP IN AM  PRE-OP BY RN 6-    OPEN REDUCTION AND INTERNAL FIXATION (ORIF) OF INJURY OF ANKLE Right 7/17/2019    Procedure: ORIF, ANKLE;  Surgeon: Raeann Steward MD;  Location: [facility] OR;  Service: Orthopedics;  Laterality: Right;  S[facility]ITALU[facility]S [place] ADAMA  [phone] TEXTED HIM @ 10:2 AM ON 7-11-19  SKELETAL  RN PREOP 7/9/19---- CMP MORNING OF SURGERY PER DR STEWARD       Review of patient's allergies indicates:   Allergen Reactions    Trulicity [dulaglutide] Nausea Only     dizzy       No current facility-administered medications on file prior to encounter.     Current Outpatient Medications on File Prior to Encounter   Medication  "Sig    acetaminophen (TYLENOL) 500 MG tablet Take 500 mg by mouth every 6 (six) hours as needed for Pain.    amitriptyline (ELAVIL) 25 MG tablet Take 1 tablet (25 mg total) by mouth nightly as needed for Insomnia.    aspirin (ECOTRIN) 81 MG EC tablet Take 81 mg by mouth once daily.    carvediloL (COREG) 25 MG tablet Take 1 tablet (25 mg total) by mouth 2 (two) times daily with meals.    cyclobenzaprine (FLEXERIL) 5 MG tablet Take 1 tablet (5 mg total) by mouth nightly as needed for Muscle spasms.    BD ULTRA-FINE FREDA PEN NEEDLE 32 gauge x 5/32" Ndle USE AS DIRECTED 2 TIMES DAILY WITH LEVEMIR    BLOOD PRESSURE CUFF Misc 1 kit by Misc.(Non-Drug; Combo Route) route once daily.    blood sugar diagnostic (TRUE METRIX GLUCOSE TEST STRIP) Strp 1 strip by Misc.(Non-Drug; Combo Route) route 2 (two) times a day.    blood-glucose meter kit 1 each by Other route 4 (four) times daily before meals and nightly.    ciclopirox (PENLAC) 8 % Soln Apply topically nightly.    fluocinolone (SYNALAR) 0.01 % external solution APPLY TOPICALLY ONCE DAILY  FOR 10 DAYS    fluocinonide (LIDEX) 0.05 % external solution Topical prn    hydrALAZINE (APRESOLINE) 25 MG tablet Take 1 tablet (25 mg total) by mouth every 12 (twelve) hours.    ibuprofen (ADVIL,MOTRIN) 600 MG tablet Take 1 tablet (600 mg total) by mouth every 6 (six) hours as needed for Pain.    insulin (LANTUS SOLOSTAR U-100 INSULIN) glargine 100 units/mL SubQ pen Inject 40 Units into the skin 2 (two) times a day.    ketoconazole (NIZORAL) 2 % shampoo APPLY TOPICALLY TWICE A WEEK.    lisinopriL (PRINIVIL,ZESTRIL) 40 MG tablet Take 1 tablet (40 mg total) by mouth once daily.    metFORMIN (GLUCOPHAGE) 1000 MG tablet Take 1 tablet (1,000 mg total) by mouth 2 (two) times daily with meals.    omeprazole (PRILOSEC) 20 MG capsule Take 1 capsule (20 mg total) by mouth once daily.    pregabalin (LYRICA) 100 MG capsule Take 1 capsule (100 mg total) by mouth 2 (two) times daily.    sildenafiL " (VIAGRA) 100 MG tablet Take 1 tablet (100 mg total) by mouth daily as needed for Erectile Dysfunction.    simvastatin (ZOCOR) 40 MG tablet Take 1 tablet (40 mg total) by mouth every evening.    tirzepatide (MOUNJARO) 2.5 mg/0.5 mL PnIj Inject 2.5 mg into the skin every 7 days.    TRUEPLUS LANCETS 33 gauge Misc Check glucose twice daily     Family History       Problem Relation (Age of Onset)    Breast cancer Mother    Cancer Sister, Brother    Diabetes Father    Heart disease Mother          Tobacco Use    Smoking status: Former     Current packs/day: 0.00     Average packs/day: 0.2 packs/day for 42.5 years (10.6 ttl pk-yrs)     Types: Cigarettes     Start date: 1/15/1981     Quit date: 2023     Years since quittin.2    Smokeless tobacco: Never   Substance and Sexual Activity    Alcohol use: Yes     Alcohol/week: 4.0 standard drinks of alcohol     Types: 4 Cans of beer per week     Comment: social     Drug use: Not Currently     Types: Marijuana     Comment: quit marijuana    Sexual activity: Not Currently     Partners: Female     Review of Systems   Reason unable to perform ROS: ROS was performed and pertinent +s and -s are listed in HPI.     Objective:     Vital Signs (Most Recent):  Temp: 98.3 °F (36.8 °C) (10/11/24 2249)  Pulse: 81 (10/12/24 0024)  Resp: 18 (10/11/24 2249)  BP: (!) 185/90 (10/11/24 2249)  SpO2: 98 % (10/11/24 2249) Vital Signs (24h Range):  Temp:  [98.3 °F (36.8 °C)-98.8 °F (37.1 °C)] 98.3 °F (36.8 °C)  Pulse:  [81-97] 81  Resp:  [16-18] 18  SpO2:  [95 %-98 %] 98 %  BP: (133-236)/() 185/90     Weight: 108.3 kg (238 lb 12.1 oz)  Body mass index is 33.3 kg/m².     Physical Exam  Constitutional:       General: He is not in acute distress.     Appearance: Normal appearance. He is not toxic-appearing or diaphoretic.   HENT:      Head: Normocephalic.   Neck:      Comments: No abnormal elevation of JVP (e.g. > 4 cm above sternal Angle of Coy) noted  Cardiovascular:      Rate and  "Rhythm: Normal rate and regular rhythm.      Pulses: Normal pulses.      Heart sounds: Normal heart sounds.   Pulmonary:      Effort: Pulmonary effort is normal.      Breath sounds: Normal breath sounds.   Abdominal:      General: Abdomen is flat. Bowel sounds are normal.      Tenderness: There is no abdominal tenderness. There is no guarding.   Musculoskeletal:      Right lower leg: No edema.      Left lower leg: No edema.   Skin:     General: Skin is warm and dry.      Capillary Refill: Capillary refill takes less than 2 seconds.      Coloration: Skin is not jaundiced.   Neurological:      General: No focal deficit present.      Mental Status: He is alert and oriented to person, place, and time.   Psychiatric:         Mood and Affect: Mood normal.         Behavior: Behavior normal.                Significant Labs: All pertinent labs within the past 24 hours have been reviewed.  ABGs: No results for input(s): "PH", "PCO2", "HCO3", "POCSATURATED", "BE", "TOTALHB", "COHB", "METHB", "O2HB", "POCFIO2", "PO2" in the last 48 hours.  CBC:   Recent Labs   Lab 10/11/24  0944   WBC 14.17*   HGB 14.2   HCT 41.4        CMP:   Recent Labs   Lab 10/11/24  0944      K 3.5      CO2 16*   *   BUN 19   CREATININE 1.1   CALCIUM 10.5   PROT 7.1   ALBUMIN 3.3*   BILITOT 0.4   ALKPHOS 100   AST 56*   ALT 36   ANIONGAP 19*     Cardiac Markers:   Recent Labs   Lab 10/11/24  0944   *     Lactic Acid:   Recent Labs   Lab 10/11/24  1000   LACTATE 4.0*     Lipase:   Recent Labs   Lab 10/11/24  0944   LIPASE 35     POCT Glucose:   Recent Labs   Lab 10/11/24  1121 10/11/24  1215 10/11/24  1754   POCTGLUCOSE 91 109 82     Troponin:   Recent Labs   Lab 10/11/24  0944   TROPONINI 0.031*       Significant Imaging: I have reviewed all pertinent imaging results/findings within the past 24 hours.  Assessment/Plan:     * Uncontrolled type 2 diabetes mellitus with hyperglycemia  Patient's FSGs are uncontrolled due to " hypoglycemia on current medication regimen, which includes 40U glargine BID at home.  BG was persistently low after arrival; suspect he is taking too much basal.    Last A1c reviewed-   Lab Results   Component Value Date    HGBA1C 6.6 (H) 2024     Most recent fingerstick glucose reviewed-   Recent Labs   Lab 10/11/24  1034 10/11/24  1121 10/11/24  1215 10/11/24  1754   POCTGLUCOSE 108 91 109 82     Current correctional scale  Medium  Maintain anti-hyperglycemic dose as follows-   Antihyperglycemics (From admission, onward)      Start     Stop Route Frequency Ordered    10/12/24 0900  insulin glargine U-100 (Lantus) pen 15 Units         -- SubQ Daily 10/11/24 1614    10/11/24 1709  insulin aspart U-100 pen 0-10 Units         -- SubQ Before meals & nightly PRN 10/11/24 1614          Hold Oral hypoglycemics while patient is in the hospital.    Nonadherence to medication  Cocaine abuse    Patient thinks he likely took incorrect doses of his home medications due to cocaine usage.  Views this incident as a wake-up call, especially because he could have  of hypoglycemia had his nephew not discovered him and called EMS.  He requests a visit from addiction psych to help him stop taking cocaine.      Essential hypertension  Patients blood pressure range in the last 24 hours was: BP  Min: 133/90  Max: 236/101.The patient's inpatient anti-hypertensive regimen is listed below:  Current Antihypertensives  carvediloL tablet 25 mg, 2 times daily with meals, Oral  hydrALAZINE tablet 25 mg, Every 12 hours, Oral  lisinopriL tablet 40 mg, Daily, Oral    Plan  - BP is uncontrolled, will adjust as follows:  Because patient was briefly hypotensive in ED, slowly building his home regimen back .  Anticipate him being fully back on home regimen by morning      Other hyperlipidemia  Stable.  Continue home meds.        VTE Risk Mitigation (From admission, onward)           Ordered     heparin (porcine) injection 5,000 Units  Every 8  hours         10/11/24 1614     IP VTE HIGH RISK PATIENT  Once         10/11/24 1614     Place sequential compression device  Until discontinued         10/11/24 1614                       On 10/12/2024, patient should be placed in hospital observation services under my care.             Curt Rivera MD  Department of Hospital Medicine  [facility]

## 2024-10-12 NOTE — NURSING
[facility]  Nurses Note -- 4 Eyes      10/12/2024       Skin assessed on: Q Shift      [x] No Pressure Injuries Present    [x]Prevention Measures Documented    [] Yes LDA  for Pressure Injury Previously documented     [] Yes New Pressure Injury Discovered   [] LDA for New Pressure Injury Added      Attending RN:  Yue Alaniz, RN     Second :  Delfino MERRILL LPN

## 2024-10-12 NOTE — ASSESSMENT & PLAN NOTE
Patients blood pressure range in the last 24 hours was: BP  Min: 133/90  Max: 236/101.The patient's inpatient anti-hypertensive regimen is listed below:  Current Antihypertensives  carvediloL tablet 25 mg, 2 times daily with meals, Oral  hydrALAZINE tablet 25 mg, Every 12 hours, Oral  lisinopriL tablet 40 mg, Daily, Oral    Plan  - BP is uncontrolled, will adjust as follows:  Because patient was briefly hypotensive in ED, slowly building his home regimen back .  Anticipate him being fully back on home regimen by morning

## 2024-10-12 NOTE — SUBJECTIVE & OBJECTIVE
Past Medical History:   Diagnosis Date    Colon polyps     Diabetes mellitus     Diabetes with neurologic complications     DM retinopathy     GERD (gastroesophageal reflux disease)     Hyperlipidemia     Hypertension     Myocardial infarction        Past Surgical History:   Procedure Laterality Date    APPENDECTOMY      BACK SURGERY  11/2017    COLONOSCOPY N/A 10/19/2018    Procedure: COLONOSCOPY;  Surgeon: Frantz Rasmussen MD;  Location: [facility] ENDO;  Service: Endoscopy;  Laterality: N/A;    COLONOSCOPY N/A 2/22/2021    Procedure: COLONOSCOPY;  Surgeon: Dann Mahmood MD;  Location: [facility] ENDO;  Service: Endoscopy;  Laterality: N/A;  covid test [place] 2/19, instructions mailed -ml    EPIDURAL STEROID INJECTION INTO LUMBAR SPINE N/A 5/10/2024    Procedure: L4-5 Epidural Steroid Injection (ref: Tony);  Surgeon: David Stringer Jr., MD;  Location: [facility] PAIN MANAGEMENT;  Service: Pain Management;  Laterality: N/A;  @1200  ASA last 5/4  DM  Needs Consent    EXCISION OF SKIN N/A 6/25/2020    Procedure: EXCISION, SKIN;  Surgeon: Finesse Dumont MD;  Location: [facility] OR;  Service: General;  Laterality: N/A;  on back    FOOT SURGERY      INCISION AND DRAINAGE OF GROIN Right 6/25/2020    Procedure: INCISION AND DRAINAGE, INGUINAL REGION;  Surgeon: Finesse Dumont MD;  Location: [facility] OR;  Service: General;  Laterality: Right;  PT TO PREOP IN AM  PRE-OP BY RN 6-    OPEN REDUCTION AND INTERNAL FIXATION (ORIF) OF INJURY OF ANKLE Right 7/17/2019    Procedure: ORIF, ANKLE;  Surgeon: Raeann Steward MD;  Location: [facility] OR;  Service: Orthopedics;  Laterality: Right;  SARITA CLEVELAND GALAN  [phone] TEXTED HIM @ 10:2 AM ON 7-11-19  SKELETAL  RN PREOP 7/9/19---- CMP MORNING OF SURGERY PER DR STEWARD       Review of patient's allergies indicates:   Allergen Reactions    Trulicity [dulaglutide] Nausea Only     dizzy       No current facility-administered medications on file prior to encounter.     Current  "Outpatient Medications on File Prior to Encounter   Medication Sig    acetaminophen (TYLENOL) 500 MG tablet Take 500 mg by mouth every 6 (six) hours as needed for Pain.    amitriptyline (ELAVIL) 25 MG tablet Take 1 tablet (25 mg total) by mouth nightly as needed for Insomnia.    aspirin (ECOTRIN) 81 MG EC tablet Take 81 mg by mouth once daily.    carvediloL (COREG) 25 MG tablet Take 1 tablet (25 mg total) by mouth 2 (two) times daily with meals.    cyclobenzaprine (FLEXERIL) 5 MG tablet Take 1 tablet (5 mg total) by mouth nightly as needed for Muscle spasms.    BD ULTRA-FINE FREDA PEN NEEDLE 32 gauge x 5/32" Ndle USE AS DIRECTED 2 TIMES DAILY WITH LEVEMIR    BLOOD PRESSURE CUFF Misc 1 kit by Misc.(Non-Drug; Combo Route) route once daily.    blood sugar diagnostic (TRUE METRIX GLUCOSE TEST STRIP) Strp 1 strip by Misc.(Non-Drug; Combo Route) route 2 (two) times a day.    blood-glucose meter kit 1 each by Other route 4 (four) times daily before meals and nightly.    ciclopirox (PENLAC) 8 % Soln Apply topically nightly.    fluocinolone (SYNALAR) 0.01 % external solution APPLY TOPICALLY ONCE DAILY  FOR 10 DAYS    fluocinonide (LIDEX) 0.05 % external solution Topical prn    hydrALAZINE (APRESOLINE) 25 MG tablet Take 1 tablet (25 mg total) by mouth every 12 (twelve) hours.    ibuprofen (ADVIL,MOTRIN) 600 MG tablet Take 1 tablet (600 mg total) by mouth every 6 (six) hours as needed for Pain.    insulin (LANTUS SOLOSTAR U-100 INSULIN) glargine 100 units/mL SubQ pen Inject 40 Units into the skin 2 (two) times a day.    ketoconazole (NIZORAL) 2 % shampoo APPLY TOPICALLY TWICE A WEEK.    lisinopriL (PRINIVIL,ZESTRIL) 40 MG tablet Take 1 tablet (40 mg total) by mouth once daily.    metFORMIN (GLUCOPHAGE) 1000 MG tablet Take 1 tablet (1,000 mg total) by mouth 2 (two) times daily with meals.    omeprazole (PRILOSEC) 20 MG capsule Take 1 capsule (20 mg total) by mouth once daily.    pregabalin (LYRICA) 100 MG capsule Take 1 capsule " (100 mg total) by mouth 2 (two) times daily.    sildenafiL (VIAGRA) 100 MG tablet Take 1 tablet (100 mg total) by mouth daily as needed for Erectile Dysfunction.    simvastatin (ZOCOR) 40 MG tablet Take 1 tablet (40 mg total) by mouth every evening.    tirzepatide (MOUNJARO) 2.5 mg/0.5 mL PnIj Inject 2.5 mg into the skin every 7 days.    TRUEPLUS LANCETS 33 gauge Misc Check glucose twice daily     Family History       Problem Relation (Age of Onset)    Breast cancer Mother    Cancer Sister, Brother    Diabetes Father    Heart disease Mother          Tobacco Use    Smoking status: Former     Current packs/day: 0.00     Average packs/day: 0.2 packs/day for 42.5 years (10.6 ttl pk-yrs)     Types: Cigarettes     Start date: 1/15/1981     Quit date: 2023     Years since quittin.2    Smokeless tobacco: Never   Substance and Sexual Activity    Alcohol use: Yes     Alcohol/week: 4.0 standard drinks of alcohol     Types: 4 Cans of beer per week     Comment: social     Drug use: Not Currently     Types: Marijuana     Comment: quit marijuana    Sexual activity: Not Currently     Partners: Female     Review of Systems   Reason unable to perform ROS: ROS was performed and pertinent +s and -s are listed in HPI.     Objective:     Vital Signs (Most Recent):  Temp: 98.3 °F (36.8 °C) (10/11/24 2249)  Pulse: 81 (10/12/24 0024)  Resp: 18 (10/11/24 2249)  BP: (!) 185/90 (10/11/24 2249)  SpO2: 98 % (10/11/24 2249) Vital Signs (24h Range):  Temp:  [98.3 °F (36.8 °C)-98.8 °F (37.1 °C)] 98.3 °F (36.8 °C)  Pulse:  [81-97] 81  Resp:  [16-18] 18  SpO2:  [95 %-98 %] 98 %  BP: (133-236)/() 185/90     Weight: 108.3 kg (238 lb 12.1 oz)  Body mass index is 33.3 kg/m².     Physical Exam  Constitutional:       General: He is not in acute distress.     Appearance: Normal appearance. He is not toxic-appearing or diaphoretic.   HENT:      Head: Normocephalic.   Neck:      Comments: No abnormal elevation of JVP (e.g. > 4 cm above sternal  "Ramana) noted  Cardiovascular:      Rate and Rhythm: Normal rate and regular rhythm.      Pulses: Normal pulses.      Heart sounds: Normal heart sounds.   Pulmonary:      Effort: Pulmonary effort is normal.      Breath sounds: Normal breath sounds.   Abdominal:      General: Abdomen is flat. Bowel sounds are normal.      Tenderness: There is no abdominal tenderness. There is no guarding.   Musculoskeletal:      Right lower leg: No edema.      Left lower leg: No edema.   Skin:     General: Skin is warm and dry.      Capillary Refill: Capillary refill takes less than 2 seconds.      Coloration: Skin is not jaundiced.   Neurological:      General: No focal deficit present.      Mental Status: He is alert and oriented to person, place, and time.   Psychiatric:         Mood and Affect: Mood normal.         Behavior: Behavior normal.                Significant Labs: All pertinent labs within the past 24 hours have been reviewed.  ABGs: No results for input(s): "PH", "PCO2", "HCO3", "POCSATURATED", "BE", "TOTALHB", "COHB", "METHB", "O2HB", "POCFIO2", "PO2" in the last 48 hours.  CBC:   Recent Labs   Lab 10/11/24  0944   WBC 14.17*   HGB 14.2   HCT 41.4        CMP:   Recent Labs   Lab 10/11/24  0944      K 3.5      CO2 16*   *   BUN 19   CREATININE 1.1   CALCIUM 10.5   PROT 7.1   ALBUMIN 3.3*   BILITOT 0.4   ALKPHOS 100   AST 56*   ALT 36   ANIONGAP 19*     Cardiac Markers:   Recent Labs   Lab 10/11/24  0944   *     Lactic Acid:   Recent Labs   Lab 10/11/24  1000   LACTATE 4.0*     Lipase:   Recent Labs   Lab 10/11/24  0944   LIPASE 35     POCT Glucose:   Recent Labs   Lab 10/11/24  1121 10/11/24  1215 10/11/24  1754   POCTGLUCOSE 91 109 82     Troponin:   Recent Labs   Lab 10/11/24  0944   TROPONINI 0.031*       Significant Imaging: I have reviewed all pertinent imaging results/findings within the past 24 hours.  "

## 2024-10-12 NOTE — ASSESSMENT & PLAN NOTE
Cocaine abuse    Patient thinks he likely took incorrect doses of his home medications due to cocaine usage.  Views this incident as a wake-up call, especially because he could have  of hypoglycemia had his nephew not discovered him and called EMS.  He requests a visit from addiction psych to help him stop taking cocaine.

## 2024-10-12 NOTE — HPI
Thierry Ho is a 64 y.o. male with  cocaine and EtOH use,  Insulin-dependent DM2 without complications, HTN, and HLD who presented to [facility] ED on 10/11/2024 for eval and treatment of AMS.  Patient can remember very little over the last 24 hours.  Per ED report, nephew called EMS with concern for hallucinations just prior to presentation.  EMS discovered BG 33, gave an amp of D50 en route to [facility].  Patient was more alert with  on arrival, reports he takes his medication but is unsure about how much and when.  BG soon dropped back down to 100 without any insulin given.  Patient reports being shaken by the prospect of dying of hypoglycemia had his nephew not discovered him and called 911.  Denies any symptoms or discomfort at the time of initial HM interview.  Ct head, cspine, abd/pelvis acutely negative (was ttp in lower abd). abnl labs: wbc 14, co2 16, lactate 4, mag 1.4, [id number], , trop 0.03.  CT H/S/A/P all acutely negative.  ED therapy/stabilization measures:  Home BP meds and IVF given.  They were placed in observation under the care of [facility] for further evaluation and treatment.

## 2024-10-12 NOTE — ASSESSMENT & PLAN NOTE
Patient's FSGs are uncontrolled due to hypoglycemia on current medication regimen, which includes 40U glargine BID at home.  BG was persistently low after arrival; suspect he is taking too much basal.    Last A1c reviewed-   Lab Results   Component Value Date    HGBA1C 6.6 (H) 06/28/2024     Most recent fingerstick glucose reviewed-   Recent Labs   Lab 10/11/24  1034 10/11/24  1121 10/11/24  1215 10/11/24  1754   POCTGLUCOSE 108 91 109 82     Current correctional scale  Medium  Maintain anti-hyperglycemic dose as follows-   Antihyperglycemics (From admission, onward)      Start     Stop Route Frequency Ordered    10/12/24 0900  insulin glargine U-100 (Lantus) pen 15 Units         -- SubQ Daily 10/11/24 1614    10/11/24 1709  insulin aspart U-100 pen 0-10 Units         -- SubQ Before meals & nightly PRN 10/11/24 1614          Hold Oral hypoglycemics while patient is in the hospital.

## 2024-10-12 NOTE — CARE UPDATE
Admitted for hypertensive urgency    Per ED report, nephew called EMS with concern for hallucinations just prior to presentation. EMS discovered BG 33, gave an amp of D50 [place] [facility] [place] more alert with  on arrival, reports he takes his medication but is unsure about how much and when. BG soon dropped back down to 100 without any insulin given. Patient reports being shaken by the prospect of dying of hypoglycemia had his nephew not discovered him and called 911. Denies any symptoms or discomfort at the time of initial HM interview. Ct head, cspine, abd/pelvis acutely negative (was ttp in lower abd). abnl labs: wbc 14, co2 16, lactate 4, mag 1.4, yoc325, , trop 0.03. CT H/S/A/P all acutely negative. ED therapy/stabilization measures: Home BP meds and IVF given.     Patient seen and examined.  Reviewed H&P, labs, and vital signs  Continue current medical regimen    BP better controlled now    Added blood glucose monitoring  Diabetic diet    Added CIWA protocol  Multivitamin, folic acid and thiamine    Given 500 ml of IVF Cr increased back 1.7  Encourage po fluid intake    Tele psych seen patient with recs:   recommend Intensive outpt txmt for alcohol and cocaine.     Repeat troponin trend down. Likely elevated due to demand ischemia from cocaine

## 2024-10-13 PROBLEM — N17.9 AKI (ACUTE KIDNEY INJURY): Status: ACTIVE | Noted: 2024-10-13

## 2024-10-13 PROBLEM — F10.10 ALCOHOL ABUSE: Status: ACTIVE | Noted: 2024-10-13

## 2024-10-13 LAB
ALBUMIN SERPL BCP-MCNC: 2.8 G/DL (ref 3.5–5.2)
ALP SERPL-CCNC: 67 U/L (ref 55–135)
ALT SERPL W/O P-5'-P-CCNC: 28 U/L (ref 10–44)
ANION GAP SERPL CALC-SCNC: 10 MMOL/L (ref 8–16)
ANION GAP SERPL CALC-SCNC: 10 MMOL/L (ref 8–16)
AST SERPL-CCNC: 28 U/L (ref 10–40)
BASOPHILS # BLD AUTO: 0.02 K/UL (ref 0–0.2)
BASOPHILS NFR BLD: 0.3 % (ref 0–1.9)
BILIRUB SERPL-MCNC: 0.2 MG/DL (ref 0.1–1)
BUN SERPL-MCNC: 38 MG/DL (ref 8–23)
BUN SERPL-MCNC: 39 MG/DL (ref 8–23)
CALCIUM SERPL-MCNC: 10 MG/DL (ref 8.7–10.5)
CALCIUM SERPL-MCNC: 9.9 MG/DL (ref 8.7–10.5)
CHLORIDE SERPL-SCNC: 108 MMOL/L (ref 95–110)
CHLORIDE SERPL-SCNC: 108 MMOL/L (ref 95–110)
CK SERPL-CCNC: 277 U/L (ref 20–200)
CO2 SERPL-SCNC: 21 MMOL/L (ref 23–29)
CO2 SERPL-SCNC: 22 MMOL/L (ref 23–29)
CREAT SERPL-MCNC: 1.8 MG/DL (ref 0.5–1.4)
CREAT SERPL-MCNC: 1.9 MG/DL (ref 0.5–1.4)
DIFFERENTIAL METHOD BLD: ABNORMAL
EOSINOPHIL # BLD AUTO: 0.2 K/UL (ref 0–0.5)
EOSINOPHIL NFR BLD: 2.8 % (ref 0–8)
ERYTHROCYTE [DISTWIDTH] IN BLOOD BY AUTOMATED COUNT: 15.4 % (ref 11.5–14.5)
EST. GFR  (NO RACE VARIABLE): 39 ML/MIN/1.73 M^2
EST. GFR  (NO RACE VARIABLE): 42 ML/MIN/1.73 M^2
ESTIMATED AVG GLUCOSE: 131 MG/DL (ref 68–131)
GLUCOSE SERPL-MCNC: 115 MG/DL (ref 70–110)
GLUCOSE SERPL-MCNC: 150 MG/DL (ref 70–110)
HBA1C MFR BLD: 6.2 % (ref 4–5.6)
HCT VFR BLD AUTO: 35.4 % (ref 40–54)
HGB BLD-MCNC: 11.9 G/DL (ref 14–18)
IMM GRANULOCYTES # BLD AUTO: 0.01 K/UL (ref 0–0.04)
IMM GRANULOCYTES NFR BLD AUTO: 0.2 % (ref 0–0.5)
LYMPHOCYTES # BLD AUTO: 2 K/UL (ref 1–4.8)
LYMPHOCYTES NFR BLD: 31.9 % (ref 18–48)
MAGNESIUM SERPL-MCNC: 2.1 MG/DL (ref 1.6–2.6)
MCH RBC QN AUTO: 31.8 PG (ref 27–31)
MCHC RBC AUTO-ENTMCNC: 33.6 G/DL (ref 32–36)
MCV RBC AUTO: 95 FL (ref 82–98)
MONOCYTES # BLD AUTO: 1 K/UL (ref 0.3–1)
MONOCYTES NFR BLD: 15.4 % (ref 4–15)
NEUTROPHILS # BLD AUTO: 3.1 K/UL (ref 1.8–7.7)
NEUTROPHILS NFR BLD: 49.4 % (ref 38–73)
NRBC BLD-RTO: 0 /100 WBC
PHOSPHATE SERPL-MCNC: 3.6 MG/DL (ref 2.7–4.5)
PLATELET # BLD AUTO: 180 K/UL (ref 150–450)
PMV BLD AUTO: 10.2 FL (ref 9.2–12.9)
POCT GLUCOSE: 134 MG/DL (ref 70–110)
POCT GLUCOSE: 201 MG/DL (ref 70–110)
POCT GLUCOSE: 225 MG/DL (ref 70–110)
POCT GLUCOSE: 234 MG/DL (ref 70–110)
POTASSIUM SERPL-SCNC: 3.8 MMOL/L (ref 3.5–5.1)
POTASSIUM SERPL-SCNC: 4 MMOL/L (ref 3.5–5.1)
PROT SERPL-MCNC: 5.7 G/DL (ref 6–8.4)
RBC # BLD AUTO: 3.74 M/UL (ref 4.6–6.2)
SODIUM SERPL-SCNC: 139 MMOL/L (ref 136–145)
SODIUM SERPL-SCNC: 140 MMOL/L (ref 136–145)
WBC # BLD AUTO: 6.17 K/UL (ref 3.9–12.7)

## 2024-10-13 PROCEDURE — 36415 COLL VENOUS BLD VENIPUNCTURE: CPT | Mod: HCNC | Performed by: NURSE PRACTITIONER

## 2024-10-13 PROCEDURE — 83735 ASSAY OF MAGNESIUM: CPT | Mod: HCNC

## 2024-10-13 PROCEDURE — 25000003 PHARM REV CODE 250: Mod: HCNC

## 2024-10-13 PROCEDURE — 85025 COMPLETE CBC W/AUTO DIFF WBC: CPT | Mod: HCNC

## 2024-10-13 PROCEDURE — 21400001 HC TELEMETRY ROOM: Mod: HCNC

## 2024-10-13 PROCEDURE — 80048 BASIC METABOLIC PNL TOTAL CA: CPT | Mod: HCNC,XB | Performed by: NURSE PRACTITIONER

## 2024-10-13 PROCEDURE — 63600175 PHARM REV CODE 636 W HCPCS: Mod: HCNC

## 2024-10-13 PROCEDURE — 80053 COMPREHEN METABOLIC PANEL: CPT | Mod: HCNC

## 2024-10-13 PROCEDURE — 82550 ASSAY OF CK (CPK): CPT | Mod: HCNC

## 2024-10-13 PROCEDURE — 96372 THER/PROPH/DIAG INJ SC/IM: CPT

## 2024-10-13 PROCEDURE — 84100 ASSAY OF PHOSPHORUS: CPT | Mod: HCNC

## 2024-10-13 PROCEDURE — 25000003 PHARM REV CODE 250: Mod: HCNC | Performed by: NURSE PRACTITIONER

## 2024-10-13 PROCEDURE — 94761 N-INVAS EAR/PLS OXIMETRY MLT: CPT | Mod: HCNC

## 2024-10-13 PROCEDURE — 36415 COLL VENOUS BLD VENIPUNCTURE: CPT | Mod: HCNC

## 2024-10-13 RX ORDER — SODIUM CHLORIDE 450 MG/100ML
INJECTION, SOLUTION INTRAVENOUS ONCE
Status: COMPLETED | OUTPATIENT
Start: 2024-10-13 | End: 2024-10-13

## 2024-10-13 RX ADMIN — HEPARIN SODIUM 5000 UNITS: 5000 INJECTION INTRAVENOUS; SUBCUTANEOUS at 05:10

## 2024-10-13 RX ADMIN — ATORVASTATIN CALCIUM 20 MG: 10 TABLET, FILM COATED ORAL at 09:10

## 2024-10-13 RX ADMIN — FOLIC ACID 1 MG: 1 TABLET ORAL at 09:10

## 2024-10-13 RX ADMIN — HYDRALAZINE HYDROCHLORIDE 25 MG: 25 TABLET ORAL at 09:10

## 2024-10-13 RX ADMIN — CARVEDILOL 25 MG: 12.5 TABLET, FILM COATED ORAL at 05:10

## 2024-10-13 RX ADMIN — SODIUM CHLORIDE: 4.5 INJECTION, SOLUTION INTRAVENOUS at 10:10

## 2024-10-13 RX ADMIN — INSULIN ASPART 2 UNITS: 100 INJECTION, SOLUTION INTRAVENOUS; SUBCUTANEOUS at 09:10

## 2024-10-13 RX ADMIN — THERA TABS 1 TABLET: TAB at 09:10

## 2024-10-13 RX ADMIN — PREGABALIN 100 MG: 50 CAPSULE ORAL at 09:10

## 2024-10-13 RX ADMIN — HEPARIN SODIUM 5000 UNITS: 5000 INJECTION INTRAVENOUS; SUBCUTANEOUS at 09:10

## 2024-10-13 RX ADMIN — Medication 6 MG: at 09:10

## 2024-10-13 RX ADMIN — POLYETHYLENE GLYCOL 3350 17 G: 17 POWDER, FOR SOLUTION ORAL at 09:10

## 2024-10-13 RX ADMIN — CARVEDILOL 25 MG: 12.5 TABLET, FILM COATED ORAL at 09:10

## 2024-10-13 RX ADMIN — LISINOPRIL 40 MG: 20 TABLET ORAL at 09:10

## 2024-10-13 RX ADMIN — HEPARIN SODIUM 5000 UNITS: 5000 INJECTION INTRAVENOUS; SUBCUTANEOUS at 02:10

## 2024-10-13 RX ADMIN — INSULIN GLARGINE 15 UNITS: 100 INJECTION, SOLUTION SUBCUTANEOUS at 09:10

## 2024-10-13 RX ADMIN — ASPIRIN 81 MG: 81 TABLET, COATED ORAL at 09:10

## 2024-10-13 RX ADMIN — THIAMINE HCL TAB 100 MG 100 MG: 100 TAB at 09:10

## 2024-10-13 NOTE — PROGRESS NOTES
[facility] Medicine  Progress Note    Patient Name: Thierry Ho  MRN: [id number]  Patient Class: OP- Observation   Admission Date: 10/11/2024  Length of Stay: 0 days  Attending Physician: Tejas Rodriguez MD  Primary Care Provider: Rhonda Krishnan MD        Subjective:     Principal Problem:Uncontrolled type 2 diabetes mellitus with hyperglycemia        HPI:  Thierry Ho is a 64 y.o. male with  cocaine and EtOH use,  Insulin-dependent DM2 without complications, HTN, and HLD who presented to [facility] ED on 10/11/2024 for eval and treatment of AMS.  Patient can remember very little over the last 24 hours.  Per ED report, nephew called EMS with concern for hallucinations just prior to presentation.  EMS discovered BG 33, gave an amp of D50 en route to [facility].  Patient was more alert with  on arrival, reports he takes his medication but is unsure about how much and when.  BG soon dropped back down to 100 without any insulin given.  Patient reports being shaken by the prospect of dying of hypoglycemia had his nephew not discovered him and called 911.  Denies any symptoms or discomfort at the time of initial HM interview.  Ct head, cspine, abd/pelvis acutely negative (was ttp in lower abd). abnl labs: wbc 14, co2 16, lactate 4, mag 1.4, wby380, , trop 0.03.  CT H/S/A/P all acutely negative.  ED therapy/stabilization measures:  Home BP meds and IVF given.  They were placed in observation under the care of [facility] for further evaluation and treatment.       Overview/Hospital Course:  No notes on file    Interval History: patient seen and examined. NAD noted. Cr increased overnight. Will give IVF hydration. Repeat BMP at 1600. Can consider a nephrology consult. Renal US ordered.     Review of Systems   Reason unable to perform ROS: ROS was performed and pertinent +s and -s are listed in HPI.     Objective:     Vital Signs (Most Recent):  Temp: 98 °F (36.7 °C) (10/13/24  0802)  Pulse: 74 (10/13/24 0808)  Resp: 18 (10/13/24 0802)  BP: (!) 154/74 (10/13/24 0802)  SpO2: 98 % (10/13/24 0802) Vital Signs (24h Range):  Temp:  [97.7 °F (36.5 °C)-98.4 °F (36.9 °C)] 98 °F (36.7 °C)  Pulse:  [63-78] 74  Resp:  [18] 18  SpO2:  [93 %-98 %] 98 %  BP: (118-156)/(64-87) 154/74     Weight: 108.3 kg (238 lb 12.1 oz)  Body mass index is 33.3 kg/m².    Intake/Output Summary (Last 24 hours) at 10/13/2024 1016  Last data filed at 10/13/2024 1009  Gross per 24 hour   Intake 120 ml   Output 700 ml   Net -580 ml         Physical Exam  Constitutional:       General: He is not in acute distress.     Appearance: Normal appearance. He is not toxic-appearing or diaphoretic.   HENT:      Head: Normocephalic.   Cardiovascular:      Rate and Rhythm: Normal rate and regular rhythm.      Pulses: Normal pulses.      Heart sounds: Normal heart sounds.   Pulmonary:      Effort: Pulmonary effort is normal.      Breath sounds: Normal breath sounds.   Abdominal:      General: Abdomen is flat. Bowel sounds are normal.      Tenderness: There is no abdominal tenderness. There is no guarding.   Musculoskeletal:      Right lower leg: No edema.      Left lower leg: No edema.   Skin:     General: Skin is warm and dry.      Capillary Refill: Capillary refill takes less than 2 seconds.      Coloration: Skin is not jaundiced.   Neurological:      General: No focal deficit present.      Mental Status: He is alert and oriented to person, place, and time.   Psychiatric:         Mood and Affect: Mood normal.         Behavior: Behavior normal.             Significant Labs: All pertinent labs within the past 24 hours have been reviewed.    Significant Imaging: I have reviewed all pertinent imaging results/findings within the past 24 hours.    Assessment/Plan:      * Uncontrolled type 2 diabetes mellitus with hyperglycemia  Patient's FSGs are uncontrolled due to hypoglycemia on current medication regimen, which includes 40U glargine BID  at home.  BG was persistently low after arrival; suspect he is taking too much basal.    Last A1c reviewed-   Lab Results   Component Value Date    HGBA1C 6.6 (H) 2024     Most recent fingerstick glucose reviewed-   Recent Labs   Lab 10/11/24  1034 10/11/24  1121 10/11/24  1215 10/11/24  1754   POCTGLUCOSE 108 91 109 82     Current correctional scale  Medium  Maintain anti-hyperglycemic dose as follows-   Antihyperglycemics (From admission, onward)      Start     Stop Route Frequency Ordered    10/12/24 0900  insulin glargine U-100 (Lantus) pen 15 Units         -- SubQ Daily 10/11/24 1614    10/11/24 1709  insulin aspart U-100 pen 0-10 Units         -- SubQ Before meals & nightly PRN 10/11/24 1614          Hold Oral hypoglycemics while patient is in the hospital.    Alcohol abuse  CIWA protocol  Multivitamin, folic acid, thiamine  As needed meds for withdrawals       KENNETH (acute kidney injury)  KENNETH is likely due to pre-renal azotemia due to dehydration. Baseline creatinine is unknown. Most recent creatinine and eGFR are listed below.  Recent Labs     10/12/24  0512 10/12/24  1049 10/13/24  0629   CREATININE 1.5* 1.7* 1.9*   EGFRNORACEVR 52* 45* 39*      Plan  - KENNETH is worsening. Will adjust treatment as follows: IVF  - Avoid nephrotoxins and renally dose meds for GFR listed above  - Monitor urine output, serial BMP, and adjust therapy as needed  - renal US  - can consider nephrology consult    Cocaine abuse  Consulted tele psych recommend Intensive outpt txmt for alcohol and cocaine.       Nonadherence to medication  Cocaine abuse    Patient thinks he likely took incorrect doses of his home medications due to cocaine usage.  Views this incident as a wake-up call, especially because he could have  of hypoglycemia had his nephew not discovered him and called EMS.  He requests a visit from addiction psych to help him stop taking cocaine.      Non morbid obesity, unspecified obesity type  Body mass index is  33.3 kg/m². Morbid obesity complicates all aspects of disease management from diagnostic modalities to treatment. Weight loss encouraged and health benefits explained to patient.         Coronary artery disease involving native coronary artery of native heart without angina pectoris  Asa and statin   Tele monitoring     Essential hypertension  Patients blood pressure range in the last 24 hours was: BP  Min: 118/64  Max: 236/101.The patient's inpatient anti-hypertensive regimen is listed below:  Current Antihypertensives  carvediloL tablet 25 mg, 2 times daily with meals, Oral  hydrALAZINE tablet 25 mg, Every 12 hours, Oral  lisinopriL tablet 40 mg, Daily, Oral    Plan  -resume above BP meds     Other hyperlipidemia  Stable.  Continue home meds.        VTE Risk Mitigation (From admission, onward)           Ordered     heparin (porcine) injection 5,000 Units  Every 8 hours         10/11/24 1614     IP VTE HIGH RISK PATIENT  Once         10/11/24 1614     Place sequential compression device  Until discontinued         10/11/24 1614                    Discharge Planning   CARLA:      Code Status: Full Code   Is the patient medically ready for discharge?:     Reason for patient still in hospital (select all that apply): Patient trending condition  Discharge Plan A: Home with family                  Beth Belcher NP  Department of Hospital Medicine   [facility] - Telemetry

## 2024-10-13 NOTE — NURSING
Bedside handoff report received from off going nurse. Pt lying in bed, NAD noted. Fall/safety precautions maintained. Call light and personal belongings within reach. Communication board updated.

## 2024-10-13 NOTE — ASSESSMENT & PLAN NOTE
KENNETH is likely due to pre-renal azotemia due to dehydration. Baseline creatinine is unknown. Most recent creatinine and eGFR are listed below.  Recent Labs     10/12/24  0512 10/12/24  1049 10/13/24  0629   CREATININE 1.5* 1.7* 1.9*   EGFRNORACEVR 52* 45* 39*      Plan  - KENNETH is worsening. Will adjust treatment as follows: IVF  - Avoid nephrotoxins and renally dose meds for GFR listed above  - Monitor urine output, serial BMP, and adjust therapy as needed  - renal US  - can consider nephrology consult

## 2024-10-13 NOTE — ASSESSMENT & PLAN NOTE
Body mass index is 33.3 kg/m². Morbid obesity complicates all aspects of disease management from diagnostic modalities to treatment. Weight loss encouraged and health benefits explained to patient.

## 2024-10-13 NOTE — ASSESSMENT & PLAN NOTE
Patients blood pressure range in the last 24 hours was: BP  Min: 118/64  Max: 236/101.The patient's inpatient anti-hypertensive regimen is listed below:  Current Antihypertensives  carvediloL tablet 25 mg, 2 times daily with meals, Oral  hydrALAZINE tablet 25 mg, Every 12 hours, Oral  lisinopriL tablet 40 mg, Daily, Oral    Plan  -resume above BP meds

## 2024-10-13 NOTE — NURSING
[facility] [place]  Nurses Note -- 4 Eyes      10/13/2024       Skin assessed on: Q Shift      [x] No Pressure Injuries Present    [x]Prevention Measures Documented    [] Yes LDA  for Pressure Injury Previously documented     [] Yes New Pressure Injury Discovered   [] LDA for New Pressure Injury Added      Attending RN:  Yue Alaniz, RN     Second :  Lena VERNON LPN

## 2024-10-13 NOTE — SUBJECTIVE & OBJECTIVE
Interval History: patient seen and examined. NAD noted. Cr increased overnight. Will give IVF hydration. Repeat BMP at 1600. Can consider a nephrology consult. Renal US ordered.     Review of Systems   Reason unable to perform ROS: ROS was performed and pertinent +s and -s are listed in HPI.     Objective:     Vital Signs (Most Recent):  Temp: 98 °F (36.7 °C) (10/13/24 0802)  Pulse: 74 (10/13/24 0808)  Resp: 18 (10/13/24 0802)  BP: (!) 154/74 (10/13/24 0802)  SpO2: 98 % (10/13/24 0802) Vital Signs (24h Range):  Temp:  [97.7 °F (36.5 °C)-98.4 °F (36.9 °C)] 98 °F (36.7 °C)  Pulse:  [63-78] 74  Resp:  [18] 18  SpO2:  [93 %-98 %] 98 %  BP: (118-156)/(64-87) 154/74     Weight: 108.3 kg (238 lb 12.1 oz)  Body mass index is 33.3 kg/m².    Intake/Output Summary (Last 24 hours) at 10/13/2024 1016  Last data filed at 10/13/2024 1009  Gross per 24 hour   Intake 120 ml   Output 700 ml   Net -580 ml         Physical Exam  Constitutional:       General: He is not in acute distress.     Appearance: Normal appearance. He is not toxic-appearing or diaphoretic.   HENT:      Head: Normocephalic.   Cardiovascular:      Rate and Rhythm: Normal rate and regular rhythm.      Pulses: Normal pulses.      Heart sounds: Normal heart sounds.   Pulmonary:      Effort: Pulmonary effort is normal.      Breath sounds: Normal breath sounds.   Abdominal:      General: Abdomen is flat. Bowel sounds are normal.      Tenderness: There is no abdominal tenderness. There is no guarding.   Musculoskeletal:      Right lower leg: No edema.      Left lower leg: No edema.   Skin:     General: Skin is warm and dry.      Capillary Refill: Capillary refill takes less than 2 seconds.      Coloration: Skin is not jaundiced.   Neurological:      General: No focal deficit present.      Mental Status: He is alert and oriented to person, place, and time.   Psychiatric:         Mood and Affect: Mood normal.         Behavior: Behavior normal.             Significant  Labs: All pertinent labs within the past 24 hours have been reviewed.    Significant Imaging: I have reviewed all pertinent imaging results/findings within the past 24 hours.

## 2024-10-14 LAB
ALBUMIN SERPL BCP-MCNC: 3 G/DL (ref 3.5–5.2)
ALP SERPL-CCNC: 70 U/L (ref 55–135)
ALT SERPL W/O P-5'-P-CCNC: 32 U/L (ref 10–44)
ANION GAP SERPL CALC-SCNC: 8 MMOL/L (ref 8–16)
AST SERPL-CCNC: 30 U/L (ref 10–40)
BASOPHILS # BLD AUTO: 0.04 K/UL (ref 0–0.2)
BASOPHILS NFR BLD: 0.5 % (ref 0–1.9)
BILIRUB SERPL-MCNC: 0.3 MG/DL (ref 0.1–1)
BUN SERPL-MCNC: 31 MG/DL (ref 8–23)
CALCIUM SERPL-MCNC: 10.2 MG/DL (ref 8.7–10.5)
CHLORIDE SERPL-SCNC: 110 MMOL/L (ref 95–110)
CO2 SERPL-SCNC: 22 MMOL/L (ref 23–29)
CREAT SERPL-MCNC: 1.5 MG/DL (ref 0.5–1.4)
DIFFERENTIAL METHOD BLD: ABNORMAL
EOSINOPHIL # BLD AUTO: 0.3 K/UL (ref 0–0.5)
EOSINOPHIL NFR BLD: 3.4 % (ref 0–8)
ERYTHROCYTE [DISTWIDTH] IN BLOOD BY AUTOMATED COUNT: 15.1 % (ref 11.5–14.5)
EST. GFR  (NO RACE VARIABLE): 52 ML/MIN/1.73 M^2
GLUCOSE SERPL-MCNC: 118 MG/DL (ref 70–110)
HCT VFR BLD AUTO: 38.5 % (ref 40–54)
HGB BLD-MCNC: 12.4 G/DL (ref 14–18)
IMM GRANULOCYTES # BLD AUTO: 0.02 K/UL (ref 0–0.04)
IMM GRANULOCYTES NFR BLD AUTO: 0.3 % (ref 0–0.5)
LYMPHOCYTES # BLD AUTO: 2.7 K/UL (ref 1–4.8)
LYMPHOCYTES NFR BLD: 36.2 % (ref 18–48)
MAGNESIUM SERPL-MCNC: 2 MG/DL (ref 1.6–2.6)
MCH RBC QN AUTO: 30.5 PG (ref 27–31)
MCHC RBC AUTO-ENTMCNC: 32.2 G/DL (ref 32–36)
MCV RBC AUTO: 95 FL (ref 82–98)
MONOCYTES # BLD AUTO: 0.8 K/UL (ref 0.3–1)
MONOCYTES NFR BLD: 11.4 % (ref 4–15)
NEUTROPHILS # BLD AUTO: 3.5 K/UL (ref 1.8–7.7)
NEUTROPHILS NFR BLD: 48.2 % (ref 38–73)
NRBC BLD-RTO: 0 /100 WBC
PHOSPHATE SERPL-MCNC: 3.6 MG/DL (ref 2.7–4.5)
PLATELET # BLD AUTO: 219 K/UL (ref 150–450)
PMV BLD AUTO: 10.7 FL (ref 9.2–12.9)
POCT GLUCOSE: 107 MG/DL (ref 70–110)
POCT GLUCOSE: 118 MG/DL (ref 70–110)
POCT GLUCOSE: 184 MG/DL (ref 70–110)
POCT GLUCOSE: 235 MG/DL (ref 70–110)
POTASSIUM SERPL-SCNC: 3.9 MMOL/L (ref 3.5–5.1)
PROT SERPL-MCNC: 6.2 G/DL (ref 6–8.4)
RBC # BLD AUTO: 4.06 M/UL (ref 4.6–6.2)
SODIUM SERPL-SCNC: 140 MMOL/L (ref 136–145)
WBC # BLD AUTO: 7.35 K/UL (ref 3.9–12.7)

## 2024-10-14 PROCEDURE — 21400001 HC TELEMETRY ROOM: Mod: HCNC

## 2024-10-14 PROCEDURE — 36415 COLL VENOUS BLD VENIPUNCTURE: CPT | Mod: HCNC

## 2024-10-14 PROCEDURE — 63600175 PHARM REV CODE 636 W HCPCS: Mod: HCNC

## 2024-10-14 PROCEDURE — 25000003 PHARM REV CODE 250: Mod: HCNC | Performed by: NURSE PRACTITIONER

## 2024-10-14 PROCEDURE — 83735 ASSAY OF MAGNESIUM: CPT | Mod: HCNC

## 2024-10-14 PROCEDURE — 80053 COMPREHEN METABOLIC PANEL: CPT | Mod: HCNC

## 2024-10-14 PROCEDURE — 63600175 PHARM REV CODE 636 W HCPCS: Mod: HCNC | Performed by: STUDENT IN AN ORGANIZED HEALTH CARE EDUCATION/TRAINING PROGRAM

## 2024-10-14 PROCEDURE — 25000003 PHARM REV CODE 250: Mod: HCNC

## 2024-10-14 PROCEDURE — 85025 COMPLETE CBC W/AUTO DIFF WBC: CPT | Mod: HCNC

## 2024-10-14 PROCEDURE — 84100 ASSAY OF PHOSPHORUS: CPT | Mod: HCNC

## 2024-10-14 RX ORDER — SODIUM CHLORIDE 9 MG/ML
INJECTION, SOLUTION INTRAVENOUS CONTINUOUS
Status: ACTIVE | OUTPATIENT
Start: 2024-10-14 | End: 2024-10-15

## 2024-10-14 RX ORDER — HYDRALAZINE HYDROCHLORIDE 20 MG/ML
20 INJECTION INTRAMUSCULAR; INTRAVENOUS ONCE
Status: DISCONTINUED | OUTPATIENT
Start: 2024-10-14 | End: 2024-10-14

## 2024-10-14 RX ORDER — HYDRALAZINE HYDROCHLORIDE 20 MG/ML
20 INJECTION INTRAMUSCULAR; INTRAVENOUS ONCE
Status: COMPLETED | OUTPATIENT
Start: 2024-10-14 | End: 2024-10-14

## 2024-10-14 RX ORDER — HYDRALAZINE HYDROCHLORIDE 20 MG/ML
10 INJECTION INTRAMUSCULAR; INTRAVENOUS EVERY 6 HOURS PRN
Status: DISCONTINUED | OUTPATIENT
Start: 2024-10-14 | End: 2024-10-15 | Stop reason: HOSPADM

## 2024-10-14 RX ORDER — ADHESIVE BANDAGE
30 BANDAGE TOPICAL ONCE
Status: COMPLETED | OUTPATIENT
Start: 2024-10-14 | End: 2024-10-14

## 2024-10-14 RX ADMIN — THERA TABS 1 TABLET: TAB at 08:10

## 2024-10-14 RX ADMIN — HYDRALAZINE HYDROCHLORIDE 25 MG: 25 TABLET ORAL at 08:10

## 2024-10-14 RX ADMIN — OXYCODONE HYDROCHLORIDE 5 MG: 5 TABLET ORAL at 08:10

## 2024-10-14 RX ADMIN — MAGNESIUM HYDROXIDE 2400 MG: 400 SUSPENSION ORAL at 10:10

## 2024-10-14 RX ADMIN — CARVEDILOL 25 MG: 12.5 TABLET, FILM COATED ORAL at 04:10

## 2024-10-14 RX ADMIN — ASPIRIN 81 MG: 81 TABLET, COATED ORAL at 08:10

## 2024-10-14 RX ADMIN — FOLIC ACID 1 MG: 1 TABLET ORAL at 08:10

## 2024-10-14 RX ADMIN — ATORVASTATIN CALCIUM 20 MG: 10 TABLET, FILM COATED ORAL at 08:10

## 2024-10-14 RX ADMIN — PREGABALIN 100 MG: 50 CAPSULE ORAL at 08:10

## 2024-10-14 RX ADMIN — SODIUM CHLORIDE: 9 INJECTION, SOLUTION INTRAVENOUS at 02:10

## 2024-10-14 RX ADMIN — LISINOPRIL 40 MG: 20 TABLET ORAL at 08:10

## 2024-10-14 RX ADMIN — POLYETHYLENE GLYCOL 3350 17 G: 17 POWDER, FOR SOLUTION ORAL at 08:10

## 2024-10-14 RX ADMIN — HEPARIN SODIUM 5000 UNITS: 5000 INJECTION INTRAVENOUS; SUBCUTANEOUS at 05:10

## 2024-10-14 RX ADMIN — CARVEDILOL 25 MG: 12.5 TABLET, FILM COATED ORAL at 08:10

## 2024-10-14 RX ADMIN — HEPARIN SODIUM 5000 UNITS: 5000 INJECTION INTRAVENOUS; SUBCUTANEOUS at 10:10

## 2024-10-14 RX ADMIN — Medication 6 MG: at 11:10

## 2024-10-14 RX ADMIN — INSULIN ASPART 4 UNITS: 100 INJECTION, SOLUTION INTRAVENOUS; SUBCUTANEOUS at 11:10

## 2024-10-14 RX ADMIN — INSULIN ASPART 1 UNITS: 100 INJECTION, SOLUTION INTRAVENOUS; SUBCUTANEOUS at 08:10

## 2024-10-14 RX ADMIN — THIAMINE HCL TAB 100 MG 100 MG: 100 TAB at 08:10

## 2024-10-14 RX ADMIN — HYDRALAZINE HYDROCHLORIDE 20 MG: 20 INJECTION INTRAMUSCULAR; INTRAVENOUS at 10:10

## 2024-10-14 RX ADMIN — HEPARIN SODIUM 5000 UNITS: 5000 INJECTION INTRAVENOUS; SUBCUTANEOUS at 02:10

## 2024-10-14 RX ADMIN — INSULIN GLARGINE 15 UNITS: 100 INJECTION, SOLUTION SUBCUTANEOUS at 08:10

## 2024-10-14 NOTE — SUBJECTIVE & OBJECTIVE
Interval History:  Patient seen and examined. NAD. Creatinine downtrending 1.5.  Continue IV with  cc/hour.  Patient denies any symptoms however states his legs are slightly swollen bilaterally.  On examination 1+ edema bilaterally.  Patient told to keep legs elevated and compression socks to be given by nurse.    Review of Systems   Reason unable to perform ROS: ROS was performed and pertinent +s and -s are listed in HPI.     Objective:     Vital Signs (Most Recent):  Temp: 98.3 °F (36.8 °C) (10/14/24 1527)  Pulse: 74 (10/14/24 1527)  Resp: 18 (10/14/24 1527)  BP: (!) 190/97 (10/14/24 1527)  SpO2: 98 % (10/14/24 1527) Vital Signs (24h Range):  Temp:  [97.7 °F (36.5 °C)-98.4 °F (36.9 °C)] 98.3 °F (36.8 °C)  Pulse:  [64-77] 74  Resp:  [18] 18  SpO2:  [95 %-98 %] 98 %  BP: (146-190)/(76-97) 190/97     Weight: 108.3 kg (238 lb 12.1 oz)  Body mass index is 33.3 kg/m².    Intake/Output Summary (Last 24 hours) at 10/14/2024 1705  Last data filed at 10/14/2024 1527  Gross per 24 hour   Intake 600 ml   Output 2050 ml   Net -1450 ml         Physical Exam  Constitutional:       General: He is not in acute distress.     Appearance: Normal appearance. He is not toxic-appearing or diaphoretic.   HENT:      Head: Normocephalic.   Cardiovascular:      Rate and Rhythm: Normal rate and regular rhythm.      Pulses: Normal pulses.      Heart sounds: Normal heart sounds.   Pulmonary:      Effort: Pulmonary effort is normal.      Breath sounds: Normal breath sounds.   Abdominal:      General: Abdomen is flat. Bowel sounds are normal.      Tenderness: There is no abdominal tenderness. There is no guarding.   Musculoskeletal:      Right lower le+ Edema present.      Left lower le+ Edema present.   Skin:     General: Skin is warm and dry.      Capillary Refill: Capillary refill takes less than 2 seconds.      Coloration: Skin is not jaundiced.   Neurological:      General: No focal deficit present.      Mental Status: He is  alert and oriented to person, place, and time.   Psychiatric:         Mood and Affect: Mood normal.         Behavior: Behavior normal.             Significant Labs: All pertinent labs within the past 24 hours have been reviewed.    Significant Imaging: I have reviewed all pertinent imaging results/findings within the past 24 hours.  Imaging Results              CT Head Without Contrast (Final result)  Result time 10/11/24 12:06:58      Final result by Finesse Mitchell MD (10/11/24 12:06:58)                   Impression:      1. Allowing for motion artifact, no convincing acute intracranial abnormalities noting sequela of chronic microvascular ischemic change and senescent change.      Electronically signed by: Finesse Mitchell MD  Date:    10/11/2024  Time:    12:06               Narrative:    EXAMINATION:  CT HEAD WITHOUT CONTRAST    CLINICAL HISTORY:  Mental status change, unknown cause;confusion, resolved hypoglycemia. nausea.;    TECHNIQUE:  Low dose axial images were obtained through the head.  Coronal and sagittal reformations were also performed. Contrast was not administered.    COMPARISON:  None.    FINDINGS:  There is motion artifact.    There is generalized cerebral volume loss.  There is hypoattenuation in a periventricular fashion, likely sequela of chronic microvascular ischemic change.  There is no evidence of acute major vascular territory infarct, hemorrhage, or mass.  There is no hydrocephalus.  There are no abnormal extra-axial fluid collections.  The paranasal sinuses and mastoid air cells are clear, and there is no evidence of calvarial fracture.  The visualized soft tissues are unremarkable.                                       CT Cervical Spine Without Contrast (Final result)  Result time 10/11/24 12:11:10      Final result by Finesse Mitchell MD (10/11/24 12:11:10)                   Impression:      1. No acute displaced fracture or dislocation of the cervical spine noting degenerative  changes and additional findings above.      Electronically signed by: Finesse Mitchell MD  Date:    10/11/2024  Time:    12:11               Narrative:    EXAMINATION:  CT CERVICAL SPINE WITHOUT CONTRAST    CLINICAL HISTORY:  Polytrauma, blunt;unsure if fall. confusion, nausea, hypoglycemia, abd pain;    TECHNIQUE:  Low dose axial images, sagittal and coronal reformations were performed though the cervical spine.  Contrast was not administered.    COMPARISON:  None    FINDINGS:  There is motion artifact.  There is osteopenia.    The visualized portions of the lung apices are unremarkable.  The thyroid gland, parotid glands, and remaining visualized salivary glands are grossly unremarkable.  No tonsillar enlargement.  No significant cervical lymphadenopathy.  The posterior paraspinous and hypopharyngeal soft tissues are unremarkable.    Sagittal reformatted imaging demonstrates adequate alignment of the cervical spine without significant vertebral body height loss.  There is multilevel disc degenerative change and multilevel anterior marginal osteophytosis.  There is multilevel facet arthropathy.  No convincing acute displaced fracture or dislocation of the cervical spine.  Motion artifact limits evaluation for spondylitic changes.  There is multilevel moderate spinal canal stenosis and neuroforaminal narrowing.  The airway is patent.                                       CT Abdomen Pelvis With IV Contrast NO Oral Contrast (Final result)  Result time 10/11/24 11:36:46      Final result by Bud Corbin MD (10/11/24 11:36:46)                   Impression:      No etiology for abdominal pain identified.      Electronically signed by: Bud Corbin MD  Date:    10/11/2024  Time:    11:36               Narrative:    EXAMINATION:  CT ABDOMEN PELVIS WITH IV CONTRAST    CLINICAL HISTORY:  Abdominal pain, acute, nonlocalized;diffuse lower abd pain, nausea;    TECHNIQUE:  Low dose axial images, sagittal and coronal  reformations were obtained from the lung bases to the pubic symphysis following the IV administration of 100 mL of Omnipaque 350 and the oral administration of 30 mL of Omnipaque 350.    COMPARISON:  None.    FINDINGS:  Abdomen: No liver masses.  The gallbladder is unremarkable.  No biliary or pancreatic ductal dilatation.  Splenic size within normal limits.  Adrenal glands unremarkable.  Small left upper pole renal cyst noted.  No renal masses or hydronephrosis.  Abdominal aorta tapers normally.  Mild scattered calcific atherosclerosis.    Pelvis: Bladder is distended.  Prostate unremarkable.  No fluid collections.  No inguinal or pelvic lymphadenopathy.    Bowel/mesentery: The terminal ileum is unremarkable.  No dilated appendix visualized.  There is mild fat stranding in the mesentery.  No fluid collections.  No mesenteric lymphadenopathy.  Small hiatal hernia.    Bones: No marrow replacement process.  Degenerative changes of lumbar spine and hips noted.    Lung bases: Mild dependent interstitial thickening.                                       X-Ray Chest AP Portable (Final result)  Result time 10/11/24 10:14:50      Final result by Bud Corbin MD (10/11/24 10:14:50)                   Impression:      No acute findings.      Electronically signed by: Bud Corbin MD  Date:    10/11/2024  Time:    10:14               Narrative:    EXAMINATION:  XR CHEST AP PORTABLE    CLINICAL HISTORY:  Nausea    TECHNIQUE:  Single frontal view of the chest was performed.    COMPARISON:  07/18/2023    FINDINGS:  Enlarged cardiac silhouette.The lungs are grossly clear.  No pneumothorax.No pleural effusions.

## 2024-10-14 NOTE — ASSESSMENT & PLAN NOTE
KENNETH is likely due to pre-renal azotemia due to dehydration. Baseline creatinine is unknown. Most recent creatinine and eGFR are listed below.  Recent Labs     10/13/24  0629 10/13/24  1559 10/14/24  0545   CREATININE 1.9* 1.8* 1.5*   EGFRNORACEVR 39* 42* 52*        Plan  - KENNETH is worsening. Will adjust treatment as follows: IVF  - Avoid nephrotoxins and renally dose meds for GFR listed above  - Monitor urine output, serial BMP, and adjust therapy as needed  - Renal US:  No acute abnormality, 2 cm left renal simple cyst seen  - Can consider nephrology consult

## 2024-10-14 NOTE — ASSESSMENT & PLAN NOTE
Patient's FSGs are uncontrolled due to hypoglycemia on current medication regimen, which includes 40U glargine BID at home.  BG was persistently low after arrival; suspect he is taking too much basal.    Last A1c reviewed-   Lab Results   Component Value Date    HGBA1C 6.2 (H) 10/12/2024     Most recent fingerstick glucose reviewed-   Recent Labs   Lab 10/13/24  1924 10/14/24  0737 10/14/24  1057 10/14/24  1524   POCTGLUCOSE 225* 118* 235* 107       Current correctional scale  Medium  Maintain anti-hyperglycemic dose as follows-   Antihyperglycemics (From admission, onward)    Start     Stop Route Frequency Ordered    10/12/24 0900  insulin glargine U-100 (Lantus) pen 15 Units         -- SubQ Daily 10/11/24 1614    10/11/24 1709  insulin aspart U-100 pen 0-10 Units         -- SubQ Before meals & nightly PRN 10/11/24 1614        Hold Oral hypoglycemics while patient is in the hospital.

## 2024-10-14 NOTE — NURSING
[facility]  Nurses Note -- 4 Eyes      10/14/2024       Skin assessed on: Q Shift      [x] No Pressure Injuries Present    [x]Prevention Measures Documented    [] Yes LDA  for Pressure Injury Previously documented     [] Yes New Pressure Injury Discovered   [] LDA for New Pressure Injury Added      Attending RN:  Carlee Del Rosario RN     Second RN:  Lena Da Silva LPN

## 2024-10-14 NOTE — NURSING
[facility]  Nurses Note -- 4 Eyes      10/13/2024       Skin assessed on: Q Shift      [x] No Pressure Injuries Present    [x]Prevention Measures Documented    [] Yes LDA  for Pressure Injury Previously documented     [] Yes New Pressure Injury Discovered   [] LDA for New Pressure Injury Added      Attending RN:  Lena Da Silva LPN     Second RN:  Yue AlanizRN

## 2024-10-14 NOTE — PLAN OF CARE
Changes in medical condition or discharge plan: No    Does patient need new DME? TBD    Follow up appts needed: PCP and Nephrology     Medically stable: No    Estimated Discharge Date: TBD    Per attending, patient not medically stable for discharge; KENNETH is worsening and patient on IVF. Attending considering Nephrology consult. Per chart review, patient with a history of substance abuse and views recent admission as a wake up call; CM offered patient resources for rehab to which he decline, stating this incident was a one time thing and he does not do drugs.  Patient stated his feet are currently swollen and he is unable to walk; attending notified.  Per chart review, at baseline patient lives with and receives assistance from his nephew and uses a straight cane and wheelchair.  CM to continue to assess for and until discharge.     10/14/24 7156   Discharge Reassessment   Assessment Type Discharge Planning Reassessment   Did the patient's condition or plan change since previous assessment? No   Discharge Plan A Home with family   DME Needed Upon Discharge  other (see comments)  (TBD)   Transition of Care Barriers None   Why the patient remains in the hospital Requires continued medical care   Post-Acute Status   Hospital Resources/Appts/Education Provided Provided patient/caregiver with written discharge plan information   Discharge Delays None known at this time

## 2024-10-15 VITALS
BODY MASS INDEX: 33.43 KG/M2 | HEIGHT: 71 IN | DIASTOLIC BLOOD PRESSURE: 85 MMHG | TEMPERATURE: 99 F | WEIGHT: 238.75 LBS | SYSTOLIC BLOOD PRESSURE: 150 MMHG | OXYGEN SATURATION: 97 % | HEART RATE: 71 BPM | RESPIRATION RATE: 18 BRPM

## 2024-10-15 LAB
ALBUMIN SERPL BCP-MCNC: 2.7 G/DL (ref 3.5–5.2)
ALP SERPL-CCNC: 61 U/L (ref 55–135)
ALT SERPL W/O P-5'-P-CCNC: 43 U/L (ref 10–44)
ANION GAP SERPL CALC-SCNC: 7 MMOL/L (ref 8–16)
AST SERPL-CCNC: 47 U/L (ref 10–40)
BACTERIA BLD CULT: NORMAL
BACTERIA BLD CULT: NORMAL
BASOPHILS # BLD AUTO: 0.02 K/UL (ref 0–0.2)
BASOPHILS NFR BLD: 0.3 % (ref 0–1.9)
BILIRUB SERPL-MCNC: 0.3 MG/DL (ref 0.1–1)
BUN SERPL-MCNC: 24 MG/DL (ref 8–23)
CALCIUM SERPL-MCNC: 9.5 MG/DL (ref 8.7–10.5)
CHLORIDE SERPL-SCNC: 110 MMOL/L (ref 95–110)
CO2 SERPL-SCNC: 22 MMOL/L (ref 23–29)
CREAT SERPL-MCNC: 1.4 MG/DL (ref 0.5–1.4)
DIFFERENTIAL METHOD BLD: ABNORMAL
EOSINOPHIL # BLD AUTO: 0.2 K/UL (ref 0–0.5)
EOSINOPHIL NFR BLD: 2.4 % (ref 0–8)
ERYTHROCYTE [DISTWIDTH] IN BLOOD BY AUTOMATED COUNT: 15.1 % (ref 11.5–14.5)
EST. GFR  (NO RACE VARIABLE): 56 ML/MIN/1.73 M^2
GLUCOSE SERPL-MCNC: 205 MG/DL (ref 70–110)
HCT VFR BLD AUTO: 35.8 % (ref 40–54)
HGB BLD-MCNC: 11.5 G/DL (ref 14–18)
IMM GRANULOCYTES # BLD AUTO: 0.01 K/UL (ref 0–0.04)
IMM GRANULOCYTES NFR BLD AUTO: 0.2 % (ref 0–0.5)
LYMPHOCYTES # BLD AUTO: 1.9 K/UL (ref 1–4.8)
LYMPHOCYTES NFR BLD: 31.4 % (ref 18–48)
MCH RBC QN AUTO: 30.3 PG (ref 27–31)
MCHC RBC AUTO-ENTMCNC: 32.1 G/DL (ref 32–36)
MCV RBC AUTO: 95 FL (ref 82–98)
MONOCYTES # BLD AUTO: 0.7 K/UL (ref 0.3–1)
MONOCYTES NFR BLD: 12 % (ref 4–15)
NEUTROPHILS # BLD AUTO: 3.3 K/UL (ref 1.8–7.7)
NEUTROPHILS NFR BLD: 53.7 % (ref 38–73)
NRBC BLD-RTO: 0 /100 WBC
PLATELET # BLD AUTO: 186 K/UL (ref 150–450)
PMV BLD AUTO: 9.9 FL (ref 9.2–12.9)
POCT GLUCOSE: 124 MG/DL (ref 70–110)
POCT GLUCOSE: 190 MG/DL (ref 70–110)
POTASSIUM SERPL-SCNC: 4 MMOL/L (ref 3.5–5.1)
PROT SERPL-MCNC: 5.5 G/DL (ref 6–8.4)
RBC # BLD AUTO: 3.79 M/UL (ref 4.6–6.2)
SODIUM SERPL-SCNC: 139 MMOL/L (ref 136–145)
WBC # BLD AUTO: 6.17 K/UL (ref 3.9–12.7)

## 2024-10-15 PROCEDURE — 25000003 PHARM REV CODE 250: Mod: HCNC | Performed by: NURSE PRACTITIONER

## 2024-10-15 PROCEDURE — 85025 COMPLETE CBC W/AUTO DIFF WBC: CPT | Mod: HCNC

## 2024-10-15 PROCEDURE — 25000003 PHARM REV CODE 250: Mod: HCNC

## 2024-10-15 PROCEDURE — 63600175 PHARM REV CODE 636 W HCPCS: Mod: HCNC

## 2024-10-15 PROCEDURE — 80053 COMPREHEN METABOLIC PANEL: CPT | Mod: HCNC

## 2024-10-15 PROCEDURE — 36415 COLL VENOUS BLD VENIPUNCTURE: CPT | Mod: HCNC

## 2024-10-15 RX ORDER — INSULIN GLARGINE 100 [IU]/ML
10 INJECTION, SOLUTION SUBCUTANEOUS 2 TIMES DAILY
Qty: 6 ML | Refills: 0 | Status: SHIPPED | OUTPATIENT
Start: 2024-10-15 | End: 2024-11-14

## 2024-10-15 RX ADMIN — INSULIN GLARGINE 15 UNITS: 100 INJECTION, SOLUTION SUBCUTANEOUS at 08:10

## 2024-10-15 RX ADMIN — CARVEDILOL 25 MG: 12.5 TABLET, FILM COATED ORAL at 08:10

## 2024-10-15 RX ADMIN — HYDRALAZINE HYDROCHLORIDE 25 MG: 25 TABLET ORAL at 08:10

## 2024-10-15 RX ADMIN — FOLIC ACID 1 MG: 1 TABLET ORAL at 08:10

## 2024-10-15 RX ADMIN — LISINOPRIL 40 MG: 20 TABLET ORAL at 08:10

## 2024-10-15 RX ADMIN — PREGABALIN 100 MG: 50 CAPSULE ORAL at 08:10

## 2024-10-15 RX ADMIN — THIAMINE HCL TAB 100 MG 100 MG: 100 TAB at 08:10

## 2024-10-15 RX ADMIN — THERA TABS 1 TABLET: TAB at 08:10

## 2024-10-15 RX ADMIN — ATORVASTATIN CALCIUM 20 MG: 10 TABLET, FILM COATED ORAL at 08:10

## 2024-10-15 RX ADMIN — INSULIN ASPART 2 UNITS: 100 INJECTION, SOLUTION INTRAVENOUS; SUBCUTANEOUS at 11:10

## 2024-10-15 RX ADMIN — ASPIRIN 81 MG: 81 TABLET, COATED ORAL at 08:10

## 2024-10-15 RX ADMIN — HEPARIN SODIUM 5000 UNITS: 5000 INJECTION INTRAVENOUS; SUBCUTANEOUS at 05:10

## 2024-10-15 NOTE — NURSING
Reported off to oncoming nurse, patient resting in bed, aaox4. Patient can make needs known to staff, minimal assist required for adls and transfers, no acute distress noted, safety precautions maintained.        Chart check completed.

## 2024-10-15 NOTE — HOSPITAL COURSE
Thierry Ho was admitted to the hospital for hypoglycemia with the initial CBG 31.  Per ED note, patient was showing signs of hallucinations prior to arrival.  Patient's BG was corrected to 55 upon arrival to the ED. UDS presumptively positive for cocaine. In the ED:  Ct head, cspine, abd/pelvis acutely negative (was ttp in lower abd). abnl labs: wbc 14, co2 16, lactate 4, mag 1.4, crf217, , trop 0.03.  CT H/S/A/P all acutely negative.  ED therapy/stabilization measures:  Home BP meds and IVF given.    Patient was treated with IVF for KENNETH with correction of Cr from 1.8 to 1.4 (normal). Renal US showed no acute abnormality and a 2 cm left renal simple cyst. He was started on CIWA protocol for alcohol abuse and started on MVI, thiamine, and folic acid daily. His hypoglycemia corrected and patient became hyperglycemic. He was started on Lantus 15 units subq daily and moderate SSI. Telepsychiatry consulted  and recommended intensive outpatient level treatment for alcohol and cocaine abuse.  Social work discussed a list of referrals shared by telepsychiatry. Patient understood what was stated above and had no further questions. Return precautions give.     Addendum: Called patient at [phone] after discharge to explain to him that he is taking too much insulin at home. He stated that he was taking 40 units of long acting insulin in the morning and 40 units in the evening. In the hospital, we were giving him 15 units daily, which was keeping his sugars in the range of 130-200. Therefore, explained to him that he should take 10 units in the morning and 10 units in the evening until he follows up with his PCP within the next few days. Patient understood and agreed.     All findings and plan were explained to the patient. All questions and concerns were answered. Patient verbalized understanding. Patient is in stable condition to d/c home with stable jose signs and has been informed to follow up with his PCP  within the next 7-10 days to discuss his observation stay. Patient has been educated to return to the ED if He experiences any further chest pain, lightheadness, weakness, or discomfort.

## 2024-10-15 NOTE — PLAN OF CARE
Problem: Diabetes Comorbidity  Goal: Blood Glucose Level Within Targeted Range  Outcome: Progressing     Problem: Acute Kidney Injury/Impairment  Goal: Fluid and Electrolyte Balance  Outcome: Progressing     Problem: Adult Inpatient Plan of Care  Goal: Optimal Comfort and Wellbeing  Outcome: Progressing

## 2024-10-15 NOTE — PLAN OF CARE
Problem: Adult Inpatient Plan of Care  Goal: Plan of Care Review  Outcome: Met  Goal: Patient-Specific Goal (Individualized)  Outcome: Met  Goal: Absence of Hospital-Acquired Illness or Injury  Outcome: Met  Goal: Optimal Comfort and Wellbeing  Outcome: Met  Goal: Readiness for Transition of Care  Outcome: Met     Problem: Diabetes Comorbidity  Goal: Blood Glucose Level Within Targeted Range  Outcome: Met     Problem: Acute Kidney Injury/Impairment  Goal: Fluid and Electrolyte Balance  Outcome: Met  Goal: Improved Oral Intake  Outcome: Met  Goal: Effective Renal Function  Outcome: Met

## 2024-10-15 NOTE — DISCHARGE SUMMARY
[facility] Medicine  Discharge Summary      Patient Name: Thierry Ho  MRN: [id number]  MADDIE: [id number]  Patient Class: IP- Inpatient  Admission Date: 10/11/2024  Hospital Length of Stay: 2 days  Discharge Date and Time:  10/15/2024 2:39 PM  Attending Physician: No att. providers found   Discharging Provider: Tommie Hitchcock PA-C  Primary Care Provider: Rhonda Krishnan MD    Primary Care Team:  Hosp Med [facility] 3    HPI:   Thierry Ho is a 64 y.o. male with  cocaine and EtOH use,  Insulin-dependent DM2 without complications, HTN, and HLD who presented to [facility] ED on 10/11/2024 for eval and treatment of AMS.  Patient can remember very little over the last 24 hours.  Per ED report, nephew called EMS with concern for hallucinations just prior to presentation.  EMS discovered BG 33, gave an amp of D50 en route to [facility].  Patient was more alert with  on arrival, reports he takes his medication but is unsure about how much and when.  BG soon dropped back down to 100 without any insulin given.  Patient reports being shaken by the prospect of dying of hypoglycemia had his nephew not discovered him and called 911.  Denies any symptoms or discomfort at the time of initial  interview.  Ct head, cspine, abd/pelvis acutely negative (was ttp in lower abd). abnl labs: wbc 14, co2 16, lactate 4, mag 1.4, dhi014, , trop 0.03.  CT H/S/A/P all acutely negative.  ED therapy/stabilization measures:  Home BP meds and IVF given.  They were placed in observation under the care of [facility] for further evaluation and treatment.       * No surgery found *      Hospital Course:   Thierry Ho was admitted to the hospital for hypoglycemia with the initial CBG 31.  Per ED note, patient was showing signs of hallucinations prior to arrival.  Patient's BG was corrected to 55 upon arrival to the ED. UDS presumptively positive for cocaine. In the ED:  Ct head, cspine, abd/pelvis acutely  negative (was ttp in lower abd). abnl labs: wbc 14, co2 16, lactate 4, mag 1.4, xns860, , trop 0.03.  CT H/S/A/P all acutely negative.  ED therapy/stabilization measures:  Home BP meds and IVF given.    Patient was treated with IVF for KENNETH with correction of Cr from 1.8 to 1.4 (normal). Renal US showed no acute abnormality and a 2 cm left renal simple cyst. He was started on CIWA protocol for alcohol abuse and started on MVI, thiamine, and folic acid daily. His hypoglycemia corrected and patient became hyperglycemic. He was started on Lantus 15 units subq daily and moderate SSI. Telepsychiatry consulted  and recommended intensive outpatient level treatment for alcohol and cocaine abuse.  Social work discussed a list of referrals shared by telepsychiatry. Patient understood what was stated above and had no further questions. Return precautions give.     Addendum: Called patient at [phone] after discharge to explain to him that he is taking too much insulin at home. He stated that he was taking 40 units of long acting insulin in the morning and 40 units in the evening. In the hospital, we were giving him 15 units daily, which was keeping his sugars in the range of 130-200. Therefore, explained to him that he should take 10 units in the morning and 10 units in the evening until he follows up with his PCP within the next few days. Patient understood and agreed.     All findings and plan were explained to the patient. All questions and concerns were answered. Patient verbalized understanding. Patient is in stable condition to d/c home with stable jose signs and has been informed to follow up with his PCP within the next 7-10 days to discuss his observation stay. Patient has been educated to return to the ED if He experiences any further chest pain, lightheadness, weakness, or discomfort.       Goals of Care Treatment Preferences:  Code Status: Full Code      SDOH Screening:  The patient was screened for  utility difficulties, food insecurity, transport difficulties, housing insecurity, and interpersonal safety and there were no concerns identified this admission.     Consults:     No new Assessment & Plan notes have been filed under this hospital service since the last note was generated.  Service: Hospital Medicine    Final Active Diagnoses:    Diagnosis Date Noted POA    PRINCIPAL PROBLEM:  Uncontrolled type 2 diabetes mellitus with hyperglycemia [E11.65] 05/01/2020 Yes    KENNETH (acute kidney injury) [N17.9] 10/13/2024 Yes    Alcohol abuse [F10.10] 10/13/2024 Yes    Nonadherence to medication [Z91.148] 10/12/2024 Not Applicable    Cocaine abuse [F14.10] 10/12/2024 Yes    Non morbid obesity, unspecified obesity type [E66.9] 08/26/2019 Yes    Coronary artery disease involving native coronary artery of native heart without angina pectoris [I25.10] 01/29/2019 Yes    Essential hypertension [I10] 01/15/2018 Yes    Other hyperlipidemia [E78.49] 01/15/2018 Yes      Problems Resolved During this Admission:       Discharged Condition: stable    Disposition: Home or Self Care    Follow Up:   Follow-up Information       Rhonda Krishnan MD Follow up.    Specialty: Internal Medicine  Contact information:  [address] [place] [address] [phone]                           Patient Instructions:      Diet diabetic     Notify your health care provider if you experience any of the following:  persistent nausea and vomiting or diarrhea     Notify your health care provider if you experience any of the following:  temperature >100.4     Notify your health care provider if you experience any of the following:  persistent dizziness, light-headedness, or visual disturbances     Activity as tolerated       Significant Diagnostic Studies: Labs: CMP   Recent Labs   Lab 10/13/24  1559 10/14/24  0545 10/15/24  0915    140 139   K 4.0 3.9 4.0    110 110   CO2 21* 22* 22*   * 118* 205*   BUN 39* 31* 24*   CREATININE  "1.8* 1.5* 1.4   CALCIUM 9.9 10.2 9.5   PROT  --  6.2 5.5*   ALBUMIN  --  3.0* 2.7*   BILITOT  --  0.3 0.3   ALKPHOS  --  70 61   AST  --  30 47*   ALT  --  32 43   ANIONGAP 10 8 7*   , CBC   Recent Labs   Lab 10/14/24  0545 10/15/24  0915   WBC 7.35 6.17   HGB 12.4* 11.5*   HCT 38.5* 35.8*    186   , Troponin   Recent Labs   Lab 10/11/24  0944 10/12/24  0512 10/12/24  0900   TROPONINI 0.031* 0.071* 0.048*   , and A1C:   Recent Labs   Lab 06/28/24  1048 10/12/24  0512   HGBA1C 6.6* 6.2*       Pending Diagnostic Studies:       Procedure Component Value Units Date/Time    Vitamin B1 [[id number]] Collected: 10/12/24 0512    Order Status: Sent Lab Status: In process Updated: 10/12/24 0642    Specimen: Blood            Medications:  Reconciled Home Medications:      Medication List        CHANGE how you take these medications      LANTUS SOLOSTAR U-100 INSULIN 100 unit/mL (3 mL) Inpn pen  Generic drug: insulin glargine U-100 (Lantus)  Inject 10 Units into the skin 2 (two) times a day.  What changed: how much to take            CONTINUE taking these medications      acetaminophen 500 MG tablet  Commonly known as: TYLENOL  Take 500 mg by mouth every 6 (six) hours as needed for Pain.     amitriptyline 25 MG tablet  Commonly known as: ELAVIL  Take 1 tablet (25 mg total) by mouth nightly as needed for Insomnia.     aspirin 81 MG EC tablet  Commonly known as: ECOTRIN  Take 81 mg by mouth once daily.     BD ULTRA-FINE FREDA PEN NEEDLE 32 gauge x 5/32" Ndle  Generic drug: pen needle, diabetic  USE AS DIRECTED 2 TIMES DAILY WITH LEVEMIR     BLOOD PRESSURE CUFF Misc  Generic drug: miscellaneous medical supply  1 kit by Misc.(Non-Drug; Combo Route) route once daily.     blood sugar diagnostic Strp  Commonly known as: TRUE METRIX GLUCOSE TEST STRIP  1 strip by Misc.(Non-Drug; Combo Route) route 2 (two) times a day.     blood-glucose meter kit  1 each by Other route 4 (four) times daily before meals and nightly.     carvediloL " 25 MG tablet  Commonly known as: COREG  Take 1 tablet (25 mg total) by mouth 2 (two) times daily with meals.     ciclopirox 8 % Soln  Commonly known as: PENLAC  Apply topically nightly.     cyclobenzaprine 5 MG tablet  Commonly known as: FLEXERIL  Take 1 tablet (5 mg total) by mouth nightly as needed for Muscle spasms.     fluocinolone 0.01 % external solution  Commonly known as: SYNALAR  APPLY TOPICALLY ONCE DAILY  FOR 10 DAYS     fluocinonide 0.05 % external solution  Commonly known as: LIDEX  Topical prn     hydrALAZINE 25 MG tablet  Commonly known as: APRESOLINE  Take 1 tablet (25 mg total) by mouth every 12 (twelve) hours.     ibuprofen 600 MG tablet  Commonly known as: ADVIL,MOTRIN  Take 1 tablet (600 mg total) by mouth every 6 (six) hours as needed for Pain.     ketoconazole 2 % shampoo  Commonly known as: NIZORAL  APPLY TOPICALLY TWICE A WEEK.     lisinopriL 40 MG tablet  Commonly known as: PRINIVIL,ZESTRIL  Take 1 tablet (40 mg total) by mouth once daily.     metFORMIN 1000 MG tablet  Commonly known as: GLUCOPHAGE  Take 1 tablet (1,000 mg total) by mouth 2 (two) times daily with meals.     MOUNJARO 2.5 mg/0.5 mL Pnij  Generic drug: tirzepatide  Inject 2.5 mg into the skin every 7 days.     omeprazole 20 MG capsule  Commonly known as: PRILOSEC  Take 1 capsule (20 mg total) by mouth once daily.     pregabalin 100 MG capsule  Commonly known as: LYRICA  Take 1 capsule (100 mg total) by mouth 2 (two) times daily.     sildenafiL 100 MG tablet  Commonly known as: VIAGRA  Take 1 tablet (100 mg total) by mouth daily as needed for Erectile Dysfunction.     simvastatin 40 MG tablet  Commonly known as: ZOCOR  Take 1 tablet (40 mg total) by mouth every evening.     TRUEPLUS LANCETS 33 gauge Misc  Generic drug: lancets  Check glucose twice daily              Indwelling Lines/Drains at time of discharge:   Lines/Drains/Airways       None                   Time spent on the discharge of patient: 45 minutes         Rashed  BLANCA Hitchcock  Department of [facility] Medicine  [facility] [place] - Telemetry

## 2024-10-15 NOTE — PLAN OF CARE
10/15/24 1237   Medicare Message   Important Message from Medicare regarding Discharge Appeal Rights Given to patient/caregiver;Explained to patient/caregiver;Signed/date by patient/caregiver   Date IMM was signed 10/15/24   Time IMM was signed [id number]

## 2024-10-15 NOTE — PLAN OF CARE
Case Management Final Discharge Note      Discharge Disposition: Home    New DME ordered / company name: None    Relevant SDOH / Transition of Care Barriers:  None    Primary Caretaker and contact information: Patient is mostly independent; Ivette Ho (Daughter) [phone]; Ivette Ho (Daughter) [phone]; Pedro Ho (Relative) [phone]    Scheduled followup appointment: Follow up appointment with PCP scheduled and added to patient AVS; pt also has an upcoming appointment with Nephrology    Referrals placed: None    Transportation: Private vehicle/family taking patient home        Patient and family educated on discharge services and updated on DC plan.  Patient to discharge home; he is mostly independent but has family available to assist him at home as need.  Per chart review, patient has a history of substance abuse; CM offered rehab resources to patient to which he declined, stating he does not do drugs and this incident was a one time deal and wake up call.  Bedside RN notified, patient clear to discharge from Case Management Perspective.      10/15/24 1341   Final Note   Assessment Type Final Discharge Note   Anticipated Discharge Disposition Home   Hospital Resources/Appts/Education Provided Provided patient/caregiver with written discharge plan information;Appointments scheduled and added to AVS   Post-Acute Status   Post-Acute Authorization Other   Other Status No Post-Acute Service Needs   Discharge Delays None known at this time

## 2024-10-15 NOTE — NURSING
[facility]  Nurses Note -- 4 Eyes      10/15/2024       Skin assessed on: Q Shift      [x] No Pressure Injuries Present    []Prevention Measures Documented    [] Yes LDA  for Pressure Injury Previously documented     [] Yes New Pressure Injury Discovered   [] LDA for New Pressure Injury Added      Attending RN:  Ann-Marie Marquez RN     Second RN:  Carlee DOBSON RN

## 2024-10-15 NOTE — NURSING
Reviewed all discharge instructions with patient, new medications, continued medications, follow-up appointments and signs/symptoms to report to PCP or seek emergency medical care. Patient verbalized understanding to all instructions and education. Patient denies any complaints or concerns at this time. Patient was wheeled off unit to car for discharge.

## 2024-10-16 ENCOUNTER — PATIENT OUTREACH (OUTPATIENT)
Dept: ADMINISTRATIVE | Facility: CLINIC | Age: 64
End: 2024-10-16
Payer: MEDICARE

## 2024-10-16 ENCOUNTER — TELEPHONE (OUTPATIENT)
Dept: FAMILY MEDICINE | Facility: CLINIC | Age: 64
End: 2024-10-16
Payer: MEDICARE

## 2024-10-16 DIAGNOSIS — I10 ESSENTIAL HYPERTENSION: Primary | ICD-10-CM

## 2024-10-16 RX ORDER — ACETAMINOPHEN 500 MG
TABLET ORAL
Qty: 1 EACH | Refills: 0 | Status: SHIPPED | OUTPATIENT
Start: 2024-10-16

## 2024-10-16 NOTE — TELEPHONE ENCOUNTER
----- Message from VINICIO Cleaning sent at 10/16/2024 12:21 PM CDT -----  Regarding: tcc post-d/c call  Spoke with patient for tcc post-d/c call. Please contact and advise regarding patient stated questions and / or concerns. Thank you.    Pt request : BP cuff    Please do not reply to this message, as this inbox is not routinely monitored.

## 2024-10-16 NOTE — PROGRESS NOTES
C3 nurse spoke with Thierry Ho for a TCC post hospital discharge follow up call. The patient has a scheduled HOS appointment with Macie Gonzalez PA-C on 10/25/24 @ 1323.

## 2024-10-17 LAB — VIT B1 BLD-MCNC: 124 UG/L (ref 38–122)

## 2024-10-22 ENCOUNTER — LAB VISIT (OUTPATIENT)
Dept: LAB | Facility: HOSPITAL | Age: 64
End: 2024-10-22
Payer: MEDICARE

## 2024-10-22 DIAGNOSIS — E11.9 TYPE 2 DIABETES MELLITUS WITHOUT COMPLICATION: ICD-10-CM

## 2024-10-22 LAB
ALBUMIN/CREAT UR: 1543.2 UG/MG (ref 0–30)
CREAT UR-MCNC: 111 MG/DL (ref 23–375)
MICROALBUMIN UR DL<=1MG/L-MCNC: 1713 UG/ML

## 2024-10-22 PROCEDURE — 82570 ASSAY OF URINE CREATININE: CPT | Mod: HCNC | Performed by: FAMILY MEDICINE

## 2024-10-22 PROCEDURE — 82043 UR ALBUMIN QUANTITATIVE: CPT | Mod: HCNC | Performed by: FAMILY MEDICINE

## 2024-10-25 ENCOUNTER — OFFICE VISIT (OUTPATIENT)
Dept: FAMILY MEDICINE | Facility: CLINIC | Age: 64
End: 2024-10-25
Payer: MEDICARE

## 2024-10-25 ENCOUNTER — LAB VISIT (OUTPATIENT)
Dept: LAB | Facility: HOSPITAL | Age: 64
End: 2024-10-25
Attending: STUDENT IN AN ORGANIZED HEALTH CARE EDUCATION/TRAINING PROGRAM
Payer: MEDICARE

## 2024-10-25 VITALS
RESPIRATION RATE: 16 BRPM | SYSTOLIC BLOOD PRESSURE: 142 MMHG | OXYGEN SATURATION: 96 % | HEART RATE: 87 BPM | DIASTOLIC BLOOD PRESSURE: 80 MMHG | WEIGHT: 238.75 LBS | TEMPERATURE: 98 F | BODY MASS INDEX: 33.43 KG/M2 | HEIGHT: 71 IN

## 2024-10-25 DIAGNOSIS — M54.16 LUMBAR RADICULOPATHY: ICD-10-CM

## 2024-10-25 DIAGNOSIS — Z72.0 TOBACCO ABUSE: ICD-10-CM

## 2024-10-25 DIAGNOSIS — E83.52 HYPERCALCEMIA: ICD-10-CM

## 2024-10-25 DIAGNOSIS — Z23 NEED FOR COVID-19 VACCINE: ICD-10-CM

## 2024-10-25 DIAGNOSIS — E11.65 UNCONTROLLED TYPE 2 DIABETES MELLITUS WITH HYPERGLYCEMIA: Primary | ICD-10-CM

## 2024-10-25 DIAGNOSIS — R06.02 SOB (SHORTNESS OF BREATH): ICD-10-CM

## 2024-10-25 DIAGNOSIS — I10 ESSENTIAL HYPERTENSION: ICD-10-CM

## 2024-10-25 DIAGNOSIS — F14.10 COCAINE ABUSE: ICD-10-CM

## 2024-10-25 DIAGNOSIS — N18.9 CHRONIC KIDNEY DISEASE, UNSPECIFIED CKD STAGE: ICD-10-CM

## 2024-10-25 DIAGNOSIS — F10.10 ALCOHOL ABUSE: ICD-10-CM

## 2024-10-25 DIAGNOSIS — M54.9 BACK PAIN, UNSPECIFIED BACK LOCATION, UNSPECIFIED BACK PAIN LATERALITY, UNSPECIFIED CHRONICITY: ICD-10-CM

## 2024-10-25 LAB
ALBUMIN SERPL BCP-MCNC: 3.3 G/DL (ref 3.5–5.2)
ANION GAP SERPL CALC-SCNC: 8 MMOL/L (ref 8–16)
BASOPHILS # BLD AUTO: 0.08 K/UL (ref 0–0.2)
BASOPHILS NFR BLD: 0.7 % (ref 0–1.9)
BUN SERPL-MCNC: 22 MG/DL (ref 8–23)
CALCIUM SERPL-MCNC: 11.6 MG/DL (ref 8.7–10.5)
CHLORIDE SERPL-SCNC: 112 MMOL/L (ref 95–110)
CO2 SERPL-SCNC: 21 MMOL/L (ref 23–29)
CREAT SERPL-MCNC: 1.4 MG/DL (ref 0.5–1.4)
DIFFERENTIAL METHOD BLD: ABNORMAL
EOSINOPHIL # BLD AUTO: 0.2 K/UL (ref 0–0.5)
EOSINOPHIL NFR BLD: 1.5 % (ref 0–8)
ERYTHROCYTE [DISTWIDTH] IN BLOOD BY AUTOMATED COUNT: 15.6 % (ref 11.5–14.5)
EST. GFR  (NO RACE VARIABLE): 56.5 ML/MIN/1.73 M^2
FERRITIN SERPL-MCNC: 80 NG/ML (ref 20–300)
GLUCOSE SERPL-MCNC: 97 MG/DL (ref 70–110)
HCT VFR BLD AUTO: 40.5 % (ref 40–54)
HGB BLD-MCNC: 12.9 G/DL (ref 14–18)
IMM GRANULOCYTES # BLD AUTO: 0.06 K/UL (ref 0–0.04)
IMM GRANULOCYTES NFR BLD AUTO: 0.5 % (ref 0–0.5)
IRON SERPL-MCNC: 55 UG/DL (ref 45–160)
LYMPHOCYTES # BLD AUTO: 2.2 K/UL (ref 1–4.8)
LYMPHOCYTES NFR BLD: 19 % (ref 18–48)
MCH RBC QN AUTO: 30.9 PG (ref 27–31)
MCHC RBC AUTO-ENTMCNC: 31.9 G/DL (ref 32–36)
MCV RBC AUTO: 97 FL (ref 82–98)
MONOCYTES # BLD AUTO: 1 K/UL (ref 0.3–1)
MONOCYTES NFR BLD: 8.4 % (ref 4–15)
NEUTROPHILS # BLD AUTO: 8.1 K/UL (ref 1.8–7.7)
NEUTROPHILS NFR BLD: 69.9 % (ref 38–73)
NRBC BLD-RTO: 0 /100 WBC
PHOSPHATE SERPL-MCNC: 3.1 MG/DL (ref 2.7–4.5)
PLATELET # BLD AUTO: 328 K/UL (ref 150–450)
PMV BLD AUTO: 11.2 FL (ref 9.2–12.9)
POTASSIUM SERPL-SCNC: 4.5 MMOL/L (ref 3.5–5.1)
PTH-INTACT SERPL-MCNC: 138.9 PG/ML (ref 9–77)
RBC # BLD AUTO: 4.18 M/UL (ref 4.6–6.2)
SATURATED IRON: 18 % (ref 20–50)
SODIUM SERPL-SCNC: 141 MMOL/L (ref 136–145)
TOTAL IRON BINDING CAPACITY: 302 UG/DL (ref 250–450)
TRANSFERRIN SERPL-MCNC: 204 MG/DL (ref 200–375)
URATE SERPL-MCNC: 8 MG/DL (ref 3.4–7)
WBC # BLD AUTO: 11.62 K/UL (ref 3.9–12.7)

## 2024-10-25 PROCEDURE — 36415 COLL VENOUS BLD VENIPUNCTURE: CPT | Mod: HCNC,PO | Performed by: STUDENT IN AN ORGANIZED HEALTH CARE EDUCATION/TRAINING PROGRAM

## 2024-10-25 PROCEDURE — 83970 ASSAY OF PARATHORMONE: CPT | Mod: HCNC | Performed by: STUDENT IN AN ORGANIZED HEALTH CARE EDUCATION/TRAINING PROGRAM

## 2024-10-25 PROCEDURE — 82728 ASSAY OF FERRITIN: CPT | Mod: HCNC | Performed by: STUDENT IN AN ORGANIZED HEALTH CARE EDUCATION/TRAINING PROGRAM

## 2024-10-25 PROCEDURE — 85025 COMPLETE CBC W/AUTO DIFF WBC: CPT | Mod: HCNC | Performed by: STUDENT IN AN ORGANIZED HEALTH CARE EDUCATION/TRAINING PROGRAM

## 2024-10-25 PROCEDURE — 84550 ASSAY OF BLOOD/URIC ACID: CPT | Mod: HCNC | Performed by: STUDENT IN AN ORGANIZED HEALTH CARE EDUCATION/TRAINING PROGRAM

## 2024-10-25 PROCEDURE — 84466 ASSAY OF TRANSFERRIN: CPT | Mod: HCNC | Performed by: STUDENT IN AN ORGANIZED HEALTH CARE EDUCATION/TRAINING PROGRAM

## 2024-10-25 PROCEDURE — 83540 ASSAY OF IRON: CPT | Mod: HCNC | Performed by: STUDENT IN AN ORGANIZED HEALTH CARE EDUCATION/TRAINING PROGRAM

## 2024-10-25 PROCEDURE — 99999 PR PBB SHADOW E&M-EST. PATIENT-LVL V: CPT | Mod: PBBFAC,HCNC,,

## 2024-10-25 PROCEDURE — 80069 RENAL FUNCTION PANEL: CPT | Mod: HCNC | Performed by: STUDENT IN AN ORGANIZED HEALTH CARE EDUCATION/TRAINING PROGRAM

## 2024-10-25 RX ORDER — LIDOCAINE 50 MG/G
1 PATCH TOPICAL DAILY
Qty: 30 PATCH | Refills: 1 | Status: SHIPPED | OUTPATIENT
Start: 2024-10-25

## 2024-10-25 NOTE — PROGRESS NOTES
HPI     Chief Complaint:  Chief Complaint   Patient presents with    Follow-up       Thierry Ho is a 64 y.o. male with multiple medical diagnoses as listed in the medical history and problem list that presents for  hospital follow up    HPI    History of Present Illness    CHIEF COMPLAINT:  Thierry presents today for hospital follow-up.    RECENT HOSPITALIZATION:  He reports a recent hospitalization due to severe hypoglycemia, with glucose level reaching 29. He had been drinking alcohol the day before and had not eaten. He acknowledges that cocaine was found in his system during hospitalization, but attributes this to medication. He expresses that without his nephew's intervention, the outcome could have been fatal.    DIABETES MANAGEMENT:  He reports a fasting blood sugar of 160 this morning, an improvement from his usual morning readings of 175-180. He confirms having an adequate supply of test strips at home for continued monitoring.    BACK PAIN:  He reports severe back pain, possibly due to arthritis in the vertebrae. He describes difficulty bending down and inability to get up if he does so. The pain affects his ability to walk and perform daily activities. He uses a cane for mobility assistance. A previous steroid injection provided relief for two days but worsened on the third day. He expresses that his current pain medication is ineffective and needs stronger pain management.    RESPIRATORY ISSUES:  He reports experiencing wheezing at night and persistent shortness of breath with minimal exertion, such as walking down a hallway. This has been ongoing for approximately one year. He denies any associated chest pain.    CARDIOVASCULAR HISTORY:  He reports a history of one or two minor heart attacks and previous cardiology consultations. He denies any history of pulmonology consultations.    EYE HEALTH:  He reports needing to see a retinal specialist, stating that a doctor informed him his eyes are bleeding  from the back.    MEDICATIONS:  Current medications include Cyclobenzaprine (Flexeril) at night, Metformin, Hydralazine and Carvedilol for blood pressure, Amitriptyline as needed, Pregabalin for leg pain (reported as ineffective), Simvastatin for cholesterol, Omeprazole (Prilosec) for chest pain prevention, and Lisinopril. He reports an allergy to Trulicity, causing weakness and a feeling of impending doom, leading to discontinuation.    DIET AND LIFESTYLE:  His last meal of the day is typically around 8 PM. He drinks 3-4 bottles of water daily. He reports limited exercise due to back pain and arthritis.    SMOKING HABITS:  He currently smokes 4-5 cigarettes per day. He expresses interest in smoking cessation and is open to referral for assistance and potential medication support to aid in quitting.                       10/15/2020 for hospital discharge    Patient Name: Thierry Ho  MRN: [id number]  MADDIE: [id number]  Patient Class: IP- Inpatient  Admission Date: 10/11/2024  Hospital Length of Stay: 2 days  Discharge Date and Time:  10/15/2024 2:39 PM  Attending Physician: No att. providers found   Discharging Provider: Tommie Hitchcock PA-C  Primary Care Provider: Rhonda Krishnan MD     Primary Care Team:  [facility]     HPI:   Thierry Ho is a 64 y.o. male with  cocaine and EtOH use,  Insulin-dependent DM2 without complications, HTN, and HLD who presented to [facility] ED on 10/11/2024 for eval and treatment of AMS.  Patient can remember very little over the last 24 hours.  Per ED report, nephew called EMS with concern for hallucinations just prior to presentation.  EMS discovered BG 33, gave an amp of D50 en route to [facility].  Patient was more alert with  on arrival, reports he takes his medication but is unsure about how much and when.  BG soon dropped back down to 100 without any insulin given.  Patient reports being shaken by the prospect of dying of hypoglycemia had his nephew not discovered him and  called 911.  Denies any symptoms or discomfort at the time of initial HM interview.  Ct head, cspine, abd/pelvis acutely negative (was ttp in lower abd). abnl labs: wbc 14, co2 16, lactate 4, mag 1.4, [id number], , trop 0.03.  CT H/S/A/P all acutely negative.  ED therapy/stabilization measures:  Home BP meds and IVF given.  They were placed in observation under the care of [facility] for further evaluation and treatment.         * No surgery found *       Hospital Course:   Thierry Ho was admitted to the hospital for hypoglycemia with the initial CBG 31.  Per ED note, patient was showing signs of hallucinations prior to arrival.  Patient's BG was corrected to 55 upon arrival to the ED. UDS presumptively positive for cocaine. In the ED:  Ct head, cspine, abd/pelvis acutely negative (was ttp in lower abd). abnl labs: wbc 14, co2 16, lactate 4, mag 1.4, [id number], , trop 0.03.  CT H/S/A/P all acutely negative.  ED therapy/stabilization measures:  Home BP meds and IVF given.    Patient was treated with IVF for KENNETH with correction of Cr from 1.8 to 1.4 (normal). Renal US showed no acute abnormality and a 2 cm left renal simple cyst. He was started on CIWA protocol for alcohol abuse and started on MVI, thiamine, and folic acid daily. His hypoglycemia corrected and patient became hyperglycemic. He was started on Lantus 15 units subq daily and moderate SSI. Telepsychiatry consulted  and recommended intensive outpatient level treatment for alcohol and cocaine abuse.  Social work discussed a list of referrals shared by telepsychiatry. Patient understood what was stated above and had no further questions. Return precautions give.      Addendum: Called patient at [phone] after discharge to explain to him that he is taking too much insulin at home. He stated that he was taking 40 units of long acting insulin in the morning and 40 units in the evening. In the hospital, we were giving him 15  units daily, which was keeping his sugars in the range of 130-200. Therefore, explained to him that he should take 10 units in the morning and 10 units in the evening until he follows up with his PCP within the next few days. Patient understood and agreed.      All findings and plan were explained to the patient. All questions and concerns were answered. Patient verbalized understanding. Patient is in stable condition to d/c home with stable jose signs and has been informed to follow up with his PCP within the next 7-10 days to discuss his observation stay. Patient has been educated to return to the ED if He experiences any further chest pain, lightheadness, weakness, or discomfort.    Assessment & Plan     Assessed back pain and considered topical treatment option  Evaluated shortness of breath and smoking history, determining need for pulmonary function testing and smoking cessation support  Reviewed current medications and adherence  Considered eligibility for digital medicine program to monitor blood pressure  Noted history of heart attacks and recent hospitalization with low blood sugar episode  Recognized need for colonoscopy follow-up and importance of eye care with retinal specialist    HYPERTENSION:  - Explained digital blood pressure monitoring system and its benefits for tracking and adjusting treatment.    CHRONIC OBSTRUCTIVE PULMONARY DISEASE (COPD):  - Discussed purpose of pulmonary function testing for assessing lung function.  - Pulmonary function test ordered to assess lung function.  - Referred to pulmonology for evaluation of chronic shortness of breath.    SMOKING CESSATION:  - Informed about smoking cessation options and potential medication support.  - Referred to smoking cessation program for support in quitting smoking.    HYDRATION:  - Thierry to increase water intake to 3-4 20-ounce bottles per day.    MOBILITY SUPPORT:  - Thierry to continue using cane for mobility support.    DIET:  - Thierry  to maintain current diet focusing on vegetables and avoiding sodas.    BACK PAIN:  - Started Lidocaine patch for topical back pain relief.    COVID-19 VACCINATION:  - COVID-19 booster vaccine ordered to be administered during visit.    LABS:  - Urine sample collection ordered.    COLONOSCOPY:  - Contact the office to schedule colonoscopy appointment.    OPHTHALMOLOGY REFERRAL:  - Follow up with retinal specialist Dr. Delano Ortiz for eye exam.     Referral placed for addiction psych due to patient's positive cocaine results.  Note mentioned from hospital discharge that this  referral was requested but no active referral was found in the system    FOLLOW UP:  - Follow up for annual wellness visit.         Problem List Items Addressed This Visit       Essential hypertension    Relevant Orders    Hypertension Digital Medicine (HDMP) Enrollment Order (Completed)    Tobacco abuse    Relevant Orders    Ambulatory referral/consult to Smoking Cessation Program    Uncontrolled type 2 diabetes mellitus with hyperglycemia - Primary    Relevant Orders    Diabetes Digital Medicine (DDMP) Enrollment Order (Completed)    Lumbar radiculopathy    Relevant Medications    LIDOcaine (LIDODERM) 5 %    Cocaine abuse    Relevant Orders    Ambulatory referral/consult to Addiction Psychiatry    Alcohol abuse    Relevant Orders    Ambulatory referral/consult to Addiction Psychiatry     Other Visit Diagnoses       Back pain, unspecified back location, unspecified back pain laterality, unspecified chronicity        Relevant Medications    LIDOcaine (LIDODERM) 5 %    SOB (shortness of breath)        Relevant Orders    Complete PFT w/ bronchodilator    Ambulatory referral/consult to Pulmonology    Need for COVID-19 vaccine        Relevant Medications    COVID-19 (Pfizer) 30 mcg/0.3 mL IM vaccine (>/= 13 yo) 0.3 mL              --------------------------------------------      Health Maintenance:  Health Maintenance         Date Due Completion  "Date    Colorectal Cancer Screening 02/22/2024 2/22/2021    COVID-19 Vaccine (6 - 2024-25 season) 09/01/2024 12/4/2023    Eye Exam 10/23/2024 10/23/2023    Foot Exam 10/02/2025 (Originally 8/12/2022) 8/12/2021 (Done)    Override on 8/12/2021: Done    Lipid Panel 12/28/2024 12/28/2023    Hemoglobin A1c 04/12/2025 10/12/2024    TETANUS VACCINE 10/01/2025 10/1/2015    Diabetes Urine Screening 10/22/2025 10/22/2024    High Dose Statin 10/25/2025 10/25/2024    Pneumococcal Vaccines (Age 0-64) (3 of 3 - PPSV23 or PCV20) 04/28/2026 1/15/2018            Health maintenance reviewedCOVID booster    Follow Up:  Follow up if symptoms worsen or fail to improve.    Exam     Review of Systems:  (as noted above)  Review of Systems  General: -fever, -chills, -fatigue, -weight gain, -weight loss  Eyes: -vision changes,   ENT: -ear pain, -nasal congestion, -sore throat  Cardiovascular: -chest pain, -palpitations, -lower extremity edema  Respiratory: -cough, +shortness of breath  Gastrointestinal: -abdominal pain, -nausea, -vomiting, -diarrhea, -constipation, -blood in stool  Genitourinary: -dysuria, -hematuria, -frequency  Musculoskeletal: +joint pain, -muscle pain, +back pain  Skin: -rash, -lesion  Neurological: -headache, -dizziness, -numbness, -tingling, +weakness  Psychiatric: -anxiety, -depression, -sleep difficulty     Physical Exam:   Physical Exam  Vitals:    10/25/24 1310   BP: (!) 142/80   BP Location: Right arm   Patient Position: Sitting   Pulse: 87   Resp: 16   Temp: 98 °F (36.7 °C)   TempSrc: Oral   SpO2: 96%   Weight: 108.3 kg (238 lb 12.1 oz)   Height: 5' 11" (1.803 m)      Body mass index is 33.3 kg/m².    Physical Exam    General: No acute distress. Well-developed. Well-nourished.  Eyes: EOMI. Sclerae anicteric.  HENT: Normocephalic. Atraumatic. Nares patent. Moist oral mucosa.  Cardiovascular: Regular rate. Regular rhythm. No rubs. No gallops.  Respiratory: Normal respiratory effort. Clear to auscultation " bilaterally. No rales. No rhonchi. No wheezing.  Musculoskeletal: No  obvious deformity.  Extremities: No lower extremity edema.  Neurological: Alert & oriented x3. No slurred speech. Normal gait.  Psychiatric: Normal mood. Normal affect. Good insight. Good judgment.  Skin: Warm. Dry. No rash.           History     Past Medical History:  Past Medical History:   Diagnosis Date    Colon polyps     Diabetes mellitus     Diabetes with neurologic complications     DM retinopathy     GERD (gastroesophageal reflux disease)     Hyperlipidemia     Hypertension     Myocardial infarction        Past Surgical History:  Past Surgical History:   Procedure Laterality Date    APPENDECTOMY      BACK SURGERY  11/2017    COLONOSCOPY N/A 10/19/2018    Procedure: COLONOSCOPY;  Surgeon: Frantz Rasmussen MD;  Location: [facility] ENDO;  Service: Endoscopy;  Laterality: N/A;    COLONOSCOPY N/A 2/22/2021    Procedure: COLONOSCOPY;  Surgeon: Dann Mahmood MD;  Location: [facility] ENDO;  Service: Endoscopy;  Laterality: N/A;  covid test [place] 2/19, instructions mailed -ml    EPIDURAL STEROID INJECTION INTO LUMBAR SPINE N/A 5/10/2024    Procedure: L4-5 Epidural Steroid Injection (ref: Tony);  Surgeon: David Stringer Jr., MD;  Location: [facility] PAIN MANAGEMENT;  Service: Pain Management;  Laterality: N/A;  @1200  ASA last 5/4  DM  Needs Consent    EXCISION OF SKIN N/A 6/25/2020    Procedure: EXCISION, SKIN;  Surgeon: Finesse Dumont MD;  Location: [facility] OR;  Service: General;  Laterality: N/A;  on back    FOOT SURGERY      INCISION AND DRAINAGE OF GROIN Right 6/25/2020    Procedure: INCISION AND DRAINAGE, INGUINAL REGION;  Surgeon: Finesse Dumont MD;  Location: [facility] OR;  Service: General;  Laterality: Right;  PT TO PREOP IN AM  PRE-OP BY RN 6-    OPEN REDUCTION AND INTERNAL FIXATION (ORIF) OF INJURY OF ANKLE Right 7/17/2019    Procedure: ORIF, ANKLE;  Surgeon: Raeann Steward MD;  Location: [facility] OR;  Service: Orthopedics;   Laterality: Right;  SARITA GALAN  [phone] TEXTED HIM @ 10:2 AM ON 19  SKELETAL  RN PREOP 19---- [facility] MORNING OF SURGERY PER DR FONTAINE       Social History:  Social History     Socioeconomic History    Marital status:     Number of children: 5    Highest education level: GED or equivalent   Tobacco Use    Smoking status: Former     Current packs/day: 0.00     Average packs/day: 0.2 packs/day for 42.5 years (10.6 ttl pk-yrs)     Types: Cigarettes     Start date: 1/15/1981     Quit date: 2023     Years since quittin.2    Smokeless tobacco: Never   Substance and Sexual Activity    Alcohol use: Yes     Alcohol/week: 4.0 standard drinks of alcohol     Types: 4 Cans of beer per week     Comment: social     Drug use: Not Currently     Types: Marijuana     Comment: quit marijuana    Sexual activity: Not Currently     Partners: Female     Social Drivers of Health     Financial Resource Strain: Low Risk  (10/11/2024)    Overall Financial Resource Strain (CARDIA)     Difficulty of Paying Living Expenses: Not hard at all   Food Insecurity: No Food Insecurity (10/11/2024)    Hunger Vital Sign     Worried About Running Out of Food in the Last Year: Never true     Ran Out of Food in the Last Year: Never true   Transportation Needs: No Transportation Needs (10/11/2024)    TRANSPORTATION NEEDS     Transportation : No   Physical Activity: Inactive (3/17/2023)    Exercise Vital Sign     Days of Exercise per Week: 0 days     Minutes of Exercise per Session: 0 min   Stress: No Stress Concern Present (10/11/2024)    [place] [employer] [place] [employer] - Occupational Stress Questionnaire     Feeling of Stress : Not at all   Housing Stability: Low Risk  (10/11/2024)    Housing Stability Vital Sign     Unable to Pay for Housing in the Last Year: No     Homeless in the Last Year: No       Family History:  Family History   Problem Relation Name Age of Onset    Heart disease Mother      Breast cancer  "Mother      Diabetes Father      Cancer Sister 4     Cancer Brother 2        Allergies and Medications: (updated and reviewed)  Review of patient's allergies indicates:   Allergen Reactions    Trulicity [dulaglutide] Nausea Only     dizzy     Current Outpatient Medications   Medication Sig Dispense Refill    acetaminophen (TYLENOL) 500 MG tablet Take 500 mg by mouth every 6 (six) hours as needed for Pain.      aspirin (ECOTRIN) 81 MG EC tablet Take 81 mg by mouth once daily.      BD ULTRA-FINE FREDA PEN NEEDLE 32 gauge x 5/32" Ndle USE AS DIRECTED 2 TIMES DAILY WITH LEVEMIR 200 each 3    blood pressure test kit-large Kit Blood pressure kit to use indefinitely. Measure blood pressure every day at least 2 hours after taking medication 1 each 0    blood sugar diagnostic (TRUE METRIX GLUCOSE TEST STRIP) Strp 1 strip by Misc.(Non-Drug; Combo Route) route 2 (two) times a day. 200 each 11    blood-glucose meter kit 1 each by Other route 4 (four) times daily before meals and nightly. 1 each 0    carvediloL (COREG) 25 MG tablet Take 1 tablet (25 mg total) by mouth 2 (two) times daily with meals. 180 tablet 3    ciclopirox (PENLAC) 8 % Soln Apply topically nightly. 1 Bottle 3    cyclobenzaprine (FLEXERIL) 5 MG tablet Take 1 tablet (5 mg total) by mouth nightly as needed for Muscle spasms. 90 tablet 11    hydrALAZINE (APRESOLINE) 25 MG tablet Take 1 tablet (25 mg total) by mouth every 12 (twelve) hours. 180 tablet 3    insulin glargine U-100, Lantus, (LANTUS SOLOSTAR U-100 INSULIN) 100 unit/mL (3 mL) InPn pen Inject 10 Units into the skin 2 (two) times a day. 6 mL 0    lisinopriL (PRINIVIL,ZESTRIL) 40 MG tablet Take 1 tablet (40 mg total) by mouth once daily. 90 tablet 3    metFORMIN (GLUCOPHAGE) 1000 MG tablet Take 1 tablet (1,000 mg total) by mouth 2 (two) times daily with meals. 180 tablet 3    omeprazole (PRILOSEC) 20 MG capsule Take 1 capsule (20 mg total) by mouth once daily. 90 capsule 1    pregabalin (LYRICA) 100 MG " capsule Take 1 capsule (100 mg total) by mouth 2 (two) times daily. 180 capsule 0    sildenafiL (VIAGRA) 100 MG tablet Take 1 tablet (100 mg total) by mouth daily as needed for Erectile Dysfunction. 30 tablet 5    simvastatin (ZOCOR) 40 MG tablet Take 1 tablet (40 mg total) by mouth every evening. 90 tablet 3    TRUEPLUS LANCETS 33 gauge Misc Check glucose twice daily 100 each 3    amitriptyline (ELAVIL) 25 MG tablet Take 1 tablet (25 mg total) by mouth nightly as needed for Insomnia. (Patient not taking: Reported on 10/25/2024) 90 tablet 1    BLOOD PRESSURE CUFF Misc 1 kit by Misc.(Non-Drug; Combo Route) route once daily. (Patient not taking: Reported on 10/25/2024) 1 each 0    fluocinolone (SYNALAR) 0.01 % external solution APPLY TOPICALLY ONCE DAILY  FOR 10 DAYS (Patient not taking: Reported on 10/25/2024) 60 mL 0    fluocinonide (LIDEX) 0.05 % external solution Topical prn (Patient not taking: Reported on 10/25/2024) 60 mL 0    ibuprofen (ADVIL,MOTRIN) 600 MG tablet Take 1 tablet (600 mg total) by mouth every 6 (six) hours as needed for Pain. (Patient not taking: Reported on 10/25/2024) 20 tablet 0    ketoconazole (NIZORAL) 2 % shampoo APPLY TOPICALLY TWICE A WEEK. (Patient not taking: Reported on 10/25/2024) 120 mL 0    LIDOcaine (LIDODERM) 5 % Place 1 patch onto the skin once daily. Remove & Discard patch within 12 hours or as directed by MD 30 patch 1    tirzepatide (MOUNJARO) 2.5 mg/0.5 mL PnIj Inject 2.5 mg into the skin every 7 days. (Patient not taking: Reported on 10/25/2024) 2 mL 2     Current Facility-Administered Medications   Medication Dose Route Frequency Provider Last Rate Last Admin    COVID-19 (Pfizer) 30 mcg/0.3 mL IM vaccine (>/= 11 yo) 0.3 mL  0.3 mL Intramuscular 1 time in Clinic/HOD Macie Gonzalez PA-C           Patient Care Team:  Rhonda Krishnan MD as PCP - General (Internal Medicine)  Kimberlee Zuniga MD as Consulting Physician (Ophthalmology)  Carlota Prince RD (Inactive)  as Dietitian (Diabetes)  Brandon Trujillo MA as Care Coordinator  Malvin Ortiz OD as Consulting Physician (Optometry)         - The patient is given an After Visit Summary that lists all medications with directions, allergies, education, orders placed during this encounter and follow-up instructions.      - I have reviewed the patient's medical information including past medical, family, and social history sections including the medications and allergies.      - We discussed the patient's current medications.     This note was created by combination of typed  and MModal dictation.  Transcription errors may be present.  If there are any questions, please contact me.     This note was generated with the assistance of ambient listening technology. Verbal consent was obtained by the patient and accompanying visitor(s) for the recording of patient appointment to facilitate this note. I attest to having reviewed and edited the generated note for accuracy, though some syntax or spelling errors may persist. Please contact the author of this note for any clarification.          Macie Gonzalez PA-C

## 2024-10-28 DIAGNOSIS — I10 ESSENTIAL HYPERTENSION: ICD-10-CM

## 2024-10-28 RX ORDER — HYDRALAZINE HYDROCHLORIDE 25 MG/1
25 TABLET, FILM COATED ORAL EVERY 12 HOURS
Qty: 180 TABLET | Refills: 3 | Status: SHIPPED | OUTPATIENT
Start: 2024-10-28

## 2024-10-29 ENCOUNTER — TELEPHONE (OUTPATIENT)
Dept: ENDOSCOPY | Facility: HOSPITAL | Age: 64
End: 2024-10-29
Payer: MEDICARE

## 2024-10-29 DIAGNOSIS — I25.10 CORONARY ARTERY DISEASE INVOLVING NATIVE CORONARY ARTERY OF NATIVE HEART WITHOUT ANGINA PECTORIS: Primary | ICD-10-CM

## 2024-11-06 ENCOUNTER — PATIENT MESSAGE (OUTPATIENT)
Dept: FAMILY MEDICINE | Facility: CLINIC | Age: 64
End: 2024-11-06
Payer: MEDICARE

## 2024-11-15 ENCOUNTER — TELEPHONE (OUTPATIENT)
Dept: BARIATRICS | Facility: CLINIC | Age: 64
End: 2024-11-15
Payer: MEDICARE

## 2024-11-15 ENCOUNTER — OFFICE VISIT (OUTPATIENT)
Dept: CARDIOLOGY | Facility: CLINIC | Age: 64
End: 2024-11-15
Payer: MEDICARE

## 2024-11-15 ENCOUNTER — PATIENT MESSAGE (OUTPATIENT)
Dept: BARIATRICS | Facility: CLINIC | Age: 64
End: 2024-11-15
Payer: MEDICARE

## 2024-11-15 VITALS
DIASTOLIC BLOOD PRESSURE: 78 MMHG | SYSTOLIC BLOOD PRESSURE: 128 MMHG | WEIGHT: 242.75 LBS | RESPIRATION RATE: 18 BRPM | BODY MASS INDEX: 33.98 KG/M2 | OXYGEN SATURATION: 97 % | HEIGHT: 71 IN | HEART RATE: 77 BPM

## 2024-11-15 DIAGNOSIS — E66.9 NON MORBID OBESITY, UNSPECIFIED OBESITY TYPE: ICD-10-CM

## 2024-11-15 DIAGNOSIS — E78.2 MIXED HYPERLIPIDEMIA: ICD-10-CM

## 2024-11-15 DIAGNOSIS — I25.10 CORONARY ARTERY DISEASE INVOLVING NATIVE CORONARY ARTERY OF NATIVE HEART WITHOUT ANGINA PECTORIS: ICD-10-CM

## 2024-11-15 DIAGNOSIS — I70.0 AORTIC ATHEROSCLEROSIS: ICD-10-CM

## 2024-11-15 DIAGNOSIS — Z01.810 PREOP CARDIOVASCULAR EXAM: Primary | ICD-10-CM

## 2024-11-15 DIAGNOSIS — Z72.0 TOBACCO ABUSE: ICD-10-CM

## 2024-11-15 DIAGNOSIS — R94.31 ABNORMAL EKG: ICD-10-CM

## 2024-11-15 DIAGNOSIS — I10 ESSENTIAL HYPERTENSION: ICD-10-CM

## 2024-11-15 PROCEDURE — 99999 PR PBB SHADOW E&M-EST. PATIENT-LVL V: CPT | Mod: PBBFAC,HCNC,, | Performed by: INTERNAL MEDICINE

## 2024-11-15 NOTE — PROGRESS NOTES
CARDIOVASCULAR CONSULTATION    REASON FOR CONSULT:   Thierry Ho is a 64 y.o. male who presents for preop CV eval.    PCP: Eulogio  HISTORY OF PRESENT ILLNESS:   Last seen August 2019.     The patient presents today for preoperative cardiac risk stratification prior to planned colonoscopy.  He denies angina, dyspnea, palpitations, or syncope.  He walks with a cane, but tells me he would be able to climb up a flight of stairs if needed.  There has been no PND, orthopnea, melena, hematuria, or claudication symptoms.  His main complaint today appears to be related to chronic low back pain.    Family history appears to be negative for premature CAD.  Strongly positive for diabetes.    The patient is a current cigarette smoker, I have strongly encouraged to quit smoking and plan to refer him to the Ambulatory smoking cessation program.    CARDIOVASCULAR HISTORY:   nonobst CAD by cath 10/2015 ([facility] cardiology, see Care everywhere)     PAST MEDICAL HISTORY:     Past Medical History:   Diagnosis Date    Colon polyps     Diabetes mellitus     Diabetes with neurologic complications     DM retinopathy     GERD (gastroesophageal reflux disease)     Hyperlipidemia     Hypertension     Myocardial infarction        PAST SURGICAL HISTORY:     Past Surgical History:   Procedure Laterality Date    APPENDECTOMY      BACK SURGERY  11/2017    COLONOSCOPY N/A 10/19/2018    Procedure: COLONOSCOPY;  Surgeon: Frantz Rasmussen MD;  Location: [facility] ENDO;  Service: Endoscopy;  Laterality: N/A;    COLONOSCOPY N/A 2/22/2021    Procedure: COLONOSCOPY;  Surgeon: Dann Mahmood MD;  Location: [facility] ENDO;  Service: Endoscopy;  Laterality: N/A;  covid test [place] 2/19, instructions mailed -ml    EPIDURAL STEROID INJECTION INTO LUMBAR SPINE N/A 5/10/2024    Procedure: L4-5 Epidural Steroid Injection (ref: Tony);  Surgeon: David Stringer Jr., MD;  Location: [facility] PAIN MANAGEMENT;  Service: Pain Management;  Laterality: N/A;  @1200  ASA  "last 5/4  DM  Needs Consent    EXCISION OF SKIN N/A 6/25/2020    Procedure: EXCISION, SKIN;  Surgeon: Finesse Dumont MD;  Location: [facility] OR;  Service: General;  Laterality: N/A;  on back    FOOT SURGERY      INCISION AND DRAINAGE OF GROIN Right 6/25/2020    Procedure: INCISION AND DRAINAGE, INGUINAL REGION;  Surgeon: Finesse Dumont MD;  Location: [facility] OR;  Service: General;  Laterality: Right;  PT TO PREOP IN AM  PRE-OP BY RN 6-    OPEN REDUCTION AND INTERNAL FIXATION (ORIF) OF INJURY OF ANKLE Right 7/17/2019    Procedure: ORIF, ANKLE;  Surgeon: Raeann Steward MD;  Location: [facility] OR;  Service: Orthopedics;  Laterality: Right;  SARITA GALAN  [phone] TEXTED HIM @ 10:2 AM ON 7-11-19  SKELETAL  RN PREOP 7/9/19---- CMP MORNING OF SURGERY PER DR STEWARD       ALLERGIES AND MEDICATION:     Review of patient's allergies indicates:   Allergen Reactions    Trulicity [dulaglutide] Nausea Only     dizzy        Medication List            Accurate as of November 15, 2024 10:01 AM. If you have any questions, ask your nurse or doctor.                CONTINUE taking these medications      acetaminophen 500 MG tablet  Commonly known as: TYLENOL     aspirin 81 MG EC tablet  Commonly known as: ECOTRIN     BD ULTRA-FINE FREDA PEN NEEDLE 32 gauge x 5/32" Ndle  Generic drug: pen needle, diabetic  USE AS DIRECTED 2 TIMES DAILY WITH LEVEMIR     blood pressure test kit-large Kit  Blood pressure kit to use indefinitely. Measure blood pressure every day at least 2 hours after taking medication     blood sugar diagnostic Strp  Commonly known as: TRUE METRIX GLUCOSE TEST STRIP  1 strip by Misc.(Non-Drug; Combo Route) route 2 (two) times a day.     blood-glucose meter kit  1 each by Other route 4 (four) times daily before meals and nightly.     carvediloL 25 MG tablet  Commonly known as: COREG  Take 1 tablet (25 mg total) by mouth 2 (two) times daily with meals.     ciclopirox 8 % Soln  Commonly known as: PENLAC  Apply " topically nightly.     cyclobenzaprine 5 MG tablet  Commonly known as: FLEXERIL  Take 1 tablet (5 mg total) by mouth nightly as needed for Muscle spasms.     hydrALAZINE 25 MG tablet  Commonly known as: APRESOLINE  TAKE 1 TABLET EVERY 12 HOURS     LANTUS SOLOSTAR U-100 INSULIN 100 unit/mL (3 mL) Inpn pen  Generic drug: insulin glargine U-100 (Lantus)  Inject 10 Units into the skin 2 (two) times a day.     LIDOcaine 5 %  Commonly known as: LIDODERM  Place 1 patch onto the skin once daily. Remove & Discard patch within 12 hours or as directed by MD     lisinopriL 40 MG tablet  Commonly known as: PRINIVIL,ZESTRIL  Take 1 tablet (40 mg total) by mouth once daily.     metFORMIN 1000 MG tablet  Commonly known as: GLUCOPHAGE  Take 1 tablet (1,000 mg total) by mouth 2 (two) times daily with meals.     MOUNJARO 2.5 mg/0.5 mL Pnij  Generic drug: tirzepatide  Inject 2.5 mg into the skin every 7 days.     omeprazole 20 MG capsule  Commonly known as: PRILOSEC  Take 1 capsule (20 mg total) by mouth once daily.     pregabalin 100 MG capsule  Commonly known as: LYRICA  Take 1 capsule (100 mg total) by mouth 2 (two) times daily.     sildenafiL 100 MG tablet  Commonly known as: VIAGRA  Take 1 tablet (100 mg total) by mouth daily as needed for Erectile Dysfunction.     simvastatin 40 MG tablet  Commonly known as: ZOCOR  Take 1 tablet (40 mg total) by mouth every evening.     TRUEPLUS LANCETS 33 gauge Misc  Generic drug: lancets  Check glucose twice daily            STOP taking these medications      amitriptyline 25 MG tablet  Commonly known as: ELAVIL  Stopped by: David Ibrahim MD     BLOOD PRESSURE CUFF Misc  Generic drug: miscellaneous medical supply  Stopped by: David Ibrahim MD     fluocinolone 0.01 % external solution  Commonly known as: SYNALAR  Stopped by: David Ibrahim MD     fluocinonide 0.05 % external solution  Commonly known as: LIDEX  Stopped by: David Ibrahim MD     ibuprofen 600 MG tablet  Commonly  known as: ADVIL,MOTRIN  Stopped by: David Ibrahim MD     ketoconazole 2 % shampoo  Commonly known as: NIZORAL  Stopped by: David Ibrahim MD              SOCIAL HISTORY:     Social History     Socioeconomic History    Marital status:     Number of children: 5    Highest education level: GED or equivalent   Tobacco Use    Smoking status: Former     Current packs/day: 0.00     Average packs/day: 0.2 packs/day for 42.5 years (10.6 ttl pk-yrs)     Types: Cigarettes     Start date: 1/15/1981     Quit date: 2023     Years since quittin.3    Smokeless tobacco: Never   Substance and Sexual Activity    Alcohol use: Yes     Alcohol/week: 4.0 standard drinks of alcohol     Types: 4 Cans of beer per week     Comment: social     Drug use: Not Currently     Types: Marijuana     Comment: quit marijuana    Sexual activity: Not Currently     Partners: Female     Social Drivers of Health     Financial Resource Strain: Low Risk  (10/11/2024)    Overall Financial Resource Strain (CARDIA)     Difficulty of Paying Living Expenses: Not hard at all   Food Insecurity: No Food Insecurity (10/11/2024)    Hunger Vital Sign     Worried About Running Out of Food in the Last Year: Never true     Ran Out of Food in the Last Year: Never true   Transportation Needs: No Transportation Needs (10/11/2024)    TRANSPORTATION NEEDS     Transportation : No   Physical Activity: Inactive (3/17/2023)    Exercise Vital Sign     Days of Exercise per Week: 0 days     Minutes of Exercise per Session: 0 min   Stress: No Stress Concern Present (10/11/2024)    [employer] - Occupational Stress Questionnaire     Feeling of Stress : Not at all   Housing Stability: Low Risk  (10/11/2024)    Housing Stability Vital Sign     Unable to Pay for Housing in the Last Year: No     Homeless in the Last Year: No       FAMILY HISTORY:     Family History   Problem Relation Name Age of Onset    Heart disease Mother      Breast  "cancer Mother      Diabetes Father      Cancer Sister 4     Cancer Brother 2        REVIEW OF SYSTEMS:   Review of Systems   Constitutional:  Negative for chills, diaphoresis and fever.   HENT:  Negative for nosebleeds.    Eyes:  Negative for blurred vision, double vision and photophobia.   Respiratory:  Negative for hemoptysis, shortness of breath and wheezing.    Cardiovascular:  Negative for chest pain, palpitations, orthopnea, claudication, leg swelling and PND.   Gastrointestinal:  Negative for abdominal pain, blood in stool, heartburn, melena, nausea and vomiting.   Genitourinary:  Negative for flank pain and hematuria.   Musculoskeletal:  Positive for back pain. Negative for falls, myalgias and neck pain.   Skin:  Negative for rash.   Neurological:  Negative for dizziness, seizures, loss of consciousness, weakness and headaches.   Endo/Heme/Allergies:  Negative for polydipsia. Does not bruise/bleed easily.   Psychiatric/Behavioral:  Negative for depression and memory loss. The patient is not nervous/anxious.        PHYSICAL EXAM:     Vitals:    11/15/24 0952   BP: 128/78   Pulse: 77   Resp: 18    Body mass index is 33.85 kg/m².  Weight: 110.1 kg (242 lb 11.6 oz)   Height: 5' 11" (180.3 cm)     Physical Exam  Vitals reviewed.   Constitutional:       General: He is not in acute distress.     Appearance: He is well-developed. He is obese. He is not ill-appearing, toxic-appearing or diaphoretic.   HENT:      Head: Normocephalic and atraumatic.   Eyes:      General: No scleral icterus.     Extraocular Movements: Extraocular movements intact.      Conjunctiva/sclera: Conjunctivae normal.      Pupils: Pupils are equal, round, and reactive to light.   Neck:      Thyroid: No thyromegaly.      Vascular: Normal carotid pulses. No carotid bruit or JVD.      Trachea: Trachea normal. No tracheal deviation.   Cardiovascular:      Rate and Rhythm: Normal rate and regular rhythm.      Pulses:           Carotid pulses are 2+ " on the right side and 2+ on the left side.     Heart sounds: S1 normal and S2 normal. Heart sounds are distant. No murmur heard.     No friction rub. No gallop.   Pulmonary:      Effort: Pulmonary effort is normal. No respiratory distress.      Breath sounds: Normal breath sounds. No stridor. No wheezing, rhonchi or rales.   Chest:      Chest wall: No tenderness.   Abdominal:      General: There is no distension.      Palpations: Abdomen is soft.   Musculoskeletal:         General: No swelling or tenderness. Normal range of motion.      Cervical back: Normal range of motion and neck supple. No edema or rigidity.      Right lower leg: No edema.      Left lower leg: No edema.   Feet:      Right foot:      Skin integrity: No ulcer.      Left foot:      Skin integrity: No ulcer.   Skin:     General: Skin is warm and dry.      Coloration: Skin is not jaundiced.   Neurological:      General: No focal deficit present.      Mental Status: He is alert and oriented to person, place, and time.      Cranial Nerves: No cranial nerve deficit.   Psychiatric:         Mood and Affect: Mood normal.         Speech: Speech normal.         Behavior: Behavior normal. Behavior is cooperative.         DATA:   EKG: (personally reviewed tracing(s))  10/11/24 SR 89, PRWP, NSSTTW changes    Laboratory:  CBC:  Recent Labs   Lab 10/14/24  0545 10/15/24  0915 10/25/24  1427   WBC 7.35 6.17 11.62   Hemoglobin 12.4 L 11.5 L 12.9 L   Hematocrit 38.5 L 35.8 L 40.5   Platelets 219 186 328       CHEMISTRIES:  Recent Labs   Lab 03/30/22  1142 09/26/22  0950 10/12/24  0512 10/12/24  1049 10/13/24  0629 10/13/24  1559 10/14/24  0545 10/15/24  0915 10/25/24  1427   Glucose 108   < > 69 L   < > 150 H   < > 118 H 205 H 97   Sodium 139   < > 140   < > 140   < > 140 139 141   Potassium 4.6   < > 3.4 L   < > 3.8   < > 3.9 4.0 4.5   BUN 27 H   < > 27 H   < > 38 H   < > 31 H 24 H 22   Creatinine 1.1   < > 1.5 H   < > 1.9 H   < > 1.5 H 1.4 1.4   eGFR if   American >60.0  --   --   --   --   --   --   --   --    eGFR if non  >60.0  --   --   --   --   --   --   --   --    Calcium 11.0 H   < > 9.8   < > 10.0   < > 10.2 9.5 11.6 H   Magnesium  --    < > 1.9  --  2.1  --  2.0  --   --     < > = values in this interval not displayed.       CARDIAC BIOMARKERS:  Recent Labs   Lab 10/11/24  0944 10/12/24  0512 10/12/24  0900 10/13/24  0629    H  --   --  277 H   Troponin I 0.031 H 0.071 H 0.048 H  --        COAGS:  Recent Labs   Lab 07/18/23  1009   INR 1.0       LIPIDS/LFTS:  Recent Labs   Lab 09/26/22  0950 03/17/23  1056 12/28/23  0834 06/28/24  1048 10/13/24  0629 10/14/24  0545 10/15/24  0915   Cholesterol 128  --  116 L  --   --   --   --    Triglycerides 110  --  114  --   --   --   --    HDL 45  --  36 L  --   --   --   --    LDL Cholesterol 61.0 L  --  57.2 L  --   --   --   --    Non-HDL Cholesterol 83  --  80  --   --   --   --    AST  --    < > 35   < > 28 30 47 H   ALT  --    < > 32   < > 28 32 43    < > = values in this interval not displayed.       Cardiovascular Testing:  Cath 10/22/15 (Care everywhere)  · Left dominant system  · No Left main - LAD and LCX have separate ostia   · Left anterior descending has 20 % ostial, D1 patent   · Circumflex is patent   · OM1 patent   · LPL1, 2 & 3 are small and patent  · Right coronary artery small and patent     DSE 9/2/15 (Care everywhere)  1. The ECG showed ischemic ST segment changes: up to 2 mm ST segment depressions in leads II, III, aVF, and V4-V6.  2. Stress induced inferior wall motion abnormality.  Consideration of coronary angiography is warranted.  3. 2D echocardiographic evidence of inducible ishemia at achieved workload (in the usual distribution of the right coronary artery).    ASSESSMENT:   # Nonobst CAD by cath 10/2015, no anginal/CHF sxs.  # HTN, controlled  # HLP on simva 40mg, LDL acceptable  # DM, mgmt per IM  # tob abuse, persistent  # BMI 34  # preop CV eval prior to  colonoscopy.  Patient reports being able to climb up a flight of stairs.    PLAN:   Cont med rx  Cont ASA 81mg qd  The patient is at low cardiac risk for planned colonoscopy and associated anesthesia.  No further preoperative cardiac testing is required.  If necessary, it is acceptable hold his aspirin for 7 days prior to the procedure and resume it as soon as possible thereafter.  Diet/exercise/weight loss  Bariatric medicine referral.  Ambulatory smoking cessation program referral  RTC 1 year (Nov 2025)    The above documents medical care services that are part of ongoing care related to this patient's serious/complex condition (Code ). (CAD/HTN/HLP/BMI)      David Ibrahim MD, FACC

## 2024-11-15 NOTE — TELEPHONE ENCOUNTER
----- Message from Med Assistant Escobedo sent at 11/15/2024 10:38 AM CST -----  Regarding: Appointment  Good morning,  Dr. Ibrahim would like patient to be seen for Non morbid obesity, unspecified obesity type [E66.9]. please assist with scheduling and calling patient with appointment info. thanks

## 2024-11-18 ENCOUNTER — TELEPHONE (OUTPATIENT)
Dept: FAMILY MEDICINE | Facility: CLINIC | Age: 64
End: 2024-11-18
Payer: MEDICARE

## 2024-11-25 ENCOUNTER — TELEPHONE (OUTPATIENT)
Dept: FAMILY MEDICINE | Facility: CLINIC | Age: 64
End: 2024-11-25
Payer: MEDICARE

## 2024-11-25 NOTE — TELEPHONE ENCOUNTER
Called pt to reschedule appt on 12/2 due to  being out of the office. Patient answered and appt has been switched to a different provider on the same day

## 2024-12-03 ENCOUNTER — OFFICE VISIT (OUTPATIENT)
Dept: FAMILY MEDICINE | Facility: CLINIC | Age: 64
End: 2024-12-03
Payer: MEDICARE

## 2024-12-03 VITALS
OXYGEN SATURATION: 77 % | TEMPERATURE: 97 F | HEIGHT: 71 IN | DIASTOLIC BLOOD PRESSURE: 92 MMHG | WEIGHT: 243.81 LBS | SYSTOLIC BLOOD PRESSURE: 164 MMHG | HEART RATE: 80 BPM | BODY MASS INDEX: 34.13 KG/M2 | RESPIRATION RATE: 16 BRPM

## 2024-12-03 DIAGNOSIS — I10 ESSENTIAL HYPERTENSION: ICD-10-CM

## 2024-12-03 DIAGNOSIS — Z79.4 TYPE 2 DIABETES MELLITUS WITH HYPERGLYCEMIA, WITH LONG-TERM CURRENT USE OF INSULIN: Primary | ICD-10-CM

## 2024-12-03 DIAGNOSIS — E11.65 TYPE 2 DIABETES MELLITUS WITH HYPERGLYCEMIA, WITH LONG-TERM CURRENT USE OF INSULIN: Primary | ICD-10-CM

## 2024-12-03 DIAGNOSIS — N52.8 OTHER MALE ERECTILE DYSFUNCTION: ICD-10-CM

## 2024-12-03 PROCEDURE — 3077F SYST BP >= 140 MM HG: CPT | Mod: HCNC,CPTII,S$GLB,

## 2024-12-03 PROCEDURE — 1159F MED LIST DOCD IN RCRD: CPT | Mod: HCNC,CPTII,S$GLB,

## 2024-12-03 PROCEDURE — 99214 OFFICE O/P EST MOD 30 MIN: CPT | Mod: HCNC,S$GLB,,

## 2024-12-03 PROCEDURE — 3008F BODY MASS INDEX DOCD: CPT | Mod: HCNC,CPTII,S$GLB,

## 2024-12-03 PROCEDURE — 4010F ACE/ARB THERAPY RXD/TAKEN: CPT | Mod: HCNC,CPTII,S$GLB,

## 2024-12-03 PROCEDURE — 3044F HG A1C LEVEL LT 7.0%: CPT | Mod: HCNC,CPTII,S$GLB,

## 2024-12-03 PROCEDURE — 3062F POS MACROALBUMINURIA REV: CPT | Mod: HCNC,CPTII,S$GLB,

## 2024-12-03 PROCEDURE — 99999 PR PBB SHADOW E&M-EST. PATIENT-LVL IV: CPT | Mod: PBBFAC,HCNC,,

## 2024-12-03 PROCEDURE — 3080F DIAST BP >= 90 MM HG: CPT | Mod: HCNC,CPTII,S$GLB,

## 2024-12-03 PROCEDURE — 3066F NEPHROPATHY DOC TX: CPT | Mod: HCNC,CPTII,S$GLB,

## 2024-12-03 RX ORDER — SILDENAFIL 100 MG/1
100 TABLET, FILM COATED ORAL DAILY PRN
Qty: 30 TABLET | Refills: 5 | Status: SHIPPED | OUTPATIENT
Start: 2024-12-03 | End: 2025-12-03

## 2024-12-03 NOTE — LETTER
[facility]  [address] [facility] [address]  Phone: [phone]  Fax: [phone] December 3, 2024    Thierry Ho  [address]  [address]  [place] [address]      To Whom It May Concern:    Thierry Ho is unable to participate in jury duty due to long term health issues.    If you have any questions or concerns, please feel free to call my office.    Sincerely,        Sandra Miguel PA-C

## 2024-12-03 NOTE — PROGRESS NOTES
Family Medicine      Patient Name: Thierry Ho  MRN: [id number]  : 1960    PCP: Rhonda Krishnan MD       HPI      Thierry Ho is a 64 y.o. male with multiple medical diagnoses as listed in the medical history and problem list that presents for   Chief Complaint   Patient presents with    Follow-up       Mr. Ho presents today to follow up from a visit that he had 2 months ago. He was started on Mounjaro for his diabetes at that time, but he has not been taking it because he does not know how to use the injection pens. He also needs a letter sent to the court addressing his inability to appear for jury duty.     BP elevated in clinic at 164/92, but patient admits to not taking his medication today.             History      Past Medical History:  Past Medical History:   Diagnosis Date    Colon polyps     Diabetes mellitus     Diabetes with neurologic complications     DM retinopathy     GERD (gastroesophageal reflux disease)     Hyperlipidemia     Hypertension     Myocardial infarction        Past Surgical History:  Past Surgical History:   Procedure Laterality Date    APPENDECTOMY      BACK SURGERY  2017    COLONOSCOPY N/A 10/19/2018    Procedure: COLONOSCOPY;  Surgeon: Frantz Rasmussen MD;  Location: [facility] ENDO;  Service: Endoscopy;  Laterality: N/A;    COLONOSCOPY N/A 2021    Procedure: COLONOSCOPY;  Surgeon: Dann Mahmood MD;  Location: [facility] ENDO;  Service: Endoscopy;  Laterality: N/A;  covid test [place] , instructions mailed -ml    EPIDURAL STEROID INJECTION INTO LUMBAR SPINE N/A 5/10/2024    Procedure: L4-5 Epidural Steroid Injection (ref: Tony);  Surgeon: David Stringer Jr., MD;  Location: [facility] PAIN MANAGEMENT;  Service: Pain Management;  Laterality: N/A;  @1200  ASA last   DM  Needs Consent    EXCISION OF SKIN N/A 2020    Procedure: EXCISION, SKIN;  Surgeon: Finesse Dumont MD;  Location: [facility] OR;  Service: General;  Laterality: N/A;  on back    FOOT  SURGERY      INCISION AND DRAINAGE OF GROIN Right 2020    Procedure: INCISION AND DRAINAGE, INGUINAL REGION;  Surgeon: Finesse Dumont MD;  Location: [facility] OR;  Service: General;  Laterality: Right;  PT TO PREOP IN AM  PRE-OP BY RN 2020    OPEN REDUCTION AND INTERNAL FIXATION (ORIF) OF INJURY OF ANKLE Right 2019    Procedure: ORIF, ANKLE;  Surgeon: Raeann Steward MD;  Location: [facility] OR;  Service: Orthopedics;  Laterality: Right;  SARITA GALAN  [phone] TEXTED HIM @ 10:2 AM ON 19  SKELETAL  RN PREOP 19---- [facility] MORNING OF SURGERY PER DR STEWARD       Social History:  Social History     Socioeconomic History    Marital status:     Number of children: 5    Highest education level: GED or equivalent   Tobacco Use    Smoking status: Former     Current packs/day: 0.00     Average packs/day: 0.2 packs/day for 42.5 years (10.6 ttl pk-yrs)     Types: Cigarettes     Start date: 1/15/1981     Quit date: 2023     Years since quittin.3    Smokeless tobacco: Never   Substance and Sexual Activity    Alcohol use: Yes     Alcohol/week: 4.0 standard drinks of alcohol     Types: 4 Cans of beer per week     Comment: social     Drug use: Not Currently     Types: Marijuana     Comment: quit marijuana    Sexual activity: Not Currently     Partners: Female     Social Drivers of Health     Financial Resource Strain: Low Risk  (10/11/2024)    Overall Financial Resource Strain (CARDIA)     Difficulty of Paying Living Expenses: Not hard at all   Food Insecurity: No Food Insecurity (10/11/2024)    Hunger Vital Sign     Worried About Running Out of Food in the Last Year: Never true     Ran Out of Food in the Last Year: Never true   Transportation Needs: No Transportation Needs (10/11/2024)    TRANSPORTATION NEEDS     Transportation : No   Physical Activity: Inactive (3/17/2023)    Exercise Vital Sign     Days of Exercise per Week: 0 days     Minutes of Exercise per Session: 0 min   Stress: No  "Stress Concern Present (10/11/2024)    [employer] - Occupational Stress Questionnaire     Feeling of Stress : Not at all   Housing Stability: Low Risk  (10/11/2024)    Housing Stability Vital Sign     Unable to Pay for Housing in the Last Year: No     Homeless in the Last Year: No       Family History:  Family History   Problem Relation Name Age of Onset    Heart disease Mother      Breast cancer Mother      Diabetes Father      Cancer Sister 4     Cancer Brother 2        Allergies and Medications: (updated and reviewed)  Review of patient's allergies indicates:   Allergen Reactions    Trulicity [dulaglutide] Nausea Only     dizzy     Current Outpatient Medications   Medication Sig Dispense Refill    acetaminophen (TYLENOL) 500 MG tablet Take 500 mg by mouth every 6 (six) hours as needed for Pain.      aspirin (ECOTRIN) 81 MG EC tablet Take 81 mg by mouth once daily.      BD ULTRA-FINE FREDA PEN NEEDLE 32 gauge x 5/32" Ndle USE AS DIRECTED 2 TIMES DAILY WITH LEVEMIR 200 each 3    blood pressure test kit-large Kit Blood pressure kit to use indefinitely. Measure blood pressure every day at least 2 hours after taking medication 1 each 0    blood sugar diagnostic (TRUE METRIX GLUCOSE TEST STRIP) Strp 1 strip by Misc.(Non-Drug; Combo Route) route 2 (two) times a day. 200 each 11    blood-glucose meter kit 1 each by Other route 4 (four) times daily before meals and nightly. 1 each 0    carvediloL (COREG) 25 MG tablet Take 1 tablet (25 mg total) by mouth 2 (two) times daily with meals. (Patient not taking: Reported on 12/3/2024) 180 tablet 3    ciclopirox (PENLAC) 8 % Soln Apply topically nightly. (Patient not taking: Reported on 12/3/2024) 1 Bottle 3    cyclobenzaprine (FLEXERIL) 5 MG tablet Take 1 tablet (5 mg total) by mouth nightly as needed for Muscle spasms. 90 tablet 11    hydrALAZINE (APRESOLINE) 25 MG tablet TAKE 1 TABLET EVERY 12 HOURS 180 tablet 3    insulin glargine U-100, Lantus, " (LANTUS SOLOSTAR U-100 INSULIN) 100 unit/mL (3 mL) InPn pen Inject 10 Units into the skin 2 (two) times a day. 6 mL 0    LIDOcaine (LIDODERM) 5 % Place 1 patch onto the skin once daily. Remove & Discard patch within 12 hours or as directed by MD 30 patch 1    lisinopriL (PRINIVIL,ZESTRIL) 40 MG tablet Take 1 tablet (40 mg total) by mouth once daily. 90 tablet 3    metFORMIN (GLUCOPHAGE) 1000 MG tablet Take 1 tablet (1,000 mg total) by mouth 2 (two) times daily with meals. 180 tablet 3    omeprazole (PRILOSEC) 20 MG capsule Take 1 capsule (20 mg total) by mouth once daily. 90 capsule 1    pregabalin (LYRICA) 100 MG capsule Take 1 capsule (100 mg total) by mouth 2 (two) times daily. 180 capsule 0    sildenafiL (VIAGRA) 100 MG tablet Take 1 tablet (100 mg total) by mouth daily as needed for Erectile Dysfunction. 30 tablet 5    simvastatin (ZOCOR) 40 MG tablet Take 1 tablet (40 mg total) by mouth every evening. 90 tablet 3    tirzepatide (MOUNJARO) 2.5 mg/0.5 mL PnIj Inject 2.5 mg into the skin every 7 days. 2 mL 2    TRUEPLUS LANCETS 33 gauge Misc Check glucose twice daily 100 each 3     No current facility-administered medications for this visit.       Patient Care Team:  Rhonda Krishnan MD as PCP - General (Internal Medicine)  Kimberlee Zuniga MD as Consulting Physician (Ophthalmology)  Carlota Prince RD (Inactive) as Dietitian (Diabetes)  Malvin Ortiz OD as Consulting Physician (Optometry)         Exam      Review of Systems:  (as noted above)  Review of Systems   All other systems reviewed and are negative.      Physical Exam:  Physical Exam  Vitals and nursing note reviewed.   Constitutional:       Appearance: Normal appearance. He is normal weight.   HENT:      Head: Normocephalic and atraumatic.   Eyes:      Extraocular Movements: Extraocular movements intact.      Pupils: Pupils are equal, round, and reactive to light.   Cardiovascular:      Rate and Rhythm: Normal rate and regular rhythm.      Pulses:  "Normal pulses.      Heart sounds: Normal heart sounds.   Pulmonary:      Effort: Pulmonary effort is normal.      Breath sounds: Normal breath sounds.   Abdominal:      General: Abdomen is flat.      Palpations: Abdomen is soft.   Musculoskeletal:         General: Normal range of motion.      Cervical back: Normal range of motion and neck supple.   Skin:     General: Skin is warm and dry.   Neurological:      Mental Status: He is alert and oriented to person, place, and time. Mental status is at baseline.   Psychiatric:         Mood and Affect: Mood normal.         Behavior: Behavior normal.       Vitals:    12/03/24 1053   BP: (!) 164/92   BP Location: Left arm   Patient Position: Sitting   Pulse: 80   Resp: 16   Temp: 96.6 °F (35.9 °C)   TempSrc: Temporal   SpO2: (!) 77%   Weight: 110.6 kg (243 lb 13.3 oz)   Height: 5' 11" (1.803 m)      Body mass index is 34.01 kg/m².           Assessment & Plan      1. Type 2 diabetes mellitus with hyperglycemia, with long-term current use of insulin  - Mounjaro use and side effect education given. Nurse assisted with today's injection. Patient understands this is a once weekly shot.    2. Essential hypertension  - BP uncontrolled, but patient did not take medication today. Patient to take all medications as prescribed and monitor BP at home.    3. Other male erectile dysfunction  - sildenafiL (VIAGRA) 100 MG tablet; Take 1 tablet (100 mg total) by mouth daily as needed for Erectile Dysfunction.  Dispense: 30 tablet; Refill: 5  - Prior authorization Order             --------------------------------------------      Health Maintenance:  Health Maintenance         Date Due Completion Date    Colorectal Cancer Screening 02/22/2024 2/22/2021    Lipid Panel 12/28/2024 12/28/2023    Foot Exam 10/02/2025 (Originally 8/12/2022) 8/12/2021 (Done)    Override on 8/12/2021: Done    Hemoglobin A1c 04/12/2025 10/12/2024    Eye Exam 09/16/2025 9/16/2024    TETANUS VACCINE 10/01/2025 10/1/2015 "    Diabetes Urine Screening 10/22/2025 10/22/2024    High Dose Statin 11/15/2025 11/15/2024    Pneumococcal Vaccines (Age 0-64) (3 of 3 - PPSV23 or PCV20) 04/28/2026 1/15/2018            Health maintenance reviewed    Follow Up:  No follow-ups on file.       - The patient is given an After Visit Summary that lists all medications with directions, allergies, education, orders placed during this encounter and follow-up instructions.      - I have reviewed the patient's medical information including past medical, family, and social history sections including the medications and allergies.      - We discussed the patient's current medications.     This note was created by combination of typed  and MModal dictation.  Transcription errors may be present.  If there are any questions, please contact me.     Sandra Miguel PA-C  [facility]

## 2024-12-06 ENCOUNTER — TELEPHONE (OUTPATIENT)
Dept: ENDOSCOPY | Facility: HOSPITAL | Age: 64
End: 2024-12-06
Payer: MEDICARE

## 2024-12-06 VITALS — HEIGHT: 71 IN | WEIGHT: 242 LBS | BODY MASS INDEX: 33.88 KG/M2

## 2024-12-06 DIAGNOSIS — Z12.11 COLON CANCER SCREENING: Primary | ICD-10-CM

## 2024-12-06 DIAGNOSIS — Z86.0100 HISTORY OF COLON POLYPS: ICD-10-CM

## 2024-12-06 NOTE — TELEPHONE ENCOUNTER
Referral for procedure from  Workqueue referral (see Appts tab)      Spoke to pt to schedule procedure(s) Colonoscopy       Physician to perform procedure(s) Dr. SAULO Nayak  Date of Procedure (s) 1/9/25  Arrival Time 12:00 PM  Time of Procedure(s) 1:00 PM   Location of Procedure(s) [facility]   Type of Rx Prep sent to patient: PEG  Instructions provided to patient via Postal Mail    Patient was informed on the following information and verbalized understanding. Screening questionnaire reviewed with patient and complete. If procedure requires anesthesia, a responsible adult needs to be present to accompany the patient home, patient cannot drive after receiving anesthesia. Appointment details are tentative, especially check-in time. Patient will receive a prep-op call 7 days prior to confirm check-in time for procedure. If applicable the patient should contact their pharmacy to verify Rx for procedure prep is ready for pick-up. Patient was advised to call the scheduling department at [phone] if pharmacy states no Rx is available. Patient was advised to call the endoscopy scheduling department if any questions or concerns arise.      [facility] Endoscopy Scheduling Department

## 2024-12-09 ENCOUNTER — TELEPHONE (OUTPATIENT)
Dept: ADMINISTRATIVE | Facility: CLINIC | Age: 64
End: 2024-12-09
Payer: MEDICARE

## 2024-12-28 DIAGNOSIS — E78.49 OTHER HYPERLIPIDEMIA: ICD-10-CM

## 2024-12-28 NOTE — TELEPHONE ENCOUNTER
Care Due:                  Date            Visit Type   Department     Provider  --------------------------------------------------------------------------------                                ESTABLISHED   [facility]  Last Visit: 10-      PATIENT      MEDICINE       Rhonda Krishnan  Next Visit: None Scheduled  None         None Found                                                            Last  Test          Frequency    Reason                     Performed    Due Date  --------------------------------------------------------------------------------    Lipid Panel.  12 months..  simvastatin..............  12- 12-    Health [facility] Embedded Care Due Messages. Reference number: [id number].   12/28/2024 3:02:42 AM CST

## 2024-12-30 RX ORDER — SIMVASTATIN 40 MG/1
40 TABLET, FILM COATED ORAL NIGHTLY
Qty: 90 TABLET | Refills: 3 | Status: SHIPPED | OUTPATIENT
Start: 2024-12-30

## 2025-01-06 ENCOUNTER — TELEPHONE (OUTPATIENT)
Dept: ENDOSCOPY | Facility: HOSPITAL | Age: 65
End: 2025-01-06
Payer: MEDICARE

## 2025-01-06 ENCOUNTER — TELEPHONE (OUTPATIENT)
Dept: BARIATRICS | Facility: CLINIC | Age: 65
End: 2025-01-06
Payer: MEDICARE

## 2025-01-06 NOTE — TELEPHONE ENCOUNTER
Pt contacted endoscopy scheduling for prescription for colonoscopy prep, Pt requested phone number to the pharmacy where colonoscopy prep was sent. Phone number provider for [facility] pharmacy. Pt verbalized understanding.

## 2025-01-06 NOTE — TELEPHONE ENCOUNTER
"----- Message from Maisha sent at 1/6/2025  2:24 PM CST -----  Regarding: pt adivce  Contact: [phone]  .Name Of Caller: Self     Contact Preference?:[phone]     What is the nature of the call?:needing to reschedule 1/6/25, pls call      Additional Notes:  "Thank you for all that you do for our patients"  "

## 2025-01-09 ENCOUNTER — TELEPHONE (OUTPATIENT)
Dept: ENDOSCOPY | Facility: HOSPITAL | Age: 65
End: 2025-01-09
Payer: MEDICARE

## 2025-01-09 DIAGNOSIS — E11.9 TYPE 2 DIABETES MELLITUS WITHOUT COMPLICATION: ICD-10-CM

## 2025-01-09 NOTE — TELEPHONE ENCOUNTER
Referral for procedure from telephone call r/s zac      Spoke to patient to schedule procedure(s) Colonoscopy       Physician to perform procedure(s) Dr. SALAZAR Asencio  Date of Procedure (s) 2/19/25  Arrival Time 11:30 AM  Time of Procedure(s) 12:30 PM   Location of Procedure(s) [address]   Type of Rx Prep sent to patient: PEG  Instructions provided to [facility] via [facility] and postal mail    Patient was informed on the following information and verbalized understanding. Screening questionnaire reviewed with patient and complete. If procedure requires anesthesia, a responsible adult needs to be present to accompany the patient home, patient cannot drive after receiving anesthesia. Appointment details are tentative, especially check-in time. Patient will receive a prep-op call 7 days prior to confirm check-in time for procedure. If applicable the patient should contact their pharmacy to verify Rx for procedure prep is ready for pick-up. Patient was advised to call the scheduling department at [phone] if pharmacy states no Rx is available. Patient was advised to call the endoscopy scheduling department if any questions or concerns arise.      [facility] Endoscopy Scheduling Department

## 2025-01-09 NOTE — TELEPHONE ENCOUNTER
Received Pt's Called: Pt called and stated he did not prep on yesterday and requesting to canceled his procedure. I was going over dates to reschedule pt but the phone got disconnected. Reach back out to the pt and left voicemail

## 2025-01-10 ENCOUNTER — TELEPHONE (OUTPATIENT)
Dept: ENDOSCOPY | Facility: HOSPITAL | Age: 65
End: 2025-01-10
Payer: MEDICARE

## 2025-01-13 DIAGNOSIS — Z00.00 ENCOUNTER FOR MEDICARE ANNUAL WELLNESS EXAM: ICD-10-CM

## 2025-01-17 ENCOUNTER — TELEPHONE (OUTPATIENT)
Dept: PULMONOLOGY | Facility: CLINIC | Age: 65
End: 2025-01-17
Payer: MEDICARE

## 2025-01-17 DIAGNOSIS — R06.02 SOB (SHORTNESS OF BREATH): Primary | ICD-10-CM

## 2025-01-22 ENCOUNTER — TELEPHONE (OUTPATIENT)
Dept: PULMONOLOGY | Facility: CLINIC | Age: 65
End: 2025-01-22
Payer: MEDICARE

## 2025-01-22 NOTE — TELEPHONE ENCOUNTER
GRETTAM, notified clinic closure due to weather, PFT and 6MWT appointments will be canceled, advised to call Dr. Dent's office to reschedule.  ARIEL Caldwell RN.

## 2025-01-23 ENCOUNTER — TELEPHONE (OUTPATIENT)
Dept: BARIATRICS | Facility: CLINIC | Age: 65
End: 2025-01-23
Payer: MEDICARE

## 2025-01-23 NOTE — TELEPHONE ENCOUNTER
----- Message from Ok sent at 1/22/2025 12:27 PM CST -----  Consult/Advisory    Name Of Caller:Thierry       Contact Preference:[phone]    Nature of call: Pt called to reschedule,but the appt times for labs and to see the dr is spread so far apart please call to assist

## 2025-01-23 NOTE — TELEPHONE ENCOUNTER
----- Message from Anabell sent at 1/23/2025 12:57 PM CST -----  Regarding: Reschedule appt  Contact: [phone]    Reschedule    Caller:The pt     Call back number: [phone]    Current Appt Date: was 01/06/25    Type of Appt: New pt       Provider:Dr. Garcia      Reason for Rescheduling: Pt wanting to reschedule appt       Additional Information: Thank you.

## 2025-01-27 DIAGNOSIS — E11.9 TYPE 2 DIABETES MELLITUS WITHOUT COMPLICATION, WITHOUT LONG-TERM CURRENT USE OF INSULIN: ICD-10-CM

## 2025-01-27 RX ORDER — INSULIN GLARGINE 100 [IU]/ML
INJECTION, SOLUTION SUBCUTANEOUS
Qty: 18 ML | Refills: 0 | OUTPATIENT
Start: 2025-01-27

## 2025-01-27 NOTE — TELEPHONE ENCOUNTER
"Last Office Visit Info:   The patient's last visit with Rhonda Krishnan MD was on 10/2/2024.    The patient's last visit in current department was on Visit date not found.        Last CBC Results:   Lab Results   Component Value Date    WBC 11.62 10/25/2024    HGB 12.9 (L) 10/25/2024    HCT 40.5 10/25/2024     10/25/2024       Last CMP Results  Lab Results   Component Value Date     10/25/2024    K 4.5 10/25/2024     (H) 10/25/2024    CO2 21 (L) 10/25/2024    BUN 22 10/25/2024    CREATININE 1.4 10/25/2024    CALCIUM 11.6 (H) 10/25/2024    ALBUMIN 3.3 (L) 10/25/2024    AST 47 (H) 10/15/2024    ALT 43 10/15/2024       Last Lipids  Lab Results   Component Value Date    CHOL 116 (L) 12/28/2023    TRIG 114 12/28/2023    HDL 36 (L) 12/28/2023    LDLCALC 57.2 (L) 12/28/2023       Last A1C  Lab Results   Component Value Date    HGBA1C 6.2 (H) 10/12/2024       Last TSH  Lab Results   Component Value Date    TSH 1.563 06/28/2024             Current Med Refills  Medication List with Changes/Refills   Current Medications    ACETAMINOPHEN (TYLENOL) 500 MG TABLET    Take 500 mg by mouth every 6 (six) hours as needed for Pain.       Start Date: --        End Date: --    ASPIRIN (ECOTRIN) 81 MG EC TABLET    Take 81 mg by mouth once daily.       Start Date: --        End Date: --    BD ULTRA-FINE FREDA PEN NEEDLE 32 GAUGE X 5/32" NDLE    USE AS DIRECTED 2 TIMES DAILY WITH LEVEMIR       Start Date: 8/28/2024 End Date: --    BLOOD PRESSURE TEST KIT-LARGE KIT    Blood pressure kit to use indefinitely. Measure blood pressure every day at least 2 hours after taking medication       Start Date: 10/16/2024End Date: --    BLOOD SUGAR DIAGNOSTIC (TRUE METRIX GLUCOSE TEST STRIP) STRP    1 strip by Misc.(Non-Drug; Combo Route) route 2 (two) times a day.       Start Date: 9/28/2022 End Date: --    BLOOD-GLUCOSE METER KIT    1 each by Other route 4 (four) times daily before meals and nightly.       Start Date: 9/28/2022 End Date: " --    CARVEDILOL (COREG) 25 MG TABLET    Take 1 tablet (25 mg total) by mouth 2 (two) times daily with meals.       Start Date: 12/28/2023End Date: --    CICLOPIROX (PENLAC) 8 % SOLN    Apply topically nightly.       Start Date: 8/12/2021 End Date: --    CYCLOBENZAPRINE (FLEXERIL) 5 MG TABLET    Take 1 tablet (5 mg total) by mouth nightly as needed for Muscle spasms.       Start Date: 10/2/2024 End Date: --    HYDRALAZINE (APRESOLINE) 25 MG TABLET    TAKE 1 TABLET EVERY 12 HOURS       Start Date: 10/28/2024End Date: --    INSULIN GLARGINE U-100, LANTUS, (LANTUS SOLOSTAR U-100 INSULIN) 100 UNIT/ML (3 ML) INPN PEN    Inject 10 Units into the skin 2 (two) times a day.       Start Date: 10/15/2024End Date: 11/14/2024    LIDOCAINE (LIDODERM) 5 %    Place 1 patch onto the skin once daily. Remove & Discard patch within 12 hours or as directed by MD       Start Date: 10/25/2024End Date: --    LISINOPRIL (PRINIVIL,ZESTRIL) 40 MG TABLET    Take 1 tablet (40 mg total) by mouth once daily.       Start Date: 12/28/2023End Date: --    METFORMIN (GLUCOPHAGE) 1000 MG TABLET    Take 1 tablet (1,000 mg total) by mouth 2 (two) times daily with meals.       Start Date: 12/28/2023End Date: --    OMEPRAZOLE (PRILOSEC) 20 MG CAPSULE    Take 1 capsule (20 mg total) by mouth once daily.       Start Date: 9/4/2024  End Date: --    PREGABALIN (LYRICA) 100 MG CAPSULE    Take 1 capsule (100 mg total) by mouth 2 (two) times daily.       Start Date: 10/2/2024 End Date: 12/31/2024    SILDENAFIL (VIAGRA) 100 MG TABLET    Take 1 tablet (100 mg total) by mouth daily as needed for Erectile Dysfunction.       Start Date: 12/3/2024 End Date: 12/3/2025    SIMVASTATIN (ZOCOR) 40 MG TABLET    TAKE 1 TABLET EVERY EVENING       Start Date: 12/30/2024End Date: --    TRUEPLUS LANCETS 33 GAUGE MISC    Check glucose twice daily       Start Date: 9/28/2022 End Date: --       Order(s) placed per written order guidelines: none    Please advise.

## 2025-01-28 DIAGNOSIS — E11.65 TYPE 2 DIABETES MELLITUS WITH HYPERGLYCEMIA, WITH LONG-TERM CURRENT USE OF INSULIN: Primary | ICD-10-CM

## 2025-01-28 DIAGNOSIS — Z79.4 TYPE 2 DIABETES MELLITUS WITH HYPERGLYCEMIA, WITH LONG-TERM CURRENT USE OF INSULIN: Primary | ICD-10-CM

## 2025-01-28 NOTE — TELEPHONE ENCOUNTER
Refill Decision Note   Thierry Ho  is requesting a refill authorization.  Brief Assessment and Rationale for Refill:  Quick Discontinue     Medication Therapy Plan: Pharmacy is requesting new scripts for the following medications without required information, (sig/ frequency/qty/etc)      Comments:     Note composed:11:44 PM 01/27/2025

## 2025-01-29 RX ORDER — TIRZEPATIDE 2.5 MG/.5ML
2.5 INJECTION, SOLUTION SUBCUTANEOUS
Qty: 2 ML | Refills: 2 | Status: SHIPPED | OUTPATIENT
Start: 2025-01-29 | End: 2025-04-29

## 2025-01-29 RX ORDER — TIRZEPATIDE 2.5 MG/.5ML
2.5 INJECTION, SOLUTION SUBCUTANEOUS
COMMUNITY
End: 2025-01-29 | Stop reason: SDUPTHER

## 2025-01-29 NOTE — TELEPHONE ENCOUNTER
Care Due:                  Date            Visit Type   Department     Provider  --------------------------------------------------------------------------------                                ESTABLISHED   [facility]  Last Visit: 10-      PATIENT      MEDICINE       Rhonda Krishnan  Next Visit: None Scheduled  None         None Found                                                            Last  Test          Frequency    Reason                     Performed    Due Date  --------------------------------------------------------------------------------    HBA1C.......  6 months...  metFORMIN................  10-   04-    Health [facility] Embedded Care Due Messages. Reference number: [id number].   1/29/2025 10:20:33 AM CST

## 2025-03-01 ENCOUNTER — TELEPHONE (OUTPATIENT)
Dept: ENDOSCOPY | Facility: HOSPITAL | Age: 65
End: 2025-03-01
Payer: MEDICARE

## 2025-03-01 VITALS — BODY MASS INDEX: 33.88 KG/M2 | HEIGHT: 71 IN | WEIGHT: 242 LBS

## 2025-03-01 DIAGNOSIS — Z12.11 SPECIAL SCREENING FOR MALIGNANT NEOPLASMS, COLON: Primary | ICD-10-CM

## 2025-03-01 RX ORDER — SODIUM, POTASSIUM,MAG SULFATES 17.5-3.13G
1 SOLUTION, RECONSTITUTED, ORAL ORAL DAILY
Qty: 1 KIT | Refills: 0 | Status: SHIPPED | OUTPATIENT
Start: 2025-03-01 | End: 2025-03-03

## 2025-03-01 NOTE — TELEPHONE ENCOUNTER
Referral for procedure from [facility]      Spoke to pt to schedule procedure(s) Colonoscopy       Physician to perform procedure(s) Dr. MEREDITH Squires  Date of Procedure (s) 04/17/25  Arrival Time 8:30 AM  Time of Procedure(s) 9:30 AM   Location of Procedure(s) [address]   Type of Rx Prep sent to patient: Suprep  Instructions provided to [facility] via [facility]    Patient was informed on the following information and verbalized understanding. Screening questionnaire reviewed with patient and complete. If procedure requires anesthesia, a responsible adult needs to be present to accompany the patient home, patient cannot drive after receiving anesthesia. Appointment details are tentative, especially check-in time. Patient will receive a prep-op call 7 days prior to confirm check-in time for procedure. If applicable the patient should contact their pharmacy to verify Rx for procedure prep is ready for pick-up. Patient was advised to call the scheduling department at [phone] if pharmacy states no Rx is available. Patient was advised to call the endoscopy scheduling department if any questions or concerns arise.       Endoscopy Scheduling Department

## 2025-03-01 NOTE — TELEPHONE ENCOUNTER
----- Message from Kelsi sent at 2/28/2025  5:45 PM CST -----  Regarding: FW: Reschedule Appt  Contact: Pt    ----- Message -----  From: Jackie Raza  Sent: 2/28/2025   2:12 PM CST  To: [facility] Endoscopy Schedulers  Subject: Reschedule Appt                                  Patient requesting to speak with you regarding rescheduling a colonoscopy. Please advise.Phone  [phone] You

## 2025-03-03 ENCOUNTER — PATIENT MESSAGE (OUTPATIENT)
Dept: FAMILY MEDICINE | Facility: CLINIC | Age: 65
End: 2025-03-03
Payer: MEDICARE

## 2025-03-03 DIAGNOSIS — K21.9 GASTROESOPHAGEAL REFLUX DISEASE, UNSPECIFIED WHETHER ESOPHAGITIS PRESENT: ICD-10-CM

## 2025-03-03 DIAGNOSIS — E11.9 TYPE 2 DIABETES MELLITUS WITHOUT COMPLICATION, WITHOUT LONG-TERM CURRENT USE OF INSULIN: ICD-10-CM

## 2025-03-03 DIAGNOSIS — I10 ESSENTIAL HYPERTENSION: ICD-10-CM

## 2025-03-03 RX ORDER — CARVEDILOL 25 MG/1
25 TABLET ORAL 2 TIMES DAILY WITH MEALS
Qty: 180 TABLET | Refills: 3 | Status: SHIPPED | OUTPATIENT
Start: 2025-03-03

## 2025-03-03 RX ORDER — OMEPRAZOLE 20 MG/1
20 CAPSULE, DELAYED RELEASE ORAL DAILY
Qty: 90 CAPSULE | Refills: 3 | Status: SHIPPED | OUTPATIENT
Start: 2025-03-03

## 2025-03-03 RX ORDER — METFORMIN HYDROCHLORIDE 1000 MG/1
1000 TABLET ORAL 2 TIMES DAILY WITH MEALS
Qty: 180 TABLET | Refills: 3 | Status: SHIPPED | OUTPATIENT
Start: 2025-03-03

## 2025-03-03 RX ORDER — LISINOPRIL 40 MG/1
40 TABLET ORAL DAILY
Qty: 90 TABLET | Refills: 3 | Status: SHIPPED | OUTPATIENT
Start: 2025-03-03

## 2025-03-03 NOTE — TELEPHONE ENCOUNTER
Care Due:                  Date            Visit Type   Department     Provider  --------------------------------------------------------------------------------                                ESTABLISHED   [facility]  Last Visit: 10-      PATIENT      MEDICINE       Rhonda Krishnan  Next Visit: None Scheduled  None         None Found                                                            Last  Test          Frequency    Reason                     Performed    Due Date  --------------------------------------------------------------------------------    Lipid Panel.  12 months..  simvastatin..............  12- 12-    Health [facility] Embedded Care Due Messages. Reference number: [id number].   3/03/2025 8:06:18 AM CST

## 2025-03-06 DIAGNOSIS — M54.16 LUMBAR RADICULOPATHY: ICD-10-CM

## 2025-03-06 RX ORDER — PREGABALIN 100 MG/1
100 CAPSULE ORAL 2 TIMES DAILY
Qty: 180 CAPSULE | Refills: 0 | Status: SHIPPED | OUTPATIENT
Start: 2025-03-06 | End: 2025-06-04

## 2025-03-06 NOTE — TELEPHONE ENCOUNTER
No care due was identified.  Health [facility] Embedded Care Due Messages. Reference number: [id number].   3/06/2025 9:17:37 AM CST

## 2025-03-19 ENCOUNTER — TELEPHONE (OUTPATIENT)
Dept: ADMINISTRATIVE | Facility: HOSPITAL | Age: 65
End: 2025-03-19
Payer: MEDICARE

## 2025-03-19 ENCOUNTER — PATIENT OUTREACH (OUTPATIENT)
Dept: ADMINISTRATIVE | Facility: HOSPITAL | Age: 65
End: 2025-03-19
Payer: MEDICARE

## 2025-03-19 NOTE — PROGRESS NOTES
Population Health Chart Review & Patient Outreach Details      Additional Pop Health Notes:      HM and immunization's reviewed and updated.  DUE FOR COLONOSCOPY AND LIPID PANEL.  --NEED REMOTE BP. LVM and set 2 weeks reminder to call again if no update to chart.

## 2025-03-31 ENCOUNTER — PATIENT MESSAGE (OUTPATIENT)
Dept: ADMINISTRATIVE | Facility: HOSPITAL | Age: 65
End: 2025-03-31
Payer: MEDICARE

## 2025-04-02 ENCOUNTER — PATIENT OUTREACH (OUTPATIENT)
Dept: ADMINISTRATIVE | Facility: HOSPITAL | Age: 65
End: 2025-04-02
Payer: MEDICARE

## 2025-04-02 NOTE — PROGRESS NOTES
HM and immunization's reviewed and updated.  DUE FOR COLONOSCOPY AND LIPID PANEL.  --NEED REMOTE BP. LVM

## 2025-04-17 ENCOUNTER — ANESTHESIA EVENT (OUTPATIENT)
Dept: ENDOSCOPY | Facility: HOSPITAL | Age: 65
End: 2025-04-17
Payer: MEDICARE

## 2025-04-17 ENCOUNTER — ANESTHESIA (OUTPATIENT)
Dept: ENDOSCOPY | Facility: HOSPITAL | Age: 65
End: 2025-04-17
Payer: MEDICARE

## 2025-04-17 ENCOUNTER — HOSPITAL ENCOUNTER (OUTPATIENT)
Facility: HOSPITAL | Age: 65
Discharge: HOME | End: 2025-04-17
Attending: STUDENT IN AN ORGANIZED HEALTH CARE EDUCATION/TRAINING PROGRAM | Admitting: STUDENT IN AN ORGANIZED HEALTH CARE EDUCATION/TRAINING PROGRAM
Payer: MEDICARE

## 2025-04-17 DIAGNOSIS — Z86.0100 HISTORY OF COLON POLYPS: Primary | ICD-10-CM

## 2025-04-17 LAB — POCT GLUCOSE: 146 MG/DL (ref 70–110)

## 2025-04-17 PROCEDURE — G0105 COLORECTAL SCRN; HI RISK IND: HCPCS | Mod: 74,HCNC | Performed by: STUDENT IN AN ORGANIZED HEALTH CARE EDUCATION/TRAINING PROGRAM

## 2025-04-17 PROCEDURE — G0105 COLORECTAL SCRN; HI RISK IND: HCPCS | Mod: 53,HCNC,, | Performed by: STUDENT IN AN ORGANIZED HEALTH CARE EDUCATION/TRAINING PROGRAM

## 2025-04-17 PROCEDURE — 63600175 PHARM REV CODE 636 W HCPCS: Mod: HCNC

## 2025-04-17 PROCEDURE — 37000009 HC ANESTHESIA EA ADD 15 MINS: Mod: HCNC | Performed by: STUDENT IN AN ORGANIZED HEALTH CARE EDUCATION/TRAINING PROGRAM

## 2025-04-17 PROCEDURE — 37000008 HC ANESTHESIA 1ST 15 MINUTES: Mod: HCNC | Performed by: STUDENT IN AN ORGANIZED HEALTH CARE EDUCATION/TRAINING PROGRAM

## 2025-04-17 PROCEDURE — 25000003 PHARM REV CODE 250: Mod: HCNC

## 2025-04-17 RX ORDER — PROPOFOL 10 MG/ML
VIAL (ML) INTRAVENOUS
Status: DISCONTINUED
Start: 2025-04-17 | End: 2025-04-17 | Stop reason: HOSPADM

## 2025-04-17 RX ORDER — PROPOFOL 10 MG/ML
VIAL (ML) INTRAVENOUS
Status: DISCONTINUED | OUTPATIENT
Start: 2025-04-17 | End: 2025-04-17

## 2025-04-17 RX ORDER — LIDOCAINE HYDROCHLORIDE 20 MG/ML
INJECTION INTRAVENOUS
Status: DISCONTINUED | OUTPATIENT
Start: 2025-04-17 | End: 2025-04-17

## 2025-04-17 RX ORDER — LIDOCAINE HYDROCHLORIDE 20 MG/ML
INJECTION, SOLUTION EPIDURAL; INFILTRATION; INTRACAUDAL; PERINEURAL
Status: DISCONTINUED
Start: 2025-04-17 | End: 2025-04-17 | Stop reason: HOSPADM

## 2025-04-17 RX ADMIN — LIDOCAINE HYDROCHLORIDE 100 MG: 20 INJECTION, SOLUTION INTRAVENOUS at 12:04

## 2025-04-17 RX ADMIN — PROPOFOL 40 MG: 10 INJECTION, EMULSION INTRAVENOUS at 12:04

## 2025-04-17 RX ADMIN — PROPOFOL 100 MG: 10 INJECTION, EMULSION INTRAVENOUS at 12:04

## 2025-04-17 RX ADMIN — SODIUM CHLORIDE: 0.9 INJECTION, SOLUTION INTRAVENOUS at 12:04

## 2025-04-17 NOTE — ANESTHESIA POSTPROCEDURE EVALUATION
Anesthesia Post Evaluation    Patient: Thierry Ho    Procedure(s) Performed: Procedure(s) (LRB):  COLONOSCOPY, SCREENING, HIGH RISK PATIENT (N/A)    Final Anesthesia Type: general      Patient location during evaluation: GI PACU  Patient participation: Yes- Able to Participate  Level of consciousness: awake and alert  Post-procedure vital signs: reviewed and stable  Airway patency: patent    PONV status at discharge: No PONV  Anesthetic complications: no      Cardiovascular status: blood pressure returned to baseline and hemodynamically stable  Respiratory status: unassisted, spontaneous ventilation and room air  Hydration status: euvolemic  Follow-up not needed.              Vitals Value Taken Time   /80 04/17/25 12:54   Temp 36.3 °C (97.4 °F) 04/17/25 12:39   Pulse 65 04/17/25 12:54   Resp 20 04/17/25 12:54   SpO2 96 % 04/17/25 12:54         No case tracking events are documented in the log.      Pain/Estiven Score: Estiven Score: 10 (4/17/2025 12:54 PM)

## 2025-04-17 NOTE — PROVATION PATIENT INSTRUCTIONS
Discharge Summary/Instructions after an Endoscopic Procedure  Patient Name: Thierry Ho  Patient MRN: [id number]  Patient YOB: 1960 Thursday, April 17, 2025  Angela Nayak MD  Dear patient,  As a result of recent federal legislation (The Federal Cures Act), you may   receive lab or pathology results from your procedure in your [facility]   account before your physician is able to contact you. Your physician or   their representative will relay the results to you with their   recommendations at their soonest availability.  Thank you,  RESTRICTIONS:  During your procedure today, you received medications for sedation.  These   medications may affect your judgment, balance and coordination.  Therefore,   for 24 hours, you have the following restrictions:   - DO NOT drive a car, operate machinery, make legal/financial decisions,   sign important papers or drink alcohol.    ACTIVITY:  Today: no heavy lifting, straining or running due to procedural   sedation/anesthesia.  The following day: return to full activity including work.  DIET:  Eat and drink normally unless instructed otherwise.     TREATMENT FOR COMMON SIDE EFFECTS:  - Mild abdominal pain, nausea, belching, bloating or excessive gas:  rest,   eat lightly and use a heating pad.  - Sore Throat: treat with throat lozenges and/or gargle with warm salt   water.  - Because air was used during the procedure, expelling large amounts of air   from your rectum or belching is normal.  - If a bowel prep was taken, you may not have a bowel movement for 1-3 days.    This is normal.  SYMPTOMS TO WATCH FOR AND REPORT TO YOUR PHYSICIAN:  1. Abdominal pain or bloating, other than gas cramps.  2. Chest pain.  3. Back pain.  4. Signs of infection such as: chills or fever occurring within 24 hours   after the procedure.  5. Rectal bleeding, which would show as bright red, maroon, or black stools.   (A tablespoon of blood from the rectum is not serious, especially  if   hemorrhoids are present.)  6. Vomiting.  7. Weakness or dizziness.  GO DIRECTLY TO THE NEAREST EMERGENCY ROOM IF YOU HAVE ANY OF THE FOLLOWING:      Difficulty breathing              Chills and/or fever over 101 F   Persistent vomiting and/or vomiting blood   Severe abdominal pain   Severe chest pain   Black, tarry stools   Bleeding- more than one tablespoon   Any other symptom or condition that you feel may need urgent attention  Your doctor recommends these additional instructions:  If any biopsies were taken, your doctors clinic will contact you in 1 to 2   weeks with any results.  - Patient has a contact number available for emergencies.  The signs and   symptoms of potential delayed complications were discussed with the   patient.  Return to normal activities tomorrow.  Written discharge   instructions were provided to the patient.   - Discharge patient to home.   - Resume previous diet.   - Continue present medications.   - Repeat colonoscopy at the next available appointment because the bowel   preparation was poor. The patient ate solid food the entire day prior,   would recommend strict adherence to prep instructions.  For questions, problems or results please call your physician - Angela Nayak MD at Work:  [phone].  [facility] can be reached at [phone]     IF A COMPLICATION OR EMERGENCY SITUATION ARISES AND YOU ARE UNABLE TO REACH   YOUR PHYSICIAN - GO DIRECTLY TO THE EMERGENCY ROOM.  MD Angela Baca MD  4/17/2025 12:49:45 PM  This report has been verified and signed electronically.  Dear patient,  As a result of recent federal legislation (The Federal Cures Act), you may   receive lab or pathology results from your procedure in your [facility] [employer] [facility]   account before your physician is able to contact you. Your physician or   their representative will relay the results to you with their   recommendations at their soonest  availability.  Thank you,  PROVATION

## 2025-04-17 NOTE — H&P
Short Stay Endoscopy History and Physical    PCP - Rhonda Krishnan MD    Procedure - Colonoscopy  ASA - per anesthesia  Mallampati - per anesthesia  Plan of anesthesia - MAC    HPI:  This is a 64 y.o. male here for evaluation of : personal history of colon polyps    ROS:  Constitutional: No fevers, chills  CV: No chest pain  Pulm: No cough  Ophtho: No vision changes  GI: see HPI  Derm: No rash    Medical History:  has a past medical history of Colon polyps, Diabetes mellitus, Diabetes with neurologic complications, DM retinopathy, GERD (gastroesophageal reflux disease), Hyperlipidemia, Hypertension, and Myocardial infarction.    Surgical History:  has a past surgical history that includes Back surgery (11/2017); Colonoscopy (N/A, 10/19/2018); Appendectomy; Foot surgery; Open reduction and internal fixation (ORIF) of injury of ankle (Right, 7/17/2019); Incision and drainage of groin (Right, 6/25/2020); Excision of skin (N/A, 6/25/2020); Colonoscopy (N/A, 2/22/2021); and Epidural steroid injection into lumbar spine (N/A, 5/10/2024).    Family History: family history includes Breast cancer in his mother; Cancer in his brother and sister; Diabetes in his father; Heart disease in his mother.. Otherwise no colon cancer, inflammatory bowel disease, or GI malignancies.    Social History:  reports that he quit smoking about 21 months ago. His smoking use included cigarettes. He started smoking about 44 years ago. He has a 10.6 pack-year smoking history. He has never used smokeless tobacco. He reports current alcohol use of about 4.0 standard drinks of alcohol per week. He reports that he does not currently use drugs after having used the following drugs: Marijuana.    Review of patient's allergies indicates:   Allergen Reactions    Trulicity [dulaglutide] Nausea Only     dizzy       Medications:   Prescriptions Prior to Admission[1]      Vital Signs:   Vitals:    04/17/25 1114   BP: (!) 140/89   Pulse: 75   Resp: 18   Temp:  "98.1 °F (36.7 °C)       General Appearance: Well appearing in no acute distress  Eyes:    No scleral icterus  ENT: atraumatic  Abdomen: Soft, nondistended  Extremities: no tenderness  Skin: normal color    Labs:  Lab Results   Component Value Date    WBC 11.62 10/25/2024    HGB 12.9 (L) 10/25/2024    HCT 40.5 10/25/2024     10/25/2024    CHOL 116 (L) 12/28/2023    TRIG 114 12/28/2023    HDL 36 (L) 12/28/2023    ALT 43 10/15/2024    AST 47 (H) 10/15/2024     10/25/2024    K 4.5 10/25/2024     (H) 10/25/2024    CREATININE 1.4 10/25/2024    BUN 22 10/25/2024    CO2 21 (L) 10/25/2024    TSH 1.563 06/28/2024    INR 1.0 07/18/2023    HGBA1C 6.2 (H) 10/12/2024       I have explained the risks and benefits of endoscopy procedures to the patient/their POA including but not limited to bleeding, perforation, infection, and death.  The patient/their POA was asked if they understand and allowed to ask any further questions to their satisfaction.    Angela Nayak MD         [1]   Medications Prior to Admission   Medication Sig Dispense Refill Last Dose/Taking    carvediloL (COREG) 25 MG tablet Take 1 tablet (25 mg total) by mouth 2 (two) times daily with meals. 180 tablet 3 4/16/2025    hydrALAZINE (APRESOLINE) 25 MG tablet TAKE 1 TABLET EVERY 12 HOURS 180 tablet 3 4/16/2025    lisinopriL (PRINIVIL,ZESTRIL) 40 MG tablet Take 1 tablet (40 mg total) by mouth once daily. 90 tablet 3 4/16/2025    metFORMIN (GLUCOPHAGE) 1000 MG tablet Take 1 tablet (1,000 mg total) by mouth 2 (two) times daily with meals. 180 tablet 3 4/16/2025    acetaminophen (TYLENOL) 500 MG tablet Take 500 mg by mouth every 6 (six) hours as needed for Pain.       aspirin (ECOTRIN) 81 MG EC tablet Take 81 mg by mouth once daily.   More than a month    BD ULTRA-FINE FREDA PEN NEEDLE 32 gauge x 5/32" Ndle USE AS DIRECTED 2 TIMES DAILY WITH LEVEMIR 200 each 3     blood pressure test kit-large Kit Blood pressure kit to use indefinitely. Measure " blood pressure every day at least 2 hours after taking medication 1 each 0     blood sugar diagnostic (TRUE METRIX GLUCOSE TEST STRIP) Strp 1 strip by Misc.(Non-Drug; Combo Route) route 2 (two) times a day. 200 each 11     blood-glucose meter kit 1 each by Other route 4 (four) times daily before meals and nightly. 1 each 0     ciclopirox (PENLAC) 8 % Soln Apply topically nightly. (Patient not taking: Reported on 12/3/2024) 1 Bottle 3     cyclobenzaprine (FLEXERIL) 5 MG tablet Take 1 tablet (5 mg total) by mouth nightly as needed for Muscle spasms. 90 tablet 11     insulin glargine U-100, Lantus, (LANTUS SOLOSTAR U-100 INSULIN) 100 unit/mL (3 mL) InPn pen Inject 10 Units into the skin 2 (two) times a day. 6 mL 0     LIDOcaine (LIDODERM) 5 % Place 1 patch onto the skin once daily. Remove & Discard patch within 12 hours or as directed by MD 30 patch 1     omeprazole (PRILOSEC) 20 MG capsule Take 1 capsule (20 mg total) by mouth once daily. 90 capsule 3     pregabalin (LYRICA) 100 MG capsule Take 1 capsule (100 mg total) by mouth 2 (two) times daily. 180 capsule 0     sildenafiL (VIAGRA) 100 MG tablet Take 1 tablet (100 mg total) by mouth daily as needed for Erectile Dysfunction. 30 tablet 5     simvastatin (ZOCOR) 40 MG tablet TAKE 1 TABLET EVERY EVENING 90 tablet 3     tirzepatide (MOUNJARO) 2.5 mg/0.5 mL PnIj Inject 2.5 mg into the skin every 7 days. 2 mL 2 More than a month    TRUEPLUS LANCETS 33 gauge Misc Check glucose twice daily 100 each 3

## 2025-04-17 NOTE — ANESTHESIA PREPROCEDURE EVALUATION
04/17/2025  Thierry Ho is a 64 y.o., male.      Pre-op Assessment    I have reviewed the Patient Summary Reports.     I have reviewed the Nursing Notes. I have reviewed the NPO Status.   I have reviewed the Medications.     Review of Systems  Anesthesia Hx:  No problems with previous Anesthesia             Denies Family Hx of Anesthesia complications.    Denies Personal Hx of Anesthesia complications.                    Social:  Social Alcohol Use, Recreational Drugs, Smoker H/o cocaine abuse      Cardiovascular:     Hypertension   CAD           hyperlipidemia                               Pulmonary:  Pulmonary Normal                       Renal/:  Chronic Renal Disease                Hepatic/GI:     GERD                Musculoskeletal:         Spine Disorders: lumbar            Neurological:    Neuromuscular Disease,                                   Endocrine:  Diabetes, type 2         Obesity / BMI > 30      Physical Exam  General: Oriented, Alert and Cooperative    Airway:  Mallampati: II   Mouth Opening: Normal  TM Distance: Normal  Tongue: Normal  Neck ROM: Normal ROM    Dental:  Intact      Wt Readings from Last 3 Encounters:   03/01/25 109.8 kg (242 lb)   12/06/24 109.8 kg (242 lb)   12/03/24 110.6 kg (243 lb 13.3 oz)     Temp Readings from Last 3 Encounters:   04/17/25 36.7 °C (98.1 °F) (Oral)   12/03/24 35.9 °C (96.6 °F) (Temporal)   10/25/24 36.7 °C (98 °F) (Oral)     BP Readings from Last 3 Encounters:   04/17/25 (!) 140/89   12/03/24 (!) 164/92   11/15/24 128/78     Pulse Readings from Last 3 Encounters:   04/17/25 75   12/03/24 80   11/15/24 77         Anesthesia Plan  Type of Anesthesia, risks & benefits discussed:    Anesthesia Type: Gen Natural Airway  Intra-op Monitoring Plan: Standard ASA Monitors  Induction:  IV  Informed Consent: Informed consent signed with the Patient and all  parties understand the risks and agree with anesthesia plan.  All questions answered. Patient consented to blood products? No  ASA Score: 3    Ready For Surgery From Anesthesia Perspective.     .

## 2025-04-17 NOTE — TRANSFER OF CARE
Anesthesia Transfer of Care Note    Patient: Thierry Ho    Procedure(s) Performed: Procedure(s) (LRB):  COLONOSCOPY, SCREENING, HIGH RISK PATIENT (N/A)    Patient location: GI    Anesthesia Type: general    Transport from OR: Transported from OR on room air with adequate spontaneous ventilation    Post pain: adequate analgesia    Post assessment: no apparent anesthetic complications and tolerated procedure well    Post vital signs: stable    Level of consciousness: responds to stimulation and lethargic    Nausea/Vomiting: no nausea/vomiting    Complications: none    Transfer of care protocol was followed      Last vitals: Visit Vitals  /68 (BP Location: Left arm, Patient Position: Lying)   Pulse 69   Temp 36.3 °C (97.4 °F) (Temporal)   Resp 12   SpO2 95%

## 2025-04-21 ENCOUNTER — TELEPHONE (OUTPATIENT)
Dept: ENDOSCOPY | Facility: HOSPITAL | Age: 65
End: 2025-04-21
Payer: MEDICARE

## 2025-04-21 DIAGNOSIS — Z12.11 SPECIAL SCREENING FOR MALIGNANT NEOPLASMS, COLON: Primary | ICD-10-CM

## 2025-04-21 DIAGNOSIS — Z12.11 ENCOUNTER FOR SCREENING COLONOSCOPY FOR NON-HIGH-RISK PATIENT: Primary | ICD-10-CM

## 2025-04-21 RX ORDER — POLYETHYLENE GLYCOL 3350, SODIUM SULFATE ANHYDROUS, SODIUM BICARBONATE, SODIUM CHLORIDE, POTASSIUM CHLORIDE 236; 22.74; 6.74; 5.86; 2.97 G/4L; G/4L; G/4L; G/4L; G/4L
4 POWDER, FOR SOLUTION ORAL ONCE
Qty: 4000 ML | Refills: 0 | Status: SHIPPED | OUTPATIENT
Start: 2025-04-21 | End: 2025-04-21

## 2025-04-21 NOTE — TELEPHONE ENCOUNTER
Referral for procedure from  last procedure report - Pt due for follow up procedure      Spoke to pt  to reschedule procedure(s) Colonoscopy       Physician to perform procedure(s) Dr. SAULO Nayak  Date of Procedure (s) 5/8/25  Arrival Time 1:00 PM  Time of Procedure(s) 2:00 PM   Location of Procedure(s) [place] 4th Floor  Type of Rx Prep sent to patient: PEG  Instructions provided to patient via [facility]    Patient was informed on the following information and verbalized understanding. Screening questionnaire reviewed with patient and complete. If procedure requires anesthesia, a responsible adult needs to be present to accompany the patient home, patient cannot drive after receiving anesthesia. Appointment details are tentative, especially check-in time. Patient will receive a prep-op call 7 days prior to confirm check-in time for procedure. If applicable the patient should contact their pharmacy to verify Rx for procedure prep is ready for pick-up. Patient was advised to call the scheduling department at [phone] if pharmacy states no Rx is available. Patient was advised to call the endoscopy scheduling department if any questions or concerns arise.      [facility] Scheduling Department

## 2025-04-22 VITALS
TEMPERATURE: 97 F | OXYGEN SATURATION: 97 % | SYSTOLIC BLOOD PRESSURE: 158 MMHG | DIASTOLIC BLOOD PRESSURE: 86 MMHG | RESPIRATION RATE: 20 BRPM | HEART RATE: 67 BPM

## 2025-04-30 DIAGNOSIS — E11.9 TYPE 2 DIABETES MELLITUS WITHOUT COMPLICATION: ICD-10-CM

## 2025-05-07 ENCOUNTER — TELEPHONE (OUTPATIENT)
Dept: ENDOSCOPY | Facility: HOSPITAL | Age: 65
End: 2025-05-07
Payer: MEDICARE

## 2025-05-07 NOTE — TELEPHONE ENCOUNTER
Patient is rescheduled for a Colonoscopy on 5/15/25 with Dr. MEREDITH Squires  Referral for procedure from  last procedure report - Pt due for follow up procedure/poor prep

## 2025-05-08 RX ORDER — POLYETHYLENE GLYCOL 3350, SODIUM SULFATE ANHYDROUS, SODIUM BICARBONATE, SODIUM CHLORIDE, POTASSIUM CHLORIDE 236; 22.74; 6.74; 5.86; 2.97 G/4L; G/4L; G/4L; G/4L; G/4L
4 POWDER, FOR SOLUTION ORAL ONCE
Qty: 4000 ML | Refills: 0 | Status: SHIPPED | OUTPATIENT
Start: 2025-05-08 | End: 2025-05-08

## 2025-05-13 ENCOUNTER — ANESTHESIA EVENT (OUTPATIENT)
Dept: ENDOSCOPY | Facility: HOSPITAL | Age: 65
End: 2025-05-13
Payer: MEDICARE

## 2025-05-14 NOTE — H&P
Short Stay Endoscopy History and Physical    PCP - Rhonda Krishnan MD    Procedure - Colonoscopy  ASA - per anesthesia  Mallampati - per anesthesia  History of Anesthesia problems - no  Family history Anesthesia problems - no   Plan of anesthesia - General    HPI:  This is a 65 y.o. male here for evaluation of : personal history of colon polyps      ROS:  Constitutional: No fevers, chills, No weight loss  CV: No chest pain  Pulm: No cough, No shortness of breath  GI: see HPI  Derm: No rash    Medical History:  has a past medical history of Colon polyps, Diabetes mellitus, Diabetes with neurologic complications, DM retinopathy, GERD (gastroesophageal reflux disease), Hyperlipidemia, Hypertension, and Myocardial infarction.    Surgical History:  has a past surgical history that includes Back surgery (11/2017); Colonoscopy (N/A, 10/19/2018); Appendectomy; Foot surgery; Open reduction and internal fixation (ORIF) of injury of ankle (Right, 7/17/2019); Incision and drainage of groin (Right, 6/25/2020); Excision of skin (N/A, 6/25/2020); Colonoscopy (N/A, 2/22/2021); Epidural steroid injection into lumbar spine (N/A, 5/10/2024); and colonoscopy, screening, high risk patient (N/A, 4/17/2025).    Family History: family history includes Breast cancer in his mother; Cancer in his brother and sister; Diabetes in his father; Heart disease in his mother.. Otherwise no colon cancer, inflammatory bowel disease, or GI malignancies.    Social History:  reports that he quit smoking about 21 months ago. His smoking use included cigarettes. He started smoking about 44 years ago. He has a 10.6 pack-year smoking history. He has never used smokeless tobacco. He reports current alcohol use of about 4.0 standard drinks of alcohol per week. He reports that he does not currently use drugs after having used the following drugs: Marijuana.    Review of patient's allergies indicates:   Allergen Reactions    Trulicity [dulaglutide] Nausea Only      dizzy       Medications:   Prescriptions Prior to Admission[1]      Physical Exam:    Vital Signs: There were no vitals filed for this visit.    Gen: NAD, lying comfortably  HENT: NCAT, oropharynx clear  Eyes: anicteric sclerae, EOMI grossly  Neck: supple, no visible masses/goiter  Cardiac: RRR  Lungs: non-labored breathing  Abd: soft, NT/ND, normoactive BS  Ext: no LE edema, warm, well perfused  Skin: skin intact on exposed body parts, no visible rashes, lesions  Neuro: A&Ox4, neuro exam grossly intact, moves all extremities  Psych: appropriate mood, affect        Labs:  Lab Results   Component Value Date    WBC 11.62 10/25/2024    HGB 12.9 (L) 10/25/2024    HCT 40.5 10/25/2024     10/25/2024    CHOL 116 (L) 12/28/2023    TRIG 114 12/28/2023    HDL 36 (L) 12/28/2023    ALT 43 10/15/2024    AST 47 (H) 10/15/2024     10/25/2024    K 4.5 10/25/2024     (H) 10/25/2024    CREATININE 1.4 10/25/2024    BUN 22 10/25/2024    CO2 21 (L) 10/25/2024    TSH 1.563 06/28/2024    INR 1.0 07/18/2023    HGBA1C 6.2 (H) 10/12/2024       Plan:  Colonoscopy for a history of colon polyps.    I have explained the risks and benefits of endoscopy procedures to the patient including but not limited to bleeding, perforation, infection, and death.  The patient was asked if they understand and allowed to ask any further questions to their satisfaction.    Bertha Squires MD         [1]   No medications prior to admission.

## 2025-05-15 ENCOUNTER — ANESTHESIA (OUTPATIENT)
Dept: ENDOSCOPY | Facility: HOSPITAL | Age: 65
End: 2025-05-15
Payer: MEDICARE

## 2025-05-15 ENCOUNTER — HOSPITAL ENCOUNTER (OUTPATIENT)
Facility: HOSPITAL | Age: 65
Discharge: HOME | End: 2025-05-15
Attending: INTERNAL MEDICINE | Admitting: INTERNAL MEDICINE
Payer: MEDICARE

## 2025-05-15 VITALS
TEMPERATURE: 98 F | HEART RATE: 75 BPM | SYSTOLIC BLOOD PRESSURE: 156 MMHG | RESPIRATION RATE: 18 BRPM | OXYGEN SATURATION: 97 % | DIASTOLIC BLOOD PRESSURE: 75 MMHG

## 2025-05-15 DIAGNOSIS — Z12.11 SPECIAL SCREENING FOR MALIGNANT NEOPLASMS, COLON: ICD-10-CM

## 2025-05-15 DIAGNOSIS — Z12.11 COLON CANCER SCREENING: Primary | ICD-10-CM

## 2025-05-15 PROCEDURE — 37000009 HC ANESTHESIA EA ADD 15 MINS: Mod: HCNC | Performed by: INTERNAL MEDICINE

## 2025-05-15 PROCEDURE — 27201012 HC FORCEPS, HOT/COLD, DISP: Mod: HCNC | Performed by: INTERNAL MEDICINE

## 2025-05-15 PROCEDURE — 25000003 PHARM REV CODE 250: Mod: HCNC | Performed by: NURSE ANESTHETIST, CERTIFIED REGISTERED

## 2025-05-15 PROCEDURE — 45385 COLONOSCOPY W/LESION REMOVAL: CPT | Mod: PT,HCNC | Performed by: INTERNAL MEDICINE

## 2025-05-15 PROCEDURE — 27201089 HC SNARE, DISP (ANY): Mod: HCNC | Performed by: INTERNAL MEDICINE

## 2025-05-15 PROCEDURE — 63600175 PHARM REV CODE 636 W HCPCS: Mod: HCNC | Performed by: NURSE ANESTHETIST, CERTIFIED REGISTERED

## 2025-05-15 PROCEDURE — 88305 TISSUE EXAM BY PATHOLOGIST: CPT | Mod: TC,HCNC | Performed by: INTERNAL MEDICINE

## 2025-05-15 PROCEDURE — 37000008 HC ANESTHESIA 1ST 15 MINUTES: Mod: HCNC | Performed by: INTERNAL MEDICINE

## 2025-05-15 PROCEDURE — 45385 COLONOSCOPY W/LESION REMOVAL: CPT | Mod: PT,HCNC,, | Performed by: INTERNAL MEDICINE

## 2025-05-15 PROCEDURE — 82962 GLUCOSE BLOOD TEST: CPT | Mod: HCNC | Performed by: INTERNAL MEDICINE

## 2025-05-15 RX ORDER — LIDOCAINE HYDROCHLORIDE 20 MG/ML
INJECTION INTRAVENOUS
Status: DISCONTINUED | OUTPATIENT
Start: 2025-05-15 | End: 2025-05-15

## 2025-05-15 RX ORDER — PROPOFOL 10 MG/ML
VIAL (ML) INTRAVENOUS
Status: DISCONTINUED | OUTPATIENT
Start: 2025-05-15 | End: 2025-05-15

## 2025-05-15 RX ORDER — PROPOFOL 10 MG/ML
VIAL (ML) INTRAVENOUS
Status: DISCONTINUED
Start: 2025-05-15 | End: 2025-05-15 | Stop reason: HOSPADM

## 2025-05-15 RX ORDER — LIDOCAINE HYDROCHLORIDE 20 MG/ML
INJECTION, SOLUTION EPIDURAL; INFILTRATION; INTRACAUDAL; PERINEURAL
Status: DISCONTINUED
Start: 2025-05-15 | End: 2025-05-15 | Stop reason: HOSPADM

## 2025-05-15 RX ORDER — PROPOFOL 10 MG/ML
VIAL (ML) INTRAVENOUS CONTINUOUS PRN
Status: DISCONTINUED | OUTPATIENT
Start: 2025-05-15 | End: 2025-05-15

## 2025-05-15 RX ORDER — HYDRALAZINE HYDROCHLORIDE 20 MG/ML
INJECTION INTRAMUSCULAR; INTRAVENOUS
Status: COMPLETED
Start: 2025-05-15 | End: 2025-05-15

## 2025-05-15 RX ORDER — HYDRALAZINE HYDROCHLORIDE 20 MG/ML
INJECTION INTRAMUSCULAR; INTRAVENOUS
Status: DISCONTINUED | OUTPATIENT
Start: 2025-05-15 | End: 2025-05-15

## 2025-05-15 RX ADMIN — HYDRALAZINE HYDROCHLORIDE 5 MG: 20 INJECTION, SOLUTION INTRAMUSCULAR; INTRAVENOUS at 11:05

## 2025-05-15 RX ADMIN — PROPOFOL 100 MG: 10 INJECTION, EMULSION INTRAVENOUS at 10:05

## 2025-05-15 RX ADMIN — LIDOCAINE HYDROCHLORIDE 50 MG: 20 INJECTION, SOLUTION INTRAVENOUS at 10:05

## 2025-05-15 RX ADMIN — PROPOFOL 150 MCG/KG/MIN: 10 INJECTION, EMULSION INTRAVENOUS at 10:05

## 2025-05-15 RX ADMIN — PROPOFOL 50 MG: 10 INJECTION, EMULSION INTRAVENOUS at 10:05

## 2025-05-15 RX ADMIN — HYDRALAZINE HYDROCHLORIDE 5 MG: 20 INJECTION, SOLUTION INTRAMUSCULAR; INTRAVENOUS at 10:05

## 2025-05-15 RX ADMIN — SODIUM CHLORIDE: 0.9 INJECTION, SOLUTION INTRAVENOUS at 10:05

## 2025-05-15 NOTE — PROVATION PATIENT INSTRUCTIONS
Discharge Summary/Instructions after an Endoscopic Procedure  Patient Name: Thierry Ho  Patient MRN: [id number]  Patient YOB: 1960  Thursday, May 15, 2025  Bertha Squires MD  Dear patient,  As a result of recent federal legislation (The Federal Cures Act), you may   receive lab or pathology results from your procedure in your [facility]   account before your physician is able to contact you. Your physician or   their representative will relay the results to you with their   recommendations at their soonest availability.  Thank you,  RESTRICTIONS:  During your procedure today, you received medications for sedation.  These   medications may affect your judgment, balance and coordination.  Therefore,   for 24 hours, you have the following restrictions:   - DO NOT drive a car, operate machinery, make legal/financial decisions,   sign important papers or drink alcohol.    ACTIVITY:  Today: no heavy lifting, straining or running due to procedural   sedation/anesthesia.  The following day: return to full activity including work.  DIET:  Eat and drink normally unless instructed otherwise.     TREATMENT FOR COMMON SIDE EFFECTS:  - Mild abdominal pain, nausea, belching, bloating or excessive gas:  rest,   eat lightly and use a heating pad.  - Sore Throat: treat with throat lozenges and/or gargle with warm salt   water.  - Because air was used during the procedure, expelling large amounts of air   from your rectum or belching is normal.  - If a bowel prep was taken, you may not have a bowel movement for 1-3 days.    This is normal.  SYMPTOMS TO WATCH FOR AND REPORT TO YOUR PHYSICIAN:  1. Abdominal pain or bloating, other than gas cramps.  2. Chest pain.  3. Back pain.  4. Signs of infection such as: chills or fever occurring within 24 hours   after the procedure.  5. Rectal bleeding, which would show as bright red, maroon, or black stools.   (A tablespoon of blood from the rectum is not serious, especially if    hemorrhoids are present.)  6. Vomiting.  7. Weakness or dizziness.  GO DIRECTLY TO THE NEAREST EMERGENCY ROOM IF YOU HAVE ANY OF THE FOLLOWING:      Difficulty breathing              Chills and/or fever over 101 F   Persistent vomiting and/or vomiting blood   Severe abdominal pain   Severe chest pain   Black, tarry stools   Bleeding- more than one tablespoon   Any other symptom or condition that you feel may need urgent attention  Your doctor recommends these additional instructions:  If any biopsies were taken, your doctors clinic will contact you in 1 to 2   weeks with any results.  - Discharge patient to home.   - Resume previous diet.   - Continue present medications.   - Await pathology results.   - Repeat colonoscopy in 1 year because the bowel preparation was   suboptimal.  For questions, problems or results please call your physician - Bertha Squires MD at Work:  [phone].  [facility] can be reached at [phone]     IF A COMPLICATION OR EMERGENCY SITUATION ARISES AND YOU ARE UNABLE TO REACH   YOUR PHYSICIAN - GO DIRECTLY TO THE EMERGENCY ROOM.  Bertha Squires MD  5/15/2025 11:29:19 AM  This report has been verified and signed electronically.  Dear patient,  As a result of recent federal legislation (The Federal Cures Act), you may   receive lab or pathology results from your procedure in your [facility]   account before your physician is able to contact you. Your physician or   their representative will relay the results to you with their   recommendations at their soonest availability.  Thank you,  PROVATION

## 2025-05-15 NOTE — ANESTHESIA POSTPROCEDURE EVALUATION
Anesthesia Post Evaluation    Patient: Thierry Ho    Procedure(s) Performed: Procedure(s) (LRB):  COLONOSCOPY, SCREENING, HIGH RISK PATIENT (N/A)    Final Anesthesia Type: general      Patient location during evaluation: GI PACU  Patient participation: Yes- Able to Participate  Level of consciousness: awake and alert  Post-procedure vital signs: reviewed and stable  Pain management: adequate  Airway patency: patent    PONV status at discharge: No PONV  Anesthetic complications: no      Cardiovascular status: hemodynamically stable  Respiratory status: unassisted and spontaneous ventilation  Hydration status: euvolemic  Follow-up not needed.              Vitals Value Taken Time   /75 05/15/25 11:58   Temp 36.4 °C (97.5 °F) 05/15/25 11:28   Pulse 75 05/15/25 11:58   Resp 18 05/15/25 11:58   SpO2 97 % 05/15/25 11:58         Event Time   Out of Recovery 12:11:29         Pain/Estiven Score: Estiven Score: 10 (5/15/2025 11:58 AM)

## 2025-05-15 NOTE — TRANSFER OF CARE
Anesthesia Transfer of Care Note    Patient: Thierry Ho    Procedure(s) Performed: Procedure(s) (LRB):  COLONOSCOPY, SCREENING, HIGH RISK PATIENT (N/A)    Patient location: GI    Anesthesia Type: general    Transport from OR: Transported from OR on room air with adequate spontaneous ventilation    Post pain: adequate analgesia    Post assessment: no apparent anesthetic complications and tolerated procedure well    Post vital signs: stable    Level of consciousness: awake, alert and oriented    Nausea/Vomiting: no nausea/vomiting    Complications: none    Transfer of care protocol was followed    Last vitals: Visit Vitals  BP (!) 154/79   Pulse 78   Temp 36.4 °C (97.5 °F)   Resp (!) 28   SpO2 98%

## 2025-05-15 NOTE — ANESTHESIA PREPROCEDURE EVALUATION
05/15/2025  Thierry Ho is a 65 y.o., male.      Pre-op Assessment    I have reviewed the Patient Summary Reports.     I have reviewed the Nursing Notes.       Review of Systems  Anesthesia Hx:  No problems with previous Anesthesia             Denies Family Hx of Anesthesia complications.    Denies Personal Hx of Anesthesia complications.                    Social:  Former Smoker       Cardiovascular:  Exercise tolerance: good   Hypertension  Past MI CAD           hyperlipidemia                               Pulmonary:  Pulmonary Normal                       Renal/:  Chronic Renal Disease, CKD                Hepatic/GI:     GERD, well controlled                Neurological:    Neuromuscular Disease,                                   Endocrine:  Diabetes           Psych:  Psychiatric History                  Physical Exam  General: Well nourished, Cooperative, Alert and Oriented    Airway:  Mallampati: III   Mouth Opening: Normal  TM Distance: 4 - 6 cm  Tongue: Normal  Neck ROM: Normal ROM    Dental:    Chest/Lungs:  Normal Respiratory Rate, Clear to auscultation    Heart:  Rate: Normal  Rhythm: Regular Rhythm      Wt Readings from Last 3 Encounters:   03/01/25 109.8 kg (242 lb)   12/06/24 109.8 kg (242 lb)   12/03/24 110.6 kg (243 lb 13.3 oz)     Temp Readings from Last 3 Encounters:   04/17/25 36.3 °C (97.4 °F) (Temporal)   12/03/24 35.9 °C (96.6 °F) (Temporal)   10/25/24 36.7 °C (98 °F) (Oral)     BP Readings from Last 3 Encounters:   04/17/25 (!) 158/86   12/03/24 (!) 164/92   11/15/24 128/78     Pulse Readings from Last 3 Encounters:   04/17/25 67   12/03/24 80   11/15/24 77         Anesthesia Plan  Type of Anesthesia, risks & benefits discussed:    Anesthesia Type: Gen Natural Airway, MAC, Gen ETT  Intra-op Monitoring Plan: Standard ASA Monitors  Post Op Pain Control Plan: multimodal  analgesia  Induction:  IV  Informed Consent: Informed consent signed with the Patient and all parties understand the risks and agree with anesthesia plan.  All questions answered.   ASA Score: 3  Day of Surgery Review of History & Physical: H&P Update referred to the surgeon/provider.    Ready For Surgery From Anesthesia Perspective.     .

## 2025-05-16 LAB — POCT GLUCOSE: 132 MG/DL (ref 70–110)

## 2025-05-22 LAB
ESTROGEN SERPL-MCNC: NORMAL PG/ML
INSULIN SERPL-ACNC: NORMAL U[IU]/ML
LAB AP CLINICAL INFORMATION: NORMAL
LAB AP GROSS DESCRIPTION: NORMAL
LAB AP PERFORMING LOCATION(S): NORMAL
LAB AP REPORT FOOTNOTES: NORMAL

## 2025-05-26 ENCOUNTER — RESULTS FOLLOW-UP (OUTPATIENT)
Dept: GASTROENTEROLOGY | Facility: CLINIC | Age: 65
End: 2025-05-26

## 2025-05-30 ENCOUNTER — OFFICE VISIT (OUTPATIENT)
Dept: FAMILY MEDICINE | Facility: CLINIC | Age: 65
End: 2025-05-30
Payer: MEDICARE

## 2025-05-30 ENCOUNTER — TELEPHONE (OUTPATIENT)
Dept: FAMILY MEDICINE | Facility: CLINIC | Age: 65
End: 2025-05-30

## 2025-05-30 VITALS
WEIGHT: 245.38 LBS | HEART RATE: 78 BPM | DIASTOLIC BLOOD PRESSURE: 78 MMHG | BODY MASS INDEX: 34.35 KG/M2 | HEIGHT: 71 IN | SYSTOLIC BLOOD PRESSURE: 118 MMHG | TEMPERATURE: 98 F | RESPIRATION RATE: 18 BRPM | OXYGEN SATURATION: 96 %

## 2025-05-30 DIAGNOSIS — M54.16 LUMBAR RADICULOPATHY: ICD-10-CM

## 2025-05-30 DIAGNOSIS — Z79.4 TYPE 2 DIABETES MELLITUS WITH BOTH EYES AFFECTED BY PROLIFERATIVE RETINOPATHY AND MACULAR EDEMA, WITH LONG-TERM CURRENT USE OF INSULIN: ICD-10-CM

## 2025-05-30 DIAGNOSIS — E11.3513 PROLIFERATIVE DIABETIC RETINOPATHY OF BOTH EYES WITH MACULAR EDEMA ASSOCIATED WITH TYPE 2 DIABETES MELLITUS: ICD-10-CM

## 2025-05-30 DIAGNOSIS — E11.3513 TYPE 2 DIABETES MELLITUS WITH BOTH EYES AFFECTED BY PROLIFERATIVE RETINOPATHY AND MACULAR EDEMA, WITH LONG-TERM CURRENT USE OF INSULIN: ICD-10-CM

## 2025-05-30 DIAGNOSIS — Z23 NEED FOR PNEUMOCOCCAL VACCINE: ICD-10-CM

## 2025-05-30 DIAGNOSIS — L40.9 SCALP PSORIASIS: ICD-10-CM

## 2025-05-30 DIAGNOSIS — H10.31 ACUTE CONJUNCTIVITIS OF RIGHT EYE, UNSPECIFIED ACUTE CONJUNCTIVITIS TYPE: Primary | ICD-10-CM

## 2025-05-30 DIAGNOSIS — N18.31 CKD STAGE 3A, GFR 45-59 ML/MIN: ICD-10-CM

## 2025-05-30 DIAGNOSIS — Z87.81 HISTORY OF FRACTURE OF FIBULA: ICD-10-CM

## 2025-05-30 PROBLEM — F14.10 COCAINE ABUSE: Status: RESOLVED | Noted: 2024-10-12 | Resolved: 2025-05-30

## 2025-05-30 PROBLEM — N17.9 AKI (ACUTE KIDNEY INJURY): Status: RESOLVED | Noted: 2024-10-13 | Resolved: 2025-05-30

## 2025-05-30 PROBLEM — E11.65 UNCONTROLLED TYPE 2 DIABETES MELLITUS WITH HYPERGLYCEMIA: Status: RESOLVED | Noted: 2020-05-01 | Resolved: 2025-05-30

## 2025-05-30 PROCEDURE — 99999 PR PBB SHADOW E&M-EST. PATIENT-LVL V: CPT | Mod: PBBFAC,HCNC,, | Performed by: INTERNAL MEDICINE

## 2025-05-30 RX ORDER — POLYETHYLENE GLYCOL-3350 AND ELECTROLYTES 236; 6.74; 5.86; 2.97; 22.74 G/274.31G; G/274.31G; G/274.31G; G/274.31G; G/274.31G
POWDER, FOR SOLUTION ORAL
COMMUNITY
Start: 2025-05-09 | End: 2025-05-30

## 2025-05-30 RX ORDER — SODIUM, POTASSIUM,MAG SULFATES 17.5-3.13G
SOLUTION, RECONSTITUTED, ORAL ORAL
COMMUNITY
Start: 2025-03-03 | End: 2025-05-30

## 2025-05-30 RX ORDER — POLYETHYLENE GLYCOL-3350 AND ELECTROLYTES WITH FLAVOR PACK 240; 5.84; 2.98; 6.72; 22.72 G/278.26G; G/278.26G; G/278.26G; G/278.26G; G/278.26G
POWDER, FOR SOLUTION ORAL
COMMUNITY
Start: 2025-05-07 | End: 2025-05-30

## 2025-05-30 RX ORDER — ERYTHROMYCIN 5 MG/G
OINTMENT OPHTHALMIC 3 TIMES DAILY
Qty: 3.5 G | Refills: 0 | Status: SHIPPED | OUTPATIENT
Start: 2025-05-30

## 2025-05-30 RX ORDER — AMOXICILLIN AND CLAVULANATE POTASSIUM 875; 125 MG/1; MG/1
1 TABLET, FILM COATED ORAL EVERY 12 HOURS
Qty: 10 TABLET | Refills: 0 | Status: SHIPPED | OUTPATIENT
Start: 2025-05-30 | End: 2025-06-04

## 2025-05-30 NOTE — PROGRESS NOTES
Chief Complaint: Follow-up and Facial Swelling (Pt states he went to the eye doctor yesterday and had some injections in his eye but later that night his right eye swelled shut and was causing pain )      Tiherry Ho  is a 65 y.o. year old patient who presents today for     History of Present Illness    CHIEF COMPLAINT:  Thierry presents today with eye swelling    EYE SYMPTOMS:  He experienced eye swelling with complete closure at 7 PM last night, initially with eyelid and eyeball pain which has since resolved. Vision remains perfect. He was evaluated by Dr. Wilson yesterday who administered eye injections.    MUSCULOSKELETAL:  He reports severe back pain that recently almost prompted an emergency room visit. He has arthritis affecting 6-7 vertebrae causing leg problems. Lyrica is not providing pain relief.    DERMATOLOGIC:  He reports an 11-year history of chronic scalp condition that turns black in certain areas, particularly in the back, when untreated.    SURGICAL HISTORY:  History of bilateral ankle surgery with steel plate and screws placement following ankle fractures.      ROS:  General: -fever, -chills, -fatigue, -weight gain, -weight loss  Eyes: -vision changes, -redness, -discharge, +eye swelling  ENT: -ear pain, -nasal congestion, -sore throat  Cardiovascular: -chest pain, -palpitations, -lower extremity edema  Respiratory: -cough, -shortness of breath  Gastrointestinal: -abdominal pain, -nausea, -vomiting, -diarrhea, -constipation, -blood in stool  Genitourinary: -dysuria, -hematuria, -frequency  Musculoskeletal: -joint pain, -muscle pain, +back pain  Skin: -rash, +lesion  Neurological: -headache, -dizziness, -numbness, -tingling  Psychiatric: -anxiety, -depression, -sleep difficulty         Past Medical History:   Diagnosis Date    Colon polyps     Diabetes mellitus     Diabetes with neurologic complications     DM retinopathy     GERD (gastroesophageal reflux disease)     Hyperlipidemia      Hypertension     Myocardial infarction        Past Surgical History:   Procedure Laterality Date    APPENDECTOMY      BACK SURGERY  11/2017    COLONOSCOPY N/A 10/19/2018    Procedure: COLONOSCOPY;  Surgeon: Frantz Rasmussen MD;  Location: [facility] ENDO;  Service: Endoscopy;  Laterality: N/A;    COLONOSCOPY N/A 2/22/2021    Procedure: COLONOSCOPY;  Surgeon: Dann Mahmood MD;  Location: [facility] ENDO;  Service: Endoscopy;  Laterality: N/A;  covid test [place] 2/19, instructions mailed -ml    COLONOSCOPY, SCREENING, HIGH RISK PATIENT N/A 4/17/2025    Procedure: COLONOSCOPY, SCREENING, HIGH RISK PATIENT;  Surgeon: Angela Nayak MD;  Location: [facility] ENDO;  Service: Endoscopy;  Laterality: N/A;    COLONOSCOPY, SCREENING, HIGH RISK PATIENT N/A 5/15/2025    Procedure: COLONOSCOPY, SCREENING, HIGH RISK PATIENT;  Surgeon: Bertha Squires MD;  Location: [facility] ENDO;  Service: Gastroenterology;  Laterality: N/A;  repeat colonoscopy poor prep, make sure pt understands instructions, peg sent to pt portal. reviewed with pt and he repeated back to me correctly. cf  5/7-pt r/s, updated instructions sent to portal-KPvt    EPIDURAL STEROID INJECTION INTO LUMBAR SPINE N/A 5/10/2024    Procedure: L4-5 Epidural Steroid Injection (ref: Chavo);  Surgeon: David Stringer Jr., MD;  Location: [facility] PAIN MANAGEMENT;  Service: Pain Management;  Laterality: N/A;  @1200  ASA last 5/4  DM  Needs Consent    EXCISION OF SKIN N/A 6/25/2020    Procedure: EXCISION, SKIN;  Surgeon: Finesse Dumont MD;  Location: [facility] OR;  Service: General;  Laterality: N/A;  on back    FOOT SURGERY      INCISION AND DRAINAGE OF GROIN Right 6/25/2020    Procedure: INCISION AND DRAINAGE, INGUINAL REGION;  Surgeon: Finesse Dumont MD;  Location: [facility] OR;  Service: General;  Laterality: Right;  PT TO PREOP IN AM  PRE-OP BY RN 6-    OPEN REDUCTION AND INTERNAL FIXATION (ORIF) OF INJURY OF ANKLE Right 7/17/2019    Procedure: ORIF, ANKLE;  Surgeon:  Raeann Fontaine MD;  [facility];  Service: Orthopedics;  Laterality: Right;  SARITA  CRISTOFER GALAN  [phone] TEXTED HIM @ 10:2 AM ON 19  SKELETAL  RN PREOP 19---- [facility] MORNING OF SURGERY PER DR FONTAINE        Family History   Problem Relation Name Age of Onset    Heart disease Mother      Breast cancer Mother      Diabetes Father      Cancer Sister 4     Cancer Brother 2         Social History     Socioeconomic History    Marital status:     Number of children: 5    Highest education level: GED or equivalent   Tobacco Use    Smoking status: Former     Current packs/day: 0.00     Average packs/day: 0.2 packs/day for 42.5 years (10.6 ttl pk-yrs)     Types: Cigarettes     Start date: 1/15/1981     Quit date: 2023     Years since quittin.8    Smokeless tobacco: Never   Substance and Sexual Activity    Alcohol use: Yes     Alcohol/week: 4.0 standard drinks of alcohol     Types: 4 Cans of beer per week     Comment: social     Drug use: Not Currently     Types: Marijuana     Comment: quit marijuana    Sexual activity: Not Currently     Partners: Female     Social Drivers of Health     Financial Resource Strain: Low Risk  (10/11/2024)    Overall Financial Resource Strain (CARDIA)     Difficulty of Paying Living Expenses: Not hard at all   Food Insecurity: No Food Insecurity (10/11/2024)    Hunger Vital Sign     Worried About Running Out of Food in the Last Year: Never true     Ran Out of Food in the Last Year: Never true   Transportation Needs: No Transportation Needs (10/11/2024)    TRANSPORTATION NEEDS     Transportation : No   Physical Activity: Inactive (3/17/2023)    Exercise Vital Sign     Days of Exercise per Week: 0 days     Minutes of Exercise per Session: 0 min   Stress: No Stress Concern Present (10/11/2024)    [employer] [place] [employer] - Occupational Stress Questionnaire     Feeling of Stress : Not at all   Housing Stability: Unknown (10/11/2024)    Housing  Stability Vital Sign     Unable to Pay for Housing in the Last Year: No     Homeless in the Last Year: No       Current Medications[1]           Objective:      Vitals:    05/30/25 1133   BP: 118/78   Pulse: 78   Resp: 18   Temp: 97.7 °F (36.5 °C)       Physical Exam  Constitutional:       Appearance: Normal appearance.   HENT:      Head: Normocephalic and atraumatic.   Eyes:      Conjunctiva/sclera:      Right eye: Right conjunctiva is injected (lower eyelid).        Comments: Lower eye lid swelling, soft and non tender   Cardiovascular:      Rate and Rhythm: Normal rate.   Musculoskeletal:         General: Normal range of motion.   Skin:     General: Skin is warm and dry.   Neurological:      General: No focal deficit present.      Mental Status: He is alert and oriented to person, place, and time.          Assessment:       1. Acute conjunctivitis of right eye, unspecified acute conjunctivitis type    2. Need for pneumococcal vaccine    3. CKD stage 3a, GFR 45-59 ml/min    4. Lumbar radiculopathy    5. Type 2 diabetes mellitus with both eyes affected by proliferative retinopathy and macular edema, with long-term current use of insulin    6. Proliferative diabetic retinopathy of both eyes with macular edema associated with type 2 diabetes mellitus    7. Scalp psoriasis    8. History of fracture of fibula          Plan:   1. Acute conjunctivitis of right eye, unspecified acute conjunctivitis type  Assessment & Plan:  - Eye is slightly swollen and significantly erythematous, with reports that the eyelid was shut last night.  - Assessment indicates swelling is likely due to infection of glands under eyelid, possibly related to recent eye injections.    Orders:  -     amoxicillin-clavulanate 875-125mg (AUGMENTIN) 875-125 mg per tablet; Take 1 tablet by mouth every 12 (twelve) hours. for 5 days  Dispense: 10 tablet; Refill: 0  -     erythromycin (ROMYCIN) ophthalmic ointment; Place into the right eye 3 (three) times  daily.  Dispense: 3.5 g; Refill: 0    2. Need for pneumococcal vaccine  -     pneumoc 20-lauryn conj-dip cr(PF) (PREVNAR-20 (PF)) injection Syrg 0.5 mL    3. CKD stage 3a, GFR 45-59 ml/min  -     Comprehensive Metabolic Panel; Future; Expected date: 05/30/2025  -     Uric Acid; Future; Expected date: 05/30/2025  -     Calcitriol; Future; Expected date: 05/30/2025    4. Lumbar radiculopathy  Assessment & Plan:  Chronic, unresolved.   - Thierry reports arthritis affecting 6-7 vertebrae causing leg problems and intermittent severe back pain, though not currently symptomatic.  - Discontinued Lyrica (pregabalin) due to ineffectiveness despite twice daily dosing.  - Referred patient to pain management clinic for comprehensive management of chronic back pain and to a chiropractor (patient to identify one that accepts their insurance).    Orders:  -     Ambulatory referral/consult to Pain Clinic; Future; Expected date: 06/06/2025  -     Ambulatory referral/consult to Chiropractic; Future; Expected date: 06/06/2025    5. Type 2 diabetes mellitus with both eyes affected by proliferative retinopathy and macular edema, with long-term current use of insulin  Assessment & Plan:  - A1C was last checked 7 months ago and current level is unknown.  - Ordered A1C lab test to assess glycemic control, with appointment scheduled for Monday.  - Follow up in 2 months to review results and adjust treatment plan as needed.    Orders:  -     CBC Auto Differential; Future; Expected date: 05/30/2025  -     Hemoglobin A1C; Future; Expected date: 05/30/2025  -     Lipid Panel; Future; Expected date: 05/30/2025    6. Proliferative diabetic retinopathy of both eyes with macular edema associated with type 2 diabetes mellitus  Assessment & Plan:  - Thierry receives regular eye injections to manage swelling related to diabetic retinopathy.  - Evaluated reported eye swelling and redness, though vision remains intact.  - Prescribed short course of antibiotics  "and eye drops for current eye infection.      7. Scalp psoriasis  Assessment & Plan:  - Thierry reports black discoloration on scalp when not using medication.  - Discussed various topical treatment options.  - Unable to identify specific medication patient has been using for chronic condition.  - Thierry instructed to contact office with name of scalp medication for refill.      8. History of fracture of fibula  Assessment & Plan:  - Thierry has history of ankle fractures with steel plate and screws and desires hardware removal.  - Referred to Dr. Rutledge (orthopedics) for evaluation and follow-up on ankle hardware removal.         IMMUNIZATION:  - Administered pneumonia vaccine in office during today's visit.         Follow up in about 2 months (around 7/30/2025) for f/up.    This note was generated with the assistance of ambient listening technology. Verbal consent was obtained by the patient and accompanying visitor(s) for the recording of patient appointment to facilitate this note. I attest to having reviewed and edited the generated note for accuracy, though some syntax or spelling errors may persist. Please contact the author of this note for any clarification.                [1]   Current Outpatient Medications:     aspirin (ECOTRIN) 81 MG EC tablet, Take 81 mg by mouth once daily., Disp: , Rfl:     BD ULTRA-FINE FREDA PEN NEEDLE 32 gauge x 5/32" Ndle, USE AS DIRECTED 2 TIMES DAILY WITH LEVEMIR, Disp: 200 each, Rfl: 3    blood pressure test kit-large Kit, Blood pressure kit to use indefinitely. Measure blood pressure every day at least 2 hours after taking medication, Disp: 1 each, Rfl: 0    blood sugar diagnostic (TRUE METRIX GLUCOSE TEST STRIP) Strp, 1 strip by Misc.(Non-Drug; Combo Route) route 2 (two) times a day., Disp: 200 each, Rfl: 11    blood-glucose meter kit, 1 each by Other route 4 (four) times daily before meals and nightly., Disp: 1 each, Rfl: 0    carvediloL (COREG) 25 MG tablet, Take 1 tablet (25 mg " total) by mouth 2 (two) times daily with meals., Disp: 180 tablet, Rfl: 3    cyclobenzaprine (FLEXERIL) 5 MG tablet, Take 1 tablet (5 mg total) by mouth nightly as needed for Muscle spasms., Disp: 90 tablet, Rfl: 11    hydrALAZINE (APRESOLINE) 25 MG tablet, TAKE 1 TABLET EVERY 12 HOURS, Disp: 180 tablet, Rfl: 3    insulin glargine U-100, Lantus, (LANTUS SOLOSTAR U-100 INSULIN) 100 unit/mL (3 mL) InPn pen, Inject 10 Units into the skin 2 (two) times a day., Disp: 6 mL, Rfl: 0    lisinopriL (PRINIVIL,ZESTRIL) 40 MG tablet, Take 1 tablet (40 mg total) by mouth once daily., Disp: 90 tablet, Rfl: 3    metFORMIN (GLUCOPHAGE) 1000 MG tablet, Take 1 tablet (1,000 mg total) by mouth 2 (two) times daily with meals., Disp: 180 tablet, Rfl: 3    omeprazole (PRILOSEC) 20 MG capsule, Take 1 capsule (20 mg total) by mouth once daily., Disp: 90 capsule, Rfl: 3    sildenafiL (VIAGRA) 100 MG tablet, Take 1 tablet (100 mg total) by mouth daily as needed for Erectile Dysfunction., Disp: 30 tablet, Rfl: 5    simvastatin (ZOCOR) 40 MG tablet, TAKE 1 TABLET EVERY EVENING, Disp: 90 tablet, Rfl: 3    TRUEPLUS LANCETS 33 gauge Misc, Check glucose twice daily, Disp: 100 each, Rfl: 3    amoxicillin-clavulanate 875-125mg (AUGMENTIN) 875-125 mg per tablet, Take 1 tablet by mouth every 12 (twelve) hours. for 5 days, Disp: 10 tablet, Rfl: 0    erythromycin (ROMYCIN) ophthalmic ointment, Place into the right eye 3 (three) times daily., Disp: 3.5 g, Rfl: 0    Current Facility-Administered Medications:     pneumoc 20-lauryn conj-dip cr(PF) (PREVNAR-20 (PF)) injection Syrg 0.5 mL, 0.5 mL, Intramuscular, 1 time in Clinic/HOD,

## 2025-05-30 NOTE — ASSESSMENT & PLAN NOTE
Chronic, unresolved.   - Thierry reports arthritis affecting 6-7 vertebrae causing leg problems and intermittent severe back pain, though not currently symptomatic.  - Discontinued Lyrica (pregabalin) due to ineffectiveness despite twice daily dosing.  - Referred patient to pain management clinic for comprehensive management of chronic back pain and to a chiropractor (patient to identify one that accepts their insurance).

## 2025-05-30 NOTE — PROGRESS NOTES
Health Maintenance Due   Topic     Pneumococcal Vaccines (Age 50+) (3 of 3 - PCV20 or PCV21) Patient agree    Diabetic Eye Exam  Pt states he went to Dr Zapata yesterday 5/29/25    Lipid Panel  Consult PCP    Hemoglobin A1c  Consult PCP

## 2025-05-30 NOTE — TELEPHONE ENCOUNTER
----- Message from Kodak sent at 5/30/2025  2:24 PM CDT -----  Regarding: [facility]  Name of Caller: [facility] Prescription Name: Does not know the name of it What do they need to clarify? Patient does not know the name of the prescription he needs and needs for the doctor to contact [facility]. All he knows is that it's 217mg. Please reach out to them. Can you be contacted via [facility]? No Best Call Back Number: .[facility] - [place], [place] - [address] [address]Qsnfl: [phone] Fax: [phone] Information

## 2025-05-30 NOTE — ASSESSMENT & PLAN NOTE
- A1C was last checked 7 months ago and current level is unknown.  - Ordered A1C lab test to assess glycemic control, with appointment scheduled for Monday.  - Follow up in 2 months to review results and adjust treatment plan as needed.

## 2025-05-30 NOTE — ASSESSMENT & PLAN NOTE
- Thierry reports black discoloration on scalp when not using medication.  - Discussed various topical treatment options.  - Unable to identify specific medication patient has been using for chronic condition.  - Thierry instructed to contact office with name of scalp medication for refill.

## 2025-05-30 NOTE — TELEPHONE ENCOUNTER
Placed call to [facility]; rx sent today was received with no issues; no refills that are needed; they received call from pt requesting medication refill but not able to say name of medication only that it is 217mg; pharmacist not able to find any medications in profile for 217mg; please advise

## 2025-05-30 NOTE — ASSESSMENT & PLAN NOTE
- Eye is slightly swollen and significantly erythematous, with reports that the eyelid was shut last night.  - Assessment indicates swelling is likely due to infection of glands under eyelid, possibly related to recent eye injections.

## 2025-05-30 NOTE — ASSESSMENT & PLAN NOTE
- Thierry receives regular eye injections to manage swelling related to diabetic retinopathy.  - Evaluated reported eye swelling and redness, though vision remains intact.  - Prescribed short course of antibiotics and eye drops for current eye infection.

## 2025-05-30 NOTE — ASSESSMENT & PLAN NOTE
- Thierry has history of ankle fractures with steel plate and screws and desires hardware removal.  - Referred to Dr. Rutledge (orthopedics) for evaluation and follow-up on ankle hardware removal.

## 2025-06-02 ENCOUNTER — PATIENT MESSAGE (OUTPATIENT)
Dept: ORTHOPEDICS | Facility: CLINIC | Age: 65
End: 2025-06-02
Payer: MEDICARE

## 2025-06-02 DIAGNOSIS — M25.571 RIGHT ANKLE PAIN: Primary | ICD-10-CM

## 2025-06-03 ENCOUNTER — TELEPHONE (OUTPATIENT)
Dept: ADMINISTRATIVE | Facility: HOSPITAL | Age: 65
End: 2025-06-03
Payer: MEDICARE

## 2025-06-03 ENCOUNTER — PATIENT OUTREACH (OUTPATIENT)
Dept: ADMINISTRATIVE | Facility: HOSPITAL | Age: 65
End: 2025-06-03
Payer: MEDICARE

## 2025-06-04 ENCOUNTER — TELEPHONE (OUTPATIENT)
Dept: ORTHOPEDICS | Facility: CLINIC | Age: 65
End: 2025-06-04
Payer: MEDICARE

## 2025-06-04 ENCOUNTER — LAB VISIT (OUTPATIENT)
Dept: LAB | Facility: HOSPITAL | Age: 65
End: 2025-06-04
Attending: INTERNAL MEDICINE
Payer: MEDICARE

## 2025-06-04 DIAGNOSIS — Z79.4 TYPE 2 DIABETES MELLITUS WITH BOTH EYES AFFECTED BY PROLIFERATIVE RETINOPATHY AND MACULAR EDEMA, WITH LONG-TERM CURRENT USE OF INSULIN: ICD-10-CM

## 2025-06-04 DIAGNOSIS — N18.31 CKD STAGE 3A, GFR 45-59 ML/MIN: ICD-10-CM

## 2025-06-04 DIAGNOSIS — E11.3513 TYPE 2 DIABETES MELLITUS WITH BOTH EYES AFFECTED BY PROLIFERATIVE RETINOPATHY AND MACULAR EDEMA, WITH LONG-TERM CURRENT USE OF INSULIN: ICD-10-CM

## 2025-06-04 LAB
ABSOLUTE EOSINOPHIL (OHS): 0.15 K/UL
ABSOLUTE MONOCYTE (OHS): 0.84 K/UL (ref 0.3–1)
ABSOLUTE NEUTROPHIL COUNT (OHS): 5.05 K/UL (ref 1.8–7.7)
ALBUMIN SERPL BCP-MCNC: 3.5 G/DL (ref 3.5–5.2)
ALP SERPL-CCNC: 83 UNIT/L (ref 40–150)
ALT SERPL W/O P-5'-P-CCNC: 15 UNIT/L (ref 10–44)
ANION GAP (OHS): 9 MMOL/L (ref 8–16)
AST SERPL-CCNC: 25 UNIT/L (ref 11–45)
BASOPHILS # BLD AUTO: 0.06 K/UL
BASOPHILS NFR BLD AUTO: 0.7 %
BILIRUB SERPL-MCNC: 0.2 MG/DL (ref 0.1–1)
BUN SERPL-MCNC: 35 MG/DL (ref 8–23)
CALCIUM SERPL-MCNC: 11.2 MG/DL (ref 8.7–10.5)
CHLORIDE SERPL-SCNC: 109 MMOL/L (ref 95–110)
CHOLEST SERPL-MCNC: 137 MG/DL (ref 120–199)
CHOLEST/HDLC SERPL: 5.1 {RATIO} (ref 2–5)
CO2 SERPL-SCNC: 22 MMOL/L (ref 23–29)
CREAT SERPL-MCNC: 1.7 MG/DL (ref 0.5–1.4)
EAG (OHS): 163 MG/DL (ref 68–131)
ERYTHROCYTE [DISTWIDTH] IN BLOOD BY AUTOMATED COUNT: 14.4 % (ref 11.5–14.5)
GFR SERPLBLD CREATININE-BSD FMLA CKD-EPI: 44 ML/MIN/1.73/M2
GLUCOSE SERPL-MCNC: 109 MG/DL (ref 70–110)
HBA1C MFR BLD: 7.3 % (ref 4–5.6)
HCT VFR BLD AUTO: 41.5 % (ref 40–54)
HDLC SERPL-MCNC: 27 MG/DL (ref 40–75)
HDLC SERPL: 19.7 % (ref 20–50)
HGB BLD-MCNC: 13.1 GM/DL (ref 14–18)
IMM GRANULOCYTES # BLD AUTO: 0.02 K/UL (ref 0–0.04)
IMM GRANULOCYTES NFR BLD AUTO: 0.2 % (ref 0–0.5)
LDLC SERPL CALC-MCNC: 76.4 MG/DL (ref 63–159)
LYMPHOCYTES # BLD AUTO: 2.23 K/UL (ref 1–4.8)
MCH RBC QN AUTO: 29.8 PG (ref 27–31)
MCHC RBC AUTO-ENTMCNC: 31.6 G/DL (ref 32–36)
MCV RBC AUTO: 94 FL (ref 82–98)
NONHDLC SERPL-MCNC: 110 MG/DL
NUCLEATED RBC (/100WBC) (OHS): 0 /100 WBC
PLATELET # BLD AUTO: 253 K/UL (ref 150–450)
PMV BLD AUTO: 11.5 FL (ref 9.2–12.9)
POTASSIUM SERPL-SCNC: 4.8 MMOL/L (ref 3.5–5.1)
PROT SERPL-MCNC: 7.1 GM/DL (ref 6–8.4)
RBC # BLD AUTO: 4.4 M/UL (ref 4.6–6.2)
RELATIVE EOSINOPHIL (OHS): 1.8 %
RELATIVE LYMPHOCYTE (OHS): 26.7 % (ref 18–48)
RELATIVE MONOCYTE (OHS): 10.1 % (ref 4–15)
RELATIVE NEUTROPHIL (OHS): 60.5 % (ref 38–73)
SODIUM SERPL-SCNC: 140 MMOL/L (ref 136–145)
TRIGL SERPL-MCNC: 168 MG/DL (ref 30–150)
URATE SERPL-MCNC: 8.5 MG/DL (ref 3.4–7)
WBC # BLD AUTO: 8.35 K/UL (ref 3.9–12.7)

## 2025-06-04 PROCEDURE — 83036 HEMOGLOBIN GLYCOSYLATED A1C: CPT | Mod: HCNC

## 2025-06-04 PROCEDURE — 82652 VIT D 1 25-DIHYDROXY: CPT | Mod: HCNC

## 2025-06-04 PROCEDURE — 80053 COMPREHEN METABOLIC PANEL: CPT | Mod: HCNC

## 2025-06-04 PROCEDURE — 80061 LIPID PANEL: CPT | Mod: HCNC

## 2025-06-04 PROCEDURE — 85025 COMPLETE CBC W/AUTO DIFF WBC: CPT | Mod: HCNC

## 2025-06-04 PROCEDURE — 36415 COLL VENOUS BLD VENIPUNCTURE: CPT | Mod: HCNC,PO

## 2025-06-04 PROCEDURE — 84550 ASSAY OF BLOOD/URIC ACID: CPT | Mod: HCNC

## 2025-06-06 LAB — W VITAMIN D, 1, 25-DIHYDROXY: 24 PG/ML

## 2025-06-09 ENCOUNTER — RESULTS FOLLOW-UP (OUTPATIENT)
Dept: FAMILY MEDICINE | Facility: CLINIC | Age: 65
End: 2025-06-09

## 2025-06-09 ENCOUNTER — TELEPHONE (OUTPATIENT)
Dept: FAMILY MEDICINE | Facility: CLINIC | Age: 65
End: 2025-06-09
Payer: MEDICARE

## 2025-06-09 DIAGNOSIS — N18.31 CKD STAGE 3A, GFR 45-59 ML/MIN: Primary | ICD-10-CM

## 2025-06-09 DIAGNOSIS — Z79.4 TYPE 2 DIABETES MELLITUS WITH BOTH EYES AFFECTED BY PROLIFERATIVE RETINOPATHY AND MACULAR EDEMA, WITH LONG-TERM CURRENT USE OF INSULIN: ICD-10-CM

## 2025-06-09 DIAGNOSIS — E11.3513 TYPE 2 DIABETES MELLITUS WITH BOTH EYES AFFECTED BY PROLIFERATIVE RETINOPATHY AND MACULAR EDEMA, WITH LONG-TERM CURRENT USE OF INSULIN: ICD-10-CM

## 2025-06-09 NOTE — TELEPHONE ENCOUNTER
----- Message from Radha sent at 6/9/2025  2:29 PM CDT -----  Type:  Needs Medical Advice/Symptom-based CallWho Called: self Symptoms (please be specific):  scalp issue How long has patient had these symptoms:   10 yearsWould the patient rather a call back or a response [facility]: .[phone] (home)

## 2025-06-09 NOTE — TELEPHONE ENCOUNTER
----- Message from Evelia sent at 6/9/2025  2:42 PM CDT -----  .Type:  Patient Returning CallWho Called: Self Who Left Message for Patient: Sara Mendez LPNDoes the patient know what this is regarding?: Yes Would the patient rather a call back or a response via [facility]? Call Back [facility] Call Back Number: .[phone] (home) Additional Information:

## 2025-06-18 DIAGNOSIS — E11.65 UNCONTROLLED TYPE 2 DIABETES MELLITUS WITH HYPERGLYCEMIA: ICD-10-CM

## 2025-06-18 RX ORDER — PEN NEEDLE, DIABETIC 31 GX5/16"
NEEDLE, DISPOSABLE MISCELLANEOUS
Qty: 200 EACH | Refills: 3 | Status: SHIPPED | OUTPATIENT
Start: 2025-06-18

## 2025-06-18 NOTE — TELEPHONE ENCOUNTER
Refill Routing Note   Medication(s) are not appropriate for processing by [facility] for the following reason(s):        Responsible provider unclear    [facility] action(s):  Defer             Appointments  past 12m or future 3m with PCP    Date Provider   Last Visit   12/28/2023 Sterling Jarquin MD   Next Visit   Visit date not found Sterling Jarquin MD   ED visits in past 90 days: 0        Note composed:9:45 AM 06/18/2025

## 2025-06-18 NOTE — TELEPHONE ENCOUNTER
No care due was identified.  [facility] Embedded Care Due Messages. Reference number: [id number].   6/18/2025 3:17:54 AM CDT

## 2025-06-30 ENCOUNTER — OFFICE VISIT (OUTPATIENT)
Dept: PAIN MEDICINE | Facility: CLINIC | Age: 65
End: 2025-06-30
Payer: MEDICARE

## 2025-06-30 ENCOUNTER — TELEPHONE (OUTPATIENT)
Dept: ORTHOPEDICS | Facility: CLINIC | Age: 65
End: 2025-06-30
Payer: MEDICARE

## 2025-06-30 VITALS
WEIGHT: 248.81 LBS | BODY MASS INDEX: 34.7 KG/M2 | SYSTOLIC BLOOD PRESSURE: 159 MMHG | DIASTOLIC BLOOD PRESSURE: 80 MMHG | RESPIRATION RATE: 18 BRPM | OXYGEN SATURATION: 97 % | HEART RATE: 78 BPM

## 2025-06-30 DIAGNOSIS — M54.16 LUMBAR RADICULOPATHY: ICD-10-CM

## 2025-06-30 DIAGNOSIS — M51.362 DEGENERATION OF INTERVERTEBRAL DISC OF LUMBAR REGION WITH DISCOGENIC BACK PAIN AND LOWER EXTREMITY PAIN: Primary | ICD-10-CM

## 2025-06-30 DIAGNOSIS — M47.816 LUMBAR SPONDYLOSIS: ICD-10-CM

## 2025-06-30 PROCEDURE — 99999 PR PBB SHADOW E&M-EST. PATIENT-LVL V: CPT | Mod: PBBFAC,HCNC,, | Performed by: PAIN MEDICINE

## 2025-06-30 PROCEDURE — 1160F RVW MEDS BY RX/DR IN RCRD: CPT | Mod: CPTII,HCNC,S$GLB, | Performed by: PAIN MEDICINE

## 2025-06-30 PROCEDURE — 99204 OFFICE O/P NEW MOD 45 MIN: CPT | Mod: HCNC,S$GLB,, | Performed by: PAIN MEDICINE

## 2025-06-30 PROCEDURE — 4010F ACE/ARB THERAPY RXD/TAKEN: CPT | Mod: CPTII,HCNC,S$GLB, | Performed by: PAIN MEDICINE

## 2025-06-30 PROCEDURE — 3051F HG A1C>EQUAL 7.0%<8.0%: CPT | Mod: CPTII,HCNC,S$GLB, | Performed by: PAIN MEDICINE

## 2025-06-30 PROCEDURE — 1159F MED LIST DOCD IN RCRD: CPT | Mod: CPTII,HCNC,S$GLB, | Performed by: PAIN MEDICINE

## 2025-06-30 PROCEDURE — 3077F SYST BP >= 140 MM HG: CPT | Mod: CPTII,HCNC,S$GLB, | Performed by: PAIN MEDICINE

## 2025-06-30 PROCEDURE — 3079F DIAST BP 80-89 MM HG: CPT | Mod: CPTII,HCNC,S$GLB, | Performed by: PAIN MEDICINE

## 2025-06-30 PROCEDURE — 3008F BODY MASS INDEX DOCD: CPT | Mod: CPTII,HCNC,S$GLB, | Performed by: PAIN MEDICINE

## 2025-06-30 NOTE — TELEPHONE ENCOUNTER
Copied from CRM #[id number]. Topic: General Inquiry - Patient Advice  >> Jun 30, 2025  2:34 PM Evelia wrote:  Type: Patient Call Back    Who called: Self     What is the request in detail:Would like to schedule an appointment to have screws removed from his legs. Ask that the nurse give him a call.     Can the clinic reply by [facility]? No     Would the patient rather a call back or a response via [facility]? Call Back    [facility] call back number: .[phone] (home)      Additional Information:

## 2025-06-30 NOTE — PROGRESS NOTES
Subjective:     Patient ID: Thierry Ho is a 65 y.o. male    Chief Complaint: Low-back Pain (Midline achy pain )      Referred by: Rhonda Krishnan MD      HPI:    Initial Encounter (6/30/25):  Thierry presents for evaluation of back pain. He reports pain in the center of his lower back, occasionally radiating down his left leg to his foot. Pain worsens with prolonged sitting, standing up, and sometimes while walking. He experiences significant leg weakness, stating that he would fall if someone were to push him. His walking ability has deteriorated over the past two years.    He has screws in both legs, which he wants removed due to stiffness. Previously, he received an epidural injection, which did not provide significant relief.      Physical Therapy:  No.    Non-pharmacologic Treatment:  Rest helps         TENS?  No    Pain Medications:         Currently taking:  Flexeril    Has tried in the past:  NSAIDs, Tylenol    Has not tried:  Opioids, TCAs, SNRIs, anticonvulsants, topical creams    Blood thinners:  Aspirin 81 mg    Interventional Therapies:   5/10/24 - L4-5 interlaminar epidural steroid injection - no significant relief noted    Relevant Surgeries:  None    Affecting sleep?  No    Affecting daily activities? yes    Depressive symptoms? No          SI/HI? No    Work status: Disabled    Pain Scores:    Best:       0/10  Worst:     7/10  Usually:   0/10  Today:    0/10    Pain Disability Index  Family/Home Responsibilities:: 2  Recreation:: 0  Social Activity:: 0  Occupation:: 0  Sexual Behavior:: 0  Self Care:: 5  Life-Support Activities:: 0  Pain Disability Index (PDI): 7    Review of Systems   Constitutional:  Negative for activity change, appetite change, chills, fatigue, fever and unexpected weight change.   HENT:  Negative for hearing loss.    Eyes:  Negative for visual disturbance.   Respiratory:  Negative for chest tightness and shortness of breath.    Cardiovascular:  Negative for chest pain.    Gastrointestinal:  Negative for abdominal pain, constipation, diarrhea, nausea and vomiting.   Genitourinary:  Negative for difficulty urinating.   Musculoskeletal:  Positive for arthralgias, back pain, gait problem and myalgias. Negative for neck pain.   Skin:  Negative for rash.   Neurological:  Positive for weakness and numbness. Negative for dizziness, light-headedness and headaches.   Psychiatric/Behavioral:  Positive for sleep disturbance. Negative for hallucinations and suicidal ideas. The patient is not nervous/anxious.        Past Medical History:   Diagnosis Date    Colon polyps     Diabetes mellitus     Diabetes with neurologic complications     DM retinopathy     GERD (gastroesophageal reflux disease)     Hyperlipidemia     Hypertension     Myocardial infarction        Past Surgical History:   Procedure Laterality Date    APPENDECTOMY      BACK SURGERY  11/2017    COLONOSCOPY N/A 10/19/2018    Procedure: COLONOSCOPY;  Surgeon: Frantz Rasmussen MD;  Location: [facility];  Service: Endoscopy;  Laterality: N/A;    COLONOSCOPY N/A 2/22/2021    Procedure: COLONOSCOPY;  Surgeon: Dann Mahmood MD;  Location: [facility];  Service: Endoscopy;  Laterality: N/A;  covid test [place] 2/19, instructions mailed -ml    COLONOSCOPY, SCREENING, HIGH RISK PATIENT N/A 4/17/2025    Procedure: COLONOSCOPY, SCREENING, HIGH RISK PATIENT;  Surgeon: Angela Nayak MD;  Location: [facility] ENDO;  Service: Endoscopy;  Laterality: N/A;    COLONOSCOPY, SCREENING, HIGH RISK PATIENT N/A 5/15/2025    Procedure: COLONOSCOPY, SCREENING, HIGH RISK PATIENT;  Surgeon: Bertha Squires MD;  Location: [facility];  Service: Gastroenterology;  Laterality: N/A;  repeat colonoscopy poor prep, make sure pt understands instructions, peg sent to pt portal. reviewed with pt and he repeated back to me correctly. cf  5/7-pt r/s, updated instructions sent to portal-KPvt    EPIDURAL STEROID INJECTION INTO LUMBAR SPINE N/A 5/10/2024    Procedure:  L4-5 Epidural Steroid Injection (ref: Tony);  Surgeon: David Stringer Jr., MD;  Location: [facility] PAIN MANAGEMENT;  Service: Pain Management;  Laterality: N/A;  @1200  ASA last 5/4  DM  Needs Consent    EXCISION OF SKIN N/A 6/25/2020    Procedure: EXCISION, SKIN;  Surgeon: Finesse Dumont MD;  Location: [facility] OR;  Service: General;  Laterality: N/A;  on back    FOOT SURGERY      INCISION AND DRAINAGE OF GROIN Right 6/25/2020    Procedure: INCISION AND DRAINAGE, INGUINAL REGION;  Surgeon: Finesse Dumont MD;  Location: [facility] OR;  Service: General;  Laterality: Right;  PT TO PREOP IN AM  PRE-OP BY RN 6-    OPEN REDUCTION AND INTERNAL FIXATION (ORIF) OF INJURY OF ANKLE Right 7/17/2019    Procedure: ORIF, ANKLE;  Surgeon: Raeann Steward MD;  Location: [facility] OR;  Service: Orthopedics;  Laterality: Right;  SARITA GALAN  [phone] TEXTED HIM @ 10:2 AM ON 7-11-19  SKELETAL  RN PREOP 7/9/19---- CMP MORNING OF SURGERY PER DR STEWARD       Social History[1]    Review of patient's allergies indicates:   Allergen Reactions    Trulicity [dulaglutide] Nausea Only     dizzy       Medications Ordered Prior to Encounter[2]    Objective:      BP (!) 159/80 (BP Location: Right arm, Patient Position: Sitting)   Pulse 78   Resp 18   Wt 112.9 kg (248 lb 12.6 oz)   SpO2 97%   BMI 34.70 kg/m²     Exam:  GEN:  Well developed, well nourished.  No acute distress.   HEENT:  No trauma.  Mucous membranes moist.  Nares patent bilaterally.  PSYCH: Normal affect. Thought content appropriate.  CHEST:  Breathing symmetric.  No audible wheezing.  ABD: Soft, non-distended.  SKIN:  Warm, pink, dry.  No rash on exposed areas.    EXT:  No cyanosis, clubbing, or edema.  No color change or changes in nail or hair growth.  NEURO/MUSCULOSKELETAL:  Fully alert, oriented, and appropriate. Speech normal scarlett. No cranial nerve deficits.   Gait:  Antalgic.  Ambulates with cane.  No focal motor deficits.       Imaging:    Narrative  & Impression    EXAMINATION:  MRI LUMBAR SPINE WITHOUT CONTRAST     CLINICAL HISTORY:  Lumbar radiculopathy, symptoms persist with conservative treatment; Radiculopathy, lumbar region     TECHNIQUE:  Multiplanar, multisequence MR images were acquired from the thoracolumbar junction to the sacrum without contrast.     COMPARISON:  Radiographs 02/01/2024     FINDINGS:  Alignment: Normal.     Vertebrae: No fracture or marrow infiltrative process.     Discs: Disc heights are maintained.  No evidence for discitis.  Note made of disc desiccation at L4-L5 and annular fissure at L5-S1.     Cord: Conus terminates at L1-L2 and appears unremarkable.  Cauda equina appears unremarkable.     Degenerative findings:     T12-L1: No spinal canal stenosis or neuroforaminal narrowing.     L1-L2: No spinal canal stenosis or neuroforaminal narrowing.     L2-L3: Mild facet arthropathy.  No spinal canal stenosis or neural foraminal narrowing.     L3-L4: Mild facet arthropathy results in mild left neural foraminal narrowing.     L4-L5: Circumferential disc bulge, moderate facet arthropathy, and epidural lipomatosis result in mild effacement of the thecal sac and mild bilateral neural foraminal narrowing.     L5-S1: Epidural lipomatosis and mild facet arthropathy result in moderate effacement of the thecal sac and moderate right neural foraminal narrowing.     Paraspinal muscles & soft tissues: Moderate paraspinal muscle atrophy.  Small left renal cyst noted.     Impression:     1. Degenerative changes of the lower lumbar spine detailed above.  Note made of mild-to-moderate effacement of the thecal sac at L4-S1. Mild-to-moderate neural foraminal narrowing noted at L3-S1.        Electronically signed by:Grant Villegas MD  Date:                                            03/14/2024  Time:                                           11:06       Assessment:       Encounter Diagnoses   Name Primary?    Lumbar radiculopathy     Degeneration of  intervertebral disc of lumbar region with discogenic back pain and lower extremity pain Yes    Lumbar spondylosis          Plan:       Thierry was seen today for low-back pain.    Diagnoses and all orders for this visit:    Degeneration of intervertebral disc of lumbar region with discogenic back pain and lower extremity pain  -     Ambulatory Referral/Consult to Physical Therapy    Lumbar radiculopathy  -     Ambulatory referral/consult to Pain Clinic  -     Ambulatory Referral/Consult to Physical Therapy    Lumbar spondylosis  -     Ambulatory Referral/Consult to Physical Therapy        Thierry Ho is a 65 y.o. male with chronic midline low back pain.  Some radiation to the left lower extremity as well.  I suspect pain is multifactorial.  Has multilevel facet degeneration as well as degenerative disc disease.  Some high-intensity zones to indicate possible discogenic source of pain as well.  Also noted to have spinal and foraminal stenosis.      Pertinent imaging studies reviewed by me. Imaging results were discussed with patient.  Refer to PT for ROM, strengthening, stretching and HEP.  Continue Flexeril as needed for pain.  May consider interventional procedures in the future.  Failed L4-5 interlaminar epidural steroid injection.  May consider transforaminal epidural steroid injection versus lumbar medial branch blocks/RFA.  Return to clinic in 6 weeks or sooner if needed.  At that time we will discuss efficacy of physical therapy/home exercise program and consider interventional procedures if needed.          This note was created by combination of typed  and M-Modal dictation. Transcription and phonetic errors may be present.  If there are any questions, please contact me.           [1]   Social History  Socioeconomic History    Marital status:     Number of children: 5    Highest education level: GED or equivalent   Tobacco Use    Smoking status: Former     Current packs/day: 0.00     Average  "packs/day: 0.2 packs/day for 42.5 years (10.6 ttl pk-yrs)     Types: Cigarettes     Start date: 1/15/1981     Quit date: 2023     Years since quittin.9    Smokeless tobacco: Never   Substance and Sexual Activity    Alcohol use: Yes     Alcohol/week: 4.0 standard drinks of alcohol     Types: 4 Cans of beer per week     Comment: social     Drug use: Not Currently     Types: Marijuana     Comment: quit marijuana    Sexual activity: Not Currently     Partners: Female     Social Drivers of Health     Financial Resource Strain: Low Risk  (10/11/2024)    Overall Financial Resource Strain (CARDIA)     Difficulty of Paying Living Expenses: Not hard at all   Food Insecurity: No Food Insecurity (10/11/2024)    Hunger Vital Sign     Worried About Running Out of Food in the Last Year: Never true     Ran Out of Food in the Last Year: Never true   Transportation Needs: No Transportation Needs (10/11/2024)    TRANSPORTATION NEEDS     Transportation : No   Physical Activity: Inactive (3/17/2023)    Exercise Vital Sign     Days of Exercise per Week: 0 days     Minutes of Exercise per Session: 0 min   Stress: No Stress Concern Present (10/11/2024)    [employer] - Occupational Stress Questionnaire     Feeling of Stress : Not at all   Housing Stability: Unknown (10/11/2024)    Housing Stability Vital Sign     Unable to Pay for Housing in the Last Year: No     Homeless in the Last Year: No   [2]   Current Outpatient Medications on File Prior to Visit   Medication Sig Dispense Refill    aspirin (ECOTRIN) 81 MG EC tablet Take 81 mg by mouth once daily.      BD ULTRA-FINE FREDA PEN NEEDLE 32 gauge x 5/32" Ndle USE AS DIRECTED 2 TIMES DAILY WITH LEVEMIR 200 each 3    blood pressure test kit-large Kit Blood pressure kit to use indefinitely. Measure blood pressure every day at least 2 hours after taking medication 1 each 0    blood sugar diagnostic (TRUE METRIX GLUCOSE TEST STRIP) Strp 1 strip by " Misc.(Non-Drug; Combo Route) route 2 (two) times a day. 200 each 11    blood-glucose meter kit 1 each by Other route 4 (four) times daily before meals and nightly. 1 each 0    carvediloL (COREG) 25 MG tablet Take 1 tablet (25 mg total) by mouth 2 (two) times daily with meals. 180 tablet 3    cyclobenzaprine (FLEXERIL) 5 MG tablet Take 1 tablet (5 mg total) by mouth nightly as needed for Muscle spasms. 90 tablet 11    empagliflozin (JARDIANCE) 25 mg tablet Take 1 tablet (25 mg total) by mouth once daily. 90 tablet 3    erythromycin (ROMYCIN) ophthalmic ointment Place into the right eye 3 (three) times daily. 3.5 g 0    hydrALAZINE (APRESOLINE) 25 MG tablet TAKE 1 TABLET EVERY 12 HOURS 180 tablet 3    lisinopriL (PRINIVIL,ZESTRIL) 40 MG tablet Take 1 tablet (40 mg total) by mouth once daily. 90 tablet 3    metFORMIN (GLUCOPHAGE) 1000 MG tablet Take 1 tablet (1,000 mg total) by mouth 2 (two) times daily with meals. 180 tablet 3    omeprazole (PRILOSEC) 20 MG capsule Take 1 capsule (20 mg total) by mouth once daily. 90 capsule 3    sildenafiL (VIAGRA) 100 MG tablet Take 1 tablet (100 mg total) by mouth daily as needed for Erectile Dysfunction. 30 tablet 5    simvastatin (ZOCOR) 40 MG tablet TAKE 1 TABLET EVERY EVENING 90 tablet 3    TRUEPLUS LANCETS 33 gauge Misc Check glucose twice daily 100 each 3    insulin glargine U-100, Lantus, (LANTUS SOLOSTAR U-100 INSULIN) 100 unit/mL (3 mL) InPn pen Inject 10 Units into the skin 2 (two) times a day. 6 mL 0     No current facility-administered medications on file prior to visit.

## 2025-07-16 ENCOUNTER — TELEPHONE (OUTPATIENT)
Dept: ORTHOPEDICS | Facility: CLINIC | Age: 65
End: 2025-07-16
Payer: MEDICARE

## 2025-07-16 NOTE — TELEPHONE ENCOUNTER
Xrays were rescheduled.     Deena Jamison MS, ATC, OTC  Clinical/Surgical Assistant - Dr. Raeann Steward  Orthopedics  Phone: [phone]      Copied from CRM #[id number]. Topic: Appointments - Amb Referral  >> Jul 16, 2025  9:45 AM Oniel wrote:  Needs orders for xray   Due to reschedule

## 2025-08-01 ENCOUNTER — OFFICE VISIT (OUTPATIENT)
Dept: FAMILY MEDICINE | Facility: CLINIC | Age: 65
End: 2025-08-01
Payer: MEDICARE

## 2025-08-01 VITALS
HEART RATE: 81 BPM | HEIGHT: 71 IN | OXYGEN SATURATION: 95 % | RESPIRATION RATE: 18 BRPM | SYSTOLIC BLOOD PRESSURE: 168 MMHG | WEIGHT: 251.56 LBS | TEMPERATURE: 98 F | BODY MASS INDEX: 35.22 KG/M2 | DIASTOLIC BLOOD PRESSURE: 98 MMHG

## 2025-08-01 DIAGNOSIS — R42 VERTIGO: Primary | ICD-10-CM

## 2025-08-01 DIAGNOSIS — R29.898 BILATERAL LEG WEAKNESS: ICD-10-CM

## 2025-08-01 DIAGNOSIS — N40.1 BPH WITH LOWER URINARY TRACT SYMPTOMS WITHOUT URINARY OBSTRUCTION: ICD-10-CM

## 2025-08-01 DIAGNOSIS — E66.01 SEVERE OBESITY (BMI 35.0-39.9) WITH COMORBIDITY: ICD-10-CM

## 2025-08-01 DIAGNOSIS — L40.9 SCALP PSORIASIS: ICD-10-CM

## 2025-08-01 DIAGNOSIS — N18.32 STAGE 3B CHRONIC KIDNEY DISEASE: ICD-10-CM

## 2025-08-01 DIAGNOSIS — M54.16 LUMBAR RADICULOPATHY: ICD-10-CM

## 2025-08-01 DIAGNOSIS — E11.3513 TYPE 2 DIABETES MELLITUS WITH BOTH EYES AFFECTED BY PROLIFERATIVE RETINOPATHY AND MACULAR EDEMA, WITH LONG-TERM CURRENT USE OF INSULIN: ICD-10-CM

## 2025-08-01 DIAGNOSIS — Z79.4 TYPE 2 DIABETES MELLITUS WITH BOTH EYES AFFECTED BY PROLIFERATIVE RETINOPATHY AND MACULAR EDEMA, WITH LONG-TERM CURRENT USE OF INSULIN: ICD-10-CM

## 2025-08-01 DIAGNOSIS — I10 ESSENTIAL HYPERTENSION: ICD-10-CM

## 2025-08-01 PROBLEM — N18.31 CKD STAGE 3A, GFR 45-59 ML/MIN: Status: RESOLVED | Noted: 2025-05-30 | Resolved: 2025-08-01

## 2025-08-01 PROCEDURE — 99999 PR PBB SHADOW E&M-EST. PATIENT-LVL V: CPT | Mod: PBBFAC,HCNC,, | Performed by: INTERNAL MEDICINE

## 2025-08-01 RX ORDER — MECLIZINE HCL 12.5 MG 12.5 MG/1
12.5 TABLET ORAL 3 TIMES DAILY PRN
Qty: 90 TABLET | Refills: 0 | Status: SHIPPED | OUTPATIENT
Start: 2025-08-01 | End: 2025-08-01

## 2025-08-01 RX ORDER — TAMSULOSIN HYDROCHLORIDE 0.4 MG/1
0.4 CAPSULE ORAL DAILY
Qty: 30 CAPSULE | Refills: 11 | Status: SHIPPED | OUTPATIENT
Start: 2025-08-01 | End: 2025-08-01

## 2025-08-01 RX ORDER — INSULIN GLARGINE 100 [IU]/ML
15 INJECTION, SOLUTION SUBCUTANEOUS 2 TIMES DAILY
Start: 2025-08-01 | End: 2025-08-31

## 2025-08-01 RX ORDER — BLOOD-GLUCOSE,RECEIVER,CONT
1 EACH MISCELLANEOUS
Qty: 1 EACH | Refills: 0 | Status: SHIPPED | OUTPATIENT
Start: 2025-08-01

## 2025-08-01 RX ORDER — TIRZEPATIDE 5 MG/.5ML
5 INJECTION, SOLUTION SUBCUTANEOUS
Qty: 2 ML | Refills: 2 | Status: SHIPPED | OUTPATIENT
Start: 2025-08-01 | End: 2025-10-30

## 2025-08-01 RX ORDER — TIRZEPATIDE 5 MG/.5ML
5 INJECTION, SOLUTION SUBCUTANEOUS
Qty: 2 ML | Refills: 2 | Status: SHIPPED | OUTPATIENT
Start: 2025-08-01 | End: 2025-08-01

## 2025-08-01 RX ORDER — TIRZEPATIDE 2.5 MG/.5ML
2.5 INJECTION, SOLUTION SUBCUTANEOUS
COMMUNITY
End: 2025-08-01 | Stop reason: DRUGHIGH

## 2025-08-01 RX ORDER — TAMSULOSIN HYDROCHLORIDE 0.4 MG/1
0.4 CAPSULE ORAL DAILY
Qty: 30 CAPSULE | Refills: 11 | Status: SHIPPED | OUTPATIENT
Start: 2025-08-01 | End: 2026-08-01

## 2025-08-01 RX ORDER — MECLIZINE HCL 12.5 MG 12.5 MG/1
12.5 TABLET ORAL 3 TIMES DAILY PRN
Qty: 90 TABLET | Refills: 0 | Status: SHIPPED | OUTPATIENT
Start: 2025-08-01

## 2025-08-01 RX ORDER — BLOOD-GLUCOSE SENSOR
1 EACH MISCELLANEOUS
Qty: 3 EACH | Refills: 2 | Status: SHIPPED | OUTPATIENT
Start: 2025-08-01

## 2025-08-01 NOTE — ASSESSMENT & PLAN NOTE
- Scalp issues persisting for 10 years, with some relief from current treatment.  - Prescribed medicated shampoo and referred to dermatologist for exam and diagnosis.

## 2025-08-01 NOTE — ASSESSMENT & PLAN NOTE
- Monitored the patient's weight, which has increased from 245 lbs to 251 lbs.  - Increased Mounjaro dose from 2.5 mg to 5 mg to improve glycemic control and aid weight loss.  - Educated the patient about the purpose of Mounjaro in lowering glucose and potentially aiding weight loss.  - Assessed the impact of weight increase on overall health, including diabetes and kidney function.

## 2025-08-01 NOTE — ASSESSMENT & PLAN NOTE
- Blood pressure reported high but controlled at home with medication.  - Continue current blood pressure medication regimen.  - Will reassess in 2-3 weeks before making any changes.  - Instructed patient to check blood pressure at home regularly.  - Scheduled follow-up nursing visit for blood pressure check in 2-3 weeks.

## 2025-08-01 NOTE — PATIENT INSTRUCTIONS
Vertigo:  - meclizine as needed  - home PT     DM:  - increasing Mounjaro  - glucose monitor    HTN:  - cont meds   - repeat BP nursing visit    Urination:  - start flomax morning     Scalp:  - refilled shampoo (maybe online?)  - dermatology referral     for care taker     Labs before two month visit

## 2025-08-01 NOTE — PROGRESS NOTES
Chief Complaint: Follow-up (2 month follow up ) and Leg Pain      Thierry Ho  is a 65 y.o. year old patient who presents today for     History of Present Illness    CHIEF COMPLAINT:  Thierry presents today for scalp condition and dizziness.    SCALP CONDITION:  He reports a chronic scalp condition present for 10 years. Current medication provides partial relief, helping to prevent the scalp from turning black. He has never undergone a scalp biopsy and has not previously been evaluated by a dermatologist. He expresses desire for a definitive diagnosis and specialized treatment.    VERTIGO/DIZZINESS:  He experiences daily vertigo symptoms with dizziness occurring consistently after standing for 10-15 minutes. He describes a severe episode last week where he had to use the wall for support and subsequently laid down for approximately half an hour. Hearing changes in one ear. Dizziness is not positional and does not correlate with head movement. He denies tinnitus. Symptoms occur while sitting and standing, and are not exclusively triggered by position changes.    URINARY ISSUES:  He reports experiencing urinary difficulties.    DIABETES:  He continues insulin use but inconsistently monitors glucose at home, acknowledging he does not regularly check blood sugars. His A1C has increased from 6.2 to 7.3, which he recognizes as elevated. He was unable to provide specific explanation for A1C increase.    HYPERTENSION:  He takes multiple blood pressure medications. He forgot to take his blood pressure medication today due to rushing out of the house, but intends to take it upon returning home. He currently does not have a blood pressure machine at home for self-monitoring.    LABS:  Vitamin D level was within normal limits. Uric acid remains elevated. Kidney function is declining, with estimated glomerular filtration rate (eGFR) decreasing from 50s to 40s, consistent with progression of chronic kidney  disease.    MEDICATIONS:  He is currently taking aspirin, Coreg, Flexeril, Jardiance, hydralazine, lisinopril, metformin, and cholesterol medication. He is taking Mounjaro 2.5 mg with plan to increase to 5 mg and reports occasionally forgetting to take it. He is on Lantus insulin 15 units twice daily, administered in the morning and at night. He recently switched to Lantus insulin due to cost considerations. Additional medications include Flomax for urination and Meclizine for vertigo, with Meclizine to be taken 1-2 tablets as needed 3 times daily.      ROS:  General: -fever, -chills, -fatigue, -weight gain, -weight loss  Eyes: -vision changes, -redness, -discharge  ENT: -ear pain, -nasal congestion, -sore throat, +difficulty hearing, +hearing loss  Cardiovascular: -chest pain, -palpitations, -lower extremity edema  Respiratory: -cough, -shortness of breath  Gastrointestinal: -abdominal pain, -nausea, -vomiting, -diarrhea, -constipation, -blood in stool  Genitourinary: -dysuria, -hematuria, -frequency  Musculoskeletal: -joint pain, -muscle pain  Skin: -rash, -lesion  Neurological: -headache, +dizziness, -numbness, -tingling, +near-syncope  Psychiatric: -anxiety, -depression, -sleep difficulty  Male Genitourinary: +urinary incontinence         Past Medical History:   Diagnosis Date    Colon polyps     Diabetes mellitus     Diabetes with neurologic complications     DM retinopathy     GERD (gastroesophageal reflux disease)     Hyperlipidemia     Hypertension     Myocardial infarction        Past Surgical History:   Procedure Laterality Date    APPENDECTOMY      BACK SURGERY  11/2017    COLONOSCOPY N/A 10/19/2018    Procedure: COLONOSCOPY;  Surgeon: Frantz Rasmussen MD;  Location: [facility] ENDO;  Service: Endoscopy;  Laterality: N/A;    COLONOSCOPY N/A 2/22/2021    Procedure: COLONOSCOPY;  Surgeon: Dann Mahmood MD;  Location: [facility] ENDO;  Service: Endoscopy;  Laterality: N/A;  covid test [place] 2/19, instructions  mailed -ml    COLONOSCOPY, SCREENING, HIGH RISK PATIENT N/A 4/17/2025    Procedure: COLONOSCOPY, SCREENING, HIGH RISK PATIENT;  Surgeon: Angela Nayak MD;  Location: [facility] ENDO;  Service: Endoscopy;  Laterality: N/A;    COLONOSCOPY, SCREENING, HIGH RISK PATIENT N/A 5/15/2025    Procedure: COLONOSCOPY, SCREENING, HIGH RISK PATIENT;  Surgeon: Bertha Squires MD;  Location: [facility] ENDO;  Service: Gastroenterology;  Laterality: N/A;  repeat colonoscopy poor prep, make sure pt understands instructions, peg sent to pt portal. reviewed with pt and he repeated back to me correctly. cf  5/7-pt r/s, updated instructions sent to portal-KPvt    EPIDURAL STEROID INJECTION INTO LUMBAR SPINE N/A 5/10/2024    Procedure: L4-5 Epidural Steroid Injection (ref: Chavo);  Surgeon: David Stringer Jr., MD;  Location: [facility] PAIN MANAGEMENT;  Service: Pain Management;  Laterality: N/A;  @1200  ASA last 5/4  DM  Needs Consent    EXCISION OF SKIN N/A 6/25/2020    Procedure: EXCISION, SKIN;  Surgeon: Finesse Dumont MD;  Location: [facility] OR;  Service: General;  Laterality: N/A;  on back    FOOT SURGERY      INCISION AND DRAINAGE OF GROIN Right 6/25/2020    Procedure: INCISION AND DRAINAGE, INGUINAL REGION;  Surgeon: Finesse Dumont MD;  Location: [facility] OR;  Service: General;  Laterality: Right;  PT TO PREOP IN AM  PRE-OP BY RN 6-    OPEN REDUCTION AND INTERNAL FIXATION (ORIF) OF INJURY OF ANKLE Right 7/17/2019    Procedure: ORIF, ANKLE;  Surgeon: Raeann Steward MD;  Location: [facility] OR;  Service: Orthopedics;  Laterality: Right;  SARITAALYSSA CLEVELAND ADAMA  [phone] TEXTED HIM @ 10:2 AM ON 7-11-19  SKELETAL  RN PREOP 7/9/19---- CMP MORNING OF SURGERY PER DR STEWARD        Family History   Problem Relation Name Age of Onset    Heart disease Mother      Breast cancer Mother      Diabetes Father      Cancer Sister 4     Cancer Brother 2         Social History     Socioeconomic History    Marital status:     Number of  children: 5    Highest education level: GED or equivalent   Tobacco Use    Smoking status: Former     Current packs/day: 0.00     Average packs/day: 0.2 packs/day for 42.5 years (10.6 ttl pk-yrs)     Types: Cigarettes     Start date: 1/15/1981     Quit date: 2023     Years since quittin.0    Smokeless tobacco: Never   Substance and Sexual Activity    Alcohol use: Yes     Alcohol/week: 4.0 standard drinks of alcohol     Types: 4 Cans of beer per week     Comment: social     Drug use: Not Currently     Types: Marijuana     Comment: quit marijuana    Sexual activity: Not Currently     Partners: Female     Social Drivers of Health     Financial Resource Strain: Low Risk  (10/11/2024)    Overall Financial Resource Strain (CARDIA)     Difficulty of Paying Living Expenses: Not hard at all   Food Insecurity: No Food Insecurity (10/11/2024)    Hunger Vital Sign     Worried About Running Out of Food in the Last Year: Never true     Ran Out of Food in the Last Year: Never true   Transportation Needs: No Transportation Needs (10/11/2024)    TRANSPORTATION NEEDS     Transportation : No   Physical Activity: Inactive (3/17/2023)    Exercise Vital Sign     Days of Exercise per Week: 0 days     Minutes of Exercise per Session: 0 min   Stress: No Stress Concern Present (10/11/2024)    [employer] - Occupational Stress Questionnaire     Feeling of Stress : Not at all   Housing Stability: Unknown (10/11/2024)    Housing Stability Vital Sign     Unable to Pay for Housing in the Last Year: No     Homeless in the Last Year: No       Current Medications[1]           Objective:      Vitals:    25 1201   BP: (!) 168/98   Pulse:    Resp:    Temp:        Physical Exam     Assessment:       1. Vertigo    2. Scalp psoriasis    3. Type 2 diabetes mellitus with both eyes affected by proliferative retinopathy and macular edema, with long-term current use of insulin    4. Lumbar radiculopathy    5. BPH  with lower urinary tract symptoms without urinary obstruction    6. Essential hypertension    7. Bilateral leg weakness    8. Stage 3b chronic kidney disease    9. Severe obesity (BMI 35.0-39.9) with comorbidity          Plan:   1. Vertigo  Assessment & Plan:  - Thierry reports daily dizziness, sometimes when standing or sitting, not necessarily positional.  - Explained that vertigo is common in older patients due to inner ear issues.  - Management plan includes combination of medication and physical therapy: prescribed Meclizine 25 mg, 1-2 tablets as needed up to 3 times daily, and referred for home physical therapy for vestibular therapy.    Orders:  -     Discontinue: meclizine (ANTIVERT) 12.5 mg tablet; Take 1 tablet (12.5 mg total) by mouth 3 (three) times daily as needed for Dizziness.  Dispense: 90 tablet; Refill: 0  -     meclizine (ANTIVERT) 12.5 mg tablet; Take 1 tablet (12.5 mg total) by mouth 3 (three) times daily as needed for Dizziness.  Dispense: 90 tablet; Refill: 0  -     Ambulatory referral/consult to Home Health; Future; Expected date: 08/02/2025    2. Scalp psoriasis  Assessment & Plan:  - Scalp issues persisting for 10 years, with some relief from current treatment.  - Prescribed medicated shampoo and referred to dermatologist for exam and diagnosis.    Orders:  -     coal tar 2.5 % Sham; Apply 1 Application topically once daily.  Dispense: 480 mL; Refill: 0  -     Ambulatory referral/consult to Dermatology; Future; Expected date: 08/08/2025    3. Type 2 diabetes mellitus with both eyes affected by proliferative retinopathy and macular edema, with long-term current use of insulin  Assessment & Plan:  - A1C increased from 6.2 to 7.3, indicating worsening diabetes control.  - Continue Lantus insulin 15 units twice daily (morning and night) and oral hypoglycemic drugs including Jardiance (for renal protection) and metformin.  - Ordered Freestyle [place] glucose monitor and A1C labs before next  visit    Orders:  -     Ambulatory referral/consult to Outpatient Case Management  -     insulin glargine U-100, Lantus, (LANTUS SOLOSTAR U-100 INSULIN) 100 unit/mL (3 mL) InPn pen; Inject 15 Units into the skin 2 (two) times a day.  -     Hemoglobin A1C; Future; Expected date: 08/01/2025  -     tirzepatide (MOUNJARO) 5 mg/0.5 mL PnIj; Inject 5 mg into the skin every 7 days.  Dispense: 2 mL; Refill: 2  -     Diabetes Digital Medicine (DDMP) Enrollment Order  -     blood-glucose sensor (FREESTYLE NATHAN 3 SENSOR) [place]; 1 each by Misc.(Non-Drug; Combo Route) route every 10 days.  Dispense: 3 each; Refill: 2  -     blood-glucose,,cont (FREESTYLE NATHAN 3 READER) Misc; 1 each by Misc.(Non-Drug; Combo Route) route every 6 (six) months.  Dispense: 1 each; Refill: 0    4. Lumbar radiculopathy  -     Ambulatory referral/consult to Outpatient Case Management    5. BPH with lower urinary tract symptoms without urinary obstruction  Assessment & Plan:  - not responding to OTC meds   - start flomax    Orders:  -     Discontinue: tamsulosin (FLOMAX) 0.4 mg Cap; Take 1 capsule (0.4 mg total) by mouth once daily.  Dispense: 30 capsule; Refill: 11  -     tamsulosin (FLOMAX) 0.4 mg Cap; Take 1 capsule (0.4 mg total) by mouth once daily.  Dispense: 30 capsule; Refill: 11    6. Essential hypertension  Assessment & Plan:  - Blood pressure reported high but controlled at home with medication.  - Continue current blood pressure medication regimen.  - Will reassess in 2-3 weeks before making any changes.  - Instructed patient to check blood pressure at home regularly.  - Scheduled follow-up nursing visit for blood pressure check in 2-3 weeks.    Orders:  -     Hypertension Digital Medicine (HDMP) Enrollment Order    7. Bilateral leg weakness  -     Ambulatory referral/consult to Home Health; Future; Expected date: 08/02/2025    8. Stage 3b chronic kidney disease  Assessment & Plan:  - Monitored renal function, which has declined from  the 50s to the 40s, confirming stage 3b chronic kidney disease.  - Discussed with patient that this stage does not require dialysis yet.  - Educated the patient on the connection between uncontrolled diabetes, hypertension, and declining renal function.  - Continued Jardiance for renal protection and ordered renal function labs before next visit.    Orders:  -     Ambulatory referral/consult to Outpatient Case Management  -     Comprehensive Metabolic Panel; Future; Expected date: 08/01/2025    9. Severe obesity (BMI 35.0-39.9) with comorbidity  Assessment & Plan:  - Monitored the patient's weight, which has increased from 245 lbs to 251 lbs.  - Increased Mounjaro dose from 2.5 mg to 5 mg to improve glycemic control and aid weight loss.  - Educated the patient about the purpose of Mounjaro in lowering glucose and potentially aiding weight loss.  - Assessed the impact of weight increase on overall health, including diabetes and kidney function.      Other orders  -     Discontinue: tirzepatide (MOUNJARO) 5 mg/0.5 mL PnIj; Inject 5 mg into the skin every 7 days.  Dispense: 2 mL; Refill: 2       HYPERURICEMIA:  - Monitored patient's uric acid, which is reported to be high.  - Discussed uric acid levels and relation to gout.    URINARY SYMPTOMS:  - Thierry reports issues with urination and difficulty voiding.  - Prescribed Flomax to be taken in the morning.  - Discontinued OTC saw palmetto (Super Beta Prostate).    FOLLOW-UP AND OTHER:  - Consider use of the [facility] portal for digital medicine program participation.  - Referred to  for assistance with caregiver payment process.  - Follow up in 2 months.         Total 40 mins spent on patient with more than 50% spent counseling    Follow up in about 2 months (around 10/1/2025).    This note was generated with the assistance of ambient listening technology. Verbal consent was obtained by the patient and accompanying visitor(s) for the recording of patient  "appointment to facilitate this note. I attest to having reviewed and edited the generated note for accuracy, though some syntax or spelling errors may persist. Please contact the author of this note for any clarification.                  [1]   Current Outpatient Medications:     aspirin (ECOTRIN) 81 MG EC tablet, Take 81 mg by mouth once daily., Disp: , Rfl:     BD ULTRA-FINE FREDA PEN NEEDLE 32 gauge x 5/32" Ndle, USE AS DIRECTED 2 TIMES DAILY WITH LEVEMIR, Disp: 200 each, Rfl: 3    blood pressure test kit-large Kit, Blood pressure kit to use indefinitely. Measure blood pressure every day at least 2 hours after taking medication, Disp: 1 each, Rfl: 0    blood sugar diagnostic (TRUE METRIX GLUCOSE TEST STRIP) Strp, 1 strip by Misc.(Non-Drug; Combo Route) route 2 (two) times a day., Disp: 200 each, Rfl: 11    blood-glucose meter kit, 1 each by Other route 4 (four) times daily before meals and nightly., Disp: 1 each, Rfl: 0    carvediloL (COREG) 25 MG tablet, Take 1 tablet (25 mg total) by mouth 2 (two) times daily with meals., Disp: 180 tablet, Rfl: 3    cyclobenzaprine (FLEXERIL) 5 MG tablet, Take 1 tablet (5 mg total) by mouth nightly as needed for Muscle spasms., Disp: 90 tablet, Rfl: 11    empagliflozin (JARDIANCE) 25 mg tablet, Take 1 tablet (25 mg total) by mouth once daily., Disp: 90 tablet, Rfl: 3    erythromycin (ROMYCIN) ophthalmic ointment, Place into the right eye 3 (three) times daily., Disp: 3.5 g, Rfl: 0    hydrALAZINE (APRESOLINE) 25 MG tablet, TAKE 1 TABLET EVERY 12 HOURS, Disp: 180 tablet, Rfl: 3    lisinopriL (PRINIVIL,ZESTRIL) 40 MG tablet, Take 1 tablet (40 mg total) by mouth once daily., Disp: 90 tablet, Rfl: 3    metFORMIN (GLUCOPHAGE) 1000 MG tablet, Take 1 tablet (1,000 mg total) by mouth 2 (two) times daily with meals., Disp: 180 tablet, Rfl: 3    omeprazole (PRILOSEC) 20 MG capsule, Take 1 capsule (20 mg total) by mouth once daily., Disp: 90 capsule, Rfl: 3    sildenafiL (VIAGRA) 100 MG " tablet, Take 1 tablet (100 mg total) by mouth daily as needed for Erectile Dysfunction., Disp: 30 tablet, Rfl: 5    simvastatin (ZOCOR) 40 MG tablet, TAKE 1 TABLET EVERY EVENING, Disp: 90 tablet, Rfl: 3    TRUEPLUS LANCETS 33 gauge Misc, Check glucose twice daily, Disp: 100 each, Rfl: 3    blood-glucose sensor (FREESTYLE NATHAN 3 SENSOR) [place], 1 each by Misc.(Non-Drug; Combo Route) route every 10 days., Disp: 3 each, Rfl: 2    blood-glucose,,cont (FREESTYLE NATHAN 3 READER) Misc, 1 each by Misc.(Non-Drug; Combo Route) route every 6 (six) months., Disp: 1 each, Rfl: 0    coal tar 2.5 % Sham, Apply 1 Application topically once daily., Disp: 480 mL, Rfl: 0    insulin glargine U-100, Lantus, (LANTUS SOLOSTAR U-100 INSULIN) 100 unit/mL (3 mL) InPn pen, Inject 15 Units into the skin 2 (two) times a day., Disp: , Rfl:     meclizine (ANTIVERT) 12.5 mg tablet, Take 1 tablet (12.5 mg total) by mouth 3 (three) times daily as needed for Dizziness., Disp: 90 tablet, Rfl: 0    tamsulosin (FLOMAX) 0.4 mg Cap, Take 1 capsule (0.4 mg total) by mouth once daily., Disp: 30 capsule, Rfl: 11    tirzepatide (MOUNJARO) 5 mg/0.5 mL PnIj, Inject 5 mg into the skin every 7 days., Disp: 2 mL, Rfl: 2

## 2025-08-01 NOTE — ASSESSMENT & PLAN NOTE
- Monitored renal function, which has declined from the 50s to the 40s, confirming stage 3b chronic kidney disease.  - Discussed with patient that this stage does not require dialysis yet.  - Educated the patient on the connection between uncontrolled diabetes, hypertension, and declining renal function.  - Continued Jardiance for renal protection and ordered renal function labs before next visit.

## 2025-08-01 NOTE — ASSESSMENT & PLAN NOTE
- A1C increased from 6.2 to 7.3, indicating worsening diabetes control.  - Continue Lantus insulin 15 units twice daily (morning and night) and oral hypoglycemic drugs including Jardiance (for renal protection) and metformin.  - Ordered Freestyle [place] glucose monitor and A1C labs before next visit

## 2025-08-01 NOTE — ASSESSMENT & PLAN NOTE
- Thierry reports daily dizziness, sometimes when standing or sitting, not necessarily positional.  - Explained that vertigo is common in older patients due to inner ear issues.  - Management plan includes combination of medication and physical therapy: prescribed Meclizine 25 mg, 1-2 tablets as needed up to 3 times daily, and referred for home physical therapy for vestibular therapy.

## 2025-08-07 ENCOUNTER — TELEPHONE (OUTPATIENT)
Dept: FAMILY MEDICINE | Facility: CLINIC | Age: 65
End: 2025-08-07
Payer: MEDICARE

## 2025-08-08 ENCOUNTER — PATIENT OUTREACH (OUTPATIENT)
Dept: ADMINISTRATIVE | Facility: OTHER | Age: 65
End: 2025-08-08
Payer: MEDICARE

## 2025-08-08 NOTE — PROGRESS NOTES
CHW - Outreach Attempt    Community Health Worker left a voicemail message for 1st attempt to contact patient regarding: OPCM referral  Community Health Worker to attempt to contact patient on: 8/20/25

## 2025-08-12 ENCOUNTER — TELEPHONE (OUTPATIENT)
Dept: PHARMACY | Facility: CLINIC | Age: 65
End: 2025-08-12
Payer: MEDICARE

## 2025-09-04 ENCOUNTER — PATIENT OUTREACH (OUTPATIENT)
Dept: ADMINISTRATIVE | Facility: OTHER | Age: 65
End: 2025-09-04
Payer: MEDICARE

## (undated) DEVICE — SEE MEDLINE ITEM 146231

## (undated) DEVICE — CONTAINER SPECIMEN STRL 4OZ

## (undated) DEVICE — BLANKET UPPER BODY 78.7X29.9IN

## (undated) DEVICE — PAD PREP 50/CA

## (undated) DEVICE — PAD CAST SPECIALIST STRL 6

## (undated) DEVICE — SEE MEDLINE ITEM 157150

## (undated) DEVICE — PACK ARTHROSCOPY W/ISO BAC

## (undated) DEVICE — BANDAGE ESMARK 6X12

## (undated) DEVICE — Device

## (undated) DEVICE — SPLINT PLASTER FS 5IN X 30IN

## (undated) DEVICE — APPLICATOR CHLORAPREP ORN 26ML

## (undated) DEVICE — ELECTRODE REM PLYHSV RETURN 9

## (undated) DEVICE — DRAPE STERI U-SHAPED 47X51IN

## (undated) DEVICE — BIT DRILL AO 2X135MM SCALED

## (undated) DEVICE — SEE MEDLINE ITEM 152529

## (undated) DEVICE — GLOVE BIOGEL PI MICRO SZ 6.5

## (undated) DEVICE — NDL HYPO REG 25G X 1 1/2

## (undated) DEVICE — BLADE SURG STAINLESS STEEL #15

## (undated) DEVICE — SEE MEDLINE ITEM 157110

## (undated) DEVICE — SHEET DRAPE FAN-FOLDED 3/4

## (undated) DEVICE — CLOSURE SKIN STERI STRIP 1/2X4

## (undated) DEVICE — CAUTERY PUSHBUTTON PENCIL

## (undated) DEVICE — GUIDE DRILL AO 2.6X30MM

## (undated) DEVICE — SEE L#120831

## (undated) DEVICE — UNDERGLOVE BIOGEL PI SZ 6.5 LF

## (undated) DEVICE — PACK ENDOSCOPY GENERAL

## (undated) DEVICE — COVER OVERHEAD SURG LT BLUE

## (undated) DEVICE — DRAPE C-ARM FOR MOBILE XRAY

## (undated) DEVICE — BIT DRILL OVERDRILL AO 2.7MM

## (undated) DEVICE — SYR 10CC LUER LOCK

## (undated) DEVICE — SUT VICRYL 3-0 27 SH

## (undated) DEVICE — SEE MEDLINE ITEM 157117

## (undated) DEVICE — DRAPE C-ARMOR EQUIPMENT COVER

## (undated) DEVICE — SCREW BONE NON LOCK 3.5X14MM
Type: IMPLANTABLE DEVICE | Site: ANKLE | Status: NON-FUNCTIONAL
Removed: 2019-07-17

## (undated) DEVICE — SUT ETHILON 3/0 18IN PS-1

## (undated) DEVICE — TOURNIQUET SB QC DP 34X4IN

## (undated) DEVICE — DRESSING XEROFORM FOIL PK 1X8

## (undated) DEVICE — GAUZE SPONGE 4X4 12PLY

## (undated) DEVICE — SUT VICRYL 2-0 36 CT-1

## (undated) DEVICE — SEE MEDLINE ITEM 146308

## (undated) DEVICE — PAD CAST SPECIALIST STRL 4

## (undated) DEVICE — SEE MEDLINE ITEM 152622

## (undated) DEVICE — GLOVE BIOGEL 7.5

## (undated) DEVICE — CANISTER SUCTION 2 LTR

## (undated) DEVICE — BIT DRILL AO 2.6X135MM SCALED

## (undated) DEVICE — SUT VICRYL PLUS 0 CT1 36IN

## (undated) DEVICE — SUT VICRYL+ 27 UR-6 VIOL

## (undated) DEVICE — SEE MEDLINE ITEM 152523